# Patient Record
Sex: FEMALE | Race: AMERICAN INDIAN OR ALASKA NATIVE
[De-identification: names, ages, dates, MRNs, and addresses within clinical notes are randomized per-mention and may not be internally consistent; named-entity substitution may affect disease eponyms.]

---

## 2017-04-17 NOTE — HISTORY AND PHYSICAL REPORT
History of Present Illness


Chief complaint: 





chest palpitations


History of present illness: 





60 YO Female with HTN, CVA, MI, CHF, Atrial Fib, DM, CKD, COPD, HLD, Bipolar 

Disorder, CAD S/P Stent Placement presents to ED for evaluation.  Pt states 

that she has been experienced a "racing heart" today. Pt symptoms accompanied 

by chest pain and shortness of breath. Pain is 6/10, substernal, nonradiating, 

not worsened with exertion, or relieved with rest. Pt denies productive cough, 

leg swelling, calf pain, NVD, syncope, dizziness, or recent ill contacts. 








Past History


Past Medical History: acute MI, atrial fib, CAD, COPD, diabetes, heart failure, 

hypertension, hyperlipidemia


Past Surgical History: Other (stent placement)


Social history: , smoking.  denies: alcohol abuse, prescription drug 

abuse, IV drug use


Family history: diabetes, hypertension





Medications and Allergies


 Allergies











Allergy/AdvReac Type Severity Reaction Status Date / Time


 


apple AdvReac  Hives Verified 12/22/15 14:57


 


shell fish Allergy  Swelling Uncoded 12/22/15 14:57











 Home Medications











 Medication  Instructions  Recorded  Confirmed  Last Taken  Type


 


Carvedilol [Coreg] 12.5 mg PO BID 12/11/15 04/17/17 Unknown History


 


Clopidogrel [Plavix] 75 mg PO QDAY 12/11/15 04/17/17 Unknown History


 


Gabapentin [Neurontin] 300 mg PO BID 12/11/15 04/17/17 Unknown History


 


Amlodipine Besylate [Norvasc] 10 mg PO DAILY 04/17/17 04/17/17 Unknown History


 


Apixaban [Eliquis] 5 mg PO DAILY 04/17/17 04/17/17 Unknown History


 


ISOSORBIDE MONOnitrate [Imdur ER] 30 mg PO DAILY 04/17/17 04/17/17 Unknown 

History


 


Lisinopril [Zestril] 5 mg PO QDAY 04/17/17 04/17/17 Unknown History


 


Nitroglycerin [Nitrostat] 0.4 mg SL Q5M PRN 04/17/17 04/17/17 Unknown History


 


cloNIDine [Catapres] 0.2 mg PO BID 04/17/17 04/17/17 Unknown History














Review of Systems


All systems: negative


Cardiovascular: palpitations, shortness of breath





Exam





- Constitutional


Vitals: 


 











Temp Pulse Resp BP Pulse Ox


 


 98.3 F   89   22   105/69   97 


 


 04/17/17 11:27  04/17/17 13:30  04/17/17 13:30  04/17/17 13:30  04/17/17 13:30











General appearance: Present: mild distress, well-nourished





- EENT


Eyes: Present: PERRL


ENT: hearing intact, clear oral mucosa





- Neck


Neck: Present: supple, normal ROM





- Respiratory


Respiratory effort: normal


Respiratory: bilateral: diminished





- Cardiovascular


Rhythm: irregularly irregular


Heart Sounds: Absent: rub, click





- Extremities


Extremities: pulses symmetrical, No edema


Extremity abnormal: edema


Peripheral Pulses: within normal limits





- Abdominal


General gastrointestinal: Present: soft, non-tender, non-distended, normal 

bowel sounds


Female genitourinary: Present: normal





- Integumentary


Integumentary: Present: clear, warm, dry





- Musculoskeletal


Musculoskeletal: gait normal, strength equal bilaterally





- Psychiatric


Psychiatric: appropriate mood/affect, intact judgment & insight





- Neurologic


Neurologic: CNII-XII intact, moves all extremities





Results





- Labs


CBC & Chem 7: 


 04/17/17 12:03





 04/17/17 16:12


Labs: 


 Abnormal lab results











  04/17/17 04/17/17 Range/Units





  12:03 12:03 


 


WBC  12.3 H   (4.5-11.0)  K/mm3


 


RDW  17.4 H   (13.2-15.2)  %


 


PT   16.0 H  (12.2-14.9)  Sec.


 


INR   1.29 H  (0.87-1.13)  


 


APTT   48.6 H  (24.2-36.6)  Sec.


 


D-Dimer   260.88 H  (0-234)  ng/mlDDU














Assessment and Plan





- Patient Problems


(1) ACS (acute coronary syndrome)


Current Visit: Yes   Status: Acute   


Plan to address problem: 


Cardiology consulted: Chest pain protocol. serial cardiac enzymes, ekg, 

telemetry monitoring, stress test in AM as per cardiology








(2) Atrial fibrillation with rapid ventricular response


Current Visit: Yes   Status: Acute   


Plan to address problem: 


continue current therapy, rate controlled. condinue cardizem, cardiology team 

consulted. 








(3) Hypertension


Current Visit: Yes   Status: Chronic   


Qualifiers: 


   Hypertension type: H 


Plan to address problem: 


monitor bp q shift, continue current therapy








(4) DVT prophylaxis


Current Visit: Yes   Status: Acute

## 2017-04-17 NOTE — CONSULTATION
History of Present Illness


Consult date: 04/17/17


Consult reason: chest pain


History of present illness: 





This is a 61yr old woman visiting from [place] who presents to the ED with 

complaints of chest pain. Patient gives a history of coronary artery disease 

status post coronary intervention 3 months ago. She has a history of atrial 

fibrillation and is on eliquis for oral anticoagulation. Patient also continues 

to smoke. Her presenting ECG shows atrial fibrillation with rapid ventricular 

response. Initial set of cardiac enzymes are normal. Patient admitted to the 

hospital and a cardiac consultation requested for chest pain.





Past History


Past Medical History: atrial fib, CAD, hypertension





Medications and Allergies


 Allergies











Allergy/AdvReac Type Severity Reaction Status Date / Time


 


apple AdvReac  Hives Verified 12/22/15 14:57


 


shell fish Allergy  Swelling Uncoded 12/22/15 14:57











 Home Medications











 Medication  Instructions  Recorded  Confirmed  Last Taken  Type


 


Carvedilol [Coreg] 12.5 mg PO BID 12/11/15 04/17/17 Unknown History


 


Clopidogrel [Plavix] 75 mg PO QDAY 12/11/15 04/17/17 Unknown History


 


Gabapentin [Neurontin] 300 mg PO BID 12/11/15 04/17/17 Unknown History


 


Amlodipine Besylate [Norvasc] 10 mg PO DAILY 04/17/17 04/17/17 Unknown History


 


Apixaban [Eliquis] 5 mg PO DAILY 04/17/17 04/17/17 Unknown History


 


ISOSORBIDE MONOnitrate [Imdur ER] 30 mg PO DAILY 04/17/17 04/17/17 Unknown 

History


 


Lisinopril [Zestril] 5 mg PO QDAY 04/17/17 04/17/17 Unknown History


 


Nitroglycerin [Nitrostat] 0.4 mg SL Q5M PRN 04/17/17 04/17/17 Unknown History


 


cloNIDine [Catapres] 0.2 mg PO BID 04/17/17 04/17/17 Unknown History











Active Meds: 


Active Medications





Acetaminophen (Tylenol)  650 mg PO Q4H PRN


   PRN Reason: Pain MILD(1-3)/Fever >100.5/HA


Albuterol/Ipratropium (Duoneb 0.5 Mg-3 Mg/3 Ml Soln)  1 ampul IH Q6HRT PRN


   PRN Reason: Wheezing


Apixaban (Eliquis)  5 mg PO DAILY DERIK


   PRN Reason: Protocol


Bisacodyl (Dulcolax)  10 mg [place] QDAY PRN


   PRN Reason: Constipation unrelieved by MOM


Carvedilol (Coreg)  12.5 mg PO BID DERIK


Clonidine HCl (Catapres)  0.2 mg PO BID DERIK


Clopidogrel Bisulfate (Plavix)  75 mg PO QDAY DERIK


Gabapentin (Neurontin)  300 mg PO BID DERIK


Isosorbide Mononitrate (Imdur)  30 mg PO DAILY DERIK


Lisinopril (Zestril)  5 mg PO QDAY DERIK


Magnesium Hydroxide (Milk Of Magnesia)  30 ml PO Q4H PRN


   PRN Reason: Constipation


Miscellaneous Medication (Amlodipine Besylate [Norvasc])  10 mg PO DAILY DERIK


Nitroglycerin (Nitrostat)  0.4 mg SL Q5M PRN


   PRN Reason: Chest Pain


Ondansetron HCl (Zofran)  4 mg IV Q8H PRN


   PRN Reason: N/V unrelieved by Reglan











Physical Examination


 Vital Signs











Pulse BP


 


 140 H  110/78 


 


 04/17/17 11:20  04/17/17 11:20











General appearance: no acute distress


HEENT: Positive: PERRL


Neck: Positive: trachea midline


Cardiac: Positive: irregularly irregular





Results





 04/17/17 12:03





 04/17/17 12:03


 Coagulation











  04/17/17 Range/Units





  12:03 


 


PT  16.0 H  (12.2-14.9)  Sec.


 


INR  1.29 H  (0.87-1.13)  


 


APTT  48.6 H  (24.2-36.6)  Sec.








 CBC











  04/17/17 Range/Units





  12:03 


 


WBC  12.3 H  (4.5-11.0)  K/mm3


 


RBC  4.41  (3.65-5.03)  M/mm3


 


Hgb  12.5  (10.1-14.3)  gm/dl


 


Hct  41.5  (30.3-42.9)  %


 


Plt Count  239  (140-440)  K/mm3


 


Lymph #  Np  


 


Mono #  Np  


 


Eos #  Np  


 


Baso #  Np  














EKG interpretations





- EKG


Supraventricular dysrhythmia: atrial fibrillation





Assessment and Plan





Chest pain


Persistent Afib


   on eliquis for anticoagulation as an outpatient


Hx of CAD s/p recent PCI 


   on plavix therapy


Hypertension


Tobacco abuse

## 2017-04-17 NOTE — ADMIT CRITERIA FORM
Admission Criteria Documentation: 





                                           TELEMETRY CARE





Telemetry Admission Guidelines





                                                             (Place 'X' for any 

and all applicable criteria):





Admission to telemetry [A] may be indicated for ANY ONE of the following(1)(2)(3

)(4)(5):


[X ]I.    Cardiac disease, including ANY ONE  of the following (9)(10)(11)(12)(

13):   


         [ ]a)   Postacute MI


         [ ]b)   Low-risk patients with ST-segment elevation  MI who have 

undergone successful percutaneous coronary intervention


         [ ]c)   Unstable angina             


         [ ]d)   Suspected MI (until it is ruled out)


         [ ]e)   Post cardiac  surgery (first 48 to 72 hours unless 

complications occur)


         [X ]f)    Acute arrhythmias (including significant tachycardia or 

bradycardia) [B]


         [ ]g)   Firing of an implantable cardioverter defibrillator [C]


         [ ]h)   Suspected pacemaker or implantable cardioverter defibrillator 

malfunction (10)


         [ ]i)    New administration or adjustment of an antiarrhythmic drug [D

] 


         [ ]j)    Child admitted for acute congestive heart failure            

  


         [ ]j)    Long QT syndrome 


         [ ]k)   Advanced heart block (eg, second-degree Mobitz  type II, third-

degree heart block)


         [ ]l)    Acute myocarditis or pericarditis


         [ ]m)  Short-term (ambulatory or inpatient) monitoring after a cardiac

  procedure as indicated  by ANY ONE of  the following [E]:


                  [ ]i)     Electrophysiologic studies                          


                  [ ]ii)    Percutaneous coronary  intervention with stent 

placement


                  [ ]iii)   Pacemaker placement with cardiac  conduction defect 


                  [ ]iv)   Implantable cardiac  defibrillator placement


[ ]II.   Drug overdose  or poisoning with substance that causes arrhythmias or 

QT prolongation (eg, phenothiazines, sympathomimetic agents, 


         cyclic antidepressants, digitalis, antiarrhythmic drugs)(15)


[ ]III.  Short-term (ambulatory or inpatient) monitoring after therapeutic or 

diagnostic procedure requiring 


         conscious sedation or anesthesia (eg, endoscopy, elective   

cardioversion)


[ ]IV.  Acute cerebrovascular even[F](18)


[ ]V.   Massive blood transfusion (eg, at least 10 units of packed red blood 

cells in 24 hours)


[ ]VI.  Variceal bleeding after endoscopy, sclerotherapy, or IV vasopressin


[ ]VII. Uncorrected electrolyte  abnormalities associated with an increased  

risk of dangerous arrhythmia [G]; examples include


         [ ]a)   Hyperkalemia with attributable ECG changes


         [ ]b)   Potassium greater  than 6.5 mmol/L  (mEq/L) in a patient  

without  history of chronic  renal disease 


         [ ]c)   Prolonged QT attributed to hypokalemia,   hypomagnesemia, or 

hypocalcemia


[ ]VIII.Unexplained syncope  or other neurologic event suspected of being due 

to arrhythmia due to a finding that increases risk; 


        examples include(19)(20)(21):


        [ ]a)   High-risk ECG findings (eg, bifascicular block, bradycardia, 

abnormal QT interval,  ventricular pre- excitation)


        [ ]b)   History of previous  syncope  due to arrhythmia


        [ ]c)   Abnormal ventricular function  (eg, reduced  ejection  fraction

)      


        [ ]d)   Exertional or supine syncope


        [ ]e)   Concerning syncope  characteristics (eg, sudden loss of 

consciousness without  prodrome)


        [ ]f)    Family history of sudden  death


        [ ]g)   Use of arrhythmogenic medication          


        [ ]h)   Suspected cardiac  ischemia


        [ ]i)    Known channelopathy (eg, long QT syndrome, Brugada syndrome, 

or catecholaminergic paroxysmal ventricular tachycardia)


        [ ]j)    Known structural heart disease (eg, hypertrophic cardiomyopathy

, severe valvular disease)


        [ ]k)   Palpitations preceding syncope











The original [employer] content created by [employer] has been revised. 


The portions of  the content which have been revised are identified through the 

use of italic text or in bold, and [employer] [employer] 


has neither reviewed nor approved the modified material. All other unmodified 

content is copyright  [employer].





Please see references footnoted in the original [employer] edition 

2016





Admission Criteria Met: Yes

## 2017-04-17 NOTE — EMERGENCY DEPARTMENT REPORT
ED Palpitations HPI





- General


Chief Complaint: Chest Pain


Stated Complaint: CHEST PAIN


Time Seen by Provider: 04/17/17 11:26


Source: patient, EMS


Mode of arrival: Stretcher


Limitations: No Limitations





- History of Present Illness


MD Complaint: rapid heart beat, "heart racing", "skipped beats", palpitations, 

irregular heart beat, atrial fibrillation


-: Sudden


Context: occured during rest


Arrythmia History: atrial fibrillation


Associated Symptoms: chest pain, shortness of breath.  denies: syncope, near-

syncope, nausea/vomiting, anxiety, diaphoresis, cough, parasthesias





- Related Data


 Home Medications











 Medication  Instructions  Recorded  Confirmed  Last Taken


 


Carvedilol [Coreg] 12.5 mg PO BID 12/11/15 04/17/17 Unknown


 


Clopidogrel [Plavix] 75 mg PO QDAY 12/11/15 04/17/17 Unknown


 


Gabapentin [Neurontin] 300 mg PO BID 12/11/15 04/17/17 Unknown


 


Amlodipine Besylate [Norvasc] 10 mg PO DAILY 04/17/17 04/17/17 Unknown


 


Apixaban [Eliquis] 5 mg PO DAILY 04/17/17 04/17/17 Unknown


 


ISOSORBIDE MONOnitrate [Imdur ER] 30 mg PO DAILY 04/17/17 04/17/17 Unknown


 


Lisinopril [Zestril] 5 mg PO QDAY 04/17/17 04/17/17 Unknown


 


Nitroglycerin [Nitrostat] 0.4 mg SL Q5M PRN 04/17/17 04/17/17 Unknown


 


cloNIDine [Catapres] 0.2 mg PO BID 04/17/17 04/17/17 Unknown











 Allergies











Allergy/AdvReac Type Severity Reaction Status Date / Time


 


apple AdvReac  Hives Verified 12/22/15 14:57


 


shell fish Allergy  Swelling Uncoded 12/22/15 14:57














ED Review of Systems


ROS: 


Stated complaint: CHEST PAIN


Other details as noted in HPI





Constitutional: denies: chills, fever


Eyes: denies: eye pain, eye discharge, vision change


ENT: denies: ear pain, throat pain


Respiratory: denies: cough, shortness of breath, wheezing


Cardiovascular: denies: chest pain, palpitations


Endocrine: no symptoms reported


Gastrointestinal: denies: abdominal pain, nausea, diarrhea


Genitourinary: denies: urgency, dysuria, discharge


Musculoskeletal: denies: back pain, joint swelling, arthralgia


Skin: denies: rash, lesions


Neurological: denies: headache, weakness, paresthesias


Psychiatric: denies: anxiety, depression


Hematological/Lymphatic: denies: easy bleeding, easy bruising





ED Past Medical Hx





- Past Medical History


Hx Hypertension: Yes


Hx CVA: Yes


Hx Heart Attack/AMI: Yes


Hx Congestive Heart Failure: Yes


Hx Diabetes: Yes


Hx Renal Disease: Yes (RENAL INSUFFICIENCY)


Hx Psychiatric Treatment: Yes (BIPOLAR/SCHIZOPHRENIA)


Hx COPD: Yes


Additional medical history: HIGH CHOLESTEROL





- Surgical History


Hx Coronary Stent: Yes (x 2 in July 2015)


Additional Surgical History: stent placement x 2 in 2015





- Social History


Smoking Status: Light Tobacco Smoker





- Medications


Home Medications: 


 Home Medications











 Medication  Instructions  Recorded  Confirmed  Last Taken  Type


 


Carvedilol [Coreg] 12.5 mg PO BID 12/11/15 04/17/17 Unknown History


 


Clopidogrel [Plavix] 75 mg PO QDAY 12/11/15 04/17/17 Unknown History


 


Gabapentin [Neurontin] 300 mg PO BID 12/11/15 04/17/17 Unknown History


 


Amlodipine Besylate [Norvasc] 10 mg PO DAILY 04/17/17 04/17/17 Unknown History


 


Apixaban [Eliquis] 5 mg PO DAILY 04/17/17 04/17/17 Unknown History


 


ISOSORBIDE MONOnitrate [Imdur ER] 30 mg PO DAILY 04/17/17 04/17/17 Unknown 

History


 


Lisinopril [Zestril] 5 mg PO QDAY 04/17/17 04/17/17 Unknown History


 


Nitroglycerin [Nitrostat] 0.4 mg SL Q5M PRN 04/17/17 04/17/17 Unknown History


 


cloNIDine [Catapres] 0.2 mg PO BID 04/17/17 04/17/17 Unknown History














ED Physical Exam





- General


Limitations: No Limitations


General appearance: alert, in no apparent distress





- Head


Head exam: Present: atraumatic, normocephalic





- Eye


Eye exam: Present: normal appearance





- ENT


ENT exam: Present: mucous membranes moist





- Neck


Neck exam: Present: normal inspection





- Respiratory


Respiratory exam: Present: normal lung sounds bilaterally.  Absent: respiratory 

distress, wheezes, rales, rhonchi, stridor





- Cardiovascular


Cardiovascular Exam: Present: tachycardia, irregular rhythm.  Absent: systolic 

murmur, diastolic murmur, rubs, gallop





- GI/Abdominal


GI/Abdominal exam: Present: soft, normal bowel sounds





- Extremities Exam


Extremities exam: Present: normal inspection





- Back Exam


Back exam: Present: normal inspection





- Neurological Exam


Neurological exam: Present: alert, oriented X3





- Psychiatric


Psychiatric exam: Present: normal affect, normal mood





- Skin


Skin exam: Present: warm, dry, intact, normal color.  Absent: rash





ED Course


 Vital Signs











  04/17/17 04/17/17 04/17/17





  11:20 11:27 11:35


 


Temperature  98.3 F 


 


Pulse Rate 140 H 129 H 140 H


 


Respiratory  22 





Rate   


 


Blood Pressure 110/78 114/81 117/67


 


Blood Pressure   





[Left]   


 


O2 Sat by Pulse  100 





Oximetry   














  04/17/17 04/17/17 04/17/17





  12:00 13:13 13:15


 


Temperature   


 


Pulse Rate 104 H 136 H 124 H


 


Respiratory 18 17 16





Rate   


 


Blood Pressure   93/71


 


Blood Pressure 114/78  





[Left]   


 


O2 Sat by Pulse 99 99 96





Oximetry   














  04/17/17 04/17/17 04/17/17





  13:21 13:25 13:29


 


Temperature   


 


Pulse Rate 118 H 133 H 143 H


 


Respiratory 19 19 





Rate   


 


Blood Pressure 96/70 96/70 113/78


 


Blood Pressure   





[Left]   


 


O2 Sat by Pulse 99 98 





Oximetry   














  04/17/17





  13:30


 


Temperature 


 


Pulse Rate 89


 


Respiratory 22





Rate 


 


Blood Pressure 105/69


 


Blood Pressure 





[Left] 


 


O2 Sat by Pulse 97





Oximetry 














ED Medical Decision Making





- Lab Data


Result diagrams: 


 04/17/17 12:03








- EKG Data


Interpretation: other (a-fib with rvr)





- Radiology Data


Radiology results: report reviewed, image reviewed





- Medical Decision Making





patient stable after doses of metoprolol and dilt here in the ER, consulted 

cardiolgoy who are seeing the patient , cbc and coags within normal limits , 

waiting on chemistry and troponin,.





HR now at 90 to 100/min , bp well maintained , Spoke to hospitalist and agree 

with plan for admission.


Critical care time in (mins) excluding proc time.: 35


Critical care attestation.: 


If time is entered above; I have spent that time in minutes in the direct care 

of this critically ill patient, excluding procedure time.








ED Disposition


Clinical Impression: 


 Afib, Hypertension, Acute chest pain





Disposition: OP ADMITTED AS IP TO THIS HOSP


Is pt being admited?: Yes


Does the pt Need Aspirin: No


Condition: Fair


Instructions:  Hypertension (ED), Chest Pain (ED)


Time of Disposition: 14:19

## 2017-04-17 NOTE — XRAY REPORT
AP CHEST:



HISTORY: Dysrhythmia



AP view of the chest demonstrates a normal mediastinal and

cardiac contour with clear lungs and normal bony and soft tissue

structures.



IMPRESSION:

Unremarkable AP chest. No change since 12/10/15.

## 2017-04-18 NOTE — QUERY- CHEST PAIN
Deadakotah Russell____Jas_______________  Date:_____4/18/17____________   



/CDS:____Tj Schwartzabhenry____________ Phone#:____[phone]___________



Exercise your independent professional judgment when responding to query.  

Questions asked do not imply a particular answer is desired or expected. We 
greatly appreciate your clarification on this issue.           

Clinical Documentation States:



61 year old female was admitted on 4/17/17.



The cadiology consult note states" Consult reason: chest pain. Assessment and 
Plan Chest pain Persistent Afib on eliquis for anticoagulation as an outpatient 
Hx of CAD s/p recent PCI on plavix therapy Hypertension Tobacco abuse. Rule out 
MI protocol, followed by a Persantin thallium stress test for further 
assessment of coronary artery disease and chest pain. "



The H&P states " Assessment and Plan



- Patient Problems

(1) ACS (acute coronary syndrome)

Current Visit: Yes   Status: Acute   

Plan to address problem: 

Cardiology consulted: Chest pain protocol. serial cardiac enzymes, ekg, 
telemetry monitoring, stress test in AM as per cardiology



(2) Atrial fibrillation with rapid ventricular response

Current Visit: Yes   Status: Acute   

Plan to address problem: 

continue current therapy, rate controlled. condinue cardizem, cardiology team 
consulted.  "

     



Please document the etiology of Chest Pain:



[ ]  Myocardial Infarction

[ ]  Pneumonia

[ ]  Mediastinitis

[X ]  Costochondritis

[ ]  Pulmonary  Embolism

[ ]  Coronary  Artery  Disease

[ ]  GERD

[ ]  Other:__________    

[ ]  Comment/Explanation:____________







Present on Admission: [ Y] Yes (Y)      [ ] Clinically undeterminable (W)     [ 
] No(N)  



Please  document response in your Progress Notes and/or Discharge Summary and 
indicate 

if the condition was present on admission.
MTDD

## 2017-04-18 NOTE — DISCHARGE SUMMARY
Providers





- Providers


Date of Admission: 


04/17/17 14:27





Date of discharge: 04/18/17


Attending physician: 


MUMTAZ QUESADA MD





 





04/17/17


Consult to Cardiac Rehabilitation [CONS] Routine 


   Reason For Exam: Phase I











Primary care physician: 


PRIMARY CARE MD








Hospitalization


Reason for admission: chest pain


Condition: Stable


Hospital course: 


62 YO Female with HTN, CVA, MI, CHF, Atrial Fib, DM, CKD, COPD, HLD, Bipolar 

Disorder, CAD S/P Stent Placement presents to ED for evaluation.  Pt states 

that she has been experienced a "racing heart" today. Pt symptoms accompanied 

by chest pain and shortness of breath. Pain is 6/10, substernal, nonradiating, 

not worsened with exertion, or relieved with rest. Pt denies productive cough, 

leg swelling, calf pain, NVD, syncope, dizziness, or recent ill contacts.  For 

full evaluation of the patient the patient wants a stress test which was 

unremarkable.  Cardiology recommended medical therapy for CAD including dual 

oral antiplatelet platelet medication.  I did also discuss the patient she 

states she's been having a chronic pain in her chest wall area that has been 

ongoing for months and this reproducible.  She denies any association of 

shortness of breath at this time.  I did provide about 15 minutes of counseling 

to the patient the need to quit tobacco use she verbalizes that she has cut 

down from 4 packs a day to 3 cigarettes daily encouraged her to continue on 

that pathway totally quit considered history.





Discharge diagnosis


* Atypical chest pain likely secondary to costochondritis


* CAD


* Atrial fibrillation with rapid ventricular response


* Mild protein caloric malnutrition


* Hypertension


* Tobacco abuse


* Hypokalemia


* CKD


* COPD


* Bipolar disorder


Disposition: DISCHARGED TO HOME OR SELFCARE


Time spent for discharge: 35 mins





Core Measure Documentation





- Palliative Care


Palliative Care/ Comfort Measures: Not Applicable





- Core Measures


Any of the following diagnoses?: none





- VTE Discharge Requirements


Deep Vein Thrombosis/Pulmonary Embolism Present on Admission: No





Exam





- Physical Exam


Narrative exam: 


 VITAL SIGNS:  Reviewed.    


GENERAL:  The patient appeared well nourished and normally developed. Vital 

signs as documented.


HEAD:  No signs of head trauma.


EYES:  Pupils are equal.  Extraocular motions intact.  


EARS:  Hearing grossly intact.


MOUTH:  Oropharynx is normal. 


NECK:  No adenopathy, no JVD.   


CHEST:  Chest with clear breath sounds bilaterally.  No wheezes, rales, or 

rhonchi.  


CARDIAC:  Regular rate and rhythm.  S1 and S2, without murmurs, gallops, or 

rubs.


VASCULAR:  No Edema.  Peripheral pulses normal and equal in all extremities.


ABDOMEN:  Soft, without detectable tenderness.  No sign of distention.  No   

rebound or guarding, and no masses palpated.   Bowel Sounds normal.


MUSCULOSKELETAL:  Good range of motion of all major joints. Extremities without 

clubbing, cyanosis or edema.  


NEUROLOGIC EXAM:  Alert and oriented x 3.  No focal sensory or strength 

deficits.   Speech normal.  Follows commands.


PSYCHIATRIC:  Mood normal.


SKIN:  No rash or lesions.














- Constitutional


Vitals: 


 











Temp Pulse Resp BP Pulse Ox


 


 98.2 F   49 L  18   110/64   99 


 


 04/18/17 05:17  04/18/17 08:07  04/18/17 05:17  04/18/17 05:17  04/18/17 05:17














Plan


Activity: advance as tolerated, fall precautions


Diet: low salt


Special Instructions: record daily weights, record daily BP diary


Follow up with: 


[facility] [Provider Group] - 7 Days


PRIMARY CARE,MD [Primary Care Provider] - 3-5 Days


Prescriptions: 


amLODIPine [Norvasc] 10 mg PO DAILY #30 tablet


Diltiazem [Cardizem] 30 mg PO Q6HR #90 tablet

## 2017-04-18 NOTE — PROGRESS NOTE
Assessment and Plan





Medical therapy for coronary artery disease including dual oral antiplatelet 

therapy.  Medical therapy for her despite atrial fibrillation including oral 

anticoagulation therapy.





Subjective


Date of service: 04/18/17


Interval history: 





Patient underwent a Persantine thallium stress test today, normal myocardial 

perfusion study.


There is no further chest pain, no shortness of breath and no palpitations.





Objective


 Vital Signs











  Temp Pulse Pulse Resp BP BP Pulse Ox


 


 04/18/17 10:47   75    139/78  


 


 04/18/17 10:46   76    139/78  


 


 04/18/17 10:45   76    141/76  


 


 04/18/17 10:44   82    121/74  


 


 04/18/17 10:43   72    151/78  


 


 04/18/17 10:04   50 L    142/79  


 


 04/18/17 08:07   49 L     


 


 04/18/17 07:30  98.0 F   52 L  18   127/74  98


 


 04/18/17 05:17  98.2 F   56 L  18   110/64  99


 


 04/18/17 04:00   52 L     


 


 04/18/17 00:24  97.6 F   66  20   121/68  97


 


 04/17/17 21:21        98


 


 04/17/17 20:13  98.9 F   65  18   116/86  99


 


 04/17/17 17:05  99.2 F   72  72 H   109/75  97


 


 04/17/17 16:02   132 H     


 


 04/17/17 15:25   132 H   18  103/67   99


 


 04/17/17 15:21   103 H   18  103/67   99


 


 04/17/17 15:15   137 H   15  103/67   98


 


 04/17/17 15:11   112 H   17  103/67   98


 


 04/17/17 15:05   118 H   18  103/67   98


 


 04/17/17 15:00   128 H   33 H  103/67   97


 


 04/17/17 14:55   126 H   26 H  110/73   99


 


 04/17/17 14:50   116 H   20  110/73   99


 


 04/17/17 14:45   108 H   16  110/73   100


 


 04/17/17 14:41   113 H   21  110/73   99


 


 04/17/17 14:35   117 H   13  110/73   98


 


 04/17/17 14:30   106 H   16  110/73  














- Physical Examination


General: No Apparent Distress


HEENT: Positive: PERRL


Neck: Positive: trachea midline


Cardiac: Positive: irregularly irregular


Lungs: Positive: Decreased Breath Sounds


Neuro: Positive: Grossly Intact


Abdomen: Positive: Soft


Skin: Positive: Clear


Extremities: Absent: edema





- Labs and Meds


 Cardiac Enzymes











  04/17/17 04/18/17 Range/Units





  23:00 02:18 


 


CK-MB (CK-2)  3.4  3.0  (0.0-4.0)  ng/mL








 Comprehensive Metabolic Panel











  04/17/17 Range/Units





  16:12 


 


Sodium  143  (137-145)  mmol/L


 


Potassium  3.2 L  (3.6-5.0)  mmol/L


 


Chloride  109.4 H  ()  mmol/L


 


Carbon Dioxide  20 L D  (22-30)  mmol/L


 


BUN  20 H  (7-17)  mg/dL


 


Creatinine  1.3 H  (0.7-1.2)  mg/dL


 


Glucose  149 H  ()  mg/dL


 


Calcium  8.6  (8.4-10.2)  mg/dL

## 2017-04-19 NOTE — TREADMILL REPORT
THALLIUM STRESS TEST



LEFT VENTRICLE:  Left ventricular chamber size is within normal.  Perfusion

study demonstrates homogeneous uptake of the tracer in all segments.  No

significant defects identified.  Gated analysis was not available.



CONCLUSION:  Normal myocardial perfusion study.





DD: 04/18/2017 13:37

DT: 04/19/2017 01:50

[facility]# [id number]  [id number]

CA/NTS

## 2017-09-22 NOTE — EMERGENCY DEPARTMENT REPORT
ED Chest Pain HPI





- General


Chief Complaint: Chest Pain


Stated Complaint: CHEST PAIN


Time Seen by Provider: 09/22/17 13:33


Source: patient, EMS


Mode of arrival: Stretcher


Limitations: No Limitations





- History of Present Illness


Initial Comments: 


Patient is a very poor historian.  She states that she has no local physician 

and that she is not from this area.  Despite this apparently probably 

unreliable information, she states that she was just admitted to [facility] for chest pain.  She states that she "can't remember" if she had a 

cardiac catheterization or not.  She was actually admitted here in April 2017.  

At that time she had a nuclear perfusion scan that was read by Dr. Bacon as 

normal.  The patient states that she is from [place] and just visiting 

relatives here.  This seems quite dubious considering her multiple admissions 

at this facility and recent admission to [facility].  In any case she 

said that she had a stent placed [facility] approximately last January.





Patient states that she took 3 nitroglycerin for her chest pain prior to 

calling EMS.  She claims that she has been compliant with her antihypertensive 

regimen.  However she arrives with severely uncontrolled hypertension.  She 

complains of associated nausea and shortness of breath.


MD Complaint: chest pain


-: hour(s)


Onset: during rest


Pain Location: substernal


Severity scale (0 -10): 8


Quality: tightness


Consistency: constant


Improves With: nothing


Worsens With: nothing


Context: other (history of PCI)


re: nausea, dyspnea


Other Symptoms: denies: cough, fever, syncope


Treatments Prior to Arrival: none


Aspirin use within the Past 7 Days: (0) No (patient states that she is not 

taking Plavix although her reconciliation list this as above)





- Related Data


 Home Medications











 Medication  Instructions  Recorded  Confirmed  Last Taken


 


Carvedilol [Coreg] 12.5 mg PO BID 12/11/15 04/17/17 Unknown


 


Clopidogrel [Plavix] 75 mg PO QDAY 12/11/15 04/17/17 Unknown


 


Gabapentin [Neurontin] 300 mg PO BID 12/11/15 04/17/17 Unknown


 


Apixaban [Eliquis] 5 mg PO DAILY 04/17/17 04/17/17 Unknown


 


ISOSORBIDE MONOnitrate [Imdur ER] 30 mg PO DAILY 04/17/17 04/17/17 Unknown


 


Lisinopril [Zestril TAB] 5 mg PO QDAY 04/17/17 04/17/17 Unknown


 


Nitroglycerin [Nitrostat] 0.4 mg SL Q5M PRN 04/17/17 04/17/17 Unknown


 


cloNIDine [Catapres] 0.2 mg PO BID 04/17/17 04/17/17 Unknown








 Previous Rx's











 Medication  Instructions  Recorded  Last Taken  Type


 


Diltiazem [Cardizem] 30 mg PO Q6HR #90 tablet 04/18/17 Unknown Rx


 


amLODIPine [Norvasc] 10 mg PO DAILY #30 tablet 04/18/17 Unknown Rx











 Allergies











Allergy/AdvReac Type Severity Reaction Status Date / Time


 


apple AdvReac  Hives Verified 09/22/17 13:02


 


shell fish Allergy  Swelling Uncoded 09/22/17 13:02














Heart Score





- HEART Score


History: Moderately suspicious


EKG: Non-specific


Age: 45-65


Risk factors: > 3 risk factors or hx of atherosclerotic disease


Troponin: < normal limit


HEART Score: 5





- Critical Actions


Critical Actions: 4-6 pts:12-16.6% risk of adverse cardiac event. Should be 

admitted





ED Review of Systems


ROS: 


Stated complaint: CHEST PAIN


Other details as noted in HPI





Constitutional: denies: chills, fever


Eyes: denies: eye pain, eye discharge, vision change


ENT: denies: ear pain, throat pain


Respiratory: shortness of breath.  denies: cough, wheezing


Cardiovascular: chest pain.  denies: palpitations


Endocrine: no symptoms reported


Gastrointestinal: nausea.  denies: abdominal pain, diarrhea


Genitourinary: denies: urgency, dysuria, discharge


Musculoskeletal: denies: back pain, joint swelling, arthralgia


Skin: denies: rash, lesions


Neurological: denies: headache, weakness, paresthesias


Psychiatric: denies: anxiety, depression


Hematological/Lymphatic: denies: easy bleeding, easy bruising





ED Past Medical Hx





- Past Medical History


Previous Medical History?: Yes


Hx Hypertension: Yes


Hx CVA: Yes


Hx Heart Attack/AMI: Yes


Hx Congestive Heart Failure: Yes


Hx Diabetes: Yes


Hx Renal Disease: Yes (RENAL INSUFFICIENCY)


Hx Psychiatric Treatment: Yes (BIPOLAR/SCHIZOPHRENIA)


Hx COPD: Yes


Additional medical history: HIGH CHOLESTEROL





- Surgical History


Hx Coronary Stent: Yes (x 2 in July 2015)


Additional Surgical History: stent placement x 2 in 2015





- Social History


Smoking Status: Current Every Day Smoker


Substance Use Type: None





- Medications


Home Medications: 


 Home Medications











 Medication  Instructions  Recorded  Confirmed  Last Taken  Type


 


Carvedilol [Coreg] 12.5 mg PO BID 12/11/15 04/17/17 Unknown History


 


Clopidogrel [Plavix] 75 mg PO QDAY 12/11/15 04/17/17 Unknown History


 


Gabapentin [Neurontin] 300 mg PO BID 12/11/15 04/17/17 Unknown History


 


Apixaban [Eliquis] 5 mg PO DAILY 04/17/17 04/17/17 Unknown History


 


ISOSORBIDE MONOnitrate [Imdur ER] 30 mg PO DAILY 04/17/17 04/17/17 Unknown 

History


 


Lisinopril [Zestril TAB] 5 mg PO QDAY 04/17/17 04/17/17 Unknown History


 


Nitroglycerin [Nitrostat] 0.4 mg SL Q5M PRN 04/17/17 04/17/17 Unknown History


 


cloNIDine [Catapres] 0.2 mg PO BID 04/17/17 04/17/17 Unknown History


 


Diltiazem [Cardizem] 30 mg PO Q6HR #90 tablet 04/18/17  Unknown Rx


 


amLODIPine [Norvasc] 10 mg PO DAILY #30 tablet 04/18/17  Unknown Rx














ED Physical Exam





- General


Limitations: No Limitations


General appearance: alert, in no apparent distress





- Head


Head exam: Present: atraumatic, normocephalic





- Eye


Eye exam: Present: normal appearance.  Absent: scleral icterus





- ENT


ENT exam: Present: mucous membranes moist





- Neck


Neck exam: Present: normal inspection





- Respiratory


Respiratory exam: Present: normal lung sounds bilaterally.  Absent: respiratory 

distress





- Cardiovascular


Cardiovascular Exam: Present: regular rate, normal rhythm.  Absent: systolic 

murmur, diastolic murmur, rubs, gallop





- GI/Abdominal


GI/Abdominal exam: Present: soft, normal bowel sounds.  Absent: distended, 

tenderness, guarding, rebound, rigid





- Extremities Exam


Extremities exam: Present: normal inspection





- Back Exam


Back exam: Present: normal inspection





- Neurological Exam


Neurological exam: Present: alert, oriented X3, CN II-XII intact.  Absent: 

motor sensory deficit





- Psychiatric


Psychiatric exam: Present: normal affect, normal mood





- Skin


Skin exam: Present: warm, dry, intact, normal color.  Absent: rash





- Other


Other exam information: 


Family and posterior tibial pulses are 2+ and 2+ symmetrically








ED Course


 Vital Signs











  09/22/17 09/22/17 09/22/17





  13:20 14:01 14:02


 


Pulse Rate 88  


 


Respiratory   20





Rate   


 


Blood Pressure 189/119 189/110 














- Reevaluation(s)


Reevaluation #1: 


Patient was given Nitropaste, labetalol.  She will be admitted by Dr. Baeza to 

the hospitalist service.  I requested the discharge summary from [facility].  


09/22/17 14:46








PANCHO score





- Pancho Score


Age > 65: (0) No


Aspirin use within the Past 7 Days: (0) No


3 or more CAD Risk Factors: (1) Yes


2 or more Angina events in past 24 hrs: (0) No


Known CAD with more than 50% Stenosis: (1) Yes


Elevated Cardiac Markers: (0) No


ST Deviation Greater than 0.5mm: (0) No


PANCHO Score: 2





ED Medical Decision Making





- Lab Data


Result diagrams: 


 09/22/17 13:05





 09/22/17 13:05








 Laboratory Results - last 24 hr











  09/22/17 09/22/17





  13:05 13:05


 


WBC  15.5 H 


 


RBC  4.23 


 


Hgb  11.7 


 


Hct  36.8 


 


MCV  87 


 


MCH  28 


 


MCHC  32 


 


RDW  16.1 H 


 


Plt Count  296 


 


Lymph % (Auto)  17.0 


 


Mono % (Auto)  5.4 


 


Eos % (Auto)  2.5 


 


Baso % (Auto)  0.5 


 


Lymph #  2.6 


 


Mono #  0.8 


 


Eos #  0.4 


 


Baso #  0.1 


 


Seg Neutrophils %  74.6 H 


 


Seg Neutrophils #  11.6 H 


 


Sodium   140


 


Potassium   3.9


 


Chloride   104.2


 


Carbon Dioxide   22


 


Anion Gap   18


 


BUN   24 H


 


Creatinine   1.3 H


 


Estimated GFR   50


 


BUN/Creatinine Ratio   18.46


 


Glucose   81


 


Calcium   9.3


 


Troponin T   < 0.010














- EKG Data


-: EKG Interpreted by Me


EKG shows normal: sinus rhythm, axis, intervals, QRS complexes, ST-T waves


Rate: normal





- EKG Data


Interpretation: no acute changes, nonspecific ST-T wave mathieu (mild)





- Radiology Data


interpreted by me: 





Chest x-ray shows no acute process


Critical care attestation.: 


If time is entered above; I have spent that time in minutes in the direct care 

of this critically ill patient, excluding procedure time.








ED Disposition


Clinical Impression: 


 Uncontrolled hypertension





Chest pain


Qualifiers:


 Chest pain type: unspecified Qualified Code(s): R07.9 - Chest pain, unspecified





Disposition: DC-09 OP ADMIT IP TO THIS HOSP


Is pt being admited?: Yes


Does the pt Need Aspirin: Yes


Condition: Stable


Instructions:  Hypertension (ED), Chest Pain (ED)


Referrals: 


PRIMARY CARE,MD [Primary Care Provider] - 3-5 Days

## 2017-09-22 NOTE — XRAY REPORT
CHEST ONE VIEW



INDICATION: Hypertension.



COMPARISON: 4/17/2017.



FINDINGS: Portable, single, frontal chest radiograph demonstrates 

normal cardiomediastinal silhouette. Clear lungs. Unremarkable bones. 

Extrinsic EKG leads.



CONCLUSION: No acute disease in the chest.



Thank you for the opportunity to participate in this patient's care.

## 2017-09-22 NOTE — HISTORY AND PHYSICAL REPORT
History of Present Illness


Date of examination: 09/22/17


Date of admission: 


09/22/17 15:14





Chief complaint: 





CC Chest pain 1 day


History of present illness: 


61 y/o female a very poor historian comes in for RSCP.Non radiating.Had Stress 

test in April 2017 in [facility] and was negative.Apparently admitted to [facility] 1 week ago and had w/u for chest pain/.Not clear whrether she 

had cath.Stress was negative.No SOB.No exacerbating or relieving factors.No 

Diaphoresis.No palpitations.No recent travel.Pain is 5/10 dull in character.








Past History


Past Medical History: CAD, hypertension, hyperlipidemia


Past Surgical History: PTCA (Srtent x1 in Jan 2017)


Social history: no significant social history, full code


Family history: CAD, hypertension





Medications and Allergies


 Allergies











Allergy/AdvReac Type Severity Reaction Status Date / Time


 


apple AdvReac  Hives Verified 09/22/17 13:02


 


shell fish Allergy  Swelling Uncoded 09/22/17 13:02











 Home Medications











 Medication  Instructions  Recorded  Confirmed  Last Taken  Type


 


Clopidogrel [Plavix] 75 mg PO QDAY 12/11/15 09/22/17 09/22/17 History


 


Apixaban [Eliquis] 5 mg PO DAILY 04/17/17 09/22/17 09/22/17 History


 


Nitroglycerin [Nitrostat] 0.4 mg SL Q5M PRN 04/17/17 09/22/17 Unknown History


 


amLODIPine [Norvasc] 10 mg PO DAILY #30 tablet 04/18/17 09/22/17 09/22/17 Rx


 


ALBUTEROL Inhaler [Proair] 2 puff IH QID PRN 09/22/17 09/22/17 09/22/17 History


 


AtorvaSTATin [Lipitor] 80 mg PO QHS 09/22/17 09/22/17 09/21/17 History


 


Carvedilol [Coreg] 25 mg PO BID 09/22/17 09/22/17 09/22/17 History


 


Quetiapine Fumarate [Seroquel] 100 mg PO BID 09/22/17 09/22/17 09/22/17 History


 


hydrALAZINE [Apresoline] 25 mg PO Q8HR 09/22/17 09/22/17 09/22/17 History











Active Meds: 


Active Medications





Acetaminophen (Tylenol)  650 mg PO Q4H PRN


   PRN Reason: Pain MILD(1-3)/Fever >100.5/HA


Albuterol (Proair)  2 puff IH QID PRN


   PRN Reason: Shortness Of Breath


Amlodipine Besylate (Norvasc)  10 mg PO DAILY DERIK


Apixaban (Eliquis)  5 mg PO DAILY DERIK


   PRN Reason: Protocol


Atorvastatin Calcium (Lipitor)  80 mg PO QHS DERIK


Bisacodyl (Dulcolax)  10 mg NC QDAY PRN


   PRN Reason: Constipation unrelieved by MOM


Carvedilol (Coreg)  25 mg PO BID DERIK


Famotidine (Pepcid)  20 mg PO BID DERIK


Hydralazine HCl (Apresoline)  25 mg PO Q8HR DERIK


Hydromorphone HCl (Dilaudid)  1 mg IV Q3H PRN


   PRN Reason: Pain , Severe (7-10)


Magnesium Hydroxide (Milk Of Magnesia)  30 ml PO Q4H PRN


   PRN Reason: Constipation


Nitroglycerin (Nitrostat)  0.4 mg SL Q5M PRN


   PRN Reason: Chest Pain


Ondansetron HCl (Zofran)  4 mg IV Q8H PRN


   PRN Reason: N/V unrelieved by Reglan


Oxycodone/Acetaminophen (Percocet 5/325)  1 tab PO Q6H PRN


   PRN Reason: Pain, Moderate (4-6)


Quetiapine Fumarate (Seroquel)  100 mg PO BID DERIK











Review of Systems


All systems: negative





Exam





- Constitutional


Vitals: 


 











Temp Pulse Resp BP Pulse Ox


 


 98.1 F   88   18   182/111   97 


 


 09/22/17 19:52  09/22/17 19:52  09/22/17 19:52  09/22/17 19:52  09/22/17 19:52











General appearance: Present: no acute distress, well-nourished





- EENT


Eyes: Present: PERRL


ENT: hearing intact, clear oral mucosa





- Neck


Neck: Present: supple, normal ROM





- Respiratory


Respiratory effort: normal


Respiratory: bilateral: CTA





- Cardiovascular


Heart rate: 80


Rhythm: regular


Heart Sounds: Present: S1 & S2.  Absent: rub, click





- Extremities


Extremities: pulses symmetrical, No edema


Peripheral Pulses: within normal limits





- Abdominal


General gastrointestinal: Present: soft, non-tender, non-distended, normal 

bowel sounds


Female genitourinary: Present: normal





- Integumentary


Integumentary: Present: clear, warm, dry





- Musculoskeletal


Musculoskeletal: gait normal, strength equal bilaterally





- Psychiatric


Psychiatric: appropriate mood/affect, intact judgment & insight





- Neurologic


Neurologic: CNII-XII intact, moves all extremities





Results





- Labs


CBC & Chem 7: 


 09/23/17 03:06





 09/22/17 13:05


Labs: 


 Laboratory Last Values











WBC  15.5 K/mm3 (4.5-11.0)  H  09/22/17  13:05    


 


RBC  4.23 M/mm3 (3.65-5.03)   09/22/17  13:05    


 


Hgb  11.7 gm/dl (10.1-14.3)   09/22/17  13:05    


 


Hct  36.8 % (30.3-42.9)   09/22/17  13:05    


 


MCV  87 fl (79-97)   09/22/17  13:05    


 


MCH  28 pg (28-32)   09/22/17  13:05    


 


MCHC  32 % (30-34)   09/22/17  13:05    


 


RDW  16.1 % (13.2-15.2)  H  09/22/17  13:05    


 


Plt Count  296 K/mm3 (140-440)   09/22/17  13:05    


 


Lymph % (Auto)  17.0 % (13.4-35.0)   09/22/17  13:05    


 


Mono % (Auto)  5.4 % (0.0-7.3)   09/22/17  13:05    


 


Eos % (Auto)  2.5 % (0.0-4.3)   09/22/17  13:05    


 


Baso % (Auto)  0.5 % (0.0-1.8)   09/22/17  13:05    


 


Lymph #  2.6 K/mm3 (1.2-5.4)   09/22/17  13:05    


 


Mono #  0.8 K/mm3 (0.0-0.8)   09/22/17  13:05    


 


Eos #  0.4 K/mm3 (0.0-0.4)   09/22/17  13:05    


 


Baso #  0.1 K/mm3 (0.0-0.1)   09/22/17  13:05    


 


Seg Neutrophils %  74.6 % (40.0-70.0)  H  09/22/17  13:05    


 


Seg Neutrophils #  11.6 K/mm3 (1.8-7.7)  H  09/22/17  13:05    


 


PT  13.6 Sec. (12.2-14.9)   09/22/17  14:26    


 


INR  1.05  (0.87-1.13)   09/22/17  14:26    


 


APTT  40.3 Sec. (24.2-36.6)  H  09/22/17  14:26    


 


Sodium  140 mmol/L (137-145)   09/22/17  13:05    


 


Potassium  3.9 mmol/L (3.6-5.0)   09/22/17  13:05    


 


Chloride  104.2 mmol/L ()   09/22/17  13:05    


 


Carbon Dioxide  22 mmol/L (22-30)   09/22/17  13:05    


 


Anion Gap  18 mmol/L  09/22/17  13:05    


 


BUN  24 mg/dL (7-17)  H  09/22/17  13:05    


 


Creatinine  1.3 mg/dL (0.7-1.2)  H  09/22/17  13:05    


 


Estimated GFR  50 ml/min  09/22/17  13:05    


 


BUN/Creatinine Ratio  18.46 %  09/22/17  13:05    


 


Glucose  81 mg/dL ()   09/22/17  13:05    


 


Calcium  9.3 mg/dL (8.4-10.2)   09/22/17  13:05    


 


Total Bilirubin  0.40 mg/dL (0.1-1.2)   09/22/17  13:50    


 


Direct Bilirubin  < 0.2 mg/dL (0-0.2)   09/22/17  13:50    


 


Indirect Bilirubin  0.2 mg/dL  09/22/17  13:50    


 


AST  11 units/L (5-40)   09/22/17  13:50    


 


ALT  10 units/L (7-56)   09/22/17  13:50    


 


Alkaline Phosphatase  67 units/L ()   09/22/17  13:50    


 


Troponin T  < 0.010 ng/mL (0.00-0.029)   09/22/17  19:30    


 


NT-Pro-B Natriuret Pep  629.7 pg/mL (0-900)   09/22/17  13:50    


 


Total Protein  6.9 g/dL (6.3-8.2)   09/22/17  13:50    


 


Albumin  3.9 g/dL (3.9-5)   09/22/17  13:50    


 


Albumin/Globulin Ratio  1.3 %  09/22/17  13:50    


 


Urine Color  Yellow  (Yellow)   09/22/17  17:04    


 


Urine Turbidity  Clear  (Clear)   09/22/17  17:04    


 


Urine pH  5.0  (5.0-7.0)   09/22/17  17:04    


 


Ur Specific Gravity  1.012  (1.003-1.030)   09/22/17  17:04    


 


Urine Protein  <15 mg/dl mg/dL (Negative)   09/22/17  17:04    


 


Urine Glucose (UA)  Neg mg/dL (Negative)   09/22/17  17:04    


 


Urine Ketones  Neg mg/dL (Negative)   09/22/17  17:04    


 


Urine Blood  Neg  (Negative)   09/22/17  17:04    


 


Urine Nitrite  Neg  (Negative)   09/22/17  17:04    


 


Urine Bilirubin  Neg  (Negative)   09/22/17  17:04    


 


Urine Urobilinogen  < 2.0 mg/dL (<2.0)   09/22/17  17:04    


 


Ur Leukocyte Esterase  Neg  (Negative)   09/22/17  17:04    


 


Urine WBC (Auto)  1.0 /HPF (0.0-6.0)   09/22/17  17:04    


 


Urine RBC (Auto)  1.0 /HPF (0.0-6.0)   09/22/17  17:04    


 


U Epithel Cells (Auto)  4.0 /HPF (0-13.0)   09/22/17  17:04    


 


Urine Mucus  Few /HPF  09/22/17  17:04    


 


Urine Opiates Screen  Presumptive negative   09/22/17  17:04    


 


Urine Methadone Screen  Presumptive negative   09/22/17  17:04    


 


Ur Barbiturates Screen  Presumptive negative   09/22/17  17:04    


 


Ur Phencyclidine Scrn  Presumptive negative   09/22/17  17:04    


 


Ur Amphetamines Screen  Presumptive negative   09/22/17  17:04    


 


U Benzodiazepines Scrn  Presumptive negative   09/22/17  17:04    


 


Urine Cocaine Screen  Presumptive negative   09/22/17  17:04    


 


U Marijuana (THC) Screen  Presumptive negative   09/22/17  17:04    


 


Drugs of Abuse Note  Disclamer   09/22/17  17:04    








 Short CBC











  09/22/17 09/23/17 Range/Units





  13:05 03:06 


 


WBC  15.5 H  12.7 H  (4.5-11.0)  K/mm3


 


Hgb  11.7  11.3  (10.1-14.3)  gm/dl


 


Hct  36.8  35.3  (30.3-42.9)  %


 


Plt Count  296  275  (140-440)  K/mm3








 BMP











  09/22/17





  13:05


 


Sodium  140


 


Potassium  3.9


 


Chloride  104.2


 


Carbon Dioxide  22


 


BUN  24 H


 


Creatinine  1.3 H


 


Glucose  81


 


Calcium  9.3








 Cardiac Enzymes











  09/22/17 09/22/17 09/22/17 Range/Units





  13:05 15:58 19:30 


 


Total Creatine Kinase     ()  units/L


 


CK-MB (CK-2)     (0.0-4.0)  ng/mL


 


Troponin T  < 0.010  < 0.010  < 0.010  (0.00-0.029)  ng/mL














  09/22/17 09/23/17 Range/Units





  23:15 03:06 


 


Total Creatine Kinase  51  48  ()  units/L


 


CK-MB (CK-2)  1.3   (0.0-4.0)  ng/mL


 


Troponin T  < 0.010  < 0.010  (0.00-0.029)  ng/mL








 Liver Function











  09/22/17 Range/Units





  13:50 


 


Total Bilirubin  0.40  (0.1-1.2)  mg/dL


 


Direct Bilirubin  < 0.2  (0-0.2)  mg/dL


 


AST  11  (5-40)  units/L


 


ALT  10  (7-56)  units/L


 


Alkaline Phosphatase  67  ()  units/L


 


Albumin  3.9  (3.9-5)  g/dL








 Urine











  09/22/17 Range/Units





  17:04 


 


Urine Color  Yellow  (Yellow)  


 


Urine pH  5.0  (5.0-7.0)  


 


Ur Specific Gravity  1.012  (1.003-1.030)  


 


Urine Protein  <15 mg/dl  (Negative)  mg/dL


 


Urine Glucose (UA)  Neg  (Negative)  mg/dL














- Imaging and Cardiology


EKG: report reviewed


Chest x-ray: report reviewed (NAF)





Assessment and Plan


Advance Directives: Yes (Full code)


VTE prophylaxis?: Chemical


Plan of care discussed with patient/family: Yes





- Patient Problems


(1) Acute chest pain


Current Visit: No   Status: Acute   


Plan to address problem: 


Patient had multiple stress tests.Requested Hospital records from [facility].








(2) Afib


Current Visit: No   Status: Chronic   


Qualifiers: 


   Atrial fibrillation type: chronic   Qualified Code(s): I48.2 - Chronic 

atrial fibrillation   


Plan to address problem: 


Cont Eliquis








(3) HTN (hypertension)


Current Visit: Yes   Status: Chronic   


Qualifiers: 


   Hypertension type: essential hypertension   Qualified Code(s): I10 - 

Essential (primary) hypertension   


Plan to address problem: 


Cont Lisinopril Carvedilol and Clonidine








(4) CAD (coronary artery disease)


Current Visit: Yes   Status: Chronic   


Qualifiers: 


   Coronary Disease-Associated Artery/Lesion type: native artery   Native vs. 

transplanted heart: native heart   Associated angina: angina presence 

unspecified   Qualified Code(s): I25.10 - Atherosclerotic heart disease of 

native coronary artery without angina pectoris   


Plan to address problem: 


Cont Isosorbide








(5) DVT prophylaxis


Current Visit: Yes   Status: Acute   


Plan to address problem: 


On Eliquis

## 2017-09-23 NOTE — TREADMILL REPORT
MYOCARDIAL PERFUSION REPORT



PROCEDURE DETAIL:  Myocardial perfusion scan was done using the standard

Lexiscan protocol.  Scans were done pre and post Lexiscan stress test.  The rest

and stress images and gated analysis were reviewed.



FINDINGS:

1.  There is a small area of mild defect of moderate severity involving the

inferior apical wall, concerning ischemia.  No other areas of fixed or

reversible defects suggestive of infarct or ischemia noted.

3.  Gated analysis reveals normal wall motion.

4.  LVEF is normal.



IMPRESSION:

1.  Abnormal stress perfusion scan revealing a small area of moderate intensity

in the inferoapical wall, concerning for ischemia.

2.  Preserved LV systolic function.

3.  This is an intermediate-risk cardiac study.





DD: 09/23/2017 10:37

DT: 09/23/2017 21:55

JOB# [id number]  [id number]

CLARICE/KACEY

## 2017-09-23 NOTE — CONSULTATION
History of Present Illness


Consult date: 09/23/17


Requesting physician: GEOFF KAY


Consult reason: chest pain


History of present illness: 





Patient is a 62-year-old who unfortunately is a very poor historian.  Patient 

currently admitted to the hospital for chest pain.  Cardiology is being 

consulted to further assess her chest pain etiology.  By the time I saw the 

patient patient had already completed her breast perfusion scan.  Absolutely no 

history could be obtained from the patient except that she had this persisting 

5 out of 10 chest pain.  I have reviewed extensively her records from [facility].  Patient was admitted there with similar complaints of chest pain.  

However her creatinine at that time was elevated and a cardiac cath was 

deferred.  Patient is a history of paroxysmal atrial fibrillation for which she 

is on eliquis.  She also has history of coronary artery disease with 2 stents 

placed in 2015 and 2016 details of which are unavailable at present.  She did 

have a stress test today that was positive for inferoapical ischemia





Past History


Past Medical History: atrial fib, CAD, hyperlipidemia


Past Surgical History: PTCA (Srtent x1 in Jan 2017)


Social history: no significant social history, smoking, full code


Family history: CAD, hypertension





Medications and Allergies


 Allergies











Allergy/AdvReac Type Severity Reaction Status Date / Time


 


apple AdvReac  Hives Verified 09/22/17 13:02


 


shell fish Allergy  Swelling Uncoded 09/22/17 13:02











 Home Medications











 Medication  Instructions  Recorded  Confirmed  Last Taken  Type


 


Clopidogrel [Plavix] 75 mg PO QDAY 12/11/15 09/22/17 09/22/17 History


 


Apixaban [Eliquis] 5 mg PO DAILY 04/17/17 09/22/17 09/22/17 History


 


Nitroglycerin [Nitrostat] 0.4 mg SL Q5M PRN 04/17/17 09/22/17 Unknown History


 


amLODIPine [Norvasc] 10 mg PO DAILY #30 tablet 04/18/17 09/22/17 09/22/17 Rx


 


ALBUTEROL Inhaler [Proair] 2 puff IH QID PRN 09/22/17 09/22/17 09/22/17 History


 


AtorvaSTATin [Lipitor] 80 mg PO QHS 09/22/17 09/22/17 09/21/17 History


 


Carvedilol [Coreg] 25 mg PO BID 09/22/17 09/22/17 09/22/17 History


 


Quetiapine Fumarate [Seroquel] 100 mg PO BID 09/22/17 09/22/17 09/22/17 History


 


hydrALAZINE [Apresoline] 25 mg PO Q8HR 09/22/17 09/22/17 09/22/17 History











Active Meds: 


Active Medications





Acetaminophen (Tylenol)  650 mg PO Q4H PRN


   PRN Reason: Pain MILD(1-3)/Fever >100.5/HA


Albuterol (Proventil)  2.5 mg IH Q4HRT PRN


   PRN Reason: Shortness Of Breath


Amlodipine Besylate (Norvasc)  10 mg PO DAILY [facility]


   Last Admin: 09/22/17 22:33 Dose:  10 mg


Apixaban (Eliquis)  5 mg PO DAILY [facility]


   PRN Reason: Protocol


   Last Admin: 09/22/17 22:32 Dose:  5 mg


Atorvastatin Calcium (Lipitor)  80 mg PO QHS [facility]


   Last Admin: 09/22/17 22:31 Dose:  80 mg


Bisacodyl (Dulcolax)  10 mg NJ QDAY PRN


   PRN Reason: Constipation unrelieved by MOM


Carvedilol (Coreg)  25 mg PO BID [facility]


   Last Admin: 09/22/17 22:33 Dose:  25 mg


Famotidine (Pepcid)  20 mg PO BID [facility]


   Last Admin: 09/22/17 22:32 Dose:  20 mg


Hydralazine HCl (Apresoline)  25 mg PO Q8HR [facility]


   Last Admin: 09/23/17 06:11 Dose:  25 mg


Hydromorphone HCl (Dilaudid)  1 mg IV Q3H PRN


   PRN Reason: Pain , Severe (7-10)


Magnesium Hydroxide (Milk Of Magnesia)  30 ml PO Q4H PRN


   PRN Reason: Constipation


Nitroglycerin (Nitrostat)  0.4 mg SL Q5M PRN


   PRN Reason: Chest Pain


   Last Admin: 09/22/17 21:59 Dose:  0.4 mg


Ondansetron HCl (Zofran)  4 mg IV Q8H PRN


   PRN Reason: N/V unrelieved by Reglan


Oxycodone/Acetaminophen (Percocet 5/325)  1 tab PO Q6H PRN


   PRN Reason: Pain, Moderate (4-6)


   Last Admin: 09/23/17 09:30 Dose:  1 tab


Quetiapine Fumarate (Seroquel)  100 mg PO BID DERIK


   Last Admin: 09/22/17 22:32 Dose:  100 mg











Review of Systems


All systems: negative (with no additional symptoms except as mentioned in H&P)





Physical Examination


 Vital Signs











Pulse Ox


 


 100 


 


 09/22/17 13:10











Narrative exam: 





GEN:  NAD 


HEENT: Carotids 2+


NECK: SUPPLE, 


CVS: RRR, NORMAL S1S2


LUNGS/CHEST: CTA 


ABD: SOFT, 


MSK: FROM X 4 EXTREMITIES


NEURO: CN 2-12 GROSSLY INTACT, NO FOCAL DEFICITS


PSY: CALM





Results





 09/23/17 03:06





 09/22/17 13:05


 Cardiac Enzymes











  09/22/17 Range/Units





  23:15 


 


CK-MB (CK-2)  1.3  (0.0-4.0)  ng/mL








 CBC











  09/23/17 Range/Units





  03:06 


 


WBC  12.7 H  (4.5-11.0)  K/mm3


 


RBC  4.00  (3.65-5.03)  M/mm3


 


Hgb  11.3  (10.1-14.3)  gm/dl


 


Hct  35.3  (30.3-42.9)  %


 


Plt Count  275  (140-440)  K/mm3


 


Lymph #  2.3  (1.2-5.4)  K/mm3


 


Mono #  0.7  (0.0-0.8)  K/mm3


 


Eos #  0.4  (0.0-0.4)  K/mm3


 


Baso #  0.1  (0.0-0.1)  K/mm3














EKG interpretations





- Telemetry


EKG Rhythm: Sinus Rhythm





Assessment and Plan








Impression


1.  Recurrent chest pain with multiple recent hospital administration to nearby 

hospitals


2.  Stress test today was positive for inferoapical ischemia


3.  History of CAD status post PCI in 2015 and 16 at [place] details of which 

are not available


4.  History of paroxysmal A. fib on eliquis


5.  Poor historian with lack of insight into her medical history





Plan


Continue Plavix start aspirin


Hold eliquis


Aggressive IV hydration


In view of the recurrent admissions for chest pain prior history of CAD and 

chest pain reminiscent of her previous angina proceed with cardiac Monday

## 2017-09-23 NOTE — PROGRESS NOTE
Assessment and Plan


Assessment and plan: 


Chest pain. patient still c/o chest pain. To rule out acute coronary syndrome. 

She was at [facility] last week. I discussed with Dr. Shawn Gray, 

cardiologist. For cardiac cath on Monday





Coronary artery disease. To r/o acute coronary syndrome





Hypertension. On Norvasc and Coreg





Hyperlipidemia. She is on Lipitor





DVT prophylaxis. On Eliquis





atrial fibrillation. On Eliquis





Full code status














History


Interval history: 


Chest pain








Hospitalist Physical





- Physical exam


Narrative exam: 


Gen Appearance: Not in acute distress


HEENT: normocephalic, atraumatic


Neck: supple, no JVD


Lungs: Clear to auscultation, bilaterally, no rales, no wheeze


Heart: S1 and S2 regular, no murmurs, rubs or gallop


Abdomen: Soft , non tender, non distended, normal bowel sounds


Extremity: No edema, no clubbing or cyanosis, 


Neuro : Awake,alert, oriented x 3, moves all extremities











- Constitutional


Vitals: 


 











Temp Pulse Resp BP Pulse Ox


 


 98.0 F   73   16   165/86   99 


 


 09/23/17 12:01  09/23/17 12:01  09/23/17 12:01  09/23/17 12:01  09/23/17 12:01











General appearance: Present: no acute distress, well-nourished





Results





- Labs


CBC & Chem 7: 


 09/23/17 03:06





 09/23/17 03:06


Labs: 


 Laboratory Last Values











WBC  12.7 K/mm3 (4.5-11.0)  H  09/23/17  03:06    


 


RBC  4.00 M/mm3 (3.65-5.03)   09/23/17  03:06    


 


Hgb  11.3 gm/dl (10.1-14.3)   09/23/17  03:06    


 


Hct  35.3 % (30.3-42.9)   09/23/17  03:06    


 


MCV  88 fl (79-97)   09/23/17  03:06    


 


MCH  28 pg (28-32)   09/23/17  03:06    


 


MCHC  32 % (30-34)   09/23/17  03:06    


 


RDW  15.8 % (13.2-15.2)  H  09/23/17  03:06    


 


Plt Count  275 K/mm3 (140-440)   09/23/17  03:06    


 


Lymph % (Auto)  17.7 % (13.4-35.0)   09/23/17  03:06    


 


Mono % (Auto)  5.2 % (0.0-7.3)   09/23/17  03:06    


 


Eos % (Auto)  3.4 % (0.0-4.3)   09/23/17  03:06    


 


Baso % (Auto)  0.5 % (0.0-1.8)   09/23/17  03:06    


 


Lymph #  2.3 K/mm3 (1.2-5.4)   09/23/17  03:06    


 


Mono #  0.7 K/mm3 (0.0-0.8)   09/23/17  03:06    


 


Eos #  0.4 K/mm3 (0.0-0.4)   09/23/17  03:06    


 


Baso #  0.1 K/mm3 (0.0-0.1)   09/23/17  03:06    


 


Seg Neutrophils %  73.2 % (40.0-70.0)  H  09/23/17  03:06    


 


Seg Neutrophils #  9.3 K/mm3 (1.8-7.7)  H  09/23/17  03:06    


 


PT  13.6 Sec. (12.2-14.9)   09/22/17  14:26    


 


INR  1.05  (0.87-1.13)   09/22/17  14:26    


 


APTT  40.3 Sec. (24.2-36.6)  H  09/22/17  14:26    


 


Sodium  140 mmol/L (137-145)   09/22/17  13:05    


 


Potassium  3.9 mmol/L (3.6-5.0)   09/22/17  13:05    


 


Chloride  104.2 mmol/L ()   09/22/17  13:05    


 


Carbon Dioxide  22 mmol/L (22-30)   09/22/17  13:05    


 


Anion Gap  18 mmol/L  09/22/17  13:05    


 


BUN  24 mg/dL (7-17)  H  09/22/17  13:05    


 


Creatinine  1.3 mg/dL (0.7-1.2)  H  09/22/17  13:05    


 


Estimated GFR  50 ml/min  09/22/17  13:05    


 


BUN/Creatinine Ratio  18.46 %  09/22/17  13:05    


 


Glucose  81 mg/dL ()   09/22/17  13:05    


 


POC Glucose  169  ()  H  09/22/17  21:18    


 


Hemoglobin A1c  6.8 % (4-6)  H  09/22/17  21:05    


 


Calcium  9.3 mg/dL (8.4-10.2)   09/22/17  13:05    


 


Total Bilirubin  0.40 mg/dL (0.1-1.2)   09/22/17  13:50    


 


Direct Bilirubin  < 0.2 mg/dL (0-0.2)   09/22/17  13:50    


 


Indirect Bilirubin  0.2 mg/dL  09/22/17  13:50    


 


AST  11 units/L (5-40)   09/22/17  13:50    


 


ALT  10 units/L (7-56)   09/22/17  13:50    


 


Alkaline Phosphatase  67 units/L ()   09/22/17  13:50    


 


Total Creatine Kinase  48 units/L ()   09/23/17  03:06    


 


CK-MB (CK-2)  1.3 ng/mL (0.0-4.0)   09/22/17  23:15    


 


CK-MB (CK-2) Rel Index  2.7  (0-4)   09/23/17  03:06    


 


Troponin T  < 0.010 ng/mL (0.00-0.029)   09/23/17  03:06    


 


NT-Pro-B Natriuret Pep  629.7 pg/mL (0-900)   09/22/17  13:50    


 


Total Protein  6.9 g/dL (6.3-8.2)   09/22/17  13:50    


 


Albumin  3.9 g/dL (3.9-5)   09/22/17  13:50    


 


Albumin/Globulin Ratio  1.3 %  09/22/17  13:50    


 


Urine Color  Yellow  (Yellow)   09/22/17  17:04    


 


Urine Turbidity  Clear  (Clear)   09/22/17  17:04    


 


Urine pH  5.0  (5.0-7.0)   09/22/17  17:04    


 


Ur Specific Gravity  1.012  (1.003-1.030)   09/22/17  17:04    


 


Urine Protein  <15 mg/dl mg/dL (Negative)   09/22/17  17:04    


 


Urine Glucose (UA)  Neg mg/dL (Negative)   09/22/17  17:04    


 


Urine Ketones  Neg mg/dL (Negative)   09/22/17  17:04    


 


Urine Blood  Neg  (Negative)   09/22/17  17:04    


 


Urine Nitrite  Neg  (Negative)   09/22/17  17:04    


 


Urine Bilirubin  Neg  (Negative)   09/22/17  17:04    


 


Urine Urobilinogen  < 2.0 mg/dL (<2.0)   09/22/17  17:04    


 


Ur Leukocyte Esterase  Neg  (Negative)   09/22/17  17:04    


 


Urine WBC (Auto)  1.0 /HPF (0.0-6.0)   09/22/17  17:04    


 


Urine RBC (Auto)  1.0 /HPF (0.0-6.0)   09/22/17  17:04    


 


U Epithel Cells (Auto)  4.0 /HPF (0-13.0)   09/22/17  17:04    


 


Urine Mucus  Few /HPF  09/22/17  17:04    


 


Urine Opiates Screen  Presumptive negative   09/22/17  17:04    


 


Urine Methadone Screen  Presumptive negative   09/22/17  17:04    


 


Ur Barbiturates Screen  Presumptive negative   09/22/17  17:04    


 


Ur Phencyclidine Scrn  Presumptive negative   09/22/17  17:04    


 


Ur Amphetamines Screen  Presumptive negative   09/22/17  17:04    


 


U Benzodiazepines Scrn  Presumptive negative   09/22/17  17:04    


 


Urine Cocaine Screen  Presumptive negative   09/22/17  17:04    


 


U Marijuana (THC) Screen  Presumptive negative   09/22/17  17:04    


 


Drugs of Abuse Note  Disclamer   09/22/17  17:04

## 2017-09-24 NOTE — PROGRESS NOTE
Assessment and Plan


Assessment and plan: 


Chest pain. To rule out acute coronary syndrome. She was at [facility] 

last week. I discussed with Dr. Shawn Gray, cardiologist. For cardiac cath 

tomorrow. She feels better. No chest pain currently.





Coronary artery disease. To r/o acute coronary syndrome





Hypertension. BP uncontrolled. On Norvasc, Coreg. May increase dose of 

hydralazine to 50 mg po q 8h if blood pressure remains uncontrolled





Hyperlipidemia. She is on Lipitor





DVT prophylaxis. On Eliquis





Atrial fibrillation. On Eliquis





Full code status














History


Interval history: 


No more chest pain,


No shortness of breath








Hospitalist Physical





- Physical exam


Narrative exam: 


Gen Appearance: Not in acute distress


HEENT: normocephalic, atraumatic


Neck: supple, no JVD


Lungs: Clear to auscultation, bilaterally, no rales, no wheeze


Heart: S1 and S2 regular, no murmurs, rubs or gallop


Abdomen: Soft , non tender, non distended, normal bowel sounds


Extremity: No edema, no clubbing or cyanosis, 


Neuro : Awake,alert, oriented x 3, moves all extremities











- Constitutional


Vitals: 


 











Temp Pulse Resp BP Pulse Ox


 


 99.0 F   72   24   153/86   97 


 


 09/24/17 08:10  09/24/17 08:10  09/24/17 08:10  09/24/17 08:10  09/24/17 08:10











General appearance: Present: no acute distress, well-nourished





Results





- Labs


CBC & Chem 7: 


 09/23/17 03:06





 09/23/17 03:06


Labs: 


 Laboratory Last Values











WBC  12.7 K/mm3 (4.5-11.0)  H  09/23/17  03:06    


 


RBC  4.00 M/mm3 (3.65-5.03)   09/23/17  03:06    


 


Hgb  11.3 gm/dl (10.1-14.3)   09/23/17  03:06    


 


Hct  35.3 % (30.3-42.9)   09/23/17  03:06    


 


MCV  88 fl (79-97)   09/23/17  03:06    


 


MCH  28 pg (28-32)   09/23/17  03:06    


 


MCHC  32 % (30-34)   09/23/17  03:06    


 


RDW  15.8 % (13.2-15.2)  H  09/23/17  03:06    


 


Plt Count  275 K/mm3 (140-440)   09/23/17  03:06    


 


Lymph % (Auto)  17.7 % (13.4-35.0)   09/23/17  03:06    


 


Mono % (Auto)  5.2 % (0.0-7.3)   09/23/17  03:06    


 


Eos % (Auto)  3.4 % (0.0-4.3)   09/23/17  03:06    


 


Baso % (Auto)  0.5 % (0.0-1.8)   09/23/17  03:06    


 


Lymph #  2.3 K/mm3 (1.2-5.4)   09/23/17  03:06    


 


Mono #  0.7 K/mm3 (0.0-0.8)   09/23/17  03:06    


 


Eos #  0.4 K/mm3 (0.0-0.4)   09/23/17  03:06    


 


Baso #  0.1 K/mm3 (0.0-0.1)   09/23/17  03:06    


 


Seg Neutrophils %  73.2 % (40.0-70.0)  H  09/23/17  03:06    


 


Seg Neutrophils #  9.3 K/mm3 (1.8-7.7)  H  09/23/17  03:06    


 


PT  13.6 Sec. (12.2-14.9)   09/22/17  14:26    


 


INR  1.05  (0.87-1.13)   09/22/17  14:26    


 


APTT  40.3 Sec. (24.2-36.6)  H  09/22/17  14:26    


 


Sodium  140 mmol/L (137-145)   09/22/17  13:05    


 


Potassium  3.9 mmol/L (3.6-5.0)   09/22/17  13:05    


 


Chloride  104.2 mmol/L ()   09/22/17  13:05    


 


Carbon Dioxide  18 mmol/L (22-30)  L  09/23/17  03:06    


 


Anion Gap  20 mmol/L  09/23/17  03:06    


 


BUN  27 mg/dL (7-17)  H  09/23/17  03:06    


 


Creatinine  1.4 mg/dL (0.7-1.2)  H  09/23/17  03:06    


 


Estimated GFR  46 ml/min  09/23/17  03:06    


 


BUN/Creatinine Ratio  19.28 %  09/23/17  03:06    


 


Glucose  126 mg/dL ()  H  09/23/17  03:06    


 


POC Glucose  145  ()  H  09/23/17  21:15    


 


Hemoglobin A1c  6.8 % (4-6)  H  09/22/17  21:05    


 


Calcium  8.5 mg/dL (8.4-10.2)   09/23/17  03:06    


 


Total Bilirubin  0.40 mg/dL (0.1-1.2)   09/23/17  03:06    


 


Direct Bilirubin  < 0.2 mg/dL (0-0.2)   09/22/17  13:50    


 


Indirect Bilirubin  0.2 mg/dL  09/22/17  13:50    


 


AST  13 units/L (5-40)   09/23/17  03:06    


 


ALT  11 units/L (7-56)   09/23/17  03:06    


 


Alkaline Phosphatase  67 units/L ()   09/23/17  03:06    


 


Total Creatine Kinase  63 units/L ()   09/23/17  12:10    


 


CK-MB (CK-2)  1.7 ng/mL (0.0-4.0)   09/23/17  12:10    


 


CK-MB (CK-2) Rel Index  2.6  (0-4)   09/23/17  12:10    


 


Troponin T  < 0.010 ng/mL (0.00-0.029)   09/23/17  12:10    


 


NT-Pro-B Natriuret Pep  629.7 pg/mL (0-900)   09/22/17  13:50    


 


Total Protein  5.8 g/dL (6.3-8.2)  L  09/23/17  03:06    


 


Albumin  3.5 g/dL (3.9-5)  L  09/23/17  03:06    


 


Albumin/Globulin Ratio  1.5 %  09/23/17  03:06    


 


Urine Color  Yellow  (Yellow)   09/22/17  17:04    


 


Urine Turbidity  Clear  (Clear)   09/22/17  17:04    


 


Urine pH  5.0  (5.0-7.0)   09/22/17  17:04    


 


Ur Specific Gravity  1.012  (1.003-1.030)   09/22/17  17:04    


 


Urine Protein  <15 mg/dl mg/dL (Negative)   09/22/17  17:04    


 


Urine Glucose (UA)  Neg mg/dL (Negative)   09/22/17  17:04    


 


Urine Ketones  Neg mg/dL (Negative)   09/22/17  17:04    


 


Urine Blood  Neg  (Negative)   09/22/17  17:04    


 


Urine Nitrite  Neg  (Negative)   09/22/17  17:04    


 


Urine Bilirubin  Neg  (Negative)   09/22/17  17:04    


 


Urine Urobilinogen  < 2.0 mg/dL (<2.0)   09/22/17  17:04    


 


Ur Leukocyte Esterase  Neg  (Negative)   09/22/17  17:04    


 


Urine WBC (Auto)  1.0 /HPF (0.0-6.0)   09/22/17  17:04    


 


Urine RBC (Auto)  1.0 /HPF (0.0-6.0)   09/22/17  17:04    


 


U Epithel Cells (Auto)  4.0 /HPF (0-13.0)   09/22/17  17:04    


 


Urine Mucus  Few /HPF  09/22/17  17:04    


 


Urine Opiates Screen  Presumptive negative   09/22/17  17:04    


 


Urine Methadone Screen  Presumptive negative   09/22/17  17:04    


 


Ur Barbiturates Screen  Presumptive negative   09/22/17  17:04    


 


Ur Phencyclidine Scrn  Presumptive negative   09/22/17  17:04    


 


Ur Amphetamines Screen  Presumptive negative   09/22/17  17:04    


 


U Benzodiazepines Scrn  Presumptive negative   09/22/17  17:04    


 


Urine Cocaine Screen  Presumptive negative   09/22/17  17:04    


 


U Marijuana (THC) Screen  Presumptive negative   09/22/17  17:04    


 


Drugs of Abuse Note  Disclamer   09/22/17  17:04

## 2017-09-24 NOTE — PROGRESS NOTE
Assessment and Plan








Impression


1.  Recurrent chest pain with multiple recent hospital administration to nearby 

hospitals


2.  Stress test this admission was positive for inferoapical ischemia


3.  History of CAD status post PCI in 2015 and 16 at [place] details of which 

are not available


4.  History of paroxysmal A. fib on eliquis that is being held for anticipated 

cardiac cath tomorrow


5.  Poor historian with lack of insight into her medical history


6.  Mild renal insufficiency





Plan


Continue Plavix and aspirin


Continue holding eliquis


Aggressive IV hydration


Start  acetylcysteine


In view of the recurrent admissions for chest pain prior history of CAD and 

chest pain reminiscent of her previous angina proceed with cardiac tomorrow if 

renal function is stable





Subjective


Date of service: 09/24/17


Principal diagnosis: unstable angina


Interval history: 





Chest pain much better





Objective


 Vital Signs











  Temp Pulse Resp BP Pulse Ox


 


 09/24/17 08:10  99.0 F  72  24  153/86  97


 


 09/24/17 05:59     152/93 


 


 09/24/17 05:22  97.8 F  71  18  152/93  100


 


 09/24/17 05:00   70   


 


 09/24/17 00:13  98.1 F  72  18  137/74  93


 


 09/23/17 22:47      99


 


 09/23/17 21:30   72   145/86 


 


 09/23/17 21:00   72   


 


 09/23/17 19:26  98.2 F  72  18  145/86  99


 


 09/23/17 17:16  98.0 F  74  16  151/88  100


 


 09/23/17 13:42   82   160/78 


 


 09/23/17 12:01  98.0 F  73  16  165/86  99


 


 09/23/17 11:08   80   140/80 


 


 09/23/17 10:46  98.3 F  84  16  176/94  99














- Physical Examination


Narrative exam: 





GEN:  NAD 


HEENT: Carotids 2+


NECK: SUPPLE, 


CVS: RRR, NORMAL S1S2


LUNGS/CHEST: CTA 


ABD: SOFT, 


MSK: FROM X 4 EXTREMITIES


NEURO: CN 2-12 GROSSLY INTACT, NO FOCAL DEFICITS


PSY: CALM





- Labs and Meds


 Cardiac Enzymes











  09/23/17 09/23/17 Range/Units





  03:06 12:10 


 


AST  13   (5-40)  units/L


 


CK-MB (CK-2)   1.7  (0.0-4.0)  ng/mL








 Comprehensive Metabolic Panel











  09/23/17 Range/Units





  03:06 


 


Carbon Dioxide  18 L  (22-30)  mmol/L


 


BUN  27 H  (7-17)  mg/dL


 


Creatinine  1.4 H  (0.7-1.2)  mg/dL


 


Glucose  126 H  ()  mg/dL


 


Calcium  8.5  (8.4-10.2)  mg/dL


 


AST  13  (5-40)  units/L


 


ALT  11  (7-56)  units/L


 


Alkaline Phosphatase  67  ()  units/L


 


Total Protein  5.8 L  (6.3-8.2)  g/dL


 


Albumin  3.5 L  (3.9-5)  g/dL














- Imaging and Cardiology


EKG: report reviewed

## 2017-09-25 NOTE — PROGRESS NOTE
Assessment and Plan


Assessment and plan: 


Chest pain. To rule out acute coronary syndrome. She was at [facility] 

last week. I discussed with Dr. Shawn Gray, cardiologist. Cardiac cath done 

today showed 2 patent stents. cardiology recommended discharge home however was 

just called by Nurse tat she has blood oozing from site. Cancel intended 

discharge. Apply pressure dressing





Coronary artery disease. To r/o acute coronary syndrome





Hypertension. BP improved On Norvasc, Coreg, Hydralazine.





Hyperlipidemia. She is on Lipitor





DVT prophylaxis. On Eliquis





Atrial fibrillation. On Eliquis





Full code status





Discharge canceled because of blood oozing from cardiac cath site.














History


Interval history: 


No more chest pain,


No shortness of breath,


Cardiac cath done today,


Not discharged because blood oozing from site on groin








Hospitalist Physical





- Physical exam


Narrative exam: 


Gen Appearance: Not in acute distress


HEENT: normocephalic, atraumatic


Neck: supple, no JVD


Lungs: Clear to auscultation, bilaterally, no rales, no wheeze


Heart: S1 and S2 regular, no murmurs, rubs or gallop


Abdomen: Soft , non tender, non distended, normal bowel sounds


Extremity: No edema, no clubbing or cyanosis, 


Neuro : Awake,alert, oriented x 3, moves all extremities











- Constitutional


Vitals: 


 











Temp Pulse Resp BP Pulse Ox


 


 98.8 F   76   18   165/92   97 


 


 09/25/17 17:08  09/25/17 15:30  09/25/17 17:08  09/25/17 17:08  09/25/17 15:30











General appearance: Present: no acute distress, well-nourished





Results





- Labs


CBC & Chem 7: 


 09/27/17 10:20





 09/27/17 10:20


Labs: 


 Laboratory Last Values











WBC  11.6 K/mm3 (4.5-11.0)  H  09/25/17  05:10    


 


RBC  4.15 M/mm3 (3.65-5.03)   09/25/17  05:10    


 


Hgb  11.6 gm/dl (10.1-14.3)   09/25/17  05:10    


 


Hct  36.6 % (30.3-42.9)   09/25/17  05:10    


 


MCV  88 fl (79-97)   09/25/17  05:10    


 


MCH  28 pg (28-32)   09/25/17  05:10    


 


MCHC  32 % (30-34)   09/25/17  05:10    


 


RDW  16.0 % (13.2-15.2)  H  09/25/17  05:10    


 


Plt Count  270 K/mm3 (140-440)   09/25/17  05:10    


 


Lymph % (Auto)  17.7 % (13.4-35.0)   09/23/17  03:06    


 


Mono % (Auto)  5.2 % (0.0-7.3)   09/23/17  03:06    


 


Eos % (Auto)  3.4 % (0.0-4.3)   09/23/17  03:06    


 


Baso % (Auto)  0.5 % (0.0-1.8)   09/23/17  03:06    


 


Lymph #  2.3 K/mm3 (1.2-5.4)   09/23/17  03:06    


 


Mono #  0.7 K/mm3 (0.0-0.8)   09/23/17  03:06    


 


Eos #  0.4 K/mm3 (0.0-0.4)   09/23/17  03:06    


 


Baso #  0.1 K/mm3 (0.0-0.1)   09/23/17  03:06    


 


Seg Neutrophils %  73.2 % (40.0-70.0)  H  09/23/17  03:06    


 


Seg Neutrophils #  9.3 K/mm3 (1.8-7.7)  H  09/23/17  03:06    


 


PT  11.9 Sec. (12.2-14.9)  L  09/25/17  05:10    


 


INR  0.89  (0.87-1.13)   09/25/17  05:10    


 


APTT  40.3 Sec. (24.2-36.6)  H  09/22/17  14:26    


 


Sodium  140 mmol/L (137-145)   09/25/17  05:10    


 


Potassium  4.2 mmol/L (3.6-5.0)   09/25/17  05:10    


 


Chloride  106.0 mmol/L ()   09/25/17  05:10    


 


Carbon Dioxide  20 mmol/L (22-30)  L  09/25/17  05:10    


 


Anion Gap  18 mmol/L  09/25/17  05:10    


 


BUN  25 mg/dL (7-17)  H  09/25/17  05:10    


 


Creatinine  1.3 mg/dL (0.7-1.2)  H  09/25/17  05:10    


 


Estimated GFR  50 ml/min  09/25/17  05:10    


 


BUN/Creatinine Ratio  19.23 %  09/25/17  05:10    


 


Glucose  116 mg/dL ()  H  09/25/17  05:10    


 


POC Glucose  120  ()  H  09/25/17  17:14    


 


Hemoglobin A1c  6.8 % (4-6)  H  09/22/17  21:05    


 


Calcium  8.7 mg/dL (8.4-10.2)   09/25/17  05:10    


 


Total Bilirubin  0.40 mg/dL (0.1-1.2)   09/23/17  03:06    


 


Direct Bilirubin  < 0.2 mg/dL (0-0.2)   09/22/17  13:50    


 


Indirect Bilirubin  0.2 mg/dL  09/22/17  13:50    


 


AST  13 units/L (5-40)   09/23/17  03:06    


 


ALT  11 units/L (7-56)   09/23/17  03:06    


 


Alkaline Phosphatase  67 units/L ()   09/23/17  03:06    


 


Total Creatine Kinase  63 units/L ()   09/23/17  12:10    


 


CK-MB (CK-2)  1.7 ng/mL (0.0-4.0)   09/23/17  12:10    


 


CK-MB (CK-2) Rel Index  2.6  (0-4)   09/23/17  12:10    


 


Troponin T  < 0.010 ng/mL (0.00-0.029)   09/23/17  12:10    


 


NT-Pro-B Natriuret Pep  629.7 pg/mL (0-900)   09/22/17  13:50    


 


Total Protein  5.8 g/dL (6.3-8.2)  L  09/23/17  03:06    


 


Albumin  3.5 g/dL (3.9-5)  L  09/23/17  03:06    


 


Albumin/Globulin Ratio  1.5 %  09/23/17  03:06    


 


Urine Color  Yellow  (Yellow)   09/22/17  17:04    


 


Urine Turbidity  Clear  (Clear)   09/22/17  17:04    


 


Urine pH  5.0  (5.0-7.0)   09/22/17  17:04    


 


Ur Specific Gravity  1.012  (1.003-1.030)   09/22/17  17:04    


 


Urine Protein  <15 mg/dl mg/dL (Negative)   09/22/17  17:04    


 


Urine Glucose (UA)  Neg mg/dL (Negative)   09/22/17  17:04    


 


Urine Ketones  Neg mg/dL (Negative)   09/22/17  17:04    


 


Urine Blood  Neg  (Negative)   09/22/17  17:04    


 


Urine Nitrite  Neg  (Negative)   09/22/17  17:04    


 


Urine Bilirubin  Neg  (Negative)   09/22/17  17:04    


 


Urine Urobilinogen  < 2.0 mg/dL (<2.0)   09/22/17  17:04    


 


Ur Leukocyte Esterase  Neg  (Negative)   09/22/17  17:04    


 


Urine WBC (Auto)  1.0 /HPF (0.0-6.0)   09/22/17  17:04    


 


Urine RBC (Auto)  1.0 /HPF (0.0-6.0)   09/22/17  17:04    


 


U Epithel Cells (Auto)  4.0 /HPF (0-13.0)   09/22/17  17:04    


 


Urine Mucus  Few /HPF  09/22/17  17:04    


 


Urine Opiates Screen  Presumptive negative   09/22/17  17:04    


 


Urine Methadone Screen  Presumptive negative   09/22/17  17:04    


 


Ur Barbiturates Screen  Presumptive negative   09/22/17  17:04    


 


Ur Phencyclidine Scrn  Presumptive negative   09/22/17  17:04    


 


Ur Amphetamines Screen  Presumptive negative   09/22/17  17:04    


 


U Benzodiazepines Scrn  Presumptive negative   09/22/17  17:04    


 


Urine Cocaine Screen  Presumptive negative   09/22/17  17:04    


 


U Marijuana (THC) Screen  Presumptive negative   09/22/17  17:04    


 


Drugs of Abuse Note  Disclamer   09/22/17  17:04

## 2017-09-25 NOTE — CARDIAC CATHERIZATION REPORT
CARDIAC CATHETERIZATION REPORT



REASON FOR PROCEDURE:  Chest pain and abnormal thallium stress test.



PROCEDURE PERFORMED:

1.  Left heart catheterization.

2.  Left ventricular angiography.

3.  Selective left and right coronary angiography.



The patient was prepped and draped in a sterile fashion after informed consent. 

Right femoral artery was entered using the Seldinger technique followed by

placement of a 6-Greek sheath.  Selective left and right coronary angiography

was performed using #4 right and left Ila catheters.  A pigtail catheter was

used for left ventricle angiography.



The catheters were removed, sheath removed, and hemostasis achieved using an

Angio-Seal device.  The patient was returned to the postprocedure unit in stable

condition.  There were no complications.



FINDINGS:

HEMODYNAMICS:  Left ventricle end diastolic pressure was 24, following coronary

angiography.  Ascending aortic pressure was 176/90.  There was no significant

pressure gradient on pullback across the aortic valve.



CORONARY ANGIOGRAPHY:  The left main coronary artery was short and essentially

there was separate LAD and circumflex ostia.



The LAD was notable for two seperate, previously stented segments.  There was a 
long

stented segment of the proximal to mid LAD, extending from the origin of a large

first diagonal branch.  This proximal stent was widely patent, with no

significant instent restenosis.  This was followed by another, 

slightly shorter stent of the mid to distal LAD. This latter stent was 

also widely patent with no significant instent restenosis.  

The large proximal diagonal branch of the LAD contained mild luminal

irregularities.  Otherwise, no significant residual denovo lesions were noted 
in the

LAD or diagonal branches.



The circumflex was a small caliber system that consisted of a single medium

sized obtuse marginal.  This vessel was free of significant disease.



The right coronary artery was dominant.  This vessel contained mild narrowing of

its mid segment, otherwise no significant disease.



Left ventricular systolic function was at lower limits of normal, ejection

fraction 50-55%.



CONCLUSION:

1.  Widely patent proximal to mid and mid to distal LAD stents, no significant

instent restenosis despite two long stented segments.

2.  No significant residual denovo lesions noted.

3.  Well preserved left ventricular systolic function, ejection fraction 50-55%.



RECOMMENDATION:  Risk factor modification and medical therapy.





DD: 09/25/2017 13:47

DT: 09/25/2017 14:43

[facility]# [id number]  [id number]

CA/KACEY COLOND

## 2017-09-25 NOTE — EVENT NOTE
Date: 09/25/17





Cardiac cath, no complications.


Findings:


Widely patent [id number] LAD stents.


Otherwise no significant residual coronary lesions.


LVEF 50-55%.





Recommend:


Medical therapy and RF modification including smoking cessation.


OK for cardiac discharge.

## 2017-09-25 NOTE — DISCHARGE SUMMARY
Providers





- Providers


Date of Admission: 


09/22/17 15:14





Date of discharge: 09/25/17


Attending physician: 


SANDI JAMESON





 





09/23/17 07:22


Consult to Physician [CONS] Routine 


   Consulting Provider: AUSTIN CORONEL


   Reason For Exam: Chest pain


   Place consult to:: [facility]


   Notified:: yes


   If yes, spoke with:: Dr Gray


   Time called:: 09:03





09/25/17 13:53


Consult to Cardiac Rehabilitation [CONS] Routine 


   Reason For Exam: Cardiac Rehab Evaluation











Primary care physician: 


PRIMARY CARE MD








Hospitalization


Condition: Fair


Disposition: DC-01 TO HOME OR SELFCARE





- Discharge Diagnoses


(1) Stable angina


Status: Acute   





(2) Chest pain


Status: Acute   


Qualifiers: 


   Chest pain type: unspecified   Ischemic chest pain type: I   Qualified Code(s

): R07.9 - Chest pain, unspecified   





(3) CAD (coronary artery disease)


Status: Chronic   


Qualifiers: 


   Coronary Disease-Associated Artery/Lesion type: native artery   Native vs. 

transplanted heart: native heart   Associated angina: with stable angina   

Qualified Code(s): I25.118 - Atherosclerotic heart disease of native coronary 

artery with other forms of angina pectoris   





Core Measure Documentation





- Palliative Care


Palliative Care/ Comfort Measures: Not Applicable





Exam





- Constitutional


Vitals: 


 











Temp Pulse Resp BP Pulse Ox


 


 98.2 F   71   18   155/90   96 


 


 09/25/17 11:54  09/25/17 11:54  09/25/17 11:54  09/25/17 11:54  09/25/17 11:54














Plan


Activity: advance as tolerated


Diet: low fat, low cholesterol, low salt, diabetic


Additional Instructions: 1.Follow up with PCP in 1 week.  2.Follow up with 

Cardiology in 1 week


Follow up with: 


PRIMARY CARE,MD [Primary Care Provider] - 3-5 Days


Prescriptions: 


Famotidine [Pepcid] 20 mg PO BID #60 tablet


ISOSORBIDE MONOnitrate [Imdur ER] 30 mg PO DAILY #30 tab.er.24h

## 2017-09-26 NOTE — PROGRESS NOTE
Assessment and Plan





Chest pain


   [facility] Findings:


   Widely patent [id number] LAD stents.


   Otherwise no significant residual coronary lesions.


   LVEF 50-55%.


Hx of CAD


Paroxysmal Afib


   on eliquis as an outpatient


Tobacco abuse














Subjective


Date of service: 09/26/17


Principal diagnosis: unstable angina


Interval history: 





Discharge held due to bleeding of right groin cath site. BP stable. Patient has 

no complaints.


Pressure dressing and sandbag currently in place.





Objective


 Vital Signs











  Temp Pulse Resp BP Pulse Ox


 


 09/26/17 08:48    20  


 


 09/26/17 07:00   73   


 


 09/26/17 06:18     140/84 


 


 09/26/17 04:05  98.3 F   18  140/84 


 


 09/25/17 23:31  98.2 F   18  155/86 


 


 09/25/17 23:00   72   


 


 09/25/17 19:30    22  


 


 09/25/17 17:08  98.8 F   18  165/92 


 


 09/25/17 15:30   76    97


 


 09/25/17 15:15   72    98


 


 09/25/17 15:00   73    96


 


 09/25/17 14:45   73    95


 


 09/25/17 14:30   74   162/92  99


 


 09/25/17 14:15   72   152/85  96


 


 09/25/17 14:11   73   142/98  97


 


 09/25/17 14:10    20  


 


 09/25/17 11:55   71   155/90  99


 


 09/25/17 11:54  98.2 F  71  18  155/90  96














- Physical Examination


General: No Apparent Distress


Cardiac: Positive: Reg Rate and Rhythm


Incision: Cardiac Cath Site





- Labs and Meds


 CBC











  09/26/17 Range/Units





  06:40 


 


WBC  14.8 H  (4.5-11.0)  K/mm3


 


RBC  3.75  (3.65-5.03)  M/mm3


 


Hgb  10.4  (10.1-14.3)  gm/dl


 


Hct  32.8  (30.3-42.9)  %


 


Plt Count  257  (140-440)  K/mm3


 


Lymph #  2.7  (1.2-5.4)  K/mm3


 


Mono #  0.9 H  (0.0-0.8)  K/mm3


 


Eos #  0.2  (0.0-0.4)  K/mm3


 


Baso #  0.1  (0.0-0.1)  K/mm3














- Imaging and Cardiology


EKG: report reviewed

## 2017-09-26 NOTE — PROGRESS NOTE
Assessment and Plan


Assessment and plan: 





Chest pain. To rule out acute coronary syndrome. She was at [facility] 

last week. I discussed with Dr. Shawn Gray, cardiologist. For cardiac cath 

tomorrow. She feels better. No chest pain currently.





Coronary artery disease. To r/o acute coronary syndrome





Hypertension. BP uncontrolled. On Norvasc, Coreg. May increase dose of 

hydralazine to 50 mg po q 8h if blood pressure remains uncontrolled





Hyperlipidemia. She is on Lipitor





DVT prophylaxis. On Eliquis





Atrial fibrillation.  Eliquis on hold due to cath site bleeding





Acute blood loss; 


Hg stable, continue pressure dressing





Cardiac cath, bleeding from site, expected outcome, hg stable


Findings:


Widely patent [id number] LAD stents.


Otherwise no significant residual coronary lesions.


LVEF 50-55%.





Recommend:


Medical therapy and RF modification including smoking cessation.


OK for cardiac discharge.





Hospitalist Physical





- Constitutional


Vitals: 


 











Temp Pulse Resp BP Pulse Ox


 


 98.3 F   77   20   155/76   98 


 


 09/26/17 04:05  09/26/17 10:43  09/26/17 11:58  09/26/17 10:43  09/26/17 10:43











General appearance: Present: no acute distress, well-nourished





Results





- Labs


CBC & Chem 7: 


 09/26/17 06:40





 09/25/17 05:10


Labs: 


 Laboratory Last Values











WBC  14.8 K/mm3 (4.5-11.0)  H  09/26/17  06:40    


 


RBC  3.75 M/mm3 (3.65-5.03)   09/26/17  06:40    


 


Hgb  10.4 gm/dl (10.1-14.3)   09/26/17  06:40    


 


Hct  32.8 % (30.3-42.9)   09/26/17  06:40    


 


MCV  87 fl (79-97)   09/26/17  06:40    


 


MCH  28 pg (28-32)   09/26/17  06:40    


 


MCHC  32 % (30-34)   09/26/17  06:40    


 


RDW  16.0 % (13.2-15.2)  H  09/26/17  06:40    


 


Plt Count  257 K/mm3 (140-440)   09/26/17  06:40    


 


Lymph % (Auto)  18.0 % (13.4-35.0)   09/26/17  06:40    


 


Mono % (Auto)  6.3 % (0.0-7.3)   09/26/17  06:40    


 


Eos % (Auto)  1.3 % (0.0-4.3)   09/26/17  06:40    


 


Baso % (Auto)  0.6 % (0.0-1.8)   09/26/17  06:40    


 


Lymph #  2.7 K/mm3 (1.2-5.4)   09/26/17  06:40    


 


Mono #  0.9 K/mm3 (0.0-0.8)  H  09/26/17  06:40    


 


Eos #  0.2 K/mm3 (0.0-0.4)   09/26/17  06:40    


 


Baso #  0.1 K/mm3 (0.0-0.1)   09/26/17  06:40    


 


Seg Neutrophils %  73.8 % (40.0-70.0)  H  09/26/17  06:40    


 


Seg Neutrophils #  10.9 K/mm3 (1.8-7.7)  H  09/26/17  06:40    


 


PT  11.9 Sec. (12.2-14.9)  L  09/25/17  05:10    


 


INR  0.89  (0.87-1.13)   09/25/17  05:10    


 


APTT  40.3 Sec. (24.2-36.6)  H  09/22/17  14:26    


 


Sodium  140 mmol/L (137-145)   09/25/17  05:10    


 


Potassium  4.2 mmol/L (3.6-5.0)   09/25/17  05:10    


 


Chloride  106.0 mmol/L ()   09/25/17  05:10    


 


Carbon Dioxide  20 mmol/L (22-30)  L  09/25/17  05:10    


 


Anion Gap  18 mmol/L  09/25/17  05:10    


 


BUN  25 mg/dL (7-17)  H  09/25/17  05:10    


 


Creatinine  1.3 mg/dL (0.7-1.2)  H  09/25/17  05:10    


 


Estimated GFR  50 ml/min  09/25/17  05:10    


 


BUN/Creatinine Ratio  19.23 %  09/25/17  05:10    


 


Glucose  116 mg/dL ()  H  09/25/17  05:10    


 


POC Glucose  191  ()  H  09/26/17  16:55    


 


Hemoglobin A1c  6.8 % (4-6)  H  09/22/17  21:05    


 


Calcium  8.7 mg/dL (8.4-10.2)   09/25/17  05:10    


 


Total Bilirubin  0.40 mg/dL (0.1-1.2)   09/23/17  03:06    


 


Direct Bilirubin  < 0.2 mg/dL (0-0.2)   09/22/17  13:50    


 


Indirect Bilirubin  0.2 mg/dL  09/22/17  13:50    


 


AST  13 units/L (5-40)   09/23/17  03:06    


 


ALT  11 units/L (7-56)   09/23/17  03:06    


 


Alkaline Phosphatase  67 units/L ()   09/23/17  03:06    


 


Total Creatine Kinase  63 units/L ()   09/23/17  12:10    


 


CK-MB (CK-2)  1.7 ng/mL (0.0-4.0)   09/23/17  12:10    


 


CK-MB (CK-2) Rel Index  2.6  (0-4)   09/23/17  12:10    


 


Troponin T  < 0.010 ng/mL (0.00-0.029)   09/23/17  12:10    


 


NT-Pro-B Natriuret Pep  629.7 pg/mL (0-900)   09/22/17  13:50    


 


Total Protein  5.8 g/dL (6.3-8.2)  L  09/23/17  03:06    


 


Albumin  3.5 g/dL (3.9-5)  L  09/23/17  03:06    


 


Albumin/Globulin Ratio  1.5 %  09/23/17  03:06    


 


Urine Color  Yellow  (Yellow)   09/22/17  17:04    


 


Urine Turbidity  Clear  (Clear)   09/22/17  17:04    


 


Urine pH  5.0  (5.0-7.0)   09/22/17  17:04    


 


Ur Specific Gravity  1.012  (1.003-1.030)   09/22/17  17:04    


 


Urine Protein  <15 mg/dl mg/dL (Negative)   09/22/17  17:04    


 


Urine Glucose (UA)  Neg mg/dL (Negative)   09/22/17  17:04    


 


Urine Ketones  Neg mg/dL (Negative)   09/22/17  17:04    


 


Urine Blood  Neg  (Negative)   09/22/17  17:04    


 


Urine Nitrite  Neg  (Negative)   09/22/17  17:04    


 


Urine Bilirubin  Neg  (Negative)   09/22/17  17:04    


 


Urine Urobilinogen  < 2.0 mg/dL (<2.0)   09/22/17  17:04    


 


Ur Leukocyte Esterase  Neg  (Negative)   09/22/17  17:04    


 


Urine WBC (Auto)  1.0 /HPF (0.0-6.0)   09/22/17  17:04    


 


Urine RBC (Auto)  1.0 /HPF (0.0-6.0)   09/22/17  17:04    


 


U Epithel Cells (Auto)  4.0 /HPF (0-13.0)   09/22/17  17:04    


 


Urine Mucus  Few /HPF  09/22/17  17:04    


 


Urine Opiates Screen  Presumptive negative   09/22/17  17:04    


 


Urine Methadone Screen  Presumptive negative   09/22/17  17:04    


 


Ur Barbiturates Screen  Presumptive negative   09/22/17  17:04    


 


Ur Phencyclidine Scrn  Presumptive negative   09/22/17  17:04    


 


Ur Amphetamines Screen  Presumptive negative   09/22/17  17:04    


 


U Benzodiazepines Scrn  Presumptive negative   09/22/17  17:04    


 


Urine Cocaine Screen  Presumptive negative   09/22/17  17:04    


 


U Marijuana (THC) Screen  Presumptive negative   09/22/17  17:04    


 


Drugs of Abuse Note  Disclamer   09/22/17  17:04

## 2017-09-27 NOTE — DISCHARGE SUMMARY
Providers





- Providers


Date of Admission: 


09/22/17 15:14





Attending physician: 


FELIX MOORE MD





 





09/23/17 07:22


Consult to Physician [CONS] Routine 


   Consulting Provider: AUSTIN CORONEL


   Reason For Exam: Chest pain


   Place consult to:: [facility]


   Notified:: yes


   If yes, spoke with:: Dr Gray


   Time called:: 09:03





09/25/17 13:53


Consult to Cardiac Rehabilitation [CONS] Routine 


   Reason For Exam: Cardiac Rehab Evaluation











Primary care physician: 


PRIMARY CARE MD








Hospitalization


Condition: Fair


Disposition: DC-01 TO HOME OR SELFCARE





Core Measure Documentation





- Palliative Care


Palliative Care/ Comfort Measures: Not Applicable





Exam





- Constitutional


Vitals: 


 











Temp Pulse Resp BP Pulse Ox


 


 97.9 F   72   18   148/85   97 


 


 09/27/17 03:27  09/27/17 07:44  09/27/17 03:27  09/27/17 07:42  09/27/17 07:44














Plan


Follow up with: 


AUSTIN CORONEL MD [Staff Physician] - 7 Days


PRIMARY CARE,MD [Primary Care Provider] - 3-5 Days


Forms:  CardCath PCI D/C Instructions


Prescriptions: 


Famotidine [Pepcid] 20 mg PO BID #60 tablet


ISOSORBIDE MONOnitrate [Imdur ER] 30 mg PO DAILY #30 tab.er.24h

## 2017-09-27 NOTE — PROGRESS NOTE
Assessment and Plan





Chest pain


   [facility] Findings:


   Widely patent [id number]-[id number] LAD stents.


   Otherwise no significant residual coronary lesions.


   LVEF 50-55%.


Hx of CAD


Paroxysmal Afib


   on eliquis as an outpatient


Tobacco abuse





Recommendations:


No further bleeding of cardiac cath site. 


Stable, cardiac wise for discharge. Eliquis should be restarted at discharge.


F/U with [facility] as scheduled Oct. 5th.





Subjective


Date of service: 09/27/17


Principal diagnosis: unstable angina


Interval history: 





Patient has no complaints. Dressing to cath site removed. No further bleeding 

noted.





Objective


 Vital Signs











  Temp Pulse Resp BP Pulse Ox


 


 09/27/17 07:44   72    97


 


 09/27/17 07:42   75   148/85  96


 


 09/27/17 07:41   73    96


 


 09/27/17 06:57   77   155/90 


 


 09/27/17 03:27  97.9 F  76  18  130/78  92


 


 09/26/17 23:33  97.8 F  75  18  113/77  99


 


 09/26/17 22:00    20  


 


 09/26/17 19:41  98.1 F  75  18  157/92  98


 


 09/26/17 17:53    22  


 


 09/26/17 11:58    20  














- Physical Examination


General: No Apparent Distress


Cardiac: Positive: Reg Rate and Rhythm


Incision: Cardiac Cath Site





- Labs and Meds


 CBC











  09/27/17 Range/Units





  10:20 


 


WBC  14.2 H  (4.5-11.0)  K/mm3


 


RBC  3.74  (3.65-5.03)  M/mm3


 


Hgb  10.7  (10.1-14.3)  gm/dl


 


Hct  32.2  (30.3-42.9)  %


 


Plt Count  234  (140-440)  K/mm3


 


Lymph #  2.3  (1.2-5.4)  K/mm3


 


Mono #  0.8  (0.0-0.8)  K/mm3


 


Eos #  0.5 H  (0.0-0.4)  K/mm3


 


Baso #  0.1  (0.0-0.1)  K/mm3








 Comprehensive Metabolic Panel











  09/27/17 Range/Units





  10:20 


 


Sodium  140  (137-145)  mmol/L


 


Potassium  3.9  (3.6-5.0)  mmol/L


 


Chloride  103.4  ()  mmol/L


 


Carbon Dioxide  24  (22-30)  mmol/L


 


BUN  25 H  (7-17)  mg/dL


 


Creatinine  1.3 H  (0.7-1.2)  mg/dL


 


Glucose  155 H  ()  mg/dL


 


Calcium  8.9  (8.4-10.2)  mg/dL














- Imaging and Cardiology


EKG: report reviewed

## 2018-02-07 ENCOUNTER — HOSPITAL ENCOUNTER (INPATIENT)
Dept: HOSPITAL 5 - ED | Age: 63
LOS: 5 days | Discharge: HOME | DRG: 304 | End: 2018-02-12
Attending: INTERNAL MEDICINE | Admitting: INTERNAL MEDICINE
Payer: MEDICARE

## 2018-02-07 DIAGNOSIS — Z86.73: ICD-10-CM

## 2018-02-07 DIAGNOSIS — E78.00: ICD-10-CM

## 2018-02-07 DIAGNOSIS — E87.1: ICD-10-CM

## 2018-02-07 DIAGNOSIS — I25.2: ICD-10-CM

## 2018-02-07 DIAGNOSIS — E87.5: ICD-10-CM

## 2018-02-07 DIAGNOSIS — I13.0: ICD-10-CM

## 2018-02-07 DIAGNOSIS — K21.9: ICD-10-CM

## 2018-02-07 DIAGNOSIS — I50.9: ICD-10-CM

## 2018-02-07 DIAGNOSIS — F17.200: ICD-10-CM

## 2018-02-07 DIAGNOSIS — E11.22: ICD-10-CM

## 2018-02-07 DIAGNOSIS — F31.9: ICD-10-CM

## 2018-02-07 DIAGNOSIS — E78.5: ICD-10-CM

## 2018-02-07 DIAGNOSIS — Z91.018: ICD-10-CM

## 2018-02-07 DIAGNOSIS — F20.9: ICD-10-CM

## 2018-02-07 DIAGNOSIS — Z79.82: ICD-10-CM

## 2018-02-07 DIAGNOSIS — J44.1: ICD-10-CM

## 2018-02-07 DIAGNOSIS — N18.3: ICD-10-CM

## 2018-02-07 DIAGNOSIS — I16.0: Primary | ICD-10-CM

## 2018-02-07 DIAGNOSIS — I48.0: ICD-10-CM

## 2018-02-07 DIAGNOSIS — I25.10: ICD-10-CM

## 2018-02-07 DIAGNOSIS — Z91.013: ICD-10-CM

## 2018-02-07 DIAGNOSIS — N17.0: ICD-10-CM

## 2018-02-07 DIAGNOSIS — R10.9: ICD-10-CM

## 2018-02-07 LAB
BASOPHILS # (AUTO): 0.1 K/MM3 (ref 0–0.1)
BASOPHILS NFR BLD AUTO: 0.9 % (ref 0–1.8)
BUN SERPL-MCNC: 30 MG/DL (ref 7–17)
BUN/CREAT SERPL: 21 %
CALCIUM SERPL-MCNC: 9.1 MG/DL (ref 8.4–10.2)
EOSINOPHIL # BLD AUTO: 0.3 K/MM3 (ref 0–0.4)
EOSINOPHIL NFR BLD AUTO: 2 % (ref 0–4.3)
HCT VFR BLD CALC: 31.8 % (ref 30.3–42.9)
HEMOLYSIS INDEX: 10
HGB BLD-MCNC: 10.2 GM/DL (ref 10.1–14.3)
LYMPHOCYTES # BLD AUTO: 2.2 K/MM3 (ref 1.2–5.4)
LYMPHOCYTES NFR BLD AUTO: 17.6 % (ref 13.4–35)
MCH RBC QN AUTO: 27 PG (ref 28–32)
MCHC RBC AUTO-ENTMCNC: 32 % (ref 30–34)
MCV RBC AUTO: 85 FL (ref 79–97)
MONOCYTES # (AUTO): 0.6 K/MM3 (ref 0–0.8)
MONOCYTES % (AUTO): 4.7 % (ref 0–7.3)
PLATELET # BLD: 278 K/MM3 (ref 140–440)
RBC # BLD AUTO: 3.76 M/MM3 (ref 3.65–5.03)

## 2018-02-07 PROCEDURE — 94640 AIRWAY INHALATION TREATMENT: CPT

## 2018-02-07 PROCEDURE — 96375 TX/PRO/DX INJ NEW DRUG ADDON: CPT

## 2018-02-07 PROCEDURE — C9113 INJ PANTOPRAZOLE SODIUM, VIA: HCPCS

## 2018-02-07 PROCEDURE — 82570 ASSAY OF URINE CREATININE: CPT

## 2018-02-07 PROCEDURE — 84520 ASSAY OF UREA NITROGEN: CPT

## 2018-02-07 PROCEDURE — 83880 ASSAY OF NATRIURETIC PEPTIDE: CPT

## 2018-02-07 PROCEDURE — 36415 COLL VENOUS BLD VENIPUNCTURE: CPT

## 2018-02-07 PROCEDURE — 85025 COMPLETE CBC W/AUTO DIFF WBC: CPT

## 2018-02-07 PROCEDURE — 82962 GLUCOSE BLOOD TEST: CPT

## 2018-02-07 PROCEDURE — 83036 HEMOGLOBIN GLYCOSYLATED A1C: CPT

## 2018-02-07 PROCEDURE — 80048 BASIC METABOLIC PNL TOTAL CA: CPT

## 2018-02-07 PROCEDURE — 93010 ELECTROCARDIOGRAM REPORT: CPT

## 2018-02-07 PROCEDURE — 99406 BEHAV CHNG SMOKING 3-10 MIN: CPT

## 2018-02-07 PROCEDURE — 74150 CT ABDOMEN W/O CONTRAST: CPT

## 2018-02-07 PROCEDURE — 71045 X-RAY EXAM CHEST 1 VIEW: CPT

## 2018-02-07 PROCEDURE — 85379 FIBRIN DEGRADATION QUANT: CPT

## 2018-02-07 PROCEDURE — 84484 ASSAY OF TROPONIN QUANT: CPT

## 2018-02-07 PROCEDURE — 93005 ELECTROCARDIOGRAM TRACING: CPT

## 2018-02-07 PROCEDURE — 89050 BODY FLUID CELL COUNT: CPT

## 2018-02-07 PROCEDURE — 81001 URINALYSIS AUTO W/SCOPE: CPT

## 2018-02-07 PROCEDURE — 96376 TX/PRO/DX INJ SAME DRUG ADON: CPT

## 2018-02-07 PROCEDURE — 76770 US EXAM ABDO BACK WALL COMP: CPT

## 2018-02-07 PROCEDURE — 94644 CONT INHLJ TX 1ST HOUR: CPT

## 2018-02-07 PROCEDURE — A9558 XE133 XENON 10MCI: HCPCS

## 2018-02-07 PROCEDURE — 82550 ASSAY OF CK (CPK): CPT

## 2018-02-07 PROCEDURE — 84300 ASSAY OF URINE SODIUM: CPT

## 2018-02-07 PROCEDURE — 96374 THER/PROPH/DIAG INJ IV PUSH: CPT

## 2018-02-07 PROCEDURE — 84156 ASSAY OF PROTEIN URINE: CPT

## 2018-02-07 PROCEDURE — A9502 TC99M TETROFOSMIN: HCPCS

## 2018-02-07 PROCEDURE — 78452 HT MUSCLE IMAGE SPECT MULT: CPT

## 2018-02-07 PROCEDURE — A9540 TC99M MAA: HCPCS

## 2018-02-07 PROCEDURE — 78582 LUNG VENTILAT&PERFUS IMAGING: CPT

## 2018-02-07 RX ADMIN — HYDROMORPHONE HYDROCHLORIDE PRN MG: 1 INJECTION, SOLUTION INTRAMUSCULAR; INTRAVENOUS; SUBCUTANEOUS at 19:52

## 2018-02-07 RX ADMIN — HEPARIN SODIUM SCH UNIT: 5000 INJECTION, SOLUTION INTRAVENOUS; SUBCUTANEOUS at 21:18

## 2018-02-07 RX ADMIN — HEPARIN SODIUM SCH UNIT: 5000 INJECTION, SOLUTION INTRAVENOUS; SUBCUTANEOUS at 14:18

## 2018-02-07 NOTE — XRAY REPORT
AP CHEST:



HISTORY: chest pain



AP view of the chest demonstrates a normal mediastinal and

cardiac contour with clear lungs and normal bony and soft tissue

structures.



IMPRESSION:

Unremarkable AP chest. No change since 9/22/17.

## 2018-02-07 NOTE — EMERGENCY DEPARTMENT REPORT
ED Chest Pain HPI





- General


Chief Complaint: Chest Pain


Stated Complaint: CHEST PAIN


Time Seen by Provider: 02/07/18 10:57


Source: patient


Mode of arrival: Stretcher


Limitations: No Limitations





- History of Present Illness


Initial Comments: 


62-year-old  [place] female presents to the emergency department by EMS 

from home with the complaint of some midsternal chest pain that has been going 

on since about 3:57 AM this morning.  She took 2 sublingual nitroglycerin at 

home and received some aspirin and a sublingual nitroglycerin in route with EMS 

without much relief.  The chest pain is sharp in nature.  It is associated with 

some mild shortness of breath and some nausea without vomiting.  She has a past 

medical history of a previous MI and has a stent placed.  She has a history of 

hypertension, COPD without oxygen dependency, and insulin-dependent diabetes.  

She does not currently have a primary care physician or cardiologist in this 

area as she says she recently moved here from [facility].  She had 

a heart catheterization done in September 2017 that showed an ejection fraction 

of 50-55% and otherwise patent coronary arteries and/or stents.








- Related Data


 Home Medications











 Medication  Instructions  Recorded  Confirmed  Last Taken


 


Clopidogrel [Plavix] 75 mg PO QDAY 12/11/15 02/07/18 02/06/18


 


Apixaban [Eliquis] 5 mg PO DAILY 04/17/17 02/07/18 02/06/18


 


Nitroglycerin [Nitrostat] 0.4 mg SL Q5M PRN 04/17/17 02/07/18 02/06/18


 


ALBUTEROL Inhaler [ProAir HFA 2 puff IH QID PRN 09/22/17 02/07/18 02/06/18





Inhaler]    


 


Carvedilol [Coreg] 25 mg PO BID 09/22/17 02/07/18 02/06/18


 


Quetiapine Fumarate [Seroquel] 100 mg PO BID 09/22/17 02/07/18 02/06/18


 


hydrALAZINE [Apresoline TAB] 25 mg PO Q8HR 09/22/17 02/07/18 02/06/18


 


Aspirin [Aspirin TAB] 325 mg PO QDAY 02/07/18 02/07/18 02/06/18


 


cloNIDine [Catapres] 0.2 mg PO BID 02/07/18 02/07/18 02/06/18











 Allergies











Allergy/AdvReac Type Severity Reaction Status Date / Time


 


apple AdvReac  Hives Verified 09/22/17 13:02


 


shell fish Allergy  Swelling Uncoded 09/22/17 13:02














Heart Score





- HEART Score


History: Moderately suspicious


EKG: Normal


Age: 45-65


Risk factors: > 3 risk factors or hx of atherosclerotic disease


Troponin: < normal limit


HEART Score: 4





ED Review of Systems


ROS: 


Stated complaint: CHEST PAIN


Other details as noted in HPI





Comment: All other systems reviewed and negative


Constitutional: denies: chills, fever


Eyes: denies: eye pain, eye discharge, vision change


ENT: denies: ear pain, throat pain


Respiratory: shortness of breath.  denies: cough


Cardiovascular: chest pain.  denies: palpitations


Gastrointestinal: nausea.  denies: abdominal pain, vomiting


Genitourinary: denies: urgency, dysuria, discharge


Musculoskeletal: denies: back pain, joint swelling, arthralgia


Skin: denies: rash, lesions


Neurological: denies: headache, weakness, paresthesias





ED Past Medical Hx





- Past Medical History


Hx Hypertension: Yes


Hx CVA: Yes


Hx Heart Attack/AMI: Yes


Hx Congestive Heart Failure: Yes


Hx Diabetes: Yes


Hx Renal Disease: Yes (RENAL INSUFFICIENCY)


Hx Psychiatric Treatment: Yes (BIPOLAR/SCHIZOPHRENIA)


Hx COPD: Yes


Additional medical history: HIGH CHOLESTEROL





- Surgical History


Past Surgical History?: Yes


Hx Coronary Stent: Yes (x 2 in July 2015)


Additional Surgical History: stent placement x 2 in 2015, knee surgery 01/2018





- Social History


Smoking Status: Current Every Day Smoker


Substance Use Type: None





- Medications


Home Medications: 


 Home Medications











 Medication  Instructions  Recorded  Confirmed  Last Taken  Type


 


Clopidogrel [Plavix] 75 mg PO QDAY 12/11/15 02/07/18 02/06/18 History


 


Apixaban [Eliquis] 5 mg PO DAILY 04/17/17 02/07/18 02/06/18 History


 


Nitroglycerin [Nitrostat] 0.4 mg SL Q5M PRN 04/17/17 02/07/18 02/06/18 History


 


ALBUTEROL Inhaler [ProAir HFA 2 puff IH QID PRN 09/22/17 02/07/18 02/06/18 

History





Inhaler]     


 


Carvedilol [Coreg] 25 mg PO BID 09/22/17 02/07/18 02/06/18 History


 


Quetiapine Fumarate [Seroquel] 100 mg PO BID 09/22/17 02/07/18 02/06/18 History


 


hydrALAZINE [Apresoline TAB] 25 mg PO Q8HR 09/22/17 02/07/18 02/06/18 History


 


Aspirin [Aspirin TAB] 325 mg PO QDAY 02/07/18 02/07/18 02/06/18 History


 


cloNIDine [Catapres] 0.2 mg PO BID 02/07/18 02/07/18 02/06/18 History














ED Physical Exam





- General


Limitations: No Limitations





- Other


Other exam information: 





GENERAL: The patient is well-developed well-nourished.


HENT: Normocephalic.  Atraumatic.    Patient has moist mucous membranes.


EYES: Extraocular motions are intact.  Pupils equal reactive to light 

bilaterally.


NECK: Supple.  Trachea is midline.


CHEST/LUNGS: Clear to auscultation.  There is no respiratory distress noted.  

Chest pain is not reproducible to palpation chest wall.


HEART/CARDIOVASCULAR: Regular.  There is no tachycardia.  There is no murmur.


ABDOMEN: Abdomen is soft, nontender.  Patient has normal bowel sounds.  There 

is no abdominal distention.


SKIN: Skin is warm and dry.


NEURO: The patient is awake, alert, and oriented.  The patient is cooperative.  

The patient has no focal neurologic deficits.  The patient has normal speech.


MUSCULOSKELETAL: There is no tenderness or deformity.  There is no limitation 

range of motion.  There is no evidence of acute injury.





ED Course


 Vital Signs











  02/07/18 02/07/18 02/07/18





  10:34 10:35 10:45


 


Temperature 98.5 F  


 


Pulse Rate 72  70


 


Respiratory 20 11 L 16





Rate   


 


Blood Pressure 187/112  187/112


 


O2 Sat by Pulse 100 100 100





Oximetry   














  02/07/18 02/07/18 02/07/18





  11:00 11:15 11:31


 


Temperature   


 


Pulse Rate 68 69 67


 


Respiratory 12 14 20





Rate   


 


Blood Pressure 186/114 186/114 186/114


 


O2 Sat by Pulse 100 100 100





Oximetry   














  02/07/18 02/07/18 02/07/18





  11:45 12:00 13:30


 


Temperature   


 


Pulse Rate 70 68 


 


Respiratory 15 16 





Rate   


 


Blood Pressure 186/114 199/164 


 


O2 Sat by Pulse 99 100 100





Oximetry   














  02/07/18 02/07/18 02/07/18





  13:45 14:18 14:45


 


Temperature   


 


Pulse Rate  71 


 


Respiratory   15





Rate   


 


Blood Pressure 213/113 194/110 


 


O2 Sat by Pulse 100  98





Oximetry   














PANCHO score





- Pancho Score


Age > 65: (0) No


Aspirin use within the Past 7 Days: (0) No


3 or more CAD Risk Factors: (1) Yes


2 or more Angina events in past 24 hrs: (1) Yes


Known CAD with more than 50% Stenosis: (1) Yes


Elevated Cardiac Markers: (0) No


ST Deviation Greater than 0.5mm: (0) No


PANCHO Score: 3





ED Medical Decision Making





- Lab Data


Result diagrams: 


 02/07/18 10:59





 02/07/18 10:59





- EKG Data


-: EKG Interpreted by Me


EKG shows normal: sinus rhythm, axis, intervals, QRS complexes, ST-T waves


Rate: normal





- EKG Data


When compared to previous EKG there are: no significant change


Interpretation: unchanged when compared t (9/28/17)





- Radiology Data


Radiology results: report reviewed, image reviewed


interpreted by me: 





Chest x-ray does not show any acute process.  There are no pleural effusions, 

obvious pneumonia and there is no pneumothorax.





LUNG SCAN, VENTILATION AND PERFUSION: 





History: Short of breath. 





Technique: 5mci of Tc99m MAA was infused for the perfusion images. 


15mci  gas was inhaled for the ventilatory images. 





Correlation is made with a chest x-ray dated 2/7/18. 





Findings: 


Inhalation of Xenon gas demonstrates heterogeneous distribution of the 


radiotracer throughout both lungs. There appears to be mild retention 


of the radiotracer within the left lung on the equilibrium images. 





After injection of Technetium 99m macroaggregated albumin gamma 


camera imaging of the lungs in multiple projections demonstrates 


normal pulmonary contours with a homogeneous distribution of activity. 


No focal areas of perfusion deficiency are identified. 





IMPRESSION: 


Low probability for pulmonary embolus. 


Mild retention of the radiotracer particularly in the left lung 


suggesting mild obstructive pulmonary disease. 





Transcribed By: TTR 


Dictated By: DARRELL CARRERA JR, MD 


Electronically Authenticated By: DARRELL CARRERA JR, MD 


Signed Date/Time: 02/07/18 1440 








- Medical Decision Making


Patient with a history of coronary artery disease with previous MI and stents 

presents with some midsternal chest pain.  Elevated d-dimer so V/Q scan done 

that was low probability for PE.  Negative troponins 2 this far.  EKG does not 

show any signs of ST elevation MI.  Chest x-ray does not show any acute 

process.  Patient will be admitted for further evaluation and treatment is been 

accepted for admission by hospitalist, Dr. Baeza.








- Differential Diagnosis


MI, PE, CHF, Pneumonia, COPD


Critical Care Time: No


Critical care attestation.: 


If time is entered above; I have spent that time in minutes in the direct care 

of this critically ill patient, excluding procedure time.








ED Disposition


Clinical Impression: 


 Acute chest pain, Hypertensive urgency





Hypertension


Qualifiers:


 Hypertension type: essential hypertension Qualified Code(s): I10 - Essential (

primary) hypertension





Disposition: DC-09 OP ADMIT IP TO THIS HOSP


Is pt being admited?: Yes


Condition: Stable


Time of Disposition: 15:01

## 2018-02-07 NOTE — HISTORY AND PHYSICAL REPORT
History of Present Illness


Date of examination: 02/07/18


Date of admission: 


02/07/18 13:29





Chief complaint: 


Chest pain since AM





History of present illness: 


History of Present Illness


In


62-year-old  G[place] female with PMH significant for HTN Asthma and 

bipolar disorder  presents to the emergency department by EMS from home with 

the complaint of some midsternal chest pain that has been going on since about 3

:57 AM this morning.  She took 2 sublingual nitroglycerin at home and received 

some aspirin and a sublingual nitroglycerin in route with EMS without much 

relief.  The chest pain is sharp in nature.  It is associated with some mild 

shortness of breath and some nausea without vomiting.  She has a past medical 

history of a previous MI and has a stent placed.  She has a history of 

hypertension, COPD without oxygen dependency, and insulin-dependent diabetes.  

She does not currently have a primary care physician or cardiologist in this 

area as she says she recently moved here from [facility].  She had 

a heart catheterization done in September 2017 that showed an ejection fraction 

of 50-55% and otherwise patent coronary arteries and/or stents.





After eval patient developed severe abd pain when CT abdomen was ordered-NAF























 Past Medical History


Hx Hypertension: Yes


Hx CVA: Yes


Hx Heart Attack/AMI: Yes


Hx Congestive Heart Failure: Yes


Hx Diabetes: Yes


Hx Renal Disease: Yes (RENAL INSUFFICIENCY)


Hx Psychiatric Treatment: Yes (BIPOLAR/SCHIZOPHRENIA)


Hx COPD: Yes


Additional medical history: HIGH CHOLESTEROL





Surgical History


Past Surgical History?: Yes


Hx Coronary Stent: Yes (x 2 in July 2015)


Additional Surgical History: stent placement x 2 in 2015, knee surgery 01/2018





Social History  


Smoking Status: Current Every Day Smoker


Substance Use Type: None





Fam Hx


Htn





- Medications


Home Medications: 


 Home Medications











 Medication  Instructions  Recorded  Confirmed  Last Taken  Type


 


Clopidogrel [Plavix] 75 mg PO QDAY 12/11/15 02/07/18 02/06/18 History


 


Apixaban [Eliquis] 5 mg PO DAILY 04/17/17 02/07/18 02/06/18 History


 


Nitroglycerin [Nitrostat] 0.4 mg SL Q5M PRN 04/17/17 02/07/18 02/06/18 History


 


ALBUTEROL Inhaler [ProAir HFA 2 puff IH QID PRN 09/22/17 02/07/18 02/06/18 

History





Inhaler]     


 


Carvedilol [Coreg] 25 mg PO BID 09/22/17 02/07/18 02/06/18 History


 


Quetiapine Fumarate [Seroquel] 100 mg PO BID 09/22/17 02/07/18 02/06/18 History


 


hydrALAZINE [Apresoline TAB] 25 mg PO Q8HR 09/22/17 02/07/18 02/06/18 History


 


Aspirin [Aspirin TAB] 325 mg PO QDAY 02/07/18 02/07/18 02/06/18 History


 


cloNIDine [Catapres] 0.2 mg PO BID 02/07/18 02/07/18 02/06/18 History











Review of Systems


ROS: 


Stated complaint: CHEST PAIN


Other details as noted in HPI





Comment: All other systems reviewed and negative


Constitutional: denies: chills, fever


Eyes: denies: eye pain, eye discharge, vision change


ENT: denies: ear pain, throat pain


Respiratory: shortness of breath.  denies: cough


Cardiovascular: chest pain.  denies: palpitations


Gastrointestinal: nausea.  denies: abdominal pain, vomiting


Genitourinary: denies: urgency, dysuria, discharge


Musculoskeletal: denies: back pain, joint swelling, arthralgia


Skin: denies: rash, lesions


Neurological: denies: headache, weakness, paresthesias


                            





Medications and Allergies


 Allergies











Allergy/AdvReac Type Severity Reaction Status Date / Time


 


apple AdvReac  Hives Verified 09/22/17 13:02


 


shell fish Allergy  Swelling Uncoded 09/22/17 13:02











 Home Medications











 Medication  Instructions  Recorded  Confirmed  Last Taken  Type


 


Clopidogrel [Plavix] 75 mg PO QDAY 12/11/15 02/07/18 02/06/18 History


 


Apixaban [Eliquis] 5 mg PO DAILY 04/17/17 02/07/18 02/06/18 History


 


Nitroglycerin [Nitrostat] 0.4 mg SL Q5M PRN 04/17/17 02/07/18 02/06/18 History


 


ALBUTEROL Inhaler [ProAir HFA 2 puff IH QID PRN 09/22/17 02/07/18 02/06/18 

History





Inhaler]     


 


Carvedilol [Coreg] 25 mg PO BID 09/22/17 02/07/18 02/06/18 History


 


Quetiapine Fumarate [Seroquel] 100 mg PO BID 09/22/17 02/07/18 02/06/18 History


 


hydrALAZINE [Apresoline TAB] 25 mg PO Q8HR 09/22/17 02/07/18 02/06/18 History


 


Aspirin [Aspirin TAB] 325 mg PO QDAY 02/07/18 02/07/18 02/06/18 History


 


cloNIDine [Catapres] 0.2 mg PO BID 02/07/18 02/07/18 02/06/18 History











Active Meds: 


Active Medications





Heparin Sodium (Porcine) (Heparin)  5,000 unit SUB-Q Q8HR DERIK


   Last Admin: 02/07/18 21:18 Dose:  5,000 unit


Hydromorphone HCl (Dilaudid)  2 mg IV Q4H PRN


   PRN Reason: Pain , Severe (7-10)


   Last Admin: 02/07/18 19:52 Dose:  2 mg











Exam





- Constitutional


Vitals: 


 











Temp Pulse Resp BP Pulse Ox


 


 98 F   98 H  20   173/106   96 


 


 02/07/18 19:54  02/07/18 19:54  02/07/18 20:23  02/07/18 19:54  02/07/18 19:54











General appearance: Present: no acute distress, well-nourished





- EENT


Eyes: Present: PERRL


ENT: hearing intact, clear oral mucosa





- Neck


Neck: Present: supple, normal ROM





- Respiratory


Respiratory effort: normal


Respiratory: bilateral: CTA





- Cardiovascular


Heart rate: 80


Rhythm: regular


Heart Sounds: Present: S1 & S2.  Absent: rub, click





- Extremities


Extremities: no ischemia, pulses intact, pulses symmetrical, No edema


Peripheral Pulses: within normal limits





- Abdominal


General gastrointestinal: Present: soft, non-tender, non-distended, normal 

bowel sounds


Female genitourinary: Present: normal





- Integumentary


Integumentary: Present: clear, warm, dry





- Musculoskeletal


Musculoskeletal: gait normal, strength equal bilaterally





- Psychiatric


Psychiatric: appropriate mood/affect, intact judgment & insight





- Neurologic


Neurologic: CNII-XII intact, moves all extremities





- Allied Health


Allied health notes reviewed: nursing, case management





Results





- Labs


CBC & Chem 7: 


 02/07/18 10:59





 02/07/18 10:59


Labs: 


 Laboratory Last Values











WBC  12.6 K/mm3 (4.5-11.0)  H  02/07/18  10:59    


 


RBC  3.76 M/mm3 (3.65-5.03)   02/07/18  10:59    


 


Hgb  10.2 gm/dl (10.1-14.3)   02/07/18  10:59    


 


Hct  31.8 % (30.3-42.9)   02/07/18  10:59    


 


MCV  85 fl (79-97)   02/07/18  10:59    


 


MCH  27 pg (28-32)  L  02/07/18  10:59    


 


MCHC  32 % (30-34)   02/07/18  10:59    


 


RDW  18.8 % (13.2-15.2)  H  02/07/18  10:59    


 


Plt Count  278 K/mm3 (140-440)   02/07/18  10:59    


 


Lymph % (Auto)  17.6 % (13.4-35.0)   02/07/18  10:59    


 


Mono % (Auto)  4.7 % (0.0-7.3)   02/07/18  10:59    


 


Eos % (Auto)  2.0 % (0.0-4.3)   02/07/18  10:59    


 


Baso % (Auto)  0.9 % (0.0-1.8)   02/07/18  10:59    


 


Lymph #  2.2 K/mm3 (1.2-5.4)   02/07/18  10:59    


 


Mono #  0.6 K/mm3 (0.0-0.8)   02/07/18  10:59    


 


Eos #  0.3 K/mm3 (0.0-0.4)   02/07/18  10:59    


 


Baso #  0.1 K/mm3 (0.0-0.1)   02/07/18  10:59    


 


Seg Neutrophils %  74.8 % (40.0-70.0)  H  02/07/18  10:59    


 


Seg Neutrophils #  9.4 K/mm3 (1.8-7.7)  H  02/07/18  10:59    


 


D-Dimer  930.17 ng/mlDDU (0-234)  H  02/07/18  10:39    


 


Sodium  141 mmol/L (137-145)   02/07/18  10:59    


 


Potassium  3.9 mmol/L (3.6-5.0)   02/07/18  10:59    


 


Chloride  104.1 mmol/L ()   02/07/18  10:59    


 


Carbon Dioxide  22 mmol/L (22-30)   02/07/18  10:59    


 


Anion Gap  19 mmol/L  02/07/18  10:59    


 


BUN  30 mg/dL (7-17)  H  02/07/18  10:59    


 


Creatinine  1.4 mg/dL (0.7-1.2)  H  02/07/18  10:59    


 


Estimated GFR  46 ml/min  02/07/18  10:59    


 


BUN/Creatinine Ratio  21 %  02/07/18  10:59    


 


Glucose  109 mg/dL ()  H  02/07/18  10:59    


 


POC Glucose  106  ()  H  02/07/18  17:50    


 


Calcium  9.1 mg/dL (8.4-10.2)   02/07/18  10:59    


 


Troponin T  < 0.010 ng/mL (0.00-0.029)   02/07/18  17:07    


 


NT-Pro-B Natriuret Pep  2071 pg/mL (0-900)  H  02/07/18  10:39    








 Short CBC











  02/07/18 Range/Units





  10:59 


 


WBC  12.6 H  (4.5-11.0)  K/mm3


 


Hgb  10.2  (10.1-14.3)  gm/dl


 


Hct  31.8  (30.3-42.9)  %


 


Plt Count  278  (140-440)  K/mm3








 [facility]











  02/07/18





  10:59


 


Sodium  141


 


Potassium  3.9


 


Chloride  104.1


 


Carbon Dioxide  22


 


BUN  30 H


 


Creatinine  1.4 H


 


Glucose  109 H


 


Calcium  9.1








 Cardiac Enzymes











  02/07/18 02/07/18 Range/Units





  10:59 17:07 


 


Troponin T  < 0.010  < 0.010  (0.00-0.029)  ng/mL














- Imaging and Cardiology


EKG: report reviewed


Chest x-ray: report reviewed





Assessment and Plan


Advance Directives: Yes (Full code)


VTE prophylaxis?: Chemical


Plan of care discussed with patient/family: Yes





- Patient Problems


(1) Acute chest pain


Current Visit: Yes   Status: Acute   


Plan to address problem: 


Chest pain w/u


Serial CE's Lexiscan in AM


Costochondritis unlikely.No reproducible pain


Gerd in differential




















(2) T2DM (type 2 diabetes mellitus)


Current Visit: Yes   Status: Chronic   


Qualifiers: 


   Diabetes mellitus complication status: without complication   Diabetes 

mellitus long term insulin use: without long term use   Qualified Code(s): 

E11.9 - Type 2 diabetes mellitus without complications   


Plan to address problem: 


Check A1c.Coverage  and adust dosage if necessary











(3) HTN (hypertension)


Current Visit: Yes   Status: Chronic   


Qualifiers: 


   Hypertension type: essential hypertension   Qualified Code(s): I10 - 

Essential (primary) hypertension   


Plan to address problem: 


Cont antihypertensives








(4) CAD (coronary artery disease)


Current Visit: No   Status: Acute   


Qualifiers: 


   Coronary Disease-Associated Artery/Lesion type: native artery   Native vs. 

transplanted heart: native heart   Associated angina: angina presence 

unspecified   Qualified Code(s): I25.10 - Atherosclerotic heart disease of 

native coronary artery without angina pectoris   


Plan to address problem: 


Cont Plavix








(5) Bipolar 1 disorder


Current Visit: Yes   Status: Chronic   


Plan to address problem: 


Cont Seroquel








(6) CHARISSA (acute kidney injury)


Current Visit: Yes   Status: Acute   


Plan to address problem: 


Cr 1.4


Fluid challenge








(7) HLD (hyperlipidemia)


Current Visit: Yes   Status: Chronic   


Qualifiers: 


   Hyperlipidemia type: mixed hyperlipidemia   Qualified Code(s): E78.2 - Mixed 

hyperlipidemia   


Plan to address problem: 


Cont statins








(8) Asthma


Current Visit: Yes   Status: Inactive   


Plan to address problem: 


Cont Bronchodilators prn








(9) Abdominal pain


Current Visit: Yes   Status: Acute   


Qualifiers: 


   Abdominal location: epigastric   Qualified Code(s): R10.13 - Epigastric pain

   


Plan to address problem: 


Possible Gastritis


Initiated on Protonix








(10) DVT prophylaxis


Current Visit: Yes   Status: Acute   


Plan to address problem: 


on Lovenox

## 2018-02-07 NOTE — NUCLEAR MEDICINE REPORT
LUNG SCAN, VENTILATION AND PERFUSION:



History: Short of breath.



Technique: 5mci of Tc99m MAA was infused for the perfusion images.  

15mci  gas was inhaled for the ventilatory images.



Correlation is made with a chest x-ray dated 2/7/18.



Findings:

Inhalation of Xenon gas demonstrates heterogeneous distribution of the 

radiotracer throughout both lungs. There appears to be mild retention 

of the radiotracer within the left lung on the equilibrium images.



After injection of Technetium 99m macroaggregated albumin gamma

camera imaging of the lungs in multiple projections demonstrates

normal pulmonary contours with a homogeneous distribution of activity. 

No focal areas of perfusion deficiency are identified.



IMPRESSION:

Low probability for pulmonary embolus.

Mild retention of the radiotracer particularly in the left lung 

suggesting mild obstructive pulmonary disease.

## 2018-02-08 LAB
BUN SERPL-MCNC: 38 MG/DL (ref 7–17)
BUN/CREAT SERPL: 21 %
CALCIUM SERPL-MCNC: 9.3 MG/DL (ref 8.4–10.2)
HEMOLYSIS INDEX: 2

## 2018-02-08 RX ADMIN — CARVEDILOL SCH MG: 25 TABLET, FILM COATED ORAL at 02:06

## 2018-02-08 RX ADMIN — CLONIDINE HYDROCHLORIDE SCH MG: 0.2 TABLET ORAL at 02:01

## 2018-02-08 RX ADMIN — HYDROMORPHONE HYDROCHLORIDE PRN MG: 1 INJECTION, SOLUTION INTRAMUSCULAR; INTRAVENOUS; SUBCUTANEOUS at 12:03

## 2018-02-08 RX ADMIN — CLOPIDOGREL BISULFATE SCH MG: 75 TABLET ORAL at 10:47

## 2018-02-08 RX ADMIN — HYDROMORPHONE HYDROCHLORIDE PRN MG: 1 INJECTION, SOLUTION INTRAMUSCULAR; INTRAVENOUS; SUBCUTANEOUS at 08:08

## 2018-02-08 RX ADMIN — ASPIRIN SCH MG: 325 TABLET ORAL at 10:48

## 2018-02-08 RX ADMIN — HYDRALAZINE HYDROCHLORIDE SCH MG: 25 TABLET, FILM COATED ORAL at 10:47

## 2018-02-08 RX ADMIN — HYDRALAZINE HYDROCHLORIDE SCH MG: 25 TABLET, FILM COATED ORAL at 02:01

## 2018-02-08 RX ADMIN — PANTOPRAZOLE SODIUM SCH MG: 40 TABLET, DELAYED RELEASE ORAL at 10:47

## 2018-02-08 RX ADMIN — HYDROMORPHONE HYDROCHLORIDE PRN MG: 1 INJECTION, SOLUTION INTRAMUSCULAR; INTRAVENOUS; SUBCUTANEOUS at 04:03

## 2018-02-08 RX ADMIN — CLONIDINE HYDROCHLORIDE SCH MG: 0.2 TABLET ORAL at 21:55

## 2018-02-08 RX ADMIN — CLONIDINE HYDROCHLORIDE SCH MG: 0.2 TABLET ORAL at 10:48

## 2018-02-08 RX ADMIN — HYDROMORPHONE HYDROCHLORIDE PRN MG: 1 INJECTION, SOLUTION INTRAMUSCULAR; INTRAVENOUS; SUBCUTANEOUS at 21:56

## 2018-02-08 RX ADMIN — HYDRALAZINE HYDROCHLORIDE SCH MG: 25 TABLET, FILM COATED ORAL at 17:37

## 2018-02-08 RX ADMIN — HEPARIN SODIUM SCH: 5000 INJECTION, SOLUTION INTRAVENOUS; SUBCUTANEOUS at 05:17

## 2018-02-08 RX ADMIN — CARVEDILOL SCH MG: 25 TABLET, FILM COATED ORAL at 10:49

## 2018-02-08 RX ADMIN — APIXABAN SCH MG: 5 TABLET, FILM COATED ORAL at 13:10

## 2018-02-08 RX ADMIN — CARVEDILOL SCH MG: 25 TABLET, FILM COATED ORAL at 21:55

## 2018-02-08 RX ADMIN — APIXABAN SCH MG: 5 TABLET, FILM COATED ORAL at 21:54

## 2018-02-08 RX ADMIN — PANTOPRAZOLE SODIUM SCH MG: 40 TABLET, DELAYED RELEASE ORAL at 23:01

## 2018-02-08 NOTE — CONSULTATION
History of Present Illness


Consult date: 02/08/18


Consult reason: chest pain


History of present illness: 





This is a 62yr old woman with a history of coronary artery disease. A recent 

cardiac cath showed widely patent LAD stents. No significant residual coronary 

lesions. LVEF 50-55%. Patient is on eliquis for oral anticoagulation for 

history of paroxysmal atrial fibrillation. Patient also continues to smoke. 





Patient was brought to this hospital with complaints of chest pain and 

epigastric pain. A cardiac consultation was requested for evaluation of her 

chest pain. Patient denies shortness of breath. Since her admission, patient 

has been experiencing nausea with vomiting. Patient denies pre-syncope or 

syncope. Her ECG is a sinus rhythm, no acute ischemic changes. 





Medications and Allergies


 Allergies











Allergy/AdvReac Type Severity Reaction Status Date / Time


 


apple AdvReac  Hives Verified 09/22/17 13:02


 


shell fish Allergy  Swelling Uncoded 09/22/17 13:02











 Home Medications











 Medication  Instructions  Recorded  Confirmed  Last Taken  Type


 


Clopidogrel [Plavix] 75 mg PO QDAY 12/11/15 02/07/18 02/06/18 History


 


Apixaban [Eliquis] 5 mg PO DAILY 04/17/17 02/07/18 02/06/18 History


 


Nitroglycerin [Nitrostat] 0.4 mg SL Q5M PRN 04/17/17 02/07/18 02/06/18 History


 


ALBUTEROL Inhaler [ProAir HFA 2 puff IH QID PRN 09/22/17 02/07/18 02/06/18 

History





Inhaler]     


 


Carvedilol [Coreg] 25 mg PO BID 09/22/17 02/07/18 02/06/18 History


 


Quetiapine Fumarate [Seroquel] 100 mg PO BID 09/22/17 02/07/18 02/06/18 History


 


hydrALAZINE [Apresoline TAB] 25 mg PO Q8HR 09/22/17 02/07/18 02/06/18 History


 


Aspirin [Aspirin TAB] 325 mg PO QDAY 02/07/18 02/07/18 02/06/18 History


 


cloNIDine [Catapres] 0.2 mg PO BID 02/07/18 02/07/18 02/06/18 History











Active Meds: 


Active Medications





Albuterol (Proventil)  2.5 mg IH Q4HRT PRN


   PRN Reason: Shortness Of Breath


Apixaban (Eliquis)  5 mg PO BID [facility]


   PRN Reason: Protocol


   Last Admin: 02/08/18 13:10 Dose:  5 mg


Aspirin (Aspirin)  325 mg PO QDAY [facility]


   Last Admin: 02/08/18 10:48 Dose:  325 mg


Carvedilol (Coreg)  25 mg PO BID [facility]


   Last Admin: 02/08/18 10:49 Dose:  25 mg


Clonidine HCl (Catapres)  0.2 mg PO BID [facility]


   Last Admin: 02/08/18 10:48 Dose:  0.2 mg


Clopidogrel Bisulfate (Plavix)  75 mg PO QDAY [facility]


   Last Admin: 02/08/18 10:47 Dose:  75 mg


Hydralazine HCl (Apresoline)  25 mg PO Q8H [facility]


   Last Admin: 02/08/18 10:47 Dose:  25 mg


Hydromorphone HCl (Dilaudid)  2 mg IV Q4H PRN


   PRN Reason: Pain , Severe (7-10)


   Last Admin: 02/08/18 12:03 Dose:  2 mg


Nitroglycerin (Nitrostat)  0.4 mg SL Q5M PRN


   PRN Reason: Chest Pain


Ondansetron HCl (Zofran)  4 mg IV Q4H PRN


   PRN Reason: Nausea And Vomiting


   Last Admin: 02/08/18 13:03 Dose:  4 mg


Pantoprazole Sodium (Protonix)  40 mg PO BID [facility]


   Last Admin: 02/08/18 10:47 Dose:  40 mg


Quetiapine Fumarate (Seroquel)  100 mg PO BID [facility]


   Last Admin: 02/08/18 10:47 Dose:  100 mg


Valsartan (Diovan)  320 mg PO QDAY [facility]


   Last Admin: 02/08/18 10:48 Dose:  320 mg











Physical Examination


 Vital Signs











Temp Pulse Resp BP Pulse Ox


 


 98.5 F   72   20   187/112   100 


 


 02/07/18 10:34  02/07/18 10:34  02/07/18 10:34  02/07/18 10:34  02/07/18 10:34











General appearance: no acute distress


HEENT: Positive: PERRL


Neck: Positive: trachea midline


Cardiac: Positive: Reg Rate and Rhythm





Results





 02/07/18 10:59





 02/08/18 10:47


 Comprehensive Metabolic Panel











  02/08/18 Range/Units





  10:47 


 


Sodium  146 H  (137-145)  mmol/L


 


Potassium  4.0  (3.6-5.0)  mmol/L


 


Chloride  99.9  ()  mmol/L


 


Carbon Dioxide  29  D  (22-30)  mmol/L


 


BUN  38 H  (7-17)  mg/dL


 


Creatinine  1.8 H  (0.7-1.2)  mg/dL


 


Glucose  158 H  ()  mg/dL


 


Calcium  9.3  (8.4-10.2)  mg/dL














Assessment and Plan





Chest pain


Epigastic pain with n/v


Acute renal failure


Hx of CAD


   [facility] 09/2017:


   Widely patent [id number] LAD stents.


   Otherwise no significant residual coronary lesions.


   LVEF 50-55%.


Paroxysmal Afib


   on eliquis as an outpatient


Tobacco abuse





Recommendations:


Persantine thallium stress test for further assessment of chest pain.


Medical therapy for coronary artery disease and atrial fibrillation.

## 2018-02-08 NOTE — PROGRESS NOTE
Assessment and Plan


Assessment and plan: 





62-year-old  [place]abi female with PMH significant for HTN Asthma and 

bipolar disorder  presents to the emergency department by EMS from home with 

the complaint of some midsternal chest pain that has been going on since about 3

:57 AM this morning.  She took 2 sublingual nitroglycerin at home and received 

some aspirin and a sublingual nitroglycerin in route with EMS without much 

relief.





Acute chest pain


Serial CE's negative, Lexiscan scheduled for AM


Costochondritis unlikely. No reproducible pain


Gerd in differential








T2DM (type 2 diabetes mellitus)


ADA diet, Accu-Cheks before meals and at bedtime, SSI Check A1c





Hypertensive urgency


Diovan initiated along with current antihypertensives, currently controlled, 

hydralazine when necessary








CAD (coronary artery disease)


Cont Plavix








Bipolar 1 disorder


Cont Seroquel








CHARISSA (acute kidney injury)


IV fluids for now, renal ultrasound and nephrology consult if no improvement








HLD (hyperlipidemia)


Cont statins








Asthma


Cont Bronchodilators prn








Abdominal pain


Initiated on Protonix





Paroxysmal atrial fibrillation


Continue Eliquis





DVT prophylaxis


SCDs





History


Interval history: 





Patient seen and examined this morning.  She continues to complain of chest 

pain along with nausea and one time vomiting.  She denies shortness of breath.





Hospitalist Physical





- Constitutional


Vitals: 


 











Temp Pulse Resp BP Pulse Ox


 


 97.9 F   100 H  18   140/93   91 


 


 02/08/18 12:45  02/08/18 14:00  02/08/18 12:45  02/08/18 12:45  02/08/18 12:45











General appearance: Present: no acute distress, well-nourished





- EENT


Eyes: Present: PERRL, EOM intact


ENT: hearing intact, clear oral mucosa





- Neck


Neck: Present: supple, normal ROM





- Respiratory


Respiratory effort: normal


Respiratory: bilateral: CTA





- Cardiovascular


Rhythm: regular


Heart Sounds: Present: S1 & S2





- Extremities


Extremities: no ischemia, No edema





- Abdominal


General gastrointestinal: soft, non-tender, non-distended





- Integumentary


Integumentary: Present: clear, warm, dry





- Psychiatric


Psychiatric: appropriate mood/affect, cooperative





- Neurologic


Neurologic: CNII-XII intact, moves all extremities





- Allied Health


Allied health notes reviewed: nursing





Results





- Labs


CBC & Chem 7: 


 02/07/18 10:59





 02/08/18 10:47


Labs: 


 Laboratory Last Values











WBC  12.6 K/mm3 (4.5-11.0)  H  02/07/18  10:59    


 


RBC  3.76 M/mm3 (3.65-5.03)   02/07/18  10:59    


 


Hgb  10.2 gm/dl (10.1-14.3)   02/07/18  10:59    


 


Hct  31.8 % (30.3-42.9)   02/07/18  10:59    


 


MCV  85 fl (79-97)   02/07/18  10:59    


 


MCH  27 pg (28-32)  L  02/07/18  10:59    


 


MCHC  32 % (30-34)   02/07/18  10:59    


 


RDW  18.8 % (13.2-15.2)  H  02/07/18  10:59    


 


Plt Count  278 K/mm3 (140-440)   02/07/18  10:59    


 


Lymph % (Auto)  17.6 % (13.4-35.0)   02/07/18  10:59    


 


Mono % (Auto)  4.7 % (0.0-7.3)   02/07/18  10:59    


 


Eos % (Auto)  2.0 % (0.0-4.3)   02/07/18  10:59    


 


Baso % (Auto)  0.9 % (0.0-1.8)   02/07/18  10:59    


 


Lymph #  2.2 K/mm3 (1.2-5.4)   02/07/18  10:59    


 


Mono #  0.6 K/mm3 (0.0-0.8)   02/07/18  10:59    


 


Eos #  0.3 K/mm3 (0.0-0.4)   02/07/18  10:59    


 


Baso #  0.1 K/mm3 (0.0-0.1)   02/07/18  10:59    


 


Seg Neutrophils %  74.8 % (40.0-70.0)  H  02/07/18  10:59    


 


Seg Neutrophils #  9.4 K/mm3 (1.8-7.7)  H  02/07/18  10:59    


 


D-Dimer  930.17 ng/mlDDU (0-234)  H  02/07/18  10:39    


 


Sodium  146 mmol/L (137-145)  H  02/08/18  10:47    


 


Potassium  4.0 mmol/L (3.6-5.0)   02/08/18  10:47    


 


Chloride  99.9 mmol/L ()   02/08/18  10:47    


 


Carbon Dioxide  29 mmol/L (22-30)  D 02/08/18  10:47    


 


Anion Gap  21 mmol/L  02/08/18  10:47    


 


BUN  38 mg/dL (7-17)  H  02/08/18  10:47    


 


Creatinine  1.8 mg/dL (0.7-1.2)  H  02/08/18  10:47    


 


Estimated GFR  34 ml/min  02/08/18  10:47    


 


BUN/Creatinine Ratio  21 %  02/08/18  10:47    


 


Glucose  158 mg/dL ()  H  02/08/18  10:47    


 


POC Glucose  106  ()  H  02/07/18  17:50    


 


Calcium  9.3 mg/dL (8.4-10.2)   02/08/18  10:47    


 


Troponin T  < 0.010 ng/mL (0.00-0.029)   02/07/18  17:07    


 


NT-Pro-B Natriuret Pep  2071 pg/mL (0-900)  H  02/07/18  10:39

## 2018-02-08 NOTE — CAT SCAN REPORT
FINAL REPORT



PROCEDURE:  CT ABDOMEN WO CON



TECHNIQUE:  Computerized axial tomography of the abdomen was

performed without intravenous contrast. This study is performed

without intravascular contrast material and its sensitivity for

abdominal and pelvic pathology, including neoplasms,

inflammation, abscess, free fluid, thrombosis, arterial

dissection and infarction, is reduced compared with a contrast

enhanced study. 



HISTORY:  abdonimal pain   epigastric pain  



COMPARISON:  No prior studies are available for comparison.



FINDINGS:  

Visualized lower thorax: There is atelectasis at the lung bases.

Heart size is normal. There is a small pericardial effusion..



Liver: Normal size and attenuation.



Spleen: Normal size and attenuation.



Gallbladder and biliary system: Normal.



Pancreas: Normal.



Adrenals: There are bilateral adrenal adenomas measuring 3

centimeters on the right and 2 centimeters on the left..



Kidneys: There are bilateral kidney cysts. There are no kidney

stones. There is no hydronephrosis..



GI tract: Stomach is distended. There is no bowel obstruction,

colitis or enteritis. The appendix is normal..



Lymph nodes and mesentery: Normal.



Vasculature: Normal.



Peritoneum: There is no ascites or free air..



Musculoskeletal structures: No significant abnormality.



Other: None .







IMPRESSION:  

There is atelectasis at the lung bases. Heart size is normal.

There is a small pericardial effusion..



There are bilateral adrenal adenomas measuring 3 centimeters on

the right and 2 centimeters on the left..



There are bilateral kidney cysts. There are no kidney stones.

There is no hydronephrosis..



Stomach is distended. There is no bowel obstruction, colitis or

enteritis. The appendix is normal..



There is no ascites or free air..

## 2018-02-09 LAB
BASOPHILS # (AUTO): 0.1 K/MM3 (ref 0–0.1)
BASOPHILS NFR BLD AUTO: 0.5 % (ref 0–1.8)
BUN SERPL-MCNC: 52 MG/DL (ref 7–17)
BUN/CREAT SERPL: 20 %
CALCIUM SERPL-MCNC: 8.4 MG/DL (ref 8.4–10.2)
EOSINOPHIL # BLD AUTO: 0.2 K/MM3 (ref 0–0.4)
EOSINOPHIL NFR BLD AUTO: 1.6 % (ref 0–4.3)
HCT VFR BLD CALC: 30.3 % (ref 30.3–42.9)
HEMOLYSIS INDEX: 13
HGB BLD-MCNC: 9.6 GM/DL (ref 10.1–14.3)
LYMPHOCYTES # BLD AUTO: 2.7 K/MM3 (ref 1.2–5.4)
LYMPHOCYTES NFR BLD AUTO: 20.9 % (ref 13.4–35)
MCH RBC QN AUTO: 27 PG (ref 28–32)
MCHC RBC AUTO-ENTMCNC: 32 % (ref 30–34)
MCV RBC AUTO: 85 FL (ref 79–97)
MONOCYTES # (AUTO): 0.8 K/MM3 (ref 0–0.8)
MONOCYTES % (AUTO): 6.1 % (ref 0–7.3)
PLATELET # BLD: 303 K/MM3 (ref 140–440)
RBC # BLD AUTO: 3.58 M/MM3 (ref 3.65–5.03)

## 2018-02-09 RX ADMIN — HYDROMORPHONE HYDROCHLORIDE PRN MG: 1 INJECTION, SOLUTION INTRAMUSCULAR; INTRAVENOUS; SUBCUTANEOUS at 10:50

## 2018-02-09 RX ADMIN — PANTOPRAZOLE SODIUM SCH MG: 40 TABLET, DELAYED RELEASE ORAL at 21:49

## 2018-02-09 RX ADMIN — HYDRALAZINE HYDROCHLORIDE SCH MG: 25 TABLET, FILM COATED ORAL at 10:51

## 2018-02-09 RX ADMIN — APIXABAN SCH MG: 5 TABLET, FILM COATED ORAL at 21:49

## 2018-02-09 RX ADMIN — IPRATROPIUM BROMIDE AND ALBUTEROL SULFATE SCH AMPUL: .5; 3 SOLUTION RESPIRATORY (INHALATION) at 20:21

## 2018-02-09 RX ADMIN — ASPIRIN SCH MG: 325 TABLET ORAL at 10:51

## 2018-02-09 RX ADMIN — CLONIDINE HYDROCHLORIDE SCH MG: 0.2 TABLET ORAL at 10:51

## 2018-02-09 RX ADMIN — HYDROMORPHONE HYDROCHLORIDE PRN MG: 1 INJECTION, SOLUTION INTRAMUSCULAR; INTRAVENOUS; SUBCUTANEOUS at 21:48

## 2018-02-09 RX ADMIN — SODIUM CHLORIDE SCH MLS/HR: 0.45 INJECTION, SOLUTION INTRAVENOUS at 23:17

## 2018-02-09 RX ADMIN — APIXABAN SCH MG: 5 TABLET, FILM COATED ORAL at 10:51

## 2018-02-09 RX ADMIN — BUDESONIDE SCH MG: 0.5 INHALANT RESPIRATORY (INHALATION) at 20:28

## 2018-02-09 RX ADMIN — CLONIDINE HYDROCHLORIDE SCH MG: 0.2 TABLET ORAL at 21:49

## 2018-02-09 RX ADMIN — CARVEDILOL SCH MG: 25 TABLET, FILM COATED ORAL at 21:49

## 2018-02-09 RX ADMIN — PANTOPRAZOLE SODIUM SCH MG: 40 TABLET, DELAYED RELEASE ORAL at 10:51

## 2018-02-09 RX ADMIN — CARVEDILOL SCH MG: 25 TABLET, FILM COATED ORAL at 10:51

## 2018-02-09 RX ADMIN — HYDRALAZINE HYDROCHLORIDE SCH MG: 25 TABLET, FILM COATED ORAL at 00:19

## 2018-02-09 RX ADMIN — HYDRALAZINE HYDROCHLORIDE SCH MG: 25 TABLET, FILM COATED ORAL at 17:55

## 2018-02-09 RX ADMIN — CLOPIDOGREL BISULFATE SCH MG: 75 TABLET ORAL at 10:51

## 2018-02-09 NOTE — PROGRESS NOTE
Assessment and Plan


Assessment and plan: 





62-year-old  [place] female with PMH significant for HTN Asthma and 

bipolar disorder  presents to the emergency department by EMS from home with 

the complaint of some midsternal chest pain that has been going on since about 3

:57 AM this morning.  She took 2 sublingual nitroglycerin at home and received 

some aspirin and a sublingual nitroglycerin in route with EMS without much 

relief.





Acute chest pain


Serial CE's negative, Lexiscan scheduled for AM


Costochondritis unlikely. No reproducible pain


Gerd in differential








T2DM (type 2 diabetes mellitus)


ADA diet, Accu-Cheks before meals and at bedtime, SSI Check A1c





Hypertensive urgency


Urgency now resolved, continue current antihypertensives, hydralazine when 

necessary








CAD (coronary artery disease)


Cont Plavix








Bipolar 1 disorder


Cont Seroquel








CHARISSA (acute kidney injury)


IV fluids, renal ultrasound ordered








HLD (hyperlipidemia)


Cont statins








Asthma


Cont Bronchodilators prn








Abdominal pain


Initiated on Protonix





Paroxysmal atrial fibrillation


Continue Eliquis





DVT prophylaxis


SCDs





History


Interval history: 





Patient seen and examined this morning.  She continues to complain of nausea, 

chest pain along with generalized abdominal pain which has been ongoing for the 

past 2 months.  She denies shortness of breath and vomiting





Hospitalist Physical





- Constitutional


Vitals: 


 











Temp Pulse Resp BP Pulse Ox


 


 98.0 F   78   18   149/76   96 


 


 02/09/18 12:21  02/09/18 12:21  02/09/18 12:21  02/09/18 12:21  02/09/18 12:21











General appearance: Present: no acute distress, well-nourished





- EENT


Eyes: Present: PERRL, EOM intact


ENT: hearing intact, clear oral mucosa





- Neck


Neck: Present: supple, normal ROM





- Respiratory


Respiratory effort: normal


Respiratory: bilateral: wheezing





- Cardiovascular


Rhythm: regular


Heart Sounds: Present: S1 & S2





- Extremities


Extremities: no ischemia, No edema





- Abdominal


General gastrointestinal: soft, tender, non-distended





- Integumentary


Integumentary: Present: clear, warm, dry





- Psychiatric


Psychiatric: appropriate mood/affect, cooperative





- Neurologic


Neurologic: CNII-XII intact, moves all extremities





- Allied Health


Allied health notes reviewed: nursing





Results





- Labs


CBC & Chem 7: 


 02/09/18 05:36





 02/09/18 05:36


Labs: 


 Laboratory Last Values











WBC  13.0 K/mm3 (4.5-11.0)  H  02/09/18  05:36    


 


RBC  3.58 M/mm3 (3.65-5.03)  L  02/09/18  05:36    


 


Hgb  9.6 gm/dl (10.1-14.3)  L  02/09/18  05:36    


 


Hct  30.3 % (30.3-42.9)   02/09/18  05:36    


 


MCV  85 fl (79-97)   02/09/18  05:36    


 


MCH  27 pg (28-32)  L  02/09/18  05:36    


 


MCHC  32 % (30-34)   02/09/18  05:36    


 


RDW  18.7 % (13.2-15.2)  H  02/09/18  05:36    


 


Plt Count  303 K/mm3 (140-440)   02/09/18  05:36    


 


Lymph % (Auto)  20.9 % (13.4-35.0)   02/09/18  05:36    


 


Mono % (Auto)  6.1 % (0.0-7.3)   02/09/18  05:36    


 


Eos % (Auto)  1.6 % (0.0-4.3)   02/09/18  05:36    


 


Baso % (Auto)  0.5 % (0.0-1.8)   02/09/18  05:36    


 


Lymph #  2.7 K/mm3 (1.2-5.4)   02/09/18  05:36    


 


Mono #  0.8 K/mm3 (0.0-0.8)   02/09/18  05:36    


 


Eos #  0.2 K/mm3 (0.0-0.4)   02/09/18  05:36    


 


Baso #  0.1 K/mm3 (0.0-0.1)   02/09/18  05:36    


 


Seg Neutrophils %  70.9 % (40.0-70.0)  H  02/09/18  05:36    


 


Seg Neutrophils #  9.2 K/mm3 (1.8-7.7)  H  02/09/18  05:36    


 


D-Dimer  930.17 ng/mlDDU (0-234)  H  02/07/18  10:39    


 


Sodium  137 mmol/L (137-145)  D 02/09/18  05:36    


 


Potassium  4.0 mmol/L (3.6-5.0)   02/09/18  05:36    


 


Chloride  98.3 mmol/L ()   02/09/18  05:36    


 


Carbon Dioxide  23 mmol/L (22-30)   02/09/18  05:36    


 


Anion Gap  20 mmol/L  02/09/18  05:36    


 


BUN  52 mg/dL (7-17)  H  02/09/18  05:36    


 


Creatinine  2.6 mg/dL (0.7-1.2)  H  02/09/18  05:36    


 


Estimated GFR  23 ml/min  02/09/18  05:36    


 


BUN/Creatinine Ratio  20 %  02/09/18  05:36    


 


Glucose  119 mg/dL ()  H  02/09/18  05:36    


 


POC Glucose  106  ()  H  02/07/18  17:50    


 


Hemoglobin A1c  5.6 % (4-6)   02/09/18  05:36    


 


Calcium  8.4 mg/dL (8.4-10.2)   02/09/18  05:36    


 


Troponin T  < 0.010 ng/mL (0.00-0.029)   02/07/18  17:07    


 


NT-Pro-B Natriuret Pep  2071 pg/mL (0-900)  H  02/07/18  10:39

## 2018-02-09 NOTE — CONSULTATION
History of Present Illness


Consult date: 02/09/18


Reason for consult: chest pain, COPD


History of present illness: 





                                                       PULMONARY CONSULTATION





This is 62 year old  female  history of COPD,hypertension,CHF,

Hypercholesteremia, Insulin dependent DM and bipolar disorder  admitted with 

chest pain.Patient  received sulingual nitroglycerin and aspirin. associated 

with chest pain shortness breath and nausea.Patient has history of MI and stent 

placement in the past.Patient also has history of cva and renal 

insufficiency.Patient recently moved  from [place] to this area.Patient has 

long history of smoking. Still smoking . Counselled her to stop smoking.Denies 

alcohol or drug abuse.Allergic to Apple and shelfish. Patient also complained 

abdominal pain.CT of abdomen reported no active findings.


Patients D dimer elevated. Perfusion lung scan obtained reported low 

probability for pulmonary emboli.
































Past History


Past Medical History: acute MI, CAD, COPD, diabetes, hypertension, 

hyperlipidemia, renal failure, stroke


Past Surgical History: PTCA


Social history: smoking





Medications and Allergies


 Allergies











Allergy/AdvReac Type Severity Reaction Status Date / Time


 


apple AdvReac  Hives Verified 09/22/17 13:02


 


shell fish Allergy  Swelling Uncoded 09/22/17 13:02











 Home Medications











 Medication  Instructions  Recorded  Confirmed  Last Taken  Type


 


Clopidogrel [Plavix] 75 mg PO QDAY 12/11/15 02/07/18 02/06/18 History


 


Apixaban [Eliquis] 5 mg PO DAILY 04/17/17 02/07/18 02/06/18 History


 


Nitroglycerin [Nitrostat] 0.4 mg SL Q5M PRN 04/17/17 02/07/18 02/06/18 History


 


ALBUTEROL Inhaler [ProAir HFA 2 puff IH QID PRN 09/22/17 02/07/18 02/06/18 

History





Inhaler]     


 


Carvedilol [Coreg] 25 mg PO BID 09/22/17 02/07/18 02/06/18 History


 


Quetiapine Fumarate [Seroquel] 100 mg PO BID 09/22/17 02/07/18 02/06/18 History


 


hydrALAZINE [Apresoline TAB] 25 mg PO Q8HR 09/22/17 02/07/18 02/06/18 History


 


Aspirin [Aspirin TAB] 325 mg PO QDAY 02/07/18 02/07/18 02/06/18 History


 


cloNIDine [Catapres] 0.2 mg PO BID 02/07/18 02/07/18 02/06/18 History











Active Meds: 


Active Medications





Albuterol (Proventil)  2.5 mg IH Q4HRT PRN


   PRN Reason: Shortness Of Breath


Albuterol/Ipratropium (Duoneb *Not For Prn Use*)  1 ampul IH Q6HRT [facility]


Apixaban (Eliquis)  5 mg PO BID [facility]


   PRN Reason: Protocol


   Last Admin: 02/09/18 10:51 Dose:  5 mg


Aspirin (Aspirin)  325 mg PO QDAY [facility]


   Last Admin: 02/09/18 10:51 Dose:  325 mg


Budesonide (Pulmicort)  0.5 mg IH Q12HRT [facility]


Carvedilol (Coreg)  25 mg PO BID [facility]


   Last Admin: 02/09/18 10:51 Dose:  25 mg


Clonidine HCl (Catapres)  0.2 mg PO BID [facility]


   Last Admin: 02/09/18 10:51 Dose:  0.2 mg


Clopidogrel Bisulfate (Plavix)  75 mg PO QDAY [facility]


   Last Admin: 02/09/18 10:51 Dose:  75 mg


Hydralazine HCl (Apresoline)  25 mg PO Q8H [facility]


   Last Admin: 02/09/18 17:55 Dose:  25 mg


Hydromorphone HCl (Dilaudid)  2 mg IV Q4H PRN


   PRN Reason: Pain , Severe (7-10)


   Last Admin: 02/09/18 10:50 Dose:  2 mg


Sodium Chloride (Nacl 0.9% 500 Ml)  500 mls @ 50 mls/hr IV AS DIRECT [facility]


   Stop: 02/09/18 20:00


   Last Admin: 02/09/18 10:50 Dose:  50 mls/hr


Methylprednisolone Sodium Succinate (Solu-Medrol)  40 mg IV Q8HR [facility]


   Last Admin: 02/09/18 17:54 Dose:  40 mg


Nitroglycerin (Nitrostat)  0.4 mg SL Q5M PRN


   PRN Reason: Chest Pain


Ondansetron HCl (Zofran)  4 mg IV Q4H PRN


   PRN Reason: Nausea And Vomiting


   Last Admin: 02/08/18 13:03 Dose:  4 mg


Pantoprazole Sodium (Protonix)  40 mg PO BID [facility]


   Last Admin: 02/09/18 10:51 Dose:  40 mg


Quetiapine Fumarate (Seroquel)  100 mg PO BID [facility]


   Last Admin: 02/09/18 10:51 Dose:  100 mg











Review of Systems


All systems: negative





Physical Examination


Vital signs: 


 Vital Signs











Temp Pulse Resp BP Pulse Ox


 


 98.5 F   72   20   187/112   100 


 


 02/07/18 10:34  02/07/18 10:34  02/07/18 10:34  02/07/18 10:34  02/07/18 10:34











General appearance: no acute distress, alert


Eyes: non-icteric


ENT: oropharynx moist


Neck: supple, no JVD


Ascultation: Bilateral: diminished breath sounds (Prolonged expiratory phase.)


Cardiovascular: regular rate and rhythm


Gastrointestinal: normoactive bowel sounds, soft, non-tender


Integumentary: normal


Extremities: no cyanosis, no edema


Musculoskeletal: no deformities


Gait: other (resting in bed.)


normal mental status, non-focal exam, pupils equal and round, CN II-XII normal


anxious





Results





- Laboratory Findings


CBC and BMP: 


 02/09/18 05:36





 02/10/18 05:48


PT/INR, D-dimer











D-Dimer  930.17 ng/mlDDU (0-234)  H  02/07/18  10:39    








Abnormal lab findings: 


 Abnormal Labs











  02/07/18 02/07/18 02/07/18





  10:39 10:39 10:59


 


WBC    12.6 H


 


RBC   


 


Hgb   


 


MCH    27 L


 


RDW    18.8 H


 


Seg Neutrophils %    74.8 H


 


Seg Neutrophils #    9.4 H


 


D-Dimer  930.17 H  


 


Sodium   


 


BUN   


 


Creatinine   


 


Glucose   


 


POC Glucose   


 


NT-Pro-B Natriuret Pep   2071 H 














  02/07/18 02/07/18 02/08/18





  10:59 17:50 10:47


 


WBC   


 


RBC   


 


Hgb   


 


MCH   


 


RDW   


 


Seg Neutrophils %   


 


Seg Neutrophils #   


 


D-Dimer   


 


Sodium    146 H


 


BUN  30 H   38 H


 


Creatinine  1.4 H   1.8 H


 


Glucose  109 H   158 H


 


POC Glucose   106 H 


 


NT-Pro-B Natriuret Pep   














  02/09/18 02/09/18





  05:36 05:36


 


WBC  13.0 H 


 


RBC  3.58 L 


 


Hgb  9.6 L 


 


MCH  27 L 


 


RDW  18.7 H 


 


Seg Neutrophils %  70.9 H 


 


Seg Neutrophils #  9.2 H 


 


D-Dimer  


 


Sodium  


 


BUN   52 H


 


Creatinine   2.6 H


 


Glucose   119 H


 


POC Glucose  


 


NT-Pro-B Natriuret Pep  














- Diagnostic Findings


Chest x-ray: report reviewed (unremarkable AP chest has been reported.), image 

reviewed





Assessment and Plan





This is 62 year old  female  history of COPD,hypertension,CHF,

Hypercholesteremia, Insulin dependent DM and bipolar disorder  admitted with 

chest pain.Patient  received sulingual nitroglycerin and aspirin. associated 

with chest pain shortness breath and nausea.Patient has history of MI and stent 

placement in the past.Patient also has history of cva and renal 

insufficiency.Patient recently moved  from [place] to this area.Patient has 

long history of smoking. Still smoking . Counselled her to stop smoking.Denies 

alcohol or drug abuse.Allergic to Apple and shelfish. Patient also complained 

abdominal pain.CT of abdomen reported no active findings.





Patients D dimer elevated. Perfusion lung scan obtained reported low 

probability for pulmonary emboli.








- Patient Problems


(1) Acute chest pain


Current Visit: Yes   Status: Acute   


Plan to address problem: 


Management as per cardiology.








(2) Abdominal pain


Current Visit: Yes   Status: Acute   


Qualifiers: 


   Abdominal location: epigastric   Qualified Code(s): R10.13 - Epigastric pain

   


Plan to address problem: 


Management as per primary care and gastroenterology.








(3) HTN (hypertension)


Current Visit: Yes   Status: Chronic   


Qualifiers: 


   Hypertension type: essential hypertension   Qualified Code(s): I10 - 

Essential (primary) hypertension   


Plan to address problem: 


Management as per primary care,








(4) CHARISSA (acute kidney injury)


Current Visit: Yes   Status: Acute   


Plan to address problem: 


Management as per primary care and nephrology.








(5) COPD exacerbation


Current Visit: No   Status: Acute   


Plan to address problem: 


O2 2 litres via nasal canula.


 Albuterol/atrovent aerosol treatments q 6 hours.


 Continue I/V solumedral


 Patient is on Apixaban


 Continue protonix.


 Counselled to stop smoking.

## 2018-02-09 NOTE — PROGRESS NOTE
Assessment and Plan





Chest pain


Epigastic pain with n/v


Acute renal failure


Hx of CAD


   [facility] 09/2017:


   Widely patent [id number] LAD stents.


   Otherwise no significant residual coronary lesions.


   LVEF 50-55%.


Paroxysmal Afib


   on eliquis as an outpatient


Acute COPD exacerbation


   Active wheezing and bibasilar rales on exam





Recommendations:





Lexiscan MPI will be deferred today due to significant wheezing on exam


Pulmonary management of her acute COPD exacerbation is warranted


Discontinue valsartan due to acute rise in creatinine levels


Ischemic evaluation will be rescheduled once COPD exacerbation is resolved





Subjective


Date of service: 02/09/18


Principal diagnosis: Chest Pain


Interval history: 





Patient was scheduled for lexiscan MPI this morning but test had to be 

cancelled due to significant wheezing on exam





Objective


 Vital Signs











  Temp Pulse Pulse Resp Resp BP Pulse Ox


 


 02/09/18 05:22  97.6 F  71   18   121/60  94


 


 02/09/18 01:00  98.5 F  71   18   117/78  92


 


 02/08/18 22:00   75     


 


 02/08/18 20:55     18   


 


 02/08/18 20:34  99.5 F  70   18   132/83  95


 


 02/08/18 17:37   90     


 


 02/08/18 17:29  97.7 F  79   18   133/81  92


 


 02/08/18 14:00   100 H     


 


 02/08/18 12:45  97.9 F  85   18   140/93  91


 


 02/08/18 10:00    80  20  18   98














- Physical Examination


HEENT: Positive: PERRL


Neck: Positive: trachea midline


Cardiac: Positive: Reg Rate and Rhythm


Lungs: Positive: Decreased Breath Sounds, Rales, Wheezes


Abdomen: Positive: Soft


Extremities: Absent: edema





- Labs and Meds


 CBC











  02/09/18 Range/Units





  05:36 


 


WBC  13.0 H  (4.5-11.0)  K/mm3


 


RBC  3.58 L  (3.65-5.03)  M/mm3


 


Hgb  9.6 L  (10.1-14.3)  gm/dl


 


Hct  30.3  (30.3-42.9)  %


 


Plt Count  303  (140-440)  K/mm3


 


Lymph #  2.7  (1.2-5.4)  K/mm3


 


Mono #  0.8  (0.0-0.8)  K/mm3


 


Eos #  0.2  (0.0-0.4)  K/mm3


 


Baso #  0.1  (0.0-0.1)  K/mm3








 Comprehensive Metabolic Panel











  02/08/18 02/09/18 Range/Units





  10:47 05:36 


 


Sodium  146 H  137  D  (137-145)  mmol/L


 


Potassium  4.0  4.0  (3.6-5.0)  mmol/L


 


Chloride  99.9  98.3  ()  mmol/L


 


Carbon Dioxide  29  D  23  (22-30)  mmol/L


 


BUN  38 H  52 H  (7-17)  mg/dL


 


Creatinine  1.8 H  2.6 H  (0.7-1.2)  mg/dL


 


Glucose  158 H  119 H  ()  mg/dL


 


Calcium  9.3  8.4  (8.4-10.2)  mg/dL














- Imaging and Cardiology


EKG: report reviewed

## 2018-02-09 NOTE — ULTRASOUND REPORT
FINAL REPORT



EXAM:  US RENAL BILAT



HISTORY:  acute kidney injury 



TECHNIQUE:  Ultrasound kidneys 



PRIORS:  Correlated with prior CT of February 7, 2018  



FINDINGS:  

Right kidney is 11.7 x 4.6 x 4.5 centimeters cortical thickness

1.3 centimeters 



Left kidney is 13.4 x 5.5 x 5.3 centimeters. There are numerous

right renal cysts. Seven of these are noted. The largest seen is

1.8 x 1.4 x 1.6 centimeters. Cortical thickness 1.3 centimeters 



There are multiple left renal cysts 6 of these are identified.

Largest seen is 3.4 x 3.3 x 3.0 centimeters. 



Both kidneys demonstrate increased renal echogenicity bilaterally





No evidence for hydronephrosis. No shadowing echogenic foci

observed. 



IMPRESSION:  

Multiple bilateral renal cysts 



Increased renal echogenicity likely reflecting underlying medical

renal disease 



No evidence for obstructive uropathy

## 2018-02-10 LAB
BILIRUB UR QL STRIP: (no result)
BLOOD UR QL VISUAL: (no result)
BUN SERPL-MCNC: 54 MG/DL (ref 7–17)
BUN/CREAT SERPL: 23 %
CALCIUM SERPL-MCNC: 8.4 MG/DL (ref 8.4–10.2)
CREATININE,URINE: 76.3 MG/DL (ref 0.1–20)
HEMOLYSIS INDEX: 6
NITRITE UR QL STRIP: (no result)
PH UR STRIP: 5 [PH] (ref 5–7)
PROT UR STRIP-MCNC: (no result) MG/DL
PROTEIN/CREATININE RATIO,URINE: 0.1
RBC #/AREA URNS HPF: 6 /HPF (ref 0–6)
UROBILINOGEN UR-MCNC: < 2 MG/DL (ref ?–2)
WBC #/AREA URNS HPF: 1 /HPF (ref 0–6)

## 2018-02-10 RX ADMIN — BUDESONIDE SCH MG: 0.5 INHALANT RESPIRATORY (INHALATION) at 19:27

## 2018-02-10 RX ADMIN — HYDRALAZINE HYDROCHLORIDE SCH MG: 25 TABLET, FILM COATED ORAL at 01:36

## 2018-02-10 RX ADMIN — IPRATROPIUM BROMIDE AND ALBUTEROL SULFATE SCH: .5; 3 SOLUTION RESPIRATORY (INHALATION) at 12:07

## 2018-02-10 RX ADMIN — ASPIRIN SCH MG: 325 TABLET ORAL at 12:53

## 2018-02-10 RX ADMIN — PANTOPRAZOLE SODIUM SCH MG: 40 TABLET, DELAYED RELEASE ORAL at 12:54

## 2018-02-10 RX ADMIN — PANTOPRAZOLE SODIUM SCH MG: 40 TABLET, DELAYED RELEASE ORAL at 22:16

## 2018-02-10 RX ADMIN — BUDESONIDE SCH MG: 0.5 INHALANT RESPIRATORY (INHALATION) at 10:44

## 2018-02-10 RX ADMIN — IPRATROPIUM BROMIDE AND ALBUTEROL SULFATE SCH AMPUL: .5; 3 SOLUTION RESPIRATORY (INHALATION) at 19:27

## 2018-02-10 RX ADMIN — CARVEDILOL SCH MG: 25 TABLET, FILM COATED ORAL at 22:15

## 2018-02-10 RX ADMIN — CLONIDINE HYDROCHLORIDE SCH MG: 0.2 TABLET ORAL at 22:16

## 2018-02-10 RX ADMIN — HYDROMORPHONE HYDROCHLORIDE PRN MG: 1 INJECTION, SOLUTION INTRAMUSCULAR; INTRAVENOUS; SUBCUTANEOUS at 15:11

## 2018-02-10 RX ADMIN — IPRATROPIUM BROMIDE AND ALBUTEROL SULFATE SCH AMPUL: .5; 3 SOLUTION RESPIRATORY (INHALATION) at 14:27

## 2018-02-10 RX ADMIN — CARVEDILOL SCH MG: 25 TABLET, FILM COATED ORAL at 12:54

## 2018-02-10 RX ADMIN — SODIUM CHLORIDE SCH MLS/HR: 0.45 INJECTION, SOLUTION INTRAVENOUS at 14:56

## 2018-02-10 RX ADMIN — APIXABAN SCH MG: 5 TABLET, FILM COATED ORAL at 22:15

## 2018-02-10 RX ADMIN — CLOPIDOGREL BISULFATE SCH MG: 75 TABLET ORAL at 12:53

## 2018-02-10 RX ADMIN — HYDRALAZINE HYDROCHLORIDE SCH MG: 25 TABLET, FILM COATED ORAL at 12:55

## 2018-02-10 RX ADMIN — IPRATROPIUM BROMIDE AND ALBUTEROL SULFATE SCH AMPUL: .5; 3 SOLUTION RESPIRATORY (INHALATION) at 10:44

## 2018-02-10 NOTE — PROGRESS NOTE
Assessment and Plan


Assessment and plan: 





62-year-old  [place] female with PMH significant for HTN Asthma and 

bipolar disorder  who pw chest pain





Chest pain, MPI neg, likely due to htn urgency





HTN urgency; optimize meds, hold ACE/ARB in light of CHARISSA





CHARISSA/Vasomotor nephropathy; renal us neg, image reviewed, neg for obs, continue 

IVF, renal consult appreciated





T2DM: cont SSI





CAD (coronary artery disease): Cont Plavix





Bipolar 1 disorder; Cont Seroquel








HLD (hyperlipidemia); Cont statins








copd exacerbation;  Cont Bronchodilators and steroids








Paroxysmal atrial fibrillation with hypercoaguable state


Continue Eliquis, cont coreg





DVT prophylaxis


SCDs








History


Interval history: 





no cp,no sob, no abdominal pain, no fever





Hospitalist Physical





- Constitutional


Vitals: 


 











Temp Pulse Resp BP Pulse Ox


 


 98.6 F   72   20   145/85   94 


 


 02/10/18 05:43  02/10/18 05:43  02/10/18 05:43  02/10/18 05:43  02/10/18 05:43











General appearance: Present: no acute distress, well-nourished





- EENT


Eyes: Present: PERRL


ENT: hearing intact





- Neck


Neck: Present: supple





- Respiratory


Respiratory effort: normal


Respiratory: bilateral: CTA





- Cardiovascular


Rhythm: regular


Heart Sounds: Present: S1 & S2





- Extremities


Extremities: no ischemia


Peripheral Pulses: within normal limits





- Abdominal


General gastrointestinal: soft, non-tender





- Integumentary


Integumentary: Present: clear, warm, dry





- Psychiatric


Psychiatric: appropriate mood/affect, memory intact, cooperative





- Neurologic


Neurologic: CNII-XII intact, moves all extremities





Results





- Labs


CBC & Chem 7: 


 02/09/18 05:36





 02/11/18 05:22


Labs: 


 Laboratory Last Values











WBC  13.0 K/mm3 (4.5-11.0)  H  02/09/18  05:36    


 


RBC  3.58 M/mm3 (3.65-5.03)  L  02/09/18  05:36    


 


Hgb  9.6 gm/dl (10.1-14.3)  L  02/09/18  05:36    


 


Hct  30.3 % (30.3-42.9)   02/09/18  05:36    


 


MCV  85 fl (79-97)   02/09/18  05:36    


 


MCH  27 pg (28-32)  L  02/09/18  05:36    


 


MCHC  32 % (30-34)   02/09/18  05:36    


 


RDW  18.7 % (13.2-15.2)  H  02/09/18  05:36    


 


Plt Count  303 K/mm3 (140-440)   02/09/18  05:36    


 


Lymph % (Auto)  20.9 % (13.4-35.0)   02/09/18  05:36    


 


Mono % (Auto)  6.1 % (0.0-7.3)   02/09/18  05:36    


 


Eos % (Auto)  1.6 % (0.0-4.3)   02/09/18  05:36    


 


Baso % (Auto)  0.5 % (0.0-1.8)   02/09/18  05:36    


 


Lymph #  2.7 K/mm3 (1.2-5.4)   02/09/18  05:36    


 


Mono #  0.8 K/mm3 (0.0-0.8)   02/09/18  05:36    


 


Eos #  0.2 K/mm3 (0.0-0.4)   02/09/18  05:36    


 


Baso #  0.1 K/mm3 (0.0-0.1)   02/09/18  05:36    


 


Seg Neutrophils %  70.9 % (40.0-70.0)  H  02/09/18  05:36    


 


Seg Neutrophils #  9.2 K/mm3 (1.8-7.7)  H  02/09/18  05:36    


 


D-Dimer  930.17 ng/mlDDU (0-234)  H  02/07/18  10:39    


 


Sodium  135 mmol/L (137-145)  L  02/10/18  05:48    


 


Potassium  4.5 mmol/L (3.6-5.0)   02/10/18  05:48    


 


Chloride  99.5 mmol/L ()   02/10/18  05:48    


 


Carbon Dioxide  18 mmol/L (22-30)  L  02/10/18  05:48    


 


Anion Gap  22 mmol/L  02/10/18  05:48    


 


BUN  54 mg/dL (7-17)  H  02/10/18  05:48    


 


Creatinine  2.4 mg/dL (0.7-1.2)  H  02/10/18  05:48    


 


Estimated GFR  25 ml/min  02/10/18  05:48    


 


BUN/Creatinine Ratio  23 %  02/10/18  05:48    


 


Glucose  247 mg/dL ()  H  02/10/18  05:48    


 


POC Glucose  106  ()  H  02/07/18  17:50    


 


Hemoglobin A1c  5.6 % (4-6)   02/09/18  05:36    


 


Calcium  8.4 mg/dL (8.4-10.2)   02/10/18  05:48    


 


Total Creatine Kinase  37 units/L ()   02/09/18  21:46    


 


Troponin T  < 0.010 ng/mL (0.00-0.029)   02/07/18  17:07    


 


NT-Pro-B Natriuret Pep  1407 pg/mL (0-900)  H  02/09/18  21:46

## 2018-02-10 NOTE — PROGRESS NOTE
Assessment and Plan





This is 62 year old  female  history of COPD,hypertension,CHF,

Hypercholesteremia, Insulin dependent DM and bipolar disorder  admitted with 

chest pain.Patient  received sulingual nitroglycerin and aspirin. associated 

with chest pain shortness breath and nausea.Patient has history of MI and stent 

placement in the past.Patient also has history of cva and renal 

insufficiency.Patient recently moved  from [place] to this area.Patient has 

long history of smoking. Still smoking . Counselled her to stop smoking.Denies 

alcohol or drug abuse.Allergic to Apple and shelfish. Patient also complained 

abdominal pain.CT of abdomen reported no active findings.





Patients D dimer elevated. Perfusion lung scan obtained reported low 

probability for pulmonary emboli.





2/10/18





Patient sleeping at this time on room air.O2 saturation 100%.





- Patient Problems


(1) Acute chest pain


Current Visit: Yes   Status: Acute   


Plan to address problem: 


Management as per cardiology.








(2) Abdominal pain


Current Visit: Yes   Status: Acute   


Qualifiers: 


   Abdominal location: epigastric   Qualified Code(s): R10.13 - Epigastric pain

   


Plan to address problem: 


Management as per primary care and gastroenterology.








(3) HTN (hypertension)


Current Visit: Yes   Status: Chronic   


Qualifiers: 


   Hypertension type: essential hypertension   Qualified Code(s): I10 - 

Essential (primary) hypertension   


Plan to address problem: 


Management as per primary care,








(4) CHARISSA (acute kidney injury)


Current Visit: Yes   Status: Acute   


Plan to address problem: 


Management as per primary care and nephrology.








(5) COPD exacerbation


Current Visit: No   Status: Acute   


Plan to address problem: 


O2 2 litres via nasal canula.


 Albuterol/atrovent aerosol treatments q 6 hours.


 Continue I/V solumedral


 Patient is on Apixaban


 Continue protonix.


 Counselled to stop smoking.








Subjective


Date of service: 02/10/18


Principal diagnosis: Chest Pain


Interval history: 





Patient sleeping at this time on room air.O2 saturation 100%.





Objective


 Vital Signs - 12hr











  02/10/18 02/10/18 02/10/18





  00:25 05:43 08:31


 


Temperature 98.8 F 98.6 F 


 


Pulse Rate 74 72 69


 


Pulse Rate [   





Bilateral]   


 


Respiratory 20 20 





Rate   


 


Respiratory   





Rate [Bilateral   





]   


 


Blood Pressure 145/84 145/85 175/95


 


O2 Sat by Pulse 91 94 





Oximetry   














  02/10/18 02/10/18 02/10/18





  09:24 09:25 09:26


 


Temperature   


 


Pulse Rate 77 83 83


 


Pulse Rate [   





Bilateral]   


 


Respiratory   





Rate   


 


Respiratory   





Rate [Bilateral   





]   


 


Blood Pressure 142/73 144/77 148/77


 


O2 Sat by Pulse   





Oximetry   














  02/10/18 02/10/18 02/10/18





  09:27 09:28 10:40


 


Temperature   


 


Pulse Rate 85 82 


 


Pulse Rate [   85





Bilateral]   


 


Respiratory   





Rate   


 


Respiratory   18





Rate [Bilateral   





]   


 


Blood Pressure 151/79 153/79 


 


O2 Sat by Pulse   





Oximetry   














  02/10/18





  11:03


 


Temperature 


 


Pulse Rate 


 


Pulse Rate [ 83





Bilateral] 


 


Respiratory 





Rate 


 


Respiratory 18





Rate [Bilateral 





] 


 


Blood Pressure 


 


O2 Sat by Pulse 





Oximetry 











Constitutional: no acute distress, alert


Eyes: non-icteric


ENT: oropharynx moist


Neck: supple, no JVD


Ascultation: Bilateral: diminished breath sounds (Prolonged expiratory phase.)


Cardiovascular: regular rate and rhythm


Gastrointestinal: normoactive bowel sounds, soft, non-tender


Integumentary: normal


Extremities: no cyanosis, no edema


Neurologic: normal mental status, non-focal exam, pupils equal and round, CN II-

XII normal


Psychiatric: anxious


CBC and BMP: 


 02/09/18 05:36





 02/10/18 05:48


ABG, PT/INR, D-dimer: 


PT/INR, D-dimer











D-Dimer  930.17 ng/mlDDU (0-234)  H  02/07/18  10:39    








Abnormal lab findings: 


 Abnormal Labs











  02/07/18 02/07/18 02/07/18





  10:39 10:39 10:59


 


WBC    12.6 H


 


RBC   


 


Hgb   


 


MCH    27 L


 


RDW    18.8 H


 


Seg Neutrophils %    74.8 H


 


Seg Neutrophils #    9.4 H


 


D-Dimer  930.17 H  


 


Sodium   


 


Carbon Dioxide   


 


BUN   


 


Creatinine   


 


Glucose   


 


POC Glucose   


 


NT-Pro-B Natriuret Pep   2071 H 














  02/07/18 02/07/18 02/08/18





  10:59 17:50 10:47


 


WBC   


 


RBC   


 


Hgb   


 


MCH   


 


RDW   


 


Seg Neutrophils %   


 


Seg Neutrophils #   


 


D-Dimer   


 


Sodium    146 H


 


Carbon Dioxide   


 


BUN  30 H   38 H


 


Creatinine  1.4 H   1.8 H


 


Glucose  109 H   158 H


 


POC Glucose   106 H 


 


NT-Pro-B Natriuret Pep   














  02/09/18 02/09/18 02/09/18





  05:36 05:36 21:46


 


WBC  13.0 H  


 


RBC  3.58 L  


 


Hgb  9.6 L  


 


MCH  27 L  


 


RDW  18.7 H  


 


Seg Neutrophils %  70.9 H  


 


Seg Neutrophils #  9.2 H  


 


D-Dimer   


 


Sodium   


 


Carbon Dioxide   


 


BUN   52 H 


 


Creatinine   2.6 H 


 


Glucose   119 H 


 


POC Glucose   


 


NT-Pro-B Natriuret Pep    1407 H














  02/10/18





  05:48


 


WBC 


 


RBC 


 


Hgb 


 


MCH 


 


RDW 


 


Seg Neutrophils % 


 


Seg Neutrophils # 


 


D-Dimer 


 


Sodium  135 L


 


Carbon Dioxide  18 L


 


BUN  54 H


 


Creatinine  2.4 H


 


Glucose  247 H


 


POC Glucose 


 


NT-Pro-B Natriuret Pep 











Additional Studies: 





Perfusion lung scan reported low probability for pulmonary emboli.

## 2018-02-10 NOTE — CONSULTATION
History of Present Illness





- Reason for Consult


Consult date: 02/10/18


acute renal failure





- History of Present Illness


Ms Hudson is a 61 y/o F with a PMH of HTN, COPD, DM2, CKD, CAD s/p PCI who has 

been admitted to the [facility] with chest pain. Pt has also had some SHOB and 

nausea. Pt denies vomiting, diarrhea, dysuria. Pt says she has never seen a 

nephrologist before and is new to the area. She was found to have a Cr of 1.4 

on admission that has increased. Pt does endorse taking NSAIDs at home. She is 

making urine.





ROS:


As in HPI otherwise 12 point review of systems -ve





Past History


Past Medical History: acute MI, CAD, COPD, diabetes, hypertension, 

hyperlipidemia, renal failure, stroke


Past Surgical History: PTCA


Social history: smoking





Medications and Allergies


 Allergies











Allergy/AdvReac Type Severity Reaction Status Date / Time


 


apple AdvReac  Hives Verified 09/22/17 13:02


 


shell fish Allergy  Swelling Uncoded 09/22/17 13:02











 Home Medications











 Medication  Instructions  Recorded  Confirmed  Last Taken  Type


 


Clopidogrel [Plavix] 75 mg PO QDAY 12/11/15 02/07/18 02/06/18 History


 


Apixaban [Eliquis] 5 mg PO DAILY 04/17/17 02/07/18 02/06/18 History


 


Nitroglycerin [Nitrostat] 0.4 mg SL Q5M PRN 04/17/17 02/07/18 02/06/18 History


 


ALBUTEROL Inhaler [ProAir HFA 2 puff IH QID PRN 09/22/17 02/07/18 02/06/18 

History





Inhaler]     


 


Carvedilol [Coreg] 25 mg PO BID 09/22/17 02/07/18 02/06/18 History


 


Quetiapine Fumarate [Seroquel] 100 mg PO BID 09/22/17 02/07/18 02/06/18 History


 


hydrALAZINE [Apresoline TAB] 25 mg PO Q8HR 09/22/17 02/07/18 02/06/18 History


 


Aspirin [Aspirin TAB] 325 mg PO QDAY 02/07/18 02/07/18 02/06/18 History


 


cloNIDine [Catapres] 0.2 mg PO BID 02/07/18 02/07/18 02/06/18 History











Active Meds: 


Active Medications





Albuterol (Proventil)  2.5 mg IH Q4HRT PRN


   PRN Reason: Shortness Of Breath


Albuterol/Ipratropium (Duoneb *Not For Prn Use*)  1 ampul IH Q6HRT [facility]


   Last Admin: 02/10/18 12:07 Dose:  Not Given


Apixaban (Eliquis)  5 mg PO BID [facility]


   PRN Reason: Protocol


   Last Admin: 02/09/18 21:49 Dose:  5 mg


Aspirin (Aspirin)  325 mg PO QDAY [facility]


   Last Admin: 02/10/18 12:53 Dose:  325 mg


Budesonide (Pulmicort)  0.5 mg IH Q12HRT [facility]


   Last Admin: 02/10/18 10:44 Dose:  0.5 mg


Carvedilol (Coreg)  25 mg PO BID [facility]


   Last Admin: 02/10/18 12:54 Dose:  25 mg


Clonidine HCl (Catapres)  0.2 mg PO BID [facility]


   Last Admin: 02/09/18 21:49 Dose:  0.2 mg


Clopidogrel Bisulfate (Plavix)  75 mg PO QDAY [facility]


   Last Admin: 02/10/18 12:53 Dose:  75 mg


Hydralazine HCl (Apresoline)  25 mg PO Q8H [facility]


   Last Admin: 02/10/18 12:55 Dose:  25 mg


Hydromorphone HCl (Dilaudid)  2 mg IV Q4H PRN


   PRN Reason: Pain , Severe (7-10)


   Last Admin: 02/09/18 21:48 Dose:  2 mg


Sodium Chloride (Nacl 0.45% 1000 Ml)  1,000 mls @ 125 mls/hr IV AS DIRECT [facility]


   Last Admin: 02/09/18 23:17 Dose:  125 mls/hr


Methylprednisolone Sodium Succinate (Solu-Medrol)  40 mg IV Q8HR [facility]


   Last Admin: 02/10/18 05:46 Dose:  40 mg


Nitroglycerin (Nitrostat)  0.4 mg SL Q5M PRN


   PRN Reason: Chest Pain


Ondansetron HCl (Zofran)  4 mg IV Q4H PRN


   PRN Reason: Nausea And Vomiting


   Last Admin: 02/08/18 13:03 Dose:  4 mg


Pantoprazole Sodium (Protonix)  40 mg PO BID [facility]


   Last Admin: 02/10/18 12:54 Dose:  40 mg


Quetiapine Fumarate (Seroquel)  100 mg PO BID [facility]


   Last Admin: 02/10/18 12:53 Dose:  100 mg











Exam





- Vital Signs


Vital signs: 


 Vital Signs











Temp Pulse Resp BP Pulse Ox


 


 98.5 F   72   20   187/112   100 


 


 02/07/18 10:34  02/07/18 10:34  02/07/18 10:34  02/07/18 10:34  02/07/18 10:34














- Physical Exam


Narrative exam: 


GE: AAOX3


 HEENT: PERRLA


 Neck: No JVD


 Chest: CTAB


 CVS: RRR


 Abd: Soft/NT/ND/BS+


 Ext: No cce


 UG: No trinh


 Psyche: Appropriate mood





Results





- Lab Results





 02/09/18 05:36





 02/10/18 05:48


 Most recent lab results











Calcium  8.4 mg/dL (8.4-10.2)   02/10/18  05:48    














Assessment and Plan


Acute Kidney injury possibly prerenal/ATN from NSAIDs:


 Chronic Kidney disease stage 3 from DM/HTN:


 -BL Cr 1.3 to 1.4


 -Cr trending down with IVFs. Will continue


 -Check Urine studies


 -Will eventually need Ace inhibition or ARB for kidney protection from DM but 

this can be done outpatient once her Cr is back down to baseline


 -No NSAIDs


 -Renally dose all meds and avoid nephrotoxic meds





Chest Pain


Coronary artery disease s/p PCI:


-Cards on board. Per them. MPI Scan per Cards





Diabetes Mellitus type 2:


-Per primary


-No metformin in the setting of renal dysfunction





Essential Hypertension:


-Titrate BP meds to keep SBP <130


-No RAAS inhibition for now. Will eventually need Ace inhibition or ARB for 

kidney protection from DM but this can be done outpatient once her Cr is back 

down to baseline





Hyperlipidemia, chronic:


-Per primary





COPD:


-Per Pulm





Plan d/w Patient at bedside as well as bedside RN.





Thank you for the consult





Sammy Arriaza MD


[phone]

## 2018-02-10 NOTE — PROGRESS NOTE
Assessment and Plan





1.  Chest pain resolved


2.  History of coronary artery disease status post PCI to the LAD.  Normal LV 

ejection fraction 50-55%


3.  History of COPD and asthma


4.  Essential hypertension


5.  Hyperlipidemia


6.  Bipolar disorder





Plan.


Patient is currently stable she is to have Lexiscan MPI today if negative may 

be discharged home today





Subjective


Date of service: 02/10/18


Principal diagnosis: Chest Pain


Interval history: 





No cardiac symptoms.





Objective


 Vital Signs











  Temp Pulse Pulse Resp Resp BP Pulse Ox


 


 02/10/18 09:28   82     153/79 


 


 02/10/18 09:27   85     151/79 


 


 02/10/18 09:26   83     148/77 


 


 02/10/18 09:25   83     144/77 


 


 02/10/18 09:24   77     142/73 


 


 02/10/18 08:31   69     175/95 


 


 02/10/18 05:43  98.6 F  72   20   145/85  94


 


 02/10/18 00:25  98.8 F  74   20   145/84  91


 


 02/09/18 20:22    69   12  


 


 02/09/18 20:13   70     


 


 02/09/18 20:04  99.1 F  71   18   126/76  95


 


 02/09/18 19:49   70     


 


 02/09/18 16:19  98.1 F  70   19   134/78  96


 


 02/09/18 12:21  98.0 F  78   18   149/76  96














- Physical Examination


General: Appears Well, No Apparent Distress


HEENT: Positive: PERRL


Neck: Positive: trachea midline


Cardiac: 


Lungs: 


Neuro: 


Abdomen: Positive: Soft


/Rectal: Normal Prostate, No Masses


Skin: 


Musculoskeletal: No Fluid Collection, No Pain, Normal Range of Motion


Gait: Normal Gait


Extremities: Absent: edema





- Labs and Meds


 Comprehensive Metabolic Panel











  02/10/18 Range/Units





  05:48 


 


Sodium  135 L  (137-145)  mmol/L


 


Potassium  4.5  (3.6-5.0)  mmol/L


 


Chloride  99.5  ()  mmol/L


 


Carbon Dioxide  18 L  (22-30)  mmol/L


 


BUN  54 H  (7-17)  mg/dL


 


Creatinine  2.4 H  (0.7-1.2)  mg/dL


 


Glucose  247 H  ()  mg/dL


 


Calcium  8.4  (8.4-10.2)  mg/dL














- Imaging and Cardiology


EKG: report reviewed

## 2018-02-11 LAB
BUN SERPL-MCNC: 40 MG/DL (ref 7–17)
BUN/CREAT SERPL: 20 %
CALCIUM SERPL-MCNC: 7.6 MG/DL (ref 8.4–10.2)
HEMOLYSIS INDEX: 156

## 2018-02-11 RX ADMIN — SODIUM CHLORIDE SCH MLS/HR: 0.45 INJECTION, SOLUTION INTRAVENOUS at 00:23

## 2018-02-11 RX ADMIN — BUDESONIDE SCH MG: 0.5 INHALANT RESPIRATORY (INHALATION) at 08:08

## 2018-02-11 RX ADMIN — SODIUM BICARBONATE SCH MG: 650 TABLET ORAL at 21:38

## 2018-02-11 RX ADMIN — HYDRALAZINE HYDROCHLORIDE SCH MG: 25 TABLET, FILM COATED ORAL at 09:56

## 2018-02-11 RX ADMIN — APIXABAN SCH MG: 5 TABLET, FILM COATED ORAL at 21:39

## 2018-02-11 RX ADMIN — CLONIDINE HYDROCHLORIDE SCH MG: 0.2 TABLET ORAL at 21:39

## 2018-02-11 RX ADMIN — CLOPIDOGREL BISULFATE SCH MG: 75 TABLET ORAL at 09:56

## 2018-02-11 RX ADMIN — SODIUM CHLORIDE SCH MLS/HR: 0.45 INJECTION, SOLUTION INTRAVENOUS at 17:44

## 2018-02-11 RX ADMIN — IPRATROPIUM BROMIDE AND ALBUTEROL SULFATE SCH: .5; 3 SOLUTION RESPIRATORY (INHALATION) at 02:49

## 2018-02-11 RX ADMIN — BUDESONIDE SCH MG: 0.5 INHALANT RESPIRATORY (INHALATION) at 20:40

## 2018-02-11 RX ADMIN — HYDRALAZINE HYDROCHLORIDE SCH MG: 25 TABLET, FILM COATED ORAL at 16:13

## 2018-02-11 RX ADMIN — APIXABAN SCH MG: 5 TABLET, FILM COATED ORAL at 09:55

## 2018-02-11 RX ADMIN — CARVEDILOL SCH MG: 25 TABLET, FILM COATED ORAL at 21:38

## 2018-02-11 RX ADMIN — IPRATROPIUM BROMIDE AND ALBUTEROL SULFATE SCH AMPUL: .5; 3 SOLUTION RESPIRATORY (INHALATION) at 20:40

## 2018-02-11 RX ADMIN — PANTOPRAZOLE SODIUM SCH MG: 40 TABLET, DELAYED RELEASE ORAL at 09:55

## 2018-02-11 RX ADMIN — HYDROMORPHONE HYDROCHLORIDE PRN MG: 1 INJECTION, SOLUTION INTRAMUSCULAR; INTRAVENOUS; SUBCUTANEOUS at 15:15

## 2018-02-11 RX ADMIN — HYDROMORPHONE HYDROCHLORIDE PRN MG: 1 INJECTION, SOLUTION INTRAMUSCULAR; INTRAVENOUS; SUBCUTANEOUS at 21:40

## 2018-02-11 RX ADMIN — ASPIRIN SCH MG: 325 TABLET ORAL at 09:53

## 2018-02-11 RX ADMIN — CLONIDINE HYDROCHLORIDE SCH MG: 0.2 TABLET ORAL at 09:56

## 2018-02-11 RX ADMIN — CARVEDILOL SCH MG: 25 TABLET, FILM COATED ORAL at 09:53

## 2018-02-11 RX ADMIN — IPRATROPIUM BROMIDE AND ALBUTEROL SULFATE SCH AMPUL: .5; 3 SOLUTION RESPIRATORY (INHALATION) at 08:08

## 2018-02-11 RX ADMIN — PANTOPRAZOLE SODIUM SCH MG: 40 TABLET, DELAYED RELEASE ORAL at 21:38

## 2018-02-11 RX ADMIN — IPRATROPIUM BROMIDE AND ALBUTEROL SULFATE SCH: .5; 3 SOLUTION RESPIRATORY (INHALATION) at 14:00

## 2018-02-11 RX ADMIN — SODIUM CHLORIDE SCH MLS/HR: 0.45 INJECTION, SOLUTION INTRAVENOUS at 09:57

## 2018-02-11 RX ADMIN — HYDRALAZINE HYDROCHLORIDE SCH MG: 25 TABLET, FILM COATED ORAL at 02:00

## 2018-02-11 RX ADMIN — SODIUM BICARBONATE SCH MG: 650 TABLET ORAL at 13:44

## 2018-02-11 NOTE — PROGRESS NOTE
Assessment and Plan


Assessment and plan: 





62-year-old  [place] female with PMH significant for HTN Asthma and 

bipolar disorder  who pw chest pain





Chest pain, MPI neg, likely due to htn urgency





HTN urgency; optimize meds, hold ACE/ARB in light of CHARISSA





CHARISSA/Vasomotor nephropathy; renal us neg, image reviewed, neg for obs, continue 

IVF, renal consult appreciated





T2DM: cont SSI





CAD (coronary artery disease): Cont Plavix





Bipolar 1 disorder; Cont Seroquel








HLD (hyperlipidemia); Cont statins








copd exacerbation;  Cont Bronchodilators and steroids








Paroxysmal atrial fibrillation with hypercoaguable state


Continue Eliquis, cont coreg





DVT prophylaxis


SCDs








History


Interval history: 


wheezing and sob is now improved


no cp, no abdominal pain, no fever





Hospitalist Physical





- Physical exam


Narrative exam: 








General appearance: Present: no acute distress, well-nourished





- EENT


Eyes: Present: PERRL


ENT: hearing intact





- Neck


Neck: Present: supple





- Respiratory


Respiratory effort: normal


Respiratory: bilateral: CTA





- Cardiovascular


Rhythm: regular


Heart Sounds: Present: S1 & S2





- Extremities


Extremities: no ischemia


Peripheral Pulses: within normal limits





- Abdominal


General gastrointestinal: soft, non-tender





- Integumentary


Integumentary: Present: clear, warm, dry





- Psychiatric


Psychiatric: appropriate mood/affect, memory intact, cooperative





- Neurologic


Neurologic: CNII-XII intact, moves all extremities





- Constitutional


Vitals: 


 











Temp Pulse Resp BP Pulse Ox


 


 98 F   78   21   186/96   99 


 


 02/11/18 11:50  02/11/18 11:50  02/11/18 15:15  02/11/18 11:50  02/11/18 11:50











General appearance: Present: no acute distress, well-nourished





Results





- Labs


CBC & Chem 7: 


 02/09/18 05:36





 02/11/18 05:22


Labs: 


 Laboratory Last Values











WBC  13.0 K/mm3 (4.5-11.0)  H  02/09/18  05:36    


 


RBC  3.58 M/mm3 (3.65-5.03)  L  02/09/18  05:36    


 


Hgb  9.6 gm/dl (10.1-14.3)  L  02/09/18  05:36    


 


Hct  30.3 % (30.3-42.9)   02/09/18  05:36    


 


MCV  85 fl (79-97)   02/09/18  05:36    


 


MCH  27 pg (28-32)  L  02/09/18  05:36    


 


MCHC  32 % (30-34)   02/09/18  05:36    


 


RDW  18.7 % (13.2-15.2)  H  02/09/18  05:36    


 


Plt Count  303 K/mm3 (140-440)   02/09/18  05:36    


 


Lymph % (Auto)  20.9 % (13.4-35.0)   02/09/18  05:36    


 


Mono % (Auto)  6.1 % (0.0-7.3)   02/09/18  05:36    


 


Eos % (Auto)  1.6 % (0.0-4.3)   02/09/18  05:36    


 


Baso % (Auto)  0.5 % (0.0-1.8)   02/09/18  05:36    


 


Lymph #  2.7 K/mm3 (1.2-5.4)   02/09/18  05:36    


 


Mono #  0.8 K/mm3 (0.0-0.8)   02/09/18  05:36    


 


Eos #  0.2 K/mm3 (0.0-0.4)   02/09/18  05:36    


 


Baso #  0.1 K/mm3 (0.0-0.1)   02/09/18  05:36    


 


Seg Neutrophils %  70.9 % (40.0-70.0)  H  02/09/18  05:36    


 


Seg Neutrophils #  9.2 K/mm3 (1.8-7.7)  H  02/09/18  05:36    


 


D-Dimer  930.17 ng/mlDDU (0-234)  H  02/07/18  10:39    


 


Sodium  129 mmol/L (137-145)  L  02/11/18  05:22    


 


Potassium  5.1 mmol/L (3.6-5.0)  H  02/11/18  05:22    


 


Chloride  95.6 mmol/L ()  L  02/11/18  05:22    


 


Carbon Dioxide  14 mmol/L (22-30)  L  02/11/18  05:22    


 


Anion Gap  25 mmol/L  02/11/18  05:22    


 


BUN  40 mg/dL (7-17)  H  02/11/18  05:22    


 


Creatinine  2.0 mg/dL (0.7-1.2)  H  02/11/18  05:22    


 


Estimated GFR  31 ml/min  02/11/18  05:22    


 


BUN/Creatinine Ratio  20 %  02/11/18  05:22    


 


Glucose  213 mg/dL ()  H  02/11/18  05:22    


 


POC Glucose  251  ()  H  02/11/18  12:09    


 


Hemoglobin A1c  5.6 % (4-6)   02/09/18  05:36    


 


Calcium  7.6 mg/dL (8.4-10.2)  L  02/11/18  05:22    


 


Total Creatine Kinase  37 units/L ()   02/09/18  21:46    


 


Troponin T  < 0.010 ng/mL (0.00-0.029)   02/07/18  17:07    


 


NT-Pro-B Natriuret Pep  1407 pg/mL (0-900)  H  02/09/18  21:46    


 


Urine Color  Yellow  (Yellow)   02/10/18  17:50    


 


Urine Turbidity  Clear  (Clear)   02/10/18  17:50    


 


Urine pH  5.0  (5.0-7.0)   02/10/18  17:50    


 


Ur Specific Gravity  1.010  (1.003-1.030)   02/10/18  17:50    


 


Urine Protein  <15 mg/dl mg/dL (Negative)   02/10/18  17:50    


 


Urine Glucose (UA)  >=500 mg/dL (Negative)   02/10/18  17:50    


 


Urine Ketones  Neg mg/dL (Negative)   02/10/18  17:50    


 


Urine Blood  Neg  (Negative)   02/10/18  17:50    


 


Urine Nitrite  Neg  (Negative)   02/10/18  17:50    


 


Urine Bilirubin  Neg  (Negative)   02/10/18  17:50    


 


Urine Urobilinogen  < 2.0 mg/dL (<2.0)   02/10/18  17:50    


 


Ur Leukocyte Esterase  Neg  (Negative)   02/10/18  17:50    


 


Urine WBC (Auto)  1.0 /HPF (0.0-6.0)   02/10/18  17:50    


 


Urine RBC (Auto)  6.0 /HPF (0.0-6.0)   02/10/18  17:50    


 


U Epithel Cells (Auto)  1.0 /HPF (0-13.0)   02/10/18  17:50    


 


Urine Eosinophils  None seen  (None Seen)   02/10/18  17:50    


 


Urine Creatinine  76.3 mg/dL (0.1-20.0)  H  02/10/18  17:50    


 


Protein/Creatinin Ratio  0.10   02/10/18  17:50    


 


Urine Sodium  15 mmol/L  02/10/18  17:50    


 


Urine Urea Nitrogen  712   02/10/18  17:50    


 


Urine Total Protein  8 mg/dL (5-11.8)   02/10/18  17:50

## 2018-02-11 NOTE — PROGRESS NOTE
Assessment and Plan





1.  Chest pain resolved


2.  History of coronary artery disease status post PCI to the LAD.  Normal LV 

ejection fraction 50-55%


3.  History of COPD and asthma


4.  Essential hypertension


5.  Hyperlipidemia


6.  Bipolar disorder





Plan.


Patient is currently stable. Lexiscan MPI is negative for imyocardial ischemic. 

Discharged home as per PCP





Subjective


Date of service: 02/11/18


Principal diagnosis: Chest Pain


Interval history: 





No cardiac symptoms.





Objective


 Vital Signs











  Temp Pulse Pulse Pulse Resp Resp Resp


 


 02/11/18 09:56   76     


 


 02/11/18 09:53   76     


 


 02/11/18 09:45  98.3 F  76    18  


 


 02/11/18 08:10    80    18 


 


 02/11/18 08:00    77    18 


 


 02/11/18 06:17  98.4 F  75    20  


 


 02/11/18 01:08   74    20  


 


 02/10/18 20:48      18  


 


 02/10/18 20:31  98.7 F  77    20  


 


 02/10/18 19:43    83    18 


 


 02/10/18 19:28    81    18 


 


 02/10/18 17:24   80     


 


 02/10/18 17:23  97.0 F L  81    18  


 


 02/10/18 14:37    79    20 


 


 02/10/18 14:27    77    20 


 


 02/10/18 13:27  98.6 F  79    18  


 


 02/10/18 12:55   77     


 


 02/10/18 12:54   77     


 


 02/10/18 12:00   79     


 


 02/10/18 11:03     83    18


 


 02/10/18 10:40     85    18














  BP BP Pulse Ox


 


 02/11/18 09:56  199/108  


 


 02/11/18 09:53  199/108  


 


 02/11/18 09:45   199/108  96


 


 02/11/18 08:10   


 


 02/11/18 08:00   


 


 02/11/18 06:17  177/89   96


 


 02/11/18 01:08  164/85   96


 


 02/10/18 20:48   


 


 02/10/18 20:31  163/82   97


 


 02/10/18 19:43   


 


 02/10/18 19:28   


 


 02/10/18 17:24    100


 


 02/10/18 17:23  173/89   100


 


 02/10/18 14:37   


 


 02/10/18 14:27   


 


 02/10/18 13:27   153/75  98


 


 02/10/18 12:55  153/89  


 


 02/10/18 12:54  153/89  


 


 02/10/18 12:00    97


 


 02/10/18 11:03   


 


 02/10/18 10:40   














- Physical Examination


General: Appears Well, No Apparent Distress


HEENT: Positive: PERRL


Neck: Positive: trachea midline


Cardiac: Positive: Regular Rate, S1/S2, Laterally Displaced


Lungs: Positive: clear to auscultation, No Wheeze, Rales, Rhonchi


Neuro: Positive: Grossly Intact


Abdomen: Positive: Soft


/Rectal: Normal Prostate, No Masses


Skin: 


Musculoskeletal: No Fluid Collection, No Pain, Normal Range of Motion


Gait: Normal Gait


Extremities: Absent: edema





- Labs and Meds


 Comprehensive Metabolic Panel











  02/11/18 Range/Units





  05:22 


 


Sodium  129 L  (137-145)  mmol/L


 


Potassium  5.1 H  (3.6-5.0)  mmol/L


 


Chloride  95.6 L  ()  mmol/L


 


Carbon Dioxide  14 L  (22-30)  mmol/L


 


BUN  40 H  (7-17)  mg/dL


 


Creatinine  2.0 H  (0.7-1.2)  mg/dL


 


Glucose  213 H  ()  mg/dL


 


Calcium  7.6 L  (8.4-10.2)  mg/dL














- Imaging and Cardiology


EKG: report reviewed





- Telemetry


EKG Rhythm: Sinus Rhythm

## 2018-02-11 NOTE — PROGRESS NOTE
Assessment and Plan





i.





2/11/18





Patient sleeping at this time on room air.O2 saturation 97%.





- Patient Problems


(1) Acute chest pain


Current Visit: Yes   Status: Acute   


Plan to address problem: 


Management as per cardiology.








(2) Abdominal pain


Current Visit: Yes   Status: Acute   


Qualifiers: 


   Abdominal location: epigastric   Qualified Code(s): R10.13 - Epigastric pain

   


Plan to address problem: 


Management as per primary care and gastroenterology.








(3) HTN (hypertension)


Current Visit: Yes   Status: Chronic   


Qualifiers: 


   Hypertension type: essential hypertension   Qualified Code(s): I10 - 

Essential (primary) hypertension   


Plan to address problem: 


Management as per primary care,








(4) CHARISSA (acute kidney injury)


Current Visit: Yes   Status: Acute   


Plan to address problem: 


Management as per primary care and nephrology.








(5) COPD exacerbation


Current Visit: No   Status: Acute   


Plan to address problem: 


O2 2 litres via nasal canula.


 Albuterol/atrovent aerosol treatments q 6 hours.


 Continue I/V solumedral


 Patient is on Apixaban


 Continue protonix.


 Counselled to stop smoking.








Subjective


Date of service: 02/11/18


Principal diagnosis: Chest Pain


Interval history: 





Patient sleeping at this time on room air.O2 saturation 97%.





Objective


 Vital Signs - 12hr











  02/11/18 02/11/18 02/11/18





  11:50 15:15 16:03


 


Temperature 98 F  98.4 F


 


Pulse Rate 78  76


 


Pulse Rate [   





Anterior   





Bilateral   





Throughout]   


 


Respiratory 17 21 18





Rate   


 


Respiratory   





Rate [Anterior   





Bilateral   





Throughout]   


 


Blood Pressure   186/99


 


Blood Pressure 186/96  





[Left]   


 


O2 Sat by Pulse 99  98





Oximetry   














  02/11/18 02/11/18 02/11/18





  16:13 20:40 20:48


 


Temperature   97.4 F L


 


Pulse Rate 76  80


 


Pulse Rate [  88 





Anterior   





Bilateral   





Throughout]   


 


Respiratory   20





Rate   


 


Respiratory  18 





Rate [Anterior   





Bilateral   





Throughout]   


 


Blood Pressure 186/99  187/95


 


Blood Pressure   





[Left]   


 


O2 Sat by Pulse   96





Oximetry   














  02/11/18 02/11/18 02/11/18





  20:54 21:38 21:39


 


Temperature   


 


Pulse Rate  80 88


 


Pulse Rate [ 89  





Anterior   





Bilateral   





Throughout]   


 


Respiratory   





Rate   


 


Respiratory 18  





Rate [Anterior   





Bilateral   





Throughout]   


 


Blood Pressure  187/95 187/95


 


Blood Pressure   





[Left]   


 


O2 Sat by Pulse   





Oximetry   














  02/11/18





  22:57


 


Temperature 98.8 F


 


Pulse Rate 76


 


Pulse Rate [ 





Anterior 





Bilateral 





Throughout] 


 


Respiratory 22





Rate 


 


Respiratory 





Rate [Anterior 





Bilateral 





Throughout] 


 


Blood Pressure 189/104


 


Blood Pressure 





[Left] 


 


O2 Sat by Pulse 97





Oximetry 











Constitutional: no acute distress, asleep


Eyes: non-icteric


ENT: oropharynx moist


Neck: supple, no JVD


Ascultation: Bilateral: diminished breath sounds (Prolonged expiratory phase.)


Cardiovascular: regular rate and rhythm


Gastrointestinal: normoactive bowel sounds, soft, non-tender


Integumentary: normal


Extremities: no cyanosis, no edema


Neurologic: normal mental status, non-focal exam, pupils equal and round, CN II-

XII normal


Psychiatric: anxious


CBC and BMP: 


 02/09/18 05:36





 02/11/18 05:22


ABG, PT/INR, D-dimer: 


PT/INR, D-dimer











D-Dimer  930.17 ng/mlDDU (0-234)  H  02/07/18  10:39    








Abnormal lab findings: 


 Abnormal Labs











  02/07/18 02/07/18 02/07/18





  10:39 10:39 10:59


 


WBC    12.6 H


 


RBC   


 


Hgb   


 


MCH    27 L


 


RDW    18.8 H


 


Seg Neutrophils %    74.8 H


 


Seg Neutrophils #    9.4 H


 


D-Dimer  930.17 H  


 


Sodium   


 


Potassium   


 


Chloride   


 


Carbon Dioxide   


 


BUN   


 


Creatinine   


 


Glucose   


 


POC Glucose   


 


Calcium   


 


NT-Pro-B Natriuret Pep   2071 H 


 


Urine Creatinine   














  02/07/18 02/07/18 02/08/18





  10:59 17:50 10:47


 


WBC   


 


RBC   


 


Hgb   


 


MCH   


 


RDW   


 


Seg Neutrophils %   


 


Seg Neutrophils #   


 


D-Dimer   


 


Sodium    146 H


 


Potassium   


 


Chloride   


 


Carbon Dioxide   


 


BUN  30 H   38 H


 


Creatinine  1.4 H   1.8 H


 


Glucose  109 H   158 H


 


POC Glucose   106 H 


 


Calcium   


 


NT-Pro-B Natriuret Pep   


 


Urine Creatinine   














  02/09/18 02/09/18 02/09/18





  05:36 05:36 21:46


 


WBC  13.0 H  


 


RBC  3.58 L  


 


Hgb  9.6 L  


 


MCH  27 L  


 


RDW  18.7 H  


 


Seg Neutrophils %  70.9 H  


 


Seg Neutrophils #  9.2 H  


 


D-Dimer   


 


Sodium   


 


Potassium   


 


Chloride   


 


Carbon Dioxide   


 


BUN   52 H 


 


Creatinine   2.6 H 


 


Glucose   119 H 


 


POC Glucose   


 


Calcium   


 


NT-Pro-B Natriuret Pep    1407 H


 


Urine Creatinine   














  02/10/18 02/10/18 02/10/18





  05:48 12:08 17:32


 


WBC   


 


RBC   


 


Hgb   


 


MCH   


 


RDW   


 


Seg Neutrophils %   


 


Seg Neutrophils #   


 


D-Dimer   


 


Sodium  135 L  


 


Potassium   


 


Chloride   


 


Carbon Dioxide  18 L  


 


BUN  54 H  


 


Creatinine  2.4 H  


 


Glucose  247 H  


 


POC Glucose   379 H  269 H


 


Calcium   


 


NT-Pro-B Natriuret Pep   


 


Urine Creatinine   














  02/10/18 02/10/18 02/11/18





  17:50 22:18 05:22


 


WBC   


 


RBC   


 


Hgb   


 


MCH   


 


RDW   


 


Seg Neutrophils %   


 


Seg Neutrophils #   


 


D-Dimer   


 


Sodium    129 L


 


Potassium    5.1 H


 


Chloride    95.6 L


 


Carbon Dioxide    14 L


 


BUN    40 H


 


Creatinine    2.0 H


 


Glucose    213 H


 


POC Glucose   247 H 


 


Calcium    7.6 L


 


NT-Pro-B Natriuret Pep   


 


Urine Creatinine  76.3 H  














  02/11/18 02/11/18 02/11/18





  08:46 12:09 16:09


 


WBC   


 


RBC   


 


Hgb   


 


MCH   


 


RDW   


 


Seg Neutrophils %   


 


Seg Neutrophils #   


 


D-Dimer   


 


Sodium   


 


Potassium   


 


Chloride   


 


Carbon Dioxide   


 


BUN   


 


Creatinine   


 


Glucose   


 


POC Glucose  235 H  251 H  279 H


 


Calcium   


 


NT-Pro-B Natriuret Pep   


 


Urine Creatinine   














  02/11/18





  22:58


 


WBC 


 


RBC 


 


Hgb 


 


MCH 


 


RDW 


 


Seg Neutrophils % 


 


Seg Neutrophils # 


 


D-Dimer 


 


Sodium 


 


Potassium 


 


Chloride 


 


Carbon Dioxide 


 


BUN 


 


Creatinine 


 


Glucose 


 


POC Glucose  321 H


 


Calcium 


 


NT-Pro-B Natriuret Pep 


 


Urine Creatinine

## 2018-02-11 NOTE — PROGRESS NOTE
Assessment and Plan


Acute Kidney injury possibly prerenal/ATN from NSAIDs:


 Chronic Kidney disease stage 3 from DM/HTN:


 -BL Cr 1.3 to 1.4


 -Cr trending down. Continue IVFs.


 -Check Urine studies


 -Will eventually need Ace inhibition or ARB for kidney protection from DM but 

this can be done outpatient once her Cr is back down to baseline


 -No NSAIDs


 -Renally dose all meds and avoid nephrotoxic meds





Chest Pain


Coronary artery disease s/p PCI:


-Cards on board. Per them. MPI Scan per Cards





Hyperkalemia:


-Low K diet


-Kayxelate ordered once 





Diabetes Mellitus type 2:


-Per primary


-No metformin in the setting of renal dysfunction





Essential Hypertension:


-Titrate BP meds to keep SBP <130


-No RAAS inhibition for now. Will eventually need Ace inhibition or ARB for 

kidney protection from DM but this can be done outpatient once her Cr is back 

down to baseline





Hyperlipidemia, chronic:


-Per primary





COPD:


-Per Pulm





Plan d/w Patient at bedside.





Pt should follow up with [facility]  1 week after discharge.





Thank you for the consult





Sammy Arriaza MD


[phone]





Subjective


Date of service: 02/11/18


Principal diagnosis: Chest Pain


Interval history: 


Denies SHOB. 





Objective





- Exam


Narrative Exam: 


GE: AAOX3


 HEENT: PERRLA


 Neck: No JVD


 Chest: CTAB


 CVS: RRR


 Abd: Soft/NT/ND/BS+


 Ext: No cce


 UG: No trinh


 Psyche: Appropriate mood





- Vital Signs


Vital signs: 


 Vital Signs - 12hr











  02/11/18 02/11/18 02/11/18





  01:08 06:17 08:00


 


Temperature  98.4 F 


 


Pulse Rate 74 75 


 


Pulse Rate [   77





Anterior   





Bilateral   





Throughout]   


 


Respiratory 20 20 





Rate   


 


Respiratory   18





Rate [Anterior   





Bilateral   





Throughout]   


 


Blood Pressure 164/85 177/89 


 


Blood Pressure   





[Left]   


 


O2 Sat by Pulse 96 96 





Oximetry   














  02/11/18 02/11/18 02/11/18





  08:10 09:45 09:53


 


Temperature  98.3 F 


 


Pulse Rate  76 76


 


Pulse Rate [ 80  





Anterior   





Bilateral   





Throughout]   


 


Respiratory  18 





Rate   


 


Respiratory 18  





Rate [Anterior   





Bilateral   





Throughout]   


 


Blood Pressure   199/108


 


Blood Pressure  199/108 





[Left]   


 


O2 Sat by Pulse  96 





Oximetry   














  02/11/18





  09:56


 


Temperature 


 


Pulse Rate 76


 


Pulse Rate [ 





Anterior 





Bilateral 





Throughout] 


 


Respiratory 





Rate 


 


Respiratory 





Rate [Anterior 





Bilateral 





Throughout] 


 


Blood Pressure 199/108


 


Blood Pressure 





[Left] 


 


O2 Sat by Pulse 





Oximetry 














- Lab





 02/09/18 05:36





 02/11/18 05:22


 Most recent lab results











Calcium  7.6 mg/dL (8.4-10.2)  L  02/11/18  05:22    


 


Urine Creatinine  76.3 mg/dL (0.1-20.0)  H  02/10/18  17:50    


 


Urine Sodium  15 mmol/L  02/10/18  17:50    


 


Urine Total Protein  8 mg/dL (5-11.8)   02/10/18  17:50

## 2018-02-12 VITALS — SYSTOLIC BLOOD PRESSURE: 146 MMHG | DIASTOLIC BLOOD PRESSURE: 80 MMHG

## 2018-02-12 LAB
BUN SERPL-MCNC: 43 MG/DL (ref 7–17)
BUN/CREAT SERPL: 25 %
CALCIUM SERPL-MCNC: 8.1 MG/DL (ref 8.4–10.2)
HEMOLYSIS INDEX: 4

## 2018-02-12 RX ADMIN — SODIUM CHLORIDE SCH MLS/HR: 0.45 INJECTION, SOLUTION INTRAVENOUS at 01:42

## 2018-02-12 RX ADMIN — HYDROMORPHONE HYDROCHLORIDE PRN MG: 1 INJECTION, SOLUTION INTRAMUSCULAR; INTRAVENOUS; SUBCUTANEOUS at 11:43

## 2018-02-12 RX ADMIN — BUDESONIDE SCH MG: 0.5 INHALANT RESPIRATORY (INHALATION) at 08:49

## 2018-02-12 RX ADMIN — CARVEDILOL SCH MG: 25 TABLET, FILM COATED ORAL at 10:11

## 2018-02-12 RX ADMIN — IPRATROPIUM BROMIDE AND ALBUTEROL SULFATE SCH: .5; 3 SOLUTION RESPIRATORY (INHALATION) at 06:05

## 2018-02-12 RX ADMIN — APIXABAN SCH MG: 5 TABLET, FILM COATED ORAL at 10:11

## 2018-02-12 RX ADMIN — CLOPIDOGREL BISULFATE SCH MG: 75 TABLET ORAL at 10:10

## 2018-02-12 RX ADMIN — HYDRALAZINE HYDROCHLORIDE SCH MG: 25 TABLET, FILM COATED ORAL at 01:53

## 2018-02-12 RX ADMIN — HYDROMORPHONE HYDROCHLORIDE PRN MG: 1 INJECTION, SOLUTION INTRAMUSCULAR; INTRAVENOUS; SUBCUTANEOUS at 01:57

## 2018-02-12 RX ADMIN — SODIUM BICARBONATE SCH MG: 650 TABLET ORAL at 17:11

## 2018-02-12 RX ADMIN — APIXABAN SCH MG: 5 TABLET, FILM COATED ORAL at 10:14

## 2018-02-12 RX ADMIN — ASPIRIN SCH MG: 325 TABLET ORAL at 10:10

## 2018-02-12 RX ADMIN — HYDRALAZINE HYDROCHLORIDE SCH MG: 25 TABLET, FILM COATED ORAL at 10:18

## 2018-02-12 RX ADMIN — IPRATROPIUM BROMIDE AND ALBUTEROL SULFATE SCH AMPUL: .5; 3 SOLUTION RESPIRATORY (INHALATION) at 08:45

## 2018-02-12 RX ADMIN — SODIUM BICARBONATE SCH MG: 650 TABLET ORAL at 10:10

## 2018-02-12 RX ADMIN — PANTOPRAZOLE SODIUM SCH MG: 40 TABLET, DELAYED RELEASE ORAL at 10:11

## 2018-02-12 RX ADMIN — CLONIDINE HYDROCHLORIDE SCH MG: 0.2 TABLET ORAL at 10:11

## 2018-02-12 RX ADMIN — IPRATROPIUM BROMIDE AND ALBUTEROL SULFATE SCH: .5; 3 SOLUTION RESPIRATORY (INHALATION) at 14:53

## 2018-02-12 NOTE — PROGRESS NOTE
Assessment and Plan





Chest pain


   no ischemia on MPI this admission


COPD exacerbation


Epigastic pain with n/v


Acute renal failure


Hx of CAD


   [facility] 09/2017:


   Widely patent [id number] LAD stents.


   Otherwise no significant residual coronary lesions.


   LVEF 50-55%.


Paroxysmal Afib


   on eliquis as an outpatient


Tobacco abuse





Recommend:


Continue medical therapy for coronary artery disease and paroxysmal atrial 

fibrillation.


Otherwise, conservative medical therapy.





Subjective


Date of service: 02/12/18


Principal diagnosis: Chest Pain


Interval history: 





Patient denies chest pain.





Objective


 Vital Signs











  Temp Pulse Pulse Resp Resp BP BP


 


 02/12/18 10:18       190/107 


 


 02/12/18 10:11       190/107 


 


 02/12/18 09:01    84   18  


 


 02/12/18 09:00    84   18  


 


 02/12/18 08:02  98.7 F  78   24   190/107 


 


 02/12/18 04:24  98.3 F  87   24   144/91 


 


 02/12/18 01:53   86     163/90 


 


 02/12/18 00:10   76     189/104 


 


 02/11/18 22:57  98.8 F  76   22   189/104 


 


 02/11/18 21:39   88     187/95 


 


 02/11/18 21:38   80     187/95 


 


 02/11/18 20:54    89   18  


 


 02/11/18 20:48  97.4 F L  80   20   187/95 


 


 02/11/18 20:40    88   18  


 


 02/11/18 16:13   76     186/99 


 


 02/11/18 16:03  98.4 F  76   18   186/99 


 


 02/11/18 15:15     21   


 


 02/11/18 11:50  98 F  78   17    186/96














  Pulse Ox


 


 02/12/18 10:18 


 


 02/12/18 10:11 


 


 02/12/18 09:01 


 


 02/12/18 09:00 


 


 02/12/18 08:02  98


 


 02/12/18 04:24  93


 


 02/12/18 01:53 


 


 02/12/18 00:10 


 


 02/11/18 22:57  97


 


 02/11/18 21:39 


 


 02/11/18 21:38 


 


 02/11/18 20:54 


 


 02/11/18 20:48  96


 


 02/11/18 20:40 


 


 02/11/18 16:13 


 


 02/11/18 16:03  98


 


 02/11/18 15:15 


 


 02/11/18 11:50  99














- Physical Examination


General: Appears Well, No Apparent Distress


HEENT: Positive: PERRL


Cardiac: Positive: Reg Rate and Rhythm


Neuro: Positive: Grossly Intact


Skin: 


Extremities: Absent: edema





- Labs and Meds


 Comprehensive Metabolic Panel











  02/12/18 Range/Units





  05:24 


 


Sodium  136 L D  (137-145)  mmol/L


 


Potassium  4.5  (3.6-5.0)  mmol/L


 


Chloride  98.3  ()  mmol/L


 


Carbon Dioxide  21 L D  (22-30)  mmol/L


 


BUN  43 H  (7-17)  mg/dL


 


Creatinine  1.7 H  (0.7-1.2)  mg/dL


 


Glucose  237 H  ()  mg/dL


 


Calcium  8.1 L  (8.4-10.2)  mg/dL














- Imaging and Cardiology


EKG: report reviewed

## 2018-02-12 NOTE — PROGRESS NOTE
Assessment and Plan








Chest Pain:


CAD s/p PCI:


-Cardiology on board, on eliquis, coreg, s/p lexiscan MPI negative for 

myocardial ischemia, f/u recs








Acute Kidney injury possibly prerenal etiology/ ATN from NSAIDs use, underlying 

chronic Kidney disease stage 3 from diabetes mellitus and hypertension:


-Renal function reviewed, gradually improving, SCr level decreased to 1.7 today

, yesterday's SCr level was 2.0


-Renally dose meds


-Discontinue 0.45% NS infusion at 125 ml/hr


-Anticipate starting on either ACEI vs ARB for renal protection from diabetes 

mellitus once renal function is stable, but this can be done as an outpatient


-Avoid NSAIDs


-Strict intake and output


-Renal plan discussed with Dr Fletcher


-Continue supportive therapy








Hyperkalemia:


-Improved


-Low potassium diet








Hyponatremia:


-Improved








Diabetes Mellitus type 2 non-insulin dependent:


-As per primary team











Essential Hypertension:


-Discontinue IV fluids


-Continue on current regimen for now, adjust if needed











COPD:


-On nebs, steroids


-As per pulmonology











Hyperlipidemia:


-As per primary 














Subjective


Date of service: 02/12/18


Principal diagnosis: Chest Pain


Interval history: 








Patient reports less chest pain today, also states she does have shortness of 

breath is at baseline (underlying COPD), no acute distress. Patient states she 

is tolerating diet ok. No family at bedside. 





Objective





- Vital Signs


Vital signs: 


 Vital Signs - 12hr











  02/11/18 02/11/18 02/11/18





  20:54 21:38 21:39


 


Temperature   


 


Pulse Rate  80 88


 


Pulse Rate [ 89  





Anterior   





Bilateral   





Throughout]   


 


Respiratory   





Rate   


 


Respiratory 18  





Rate [Anterior   





Bilateral   





Throughout]   


 


Blood Pressure  187/95 187/95


 


O2 Sat by Pulse   





Oximetry   














  02/11/18 02/12/18 02/12/18





  22:57 00:10 01:53


 


Temperature 98.8 F  


 


Pulse Rate 76 76 86


 


Pulse Rate [   





Anterior   





Bilateral   





Throughout]   


 


Respiratory 22  





Rate   


 


Respiratory   





Rate [Anterior   





Bilateral   





Throughout]   


 


Blood Pressure 189/104 189/104 163/90


 


O2 Sat by Pulse 97  





Oximetry   














  02/12/18 02/12/18





  04:24 08:02


 


Temperature 98.3 F 98.7 F


 


Pulse Rate 87 78


 


Pulse Rate [  





Anterior  





Bilateral  





Throughout]  


 


Respiratory 24 24





Rate  


 


Respiratory  





Rate [Anterior  





Bilateral  





Throughout]  


 


Blood Pressure 144/91 190/107


 


O2 Sat by Pulse 93 98





Oximetry  














- General Appearance


General appearance: well-developed (no acute distress)


EENT: ATNC


Neck: no JVD


Respiratory: Present: Other (Lung sounds decreased bilaterally, unlabored)


Cardiology: S1S2


Gastrointestinal: normoactive bowel sounds, no tenderness


Integumentary: warm and dry


Neurologic: alert and oriented x3


Musculoskeletal: other (no edema to both lower extremities)


Psychiatric: mood/affect appropriate, cooperative





- Lab





 02/09/18 05:36





 02/12/18 05:24


 Most recent lab results











Calcium  8.1 mg/dL (8.4-10.2)  L  02/12/18  05:24    


 


Urine Creatinine  76.3 mg/dL (0.1-20.0)  H  02/10/18  17:50    


 


Urine Sodium  15 mmol/L  02/10/18  17:50    


 


Urine Total Protein  8 mg/dL (5-11.8)   02/10/18  17:50

## 2018-02-12 NOTE — DISCHARGE SUMMARY
Providers





- Providers


Date of Admission: 


02/07/18 13:29





Attending physician: 


EFLIX MOORE MD





 





02/08/18 10:04


Consult to Physician [CONS] Routine 


   Consulting Provider: AUSTIN CORONEL


   Reason For Exam: chest pain/cad


   Place consult to:: Dr. Coronel


   Notified:: Maria RN


   Was contact made?: Yes


   If yes, spoke with:: Maynorkeojuancarlos Hernandes


   Time called:: 12:18





02/09/18 15:13


Consult to Physician [CONS] Routine 


   Consulting Provider: LIVIA HAGER


   Reason For Exam: copd


   Place consult to:: DR HAGER


   Notified:: DR HAGER





02/09/18 17:32


Consult to Physician [CONS] Routine 


   Consulting Provider: JENNA ARRIAZA


   Reason For Exam: acute kidney injury


   Place consult to:: Dr. Arriaza


   Notified:: Dr. Arriaza


   Comment:: completed











Primary care physician: 


SANDI LAMA








Hospitalization


Condition: Stable


Hospital course: 





62-year-old  [place] female with PMH significant for HTN Asthma and 

bipolar disorder  who pw chest pain





Chest pain, MPI neg, likely due to htn urgency





HTN urgency; optimize meds, hold ACE/ARB in light of CHARISSA





CHARISSA/Vasomotor nephropathy; renal us neg, image reviewed, neg for obs, continue 

IVF, renal consult appreciated





T2DM: cont SSI





CAD (coronary artery disease): Cont Plavix





Bipolar 1 disorder; Cont Seroquel








HLD (hyperlipidemia); Cont statins








copd exacerbation;  Cont Bronchodilators and steroids








Paroxysmal atrial fibrillation with hypercoaguable state


Continue Eliquis, cont coreg





DVT prophylaxis


SCDs





Disposition: DC-01 TO HOME OR SELFCARE


Time spent for discharge: 35 minutes





Core Measure Documentation





- Palliative Care


Palliative Care/ Comfort Measures: Not Applicable





- Core Measures


Any of the following diagnoses?: none





Exam





- Constitutional


Vitals: 


 











Temp Pulse Resp BP Pulse Ox


 


 98.4 F   71   18   162/91   100 


 


 02/12/18 13:31  02/12/18 13:31  02/12/18 13:31  02/12/18 13:31  02/12/18 13:31











General appearance: Present: no acute distress, well-nourished





- EENT


Eyes: Present: PERRL


ENT: hearing intact, clear oral mucosa





- Neck


Neck: Present: supple, normal ROM





- Respiratory


Respiratory effort: normal


Respiratory: bilateral: CTA





- Cardiovascular


Heart Sounds: Present: S1 & S2.  Absent: rub, click





- Extremities


Extremities: pulses symmetrical, No edema


Peripheral Pulses: within normal limits





- Abdominal


General gastrointestinal: Present: soft, non-tender, non-distended, normal 

bowel sounds


Female genitourinary: Present: normal





- Integumentary


Integumentary: Present: clear, warm, dry





- Musculoskeletal


Musculoskeletal: gait normal, strength equal bilaterally





- Psychiatric


Psychiatric: appropriate mood/affect, intact judgment & insight





- Neurologic


Neurologic: CNII-XII intact, moves all extremities





Plan


Follow up with: 


JENNA ARRIAZA MD [Staff Physician] - 7 Days


PRIMARY CARE,MD [Referring] - 3-5 Days


Prescriptions: 


ALBUTEROL Inhaler [ProAir HFA Inhaler] 2 puff IH QID PRN #1 inha


 PRN Reason: Shortness Of Breath


Apixaban [Eliquis] 5 mg PO BID #60 tablet


Carvedilol [Coreg] 25 mg PO BID #60 tablet


Clonidine HCl [Catapres] 0.3 mg PO BID #60 tablet


NIFEdipine XL [Procardia Xl] 60 mg PO QDAY #30 tablet


Prednisone [predniSONE 5 mg (6-Day Pack, 21 Tabs)] 5 mg PO .TAPER #1 tab.ds.pk


Sodium Bicarbonate 650 mg PO TID #90 tablet

## 2018-02-12 NOTE — PROGRESS NOTE
Assessment and Plan








Acute COPD exacerbation


Chest Pain


Hypertensive Urgency


CHARISSA


Diabetes type II


PAF (Rate controlled)





- continue bronchodilators and ICS


- continue systemic steroids with taper


- prn oxygen therapy for sats < 89%


- empiric Ab's for acute COPD exacerbation


- continue glycemic control with SSI


- azotemia per nephrology


- complete ACS w/up per cardiology


- continue eliquis for A-fib


- continue GI prophylaxis


- flu & pneumovax per protocol





...d/c planning ok pulmonary-wise











Subjective


Date of service: 02/12/18


Principal diagnosis: Chest Pain


Interval history: 





Patient is seen today for: Chest Pain; AECOPD





Seen and examined at bedside; 24hour events reviewed; nursing and respiratory 

care staff consulted; no adverse overnight events reported to me; resting 

peacefully; denies acute chest pains or increased SOB 





Objective


 Vital Signs - 12hr











  02/12/18 02/12/18 02/12/18





  04:24 08:02 09:00


 


Temperature 98.3 F 98.7 F 


 


Pulse Rate 87 78 


 


Pulse Rate [   84





Anterior   





Bilateral   





Throughout]   


 


Respiratory 24 24 





Rate   


 


Respiratory   18





Rate [Anterior   





Bilateral   





Throughout]   


 


Blood Pressure 144/91 190/107 


 


O2 Sat by Pulse 93 98 





Oximetry   














  02/12/18 02/12/18 02/12/18





  09:01 10:11 10:18


 


Temperature   


 


Pulse Rate   


 


Pulse Rate [ 84  





Anterior   





Bilateral   





Throughout]   


 


Respiratory   





Rate   


 


Respiratory 18  





Rate [Anterior   





Bilateral   





Throughout]   


 


Blood Pressure  190/107 190/107


 


O2 Sat by Pulse   





Oximetry   














  02/12/18





  13:31


 


Temperature 98.4 F


 


Pulse Rate 71


 


Pulse Rate [ 





Anterior 





Bilateral 





Throughout] 


 


Respiratory 18





Rate 


 


Respiratory 





Rate [Anterior 





Bilateral 





Throughout] 


 


Blood Pressure 162/91


 


O2 Sat by Pulse 100





Oximetry 











Constitutional: no acute distress, other (resting peacefully)


Eyes: non-icteric


ENT: oropharynx moist


Neck: supple, no lymphadenopathy, no JVD, other (no thyromegaly)


Ascultation: Bilateral: clear, diminished breath sounds (Prolonged expiratory 

phase.)


Percussion: Bilateral: not dull


Cardiovascular: regular rate and rhythm, other (no rubs or murmurs)


Gastrointestinal: normoactive bowel sounds, soft, non-tender


Integumentary: normal


Extremities: no cyanosis, no edema


Neurologic: normal mental status, non-focal exam, pupils equal and round, CN II-

XII normal


Psychiatric: mood appropriate, affect normal


CBC and BMP: 


 02/09/18 05:36





 02/12/18 05:24


ABG, PT/INR, D-dimer: 


PT/INR, D-dimer











D-Dimer  930.17 ng/mlDDU (0-234)  H  02/07/18  10:39    








Abnormal lab findings: 


 Abnormal Labs











  02/07/18 02/07/18 02/07/18





  10:39 10:39 10:59


 


WBC    12.6 H


 


RBC   


 


Hgb   


 


MCH    27 L


 


RDW    18.8 H


 


Seg Neutrophils %    74.8 H


 


Seg Neutrophils #    9.4 H


 


D-Dimer  930.17 H  


 


Sodium   


 


Potassium   


 


Chloride   


 


Carbon Dioxide   


 


BUN   


 


Creatinine   


 


Glucose   


 


POC Glucose   


 


Calcium   


 


NT-Pro-B Natriuret Pep   2071 H 


 


Urine Creatinine   














  02/07/18 02/07/18 02/08/18





  10:59 17:50 10:47


 


WBC   


 


RBC   


 


Hgb   


 


MCH   


 


RDW   


 


Seg Neutrophils %   


 


Seg Neutrophils #   


 


D-Dimer   


 


Sodium    146 H


 


Potassium   


 


Chloride   


 


Carbon Dioxide   


 


BUN  30 H   38 H


 


Creatinine  1.4 H   1.8 H


 


Glucose  109 H   158 H


 


POC Glucose   106 H 


 


Calcium   


 


NT-Pro-B Natriuret Pep   


 


Urine Creatinine   














  02/09/18 02/09/18 02/09/18





  05:36 05:36 21:46


 


WBC  13.0 H  


 


RBC  3.58 L  


 


Hgb  9.6 L  


 


MCH  27 L  


 


RDW  18.7 H  


 


Seg Neutrophils %  70.9 H  


 


Seg Neutrophils #  9.2 H  


 


D-Dimer   


 


Sodium   


 


Potassium   


 


Chloride   


 


Carbon Dioxide   


 


BUN   52 H 


 


Creatinine   2.6 H 


 


Glucose   119 H 


 


POC Glucose   


 


Calcium   


 


NT-Pro-B Natriuret Pep    1407 H


 


Urine Creatinine   














  02/10/18 02/10/18 02/10/18





  05:48 12:08 17:32


 


WBC   


 


RBC   


 


Hgb   


 


MCH   


 


RDW   


 


Seg Neutrophils %   


 


Seg Neutrophils #   


 


D-Dimer   


 


Sodium  135 L  


 


Potassium   


 


Chloride   


 


Carbon Dioxide  18 L  


 


BUN  54 H  


 


Creatinine  2.4 H  


 


Glucose  247 H  


 


POC Glucose   379 H  269 H


 


Calcium   


 


NT-Pro-B Natriuret Pep   


 


Urine Creatinine   














  02/10/18 02/10/18 02/11/18





  17:50 22:18 05:22


 


WBC   


 


RBC   


 


Hgb   


 


MCH   


 


RDW   


 


Seg Neutrophils %   


 


Seg Neutrophils #   


 


D-Dimer   


 


Sodium    129 L


 


Potassium    5.1 H


 


Chloride    95.6 L


 


Carbon Dioxide    14 L


 


BUN    40 H


 


Creatinine    2.0 H


 


Glucose    213 H


 


POC Glucose   247 H 


 


Calcium    7.6 L


 


NT-Pro-B Natriuret Pep   


 


Urine Creatinine  76.3 H  














  02/11/18 02/11/18 02/11/18





  08:46 12:09 16:09


 


WBC   


 


RBC   


 


Hgb   


 


MCH   


 


RDW   


 


Seg Neutrophils %   


 


Seg Neutrophils #   


 


D-Dimer   


 


Sodium   


 


Potassium   


 


Chloride   


 


Carbon Dioxide   


 


BUN   


 


Creatinine   


 


Glucose   


 


POC Glucose  235 H  251 H  279 H


 


Calcium   


 


NT-Pro-B Natriuret Pep   


 


Urine Creatinine   














  02/11/18 02/12/18 02/12/18





  22:58 05:24 06:30


 


WBC   


 


RBC   


 


Hgb   


 


MCH   


 


RDW   


 


Seg Neutrophils %   


 


Seg Neutrophils #   


 


D-Dimer   


 


Sodium   136 L D 


 


Potassium   


 


Chloride   


 


Carbon Dioxide   21 L D 


 


BUN   43 H 


 


Creatinine   1.7 H 


 


Glucose   237 H 


 


POC Glucose  321 H   284 H


 


Calcium   8.1 L 


 


NT-Pro-B Natriuret Pep   


 


Urine Creatinine   














  02/12/18





  12:41


 


WBC 


 


RBC 


 


Hgb 


 


MCH 


 


RDW 


 


Seg Neutrophils % 


 


Seg Neutrophils # 


 


D-Dimer 


 


Sodium 


 


Potassium 


 


Chloride 


 


Carbon Dioxide 


 


BUN 


 


Creatinine 


 


Glucose 


 


POC Glucose  456 H


 


Calcium 


 


NT-Pro-B Natriuret Pep 


 


Urine Creatinine 











Chest x-ray: image reviewed

## 2018-02-13 NOTE — TREADMILL REPORT
THALLIUM STRESS TEST



LEFT VENTRICLE:  Left ventricular chamber size is within normal spread. 

Perfusion study demonstrates well preserved perfusion uptake of the tracer in

all segments, no significant defects identified.  Gated analysis demonstrates

normal left ventricular systolic function, ejection fraction 61%.



CONCLUSION:  Normal myocardial perfusion study.





DD: 02/13/2018 08:30

DT: 02/13/2018 09:07

JOB# [id number]  [id number]

CA/NTS

## 2018-02-27 ENCOUNTER — HOSPITAL ENCOUNTER (EMERGENCY)
Dept: HOSPITAL 5 - ED | Age: 63
LOS: 1 days | Discharge: LEFT BEFORE DISCHARGE | End: 2018-02-28
Payer: MEDICARE

## 2018-02-27 VITALS — DIASTOLIC BLOOD PRESSURE: 109 MMHG | SYSTOLIC BLOOD PRESSURE: 213 MMHG

## 2018-02-27 DIAGNOSIS — R07.9: Primary | ICD-10-CM

## 2018-02-27 DIAGNOSIS — Z53.21: ICD-10-CM

## 2018-02-27 LAB
BASOPHILS # (AUTO): 0 K/MM3 (ref 0–0.1)
BASOPHILS NFR BLD AUTO: 0.4 % (ref 0–1.8)
BUN SERPL-MCNC: 35 MG/DL (ref 7–17)
BUN/CREAT SERPL: 21 %
CALCIUM SERPL-MCNC: 9 MG/DL (ref 8.4–10.2)
EOSINOPHIL # BLD AUTO: 0.2 K/MM3 (ref 0–0.4)
EOSINOPHIL NFR BLD AUTO: 2.1 % (ref 0–4.3)
HCT VFR BLD CALC: 36.2 % (ref 30.3–42.9)
HEMOLYSIS INDEX: 24
HGB BLD-MCNC: 11.2 GM/DL (ref 10.1–14.3)
LYMPHOCYTES # BLD AUTO: 2.6 K/MM3 (ref 1.2–5.4)
LYMPHOCYTES NFR BLD AUTO: 21.5 % (ref 13.4–35)
MCH RBC QN AUTO: 27 PG (ref 28–32)
MCHC RBC AUTO-ENTMCNC: 31 % (ref 30–34)
MCV RBC AUTO: 86 FL (ref 79–97)
MONOCYTES # (AUTO): 0.7 K/MM3 (ref 0–0.8)
MONOCYTES % (AUTO): 6.1 % (ref 0–7.3)
PLATELET # BLD: 252 K/MM3 (ref 140–440)
RBC # BLD AUTO: 4.21 M/MM3 (ref 3.65–5.03)

## 2018-02-27 PROCEDURE — 83880 ASSAY OF NATRIURETIC PEPTIDE: CPT

## 2018-02-27 PROCEDURE — 36415 COLL VENOUS BLD VENIPUNCTURE: CPT

## 2018-02-27 PROCEDURE — 93005 ELECTROCARDIOGRAM TRACING: CPT

## 2018-02-27 PROCEDURE — 84484 ASSAY OF TROPONIN QUANT: CPT

## 2018-02-27 PROCEDURE — 93010 ELECTROCARDIOGRAM REPORT: CPT

## 2018-02-27 PROCEDURE — 80048 BASIC METABOLIC PNL TOTAL CA: CPT

## 2018-02-27 PROCEDURE — 71046 X-RAY EXAM CHEST 2 VIEWS: CPT

## 2018-02-27 PROCEDURE — 85025 COMPLETE CBC W/AUTO DIFF WBC: CPT

## 2018-02-27 NOTE — XRAY REPORT
FINAL REPORT



EXAM:  XR CHEST ROUTINE 2V



HISTORY:  SOB/chest pain 



TECHNIQUE:  PA and lateral views of the chest



PRIORS:  None.



FINDINGS:  

Lines, tubes, and devices: N/A



Lungs and pleura: Trachea is normal in position.  Lungs are clear

of infiltrate, pleural effusion, vascular congestion, or

pneumothorax. 



Cardiomediastinal silhouette: Cardiac and mediastinal silhouettes

are unremarkable.



Other: Bony structures are intact.



IMPRESSION:  

No acute cardiopulmonary process seen.

## 2020-08-18 ENCOUNTER — HOSPITAL ENCOUNTER (EMERGENCY)
Dept: HOSPITAL 5 - ED | Age: 65
LOS: 1 days | Discharge: HOME | End: 2020-08-19
Payer: MEDICARE

## 2020-08-18 DIAGNOSIS — Z79.899: ICD-10-CM

## 2020-08-18 DIAGNOSIS — R07.89: ICD-10-CM

## 2020-08-18 DIAGNOSIS — Z98.890: ICD-10-CM

## 2020-08-18 DIAGNOSIS — I25.2: ICD-10-CM

## 2020-08-18 DIAGNOSIS — I16.0: Primary | ICD-10-CM

## 2020-08-18 DIAGNOSIS — Z91.018: ICD-10-CM

## 2020-08-18 DIAGNOSIS — J44.9: ICD-10-CM

## 2020-08-18 DIAGNOSIS — I11.0: ICD-10-CM

## 2020-08-18 DIAGNOSIS — Z79.82: ICD-10-CM

## 2020-08-18 DIAGNOSIS — Z91.013: ICD-10-CM

## 2020-08-18 DIAGNOSIS — I50.9: ICD-10-CM

## 2020-08-18 DIAGNOSIS — E11.9: ICD-10-CM

## 2020-08-18 LAB
BASOPHILS # (AUTO): 0.1 K/MM3 (ref 0–0.1)
BASOPHILS NFR BLD AUTO: 0.5 % (ref 0–1.8)
BUN SERPL-MCNC: 33 MG/DL (ref 7–17)
BUN/CREAT SERPL: 15 %
CALCIUM SERPL-MCNC: 8.9 MG/DL (ref 8.4–10.2)
EOSINOPHIL # BLD AUTO: 0.2 K/MM3 (ref 0–0.4)
EOSINOPHIL NFR BLD AUTO: 1.2 % (ref 0–4.3)
HCT VFR BLD CALC: 36 % (ref 30.3–42.9)
HEMOLYSIS INDEX: 13
HGB BLD-MCNC: 12.1 GM/DL (ref 10.1–14.3)
LYMPHOCYTES # BLD AUTO: 2.1 K/MM3 (ref 1.2–5.4)
LYMPHOCYTES NFR BLD AUTO: 13.4 % (ref 13.4–35)
MCHC RBC AUTO-ENTMCNC: 34 % (ref 30–34)
MCV RBC AUTO: 92 FL (ref 79–97)
MONOCYTES # (AUTO): 0.9 K/MM3 (ref 0–0.8)
MONOCYTES % (AUTO): 5.9 % (ref 0–7.3)
PLATELET # BLD: 219 K/MM3 (ref 140–440)
RBC # BLD AUTO: 3.92 M/MM3 (ref 3.65–5.03)

## 2020-08-18 PROCEDURE — 71045 X-RAY EXAM CHEST 1 VIEW: CPT

## 2020-08-18 PROCEDURE — 96374 THER/PROPH/DIAG INJ IV PUSH: CPT

## 2020-08-18 PROCEDURE — 85025 COMPLETE CBC W/AUTO DIFF WBC: CPT

## 2020-08-18 PROCEDURE — 99285 EMERGENCY DEPT VISIT HI MDM: CPT

## 2020-08-18 PROCEDURE — 96375 TX/PRO/DX INJ NEW DRUG ADDON: CPT

## 2020-08-18 PROCEDURE — 84484 ASSAY OF TROPONIN QUANT: CPT

## 2020-08-18 PROCEDURE — 93005 ELECTROCARDIOGRAM TRACING: CPT

## 2020-08-18 PROCEDURE — 36415 COLL VENOUS BLD VENIPUNCTURE: CPT

## 2020-08-18 PROCEDURE — 80048 BASIC METABOLIC PNL TOTAL CA: CPT

## 2020-08-18 NOTE — EMERGENCY DEPARTMENT REPORT
ED Chest Pain HPI





- General


Chief Complaint: Chest Pain


Stated Complaint: CHEST PAIN


PUI?: No


Time Seen by Provider: 20 21:33


Source: patient, EMS


Mode of arrival: Stretcher


Limitations: No Limitations





- History of Present Illness


Initial Comments: 





This is a 65-year-old female with history of CAD status post cardiac stent, CVA,

atrial fibrillation, diabetes mellitus, COPD, bipolar disorder who presents with

chest pain squeezing sensation which begins in her chest radiates to her back.  

She states that she has this type of chest pain on a pretty consistent basis.  

She states that her chest pain normally arises when her blood pressure is 

elevated.  She has been compliant with daily aspirin.  Home medications include 

hydralazine, clonidine, diuretic.





Patient was evaluated  for chest pain.  She has had extensive cardiac 

evaluation for chest pain over several hospitalizations here.





According to cardiology consultation performed last month, patient had negative 

Lexiscan at outside hospital in May of this year.





MD Complaint: chest pain


-: Gradual, This morning


Onset: during rest


Pain Location: substernal


Pain Radiation: back


Severity: moderate


Severity scale (0 -10): 8


Quality: squeezing


Consistency: constant


Improves With: nothing


Worsens With: nothing


Treatments Prior to Arrival: none





- Related Data


                                Home Medications











 Medication  Instructions  Recorded  Confirmed  Last Taken


 


Quetiapine Fumarate [Seroquel] 100 mg PO BID 17








                                  Previous Rx's











 Medication  Instructions  Recorded  Last Taken  Type


 


Albuterol Mdi (or & Nicu Only) 2 puff IH QID PRN #1 inha 18 Rx





[ProAir HFA Inhaler]    


 


Acetaminophen [Acetaminophen TAB] 650 mg PO Q4H PRN  tablet 20 Unknown Rx


 


Amiodarone [Cordarone 200 MG TAB] 200 mg PO QDAY #30 tablet 20 Unknown Rx


 


Aspirin EC [Halfprin EC] 81 mg PO QDAY #30 tablet 20 Unknown Rx


 


Amiodarone [Cordarone 200 MG TAB] 200 mg PO BID #60 tablet 20 Unknown Rx


 


Apixaban [Eliquis] 5 mg PO BID 30 Days #60 tablet 20 Unknown Rx


 


Metoprolol [Lopressor TAB] 50 mg PO BID #60 tablet 20 Unknown Rx


 


amLODIPine 10 mg PO QDAY #30 tablet 20 Unknown Rx


 


oxyCODONE /ACETAMINOPHEN [Percocet 1 tab PO Q6H PRN #20 tablet 20 Unknown 

Rx





5/325 mg]    











                                    Allergies











Allergy/AdvReac Type Severity Reaction Status Date / Time


 


apple AdvReac  Hives Verified 17 13:02


 


shell fish Allergy  Swelling Uncoded 17 13:02














Heart Score





- HEART Score


History: Slightly suspicious


EKG: Non-specific


Age: > 65


Risk factors: > 3 risk factors or hx of atherosclerotic disease


Troponin: < normal limit


HEART Score: 5





ED Review of Systems


ROS: 


Stated complaint: CHEST PAIN


Other details as noted in HPI





Comment: All other systems reviewed and negative


Constitutional: denies: fever, malaise


Respiratory: denies: cough, shortness of breath


Cardiovascular: chest pain


Gastrointestinal: denies: abdominal pain, nausea, vomiting





ED Past Medical Hx





- Past Medical History


Previous Medical History?: Yes


Hx Hypertension: Yes


Hx CVA: Yes (right sided weakness)


Hx Heart Attack/AMI: Yes (1 stent)


Hx Congestive Heart Failure: Yes


Hx Diabetes: Yes


Hx Renal Disease: Yes (RENAL INSUFFICIENCY- stent left kidney)


Hx Psychiatric Treatment: Yes (BIPOLAR/SCHIZOPHRENIA)


Hx COPD: Yes


Additional medical history: HIGH CHOLESTEROL





- Surgical History


Past Surgical History?: Yes


Hx Coronary Stent: Yes (x 2 in 2015)


Additional Surgical History: stent placement x 2 in , knee surgery 2018





- Social History


Smoking Status: Never Smoker


Substance Use Type: None





- Medications


Home Medications: 


                                Home Medications











 Medication  Instructions  Recorded  Confirmed  Last Taken  Type


 


Quetiapine Fumarate [Seroquel] 100 mg PO BID 17 History


 


Albuterol Mdi (or & Nicu Only) 2 puff IH QID PRN #1 inha 18 Rx





[ProAir HFA Inhaler]     


 


Acetaminophen [Acetaminophen TAB] 650 mg PO Q4H PRN  tablet 20 

Unknown Rx


 


Amiodarone [Cordarone 200 MG TAB] 200 mg PO QDAY #30 tablet 20 

Unknown Rx


 


Aspirin EC [Halfprin EC] 81 mg PO QDAY #30 tablet 20 Unknown Rx


 


Amiodarone [Cordarone 200 MG TAB] 200 mg PO BID #60 tablet 20  Unknown Rx


 


Apixaban [Eliquis] 5 mg PO BID 30 Days #60 tablet 20  Unknown Rx


 


Metoprolol [Lopressor TAB] 50 mg PO BID #60 tablet 20  Unknown Rx


 


amLODIPine 10 mg PO QDAY #30 tablet 20  Unknown Rx


 


oxyCODONE /ACETAMINOPHEN [Percocet 1 tab PO Q6H PRN #20 tablet 20  Unknown

 Rx





5/325 mg]     














ED Physical Exam





- General


Limitations: No Limitations


General appearance: alert, in no apparent distress





- Head


Head exam: Present: atraumatic, normocephalic





- Eye


Eye exam: Present: normal appearance





- ENT


ENT exam: Present: mucous membranes moist





- Neck


Neck exam: Present: normal inspection, full ROM





- Respiratory


Respiratory exam: Present: normal lung sounds bilaterally.  Absent: respiratory 

distress, wheezes, rales, rhonchi, stridor





- Cardiovascular


Cardiovascular Exam: Present: regular rate, normal rhythm, normal heart sounds. 

Absent: systolic murmur, diastolic murmur, rubs, gallop





- GI/Abdominal


GI/Abdominal exam: Present: soft, normal bowel sounds.  Absent: distended, ten

derness, guarding, rebound





- Extremities Exam


Extremities exam: Present: normal inspection





- Neurological Exam


Neurological exam: Present: alert, oriented X3





- Psychiatric


Psychiatric exam: Present: normal affect, normal mood





- Skin


Skin exam: Present: warm, dry, intact, normal color.  Absent: rash





ED Course


                                   Vital Signs











  20





  21:32 21:34 21:36


 


Temperature   


 


Pulse Rate 61 59 L 59 L


 


Respiratory 24 28 H 19





Rate   


 


Blood Pressure   


 


Blood Pressure   





[Left]   


 


O2 Sat by Pulse 100 100 100





Oximetry   














  20





  21:38 21:40 21:42


 


Temperature  99.6 F 


 


Pulse Rate 61  59 L


 


Respiratory 18  16





Rate   


 


Blood Pressure 208/99  


 


Blood Pressure   208/99





[Left]   


 


O2 Sat by Pulse 100  99





Oximetry   














  20





  21:57 21:59 22:47


 


Temperature   


 


Pulse Rate 61  65


 


Respiratory  19 25 H





Rate   


 


Blood Pressure 208/99  173/94


 


Blood Pressure   





[Left]   


 


O2 Sat by Pulse   100





Oximetry   














  20





  23:00 23:05 00:00


 


Temperature   


 


Pulse Rate 62 62 64


 


Respiratory 14 20 22





Rate   


 


Blood Pressure 175/91 175/91 187/92


 


Blood Pressure   





[Left]   


 


O2 Sat by Pulse 100 100 100





Oximetry   














PANCHO score





- Pancho Score


Age > 65: (0) No


Aspirin use within the Past 7 Days: (0) No


3 or more CAD Risk Factors: (1) Yes


2 or more Angina events in past 24 hrs: (1) Yes


Known CAD with more than 50% Stenosis: (1) Yes


Elevated Cardiac Markers: (0) No


ST Deviation Greater than 0.5mm: (0) No


PANCHO Score: 3





ED Medical Decision Making





- Lab Data


Result diagrams: 


                                 20 21:37





                                 20 21:37








                         Laboratory Results - last 24 hr











  20





  21:37 21:37


 


WBC  15.5 H 


 


RBC  3.92 


 


Hgb  12.1 


 


Hct  36.0 


 


MCV  92 


 


MCH  31 


 


MCHC  34 


 


RDW  15.9 H 


 


Plt Count  219 


 


Lymph % (Auto)  13.4 


 


Mono % (Auto)  5.9 


 


Eos % (Auto)  1.2 


 


Baso % (Auto)  0.5 


 


Lymph #  2.1 


 


Mono #  0.9 H 


 


Eos #  0.2 


 


Baso #  0.1 


 


Seg Neutrophils %  79.0 H 


 


Seg Neutrophils #  12.2 H 


 


Sodium   139


 


Potassium   3.7


 


Chloride   102.7


 


Carbon Dioxide   22


 


Anion Gap   18


 


BUN   33 H


 


Creatinine   2.2 H


 


Estimated GFR   27


 


BUN/Creatinine Ratio   15


 


Glucose   133 H


 


Calcium   8.9


 


Troponin T   < 0.010














- EKG Data


-: EKG Interpreted by Me


EKG shows normal: sinus rhythm, axis, intervals, ST-T waves


Rate: normal, bradycardia





- EKG Data


Interpretation: other (QS complexes in anterior lead)





- Radiology Data


Radiology results: report reviewed





Chest radiograph: No acute findings





- Medical Decision Making





1.  Chest pain: Persistent chest pain at rest.  Atypical for ACS.  Patient has 

had several hospitalizations this year for chest pain.  Last 3 previous 

hospitalizations, she had cardiac work-ups which were negative for acute 

ischemia.  Patient is grieving.  A close family friend  on yesterday.  She 

denies suicidal homicidal ideation.





2.  Hypertensive urgency: Repeat blood pressure 168/84 after receiving IV 

hydralazine.





I do not suspect pulmonary embolism, aortic dissection or any other emergent co

ndition associated with chest pain.  





She is currently chest pain-free.  Discharged home.


Critical care attestation.: 


If time is entered above; I have spent that time in minutes in the direct care 

of this critically ill patient, excluding procedure time.








ED Disposition


Clinical Impression: 


 Hypertensive urgency, Chest pain





Disposition: DC-01 TO HOME OR SELFCARE


Is pt being admited?: No


Does the pt Need Aspirin: No


Condition: Stable


Instructions:  Chest Pain (ED)


Referrals: 


PRIMARY CARE,MD [Primary Care Provider] - 3-5 Days

## 2020-08-18 NOTE — XRAY REPORT
CHEST 1 VIEW



INDICATION / CLINICAL INFORMATION:

Chest Pain.



COMPARISON: 

Radiograph dated 7/18/2020.



FINDINGS:



SUPPORT DEVICES: None.



HEART / MEDIASTINUM: No significant abnormality. 



LUNGS / PLEURA: No significant pulmonary or pleural abnormality. No pneumothorax. 



ADDITIONAL FINDINGS: No significant additional findings.



IMPRESSION:

No acute cardiopulmonary abnormality.



Signer Name: Jack Belle MD 

Signed: 8/18/2020 10:24 PM

Workstation Name: [id number]

## 2020-08-19 VITALS — DIASTOLIC BLOOD PRESSURE: 88 MMHG | SYSTOLIC BLOOD PRESSURE: 182 MMHG

## 2022-03-11 ENCOUNTER — HOSPITAL ENCOUNTER (EMERGENCY)
Dept: HOSPITAL 5 - ED | Age: 67
Discharge: HOME | End: 2022-03-11
Payer: MEDICARE

## 2022-03-11 VITALS — SYSTOLIC BLOOD PRESSURE: 172 MMHG | DIASTOLIC BLOOD PRESSURE: 73 MMHG

## 2022-03-11 DIAGNOSIS — E11.22: ICD-10-CM

## 2022-03-11 DIAGNOSIS — J44.9: ICD-10-CM

## 2022-03-11 DIAGNOSIS — Z91.018: ICD-10-CM

## 2022-03-11 DIAGNOSIS — I48.91: ICD-10-CM

## 2022-03-11 DIAGNOSIS — Z79.899: ICD-10-CM

## 2022-03-11 DIAGNOSIS — I13.0: Primary | ICD-10-CM

## 2022-03-11 DIAGNOSIS — N18.9: ICD-10-CM

## 2022-03-11 DIAGNOSIS — Z91.013: ICD-10-CM

## 2022-03-11 DIAGNOSIS — E78.00: ICD-10-CM

## 2022-03-11 DIAGNOSIS — I50.9: ICD-10-CM

## 2022-03-11 LAB
ALBUMIN SERPL-MCNC: 3.9 G/DL (ref 3.9–5)
ALT SERPL-CCNC: 14 UNITS/L (ref 7–56)
BASOPHILS # (AUTO): 0.1 K/MM3 (ref 0–0.1)
BASOPHILS NFR BLD AUTO: 1.4 % (ref 0–1.8)
BUN SERPL-MCNC: 51 MG/DL (ref 7–17)
BUN/CREAT SERPL: 11 %
CALCIUM SERPL-MCNC: 9 MG/DL (ref 8.4–10.2)
EOSINOPHIL # BLD AUTO: 0.5 K/MM3 (ref 0–0.4)
EOSINOPHIL NFR BLD AUTO: 6.7 % (ref 0–4.3)
HCT VFR BLD CALC: 32.1 % (ref 30.3–42.9)
HEMOLYSIS INDEX: 8
HGB BLD-MCNC: 10.1 GM/DL (ref 10.1–14.3)
INR PPP: 1.01 (ref 0.87–1.13)
LYMPHOCYTES # BLD AUTO: 1.2 K/MM3 (ref 1.2–5.4)
LYMPHOCYTES NFR BLD AUTO: 17.4 % (ref 13.4–35)
MCHC RBC AUTO-ENTMCNC: 32 % (ref 30–34)
MCV RBC AUTO: 96 FL (ref 79–97)
MONOCYTES # (AUTO): 0.5 K/MM3 (ref 0–0.8)
MONOCYTES % (AUTO): 7.6 % (ref 0–7.3)
PLATELET # BLD: 174 K/MM3 (ref 140–440)
RBC # BLD AUTO: 3.34 M/MM3 (ref 3.65–5.03)

## 2022-03-11 PROCEDURE — 85025 COMPLETE CBC W/AUTO DIFF WBC: CPT

## 2022-03-11 PROCEDURE — 80053 COMPREHEN METABOLIC PANEL: CPT

## 2022-03-11 PROCEDURE — 96374 THER/PROPH/DIAG INJ IV PUSH: CPT

## 2022-03-11 PROCEDURE — 85610 PROTHROMBIN TIME: CPT

## 2022-03-11 PROCEDURE — 84484 ASSAY OF TROPONIN QUANT: CPT

## 2022-03-11 PROCEDURE — 36415 COLL VENOUS BLD VENIPUNCTURE: CPT

## 2022-03-11 PROCEDURE — 71046 X-RAY EXAM CHEST 2 VIEWS: CPT

## 2022-03-11 PROCEDURE — 83690 ASSAY OF LIPASE: CPT

## 2022-03-11 PROCEDURE — 93005 ELECTROCARDIOGRAM TRACING: CPT

## 2022-03-11 PROCEDURE — 99284 EMERGENCY DEPT VISIT MOD MDM: CPT

## 2022-03-11 NOTE — EMERGENCY DEPARTMENT REPORT
ED Chest Pain HPI





- General


Chief Complaint: Chest Pain


Stated Complaint: CHEST PAIN


Time Seen by Provider: 03/11/22 14:17


Source: patient, family


Mode of arrival: Ambulatory


Limitations: Physical Limitation





- History of Present Illness


Initial Comments: 





pt reports misternal chest pain, reports squeezing , history of afib on eliquies

, central noradiation


-: Gradual, hour(s)


Pain Radiation: none


Severity: mild


Severity scale (0 -10): 4


Quality: heaviness


re: dyspnea





- Related Data


                                Home Medications











 Medication  Instructions  Recorded  Confirmed  Last Taken


 


Quetiapine Fumarate [Seroquel] 100 mg PO BID 09/22/17 07/18/20 06/01/20








                                  Previous Rx's











 Medication  Instructions  Recorded  Last Taken  Type


 


Albuterol Mdi (or & Nicu Only) 2 puff IH QID PRN #1 inha 02/12/18 06/01/20 Rx





[ProAir HFA Inhaler]    


 


Acetaminophen [Acetaminophen TAB] 650 mg PO Q4H PRN  tablet 06/05/20 Unknown Rx


 


Amiodarone [Cordarone 200 MG TAB] 200 mg PO QDAY #30 tablet 06/05/20 Unknown Rx


 


Aspirin EC [Halfprin EC] 81 mg PO QDAY #30 tablet 06/05/20 Unknown Rx


 


Amiodarone [Cordarone 200 MG TAB] 200 mg PO BID #60 tablet 07/20/20 Unknown Rx


 


Apixaban [Eliquis] 5 mg PO BID 30 Days #60 tablet 07/20/20 Unknown Rx


 


Metoprolol [Lopressor TAB] 50 mg PO BID #60 tablet 07/20/20 Unknown Rx


 


amLODIPine 10 mg PO QDAY #30 tablet 07/20/20 Unknown Rx


 


oxyCODONE /ACETAMINOPHEN [Percocet 1 tab PO Q6H PRN #20 tablet 07/20/20 Unknown 

Rx





5/325 mg]    











                                    Allergies











Allergy/AdvReac Type Severity Reaction Status Date / Time


 


apple AdvReac  Hives Verified 03/11/22 13:56


 


shell fish Allergy  Swelling Uncoded 03/11/22 13:56














Heart Score





- HEART Score


History: Slightly suspicious


EKG: Non-specific


Age: > 65


Risk factors: 1-2 risk factors


Troponin: < normal limit


HEART Score: 4





- EKG Read Time


Time EKG Completed: 12:11


EKG Read Time: 12:11





- Critical Actions


Critical Actions: 4-6 pts:12-16.6% risk of adverse cardiac event. Should be admi

tted





ED Review of Systems


ROS: 


Stated complaint: CHEST PAIN


Other details as noted in HPI





Constitutional: denies: chills, fever


Eyes: denies: eye pain, eye discharge, vision change


ENT: denies: ear pain, throat pain


Respiratory: denies: cough, shortness of breath, wheezing


Cardiovascular: denies: chest pain, palpitations


Endocrine: no symptoms reported


Gastrointestinal: denies: abdominal pain, nausea, diarrhea


Genitourinary: denies: urgency, dysuria, discharge


Musculoskeletal: denies: back pain, joint swelling, arthralgia


Skin: denies: rash, lesions


Neurological: denies: headache, weakness, paresthesias


Psychiatric: denies: anxiety, depression


Hematological/Lymphatic: denies: easy bleeding, easy bruising





ED Past Medical Hx





- Past Medical History


Previous Medical History?: Yes


Hx Hypertension: Yes


Hx CVA: Yes (right sided weakness)


Hx Heart Attack/AMI: Yes (1 stent)


Hx Congestive Heart Failure: Yes


Hx Diabetes: Yes


Hx Renal Disease: Yes (RENAL INSUFFICIENCY- stent left kidney)


Hx Psychiatric Treatment: Yes (BIPOLAR/SCHIZOPHRENIA)


Hx COPD: Yes


Additional medical history: HIGH CHOLESTEROL





- Surgical History


Hx Coronary Stent: Yes (x 2 in July 2015)


Additional Surgical History: stent placement x 2 in 2015, knee surgery 01/2018





- Social History


Smoking Status: Never Smoker





- Medications


Home Medications: 


                                Home Medications











 Medication  Instructions  Recorded  Confirmed  Last Taken  Type


 


Quetiapine Fumarate [Seroquel] 100 mg PO BID 09/22/17 07/18/20 06/01/20 History


 


Albuterol Mdi (or & Nicu Only) 2 puff IH QID PRN #1 inha 02/12/18 07/18/20 06/01/20 Rx





[ProAir HFA Inhaler]     


 


Acetaminophen [Acetaminophen TAB] 650 mg PO Q4H PRN  tablet 06/05/20 07/18/20 

Unknown Rx


 


Amiodarone [Cordarone 200 MG TAB] 200 mg PO QDAY #30 tablet 06/05/20 07/18/20 

Unknown Rx


 


Aspirin EC [Halfprin EC] 81 mg PO QDAY #30 tablet 06/05/20 07/18/20 Unknown Rx


 


Amiodarone [Cordarone 200 MG TAB] 200 mg PO BID #60 tablet 07/20/20  Unknown Rx


 


Apixaban [Eliquis] 5 mg PO BID 30 Days #60 tablet 07/20/20  Unknown Rx


 


Metoprolol [Lopressor TAB] 50 mg PO BID #60 tablet 07/20/20  Unknown Rx


 


amLODIPine 10 mg PO QDAY #30 tablet 07/20/20  Unknown Rx


 


oxyCODONE /ACETAMINOPHEN [Percocet 1 tab PO Q6H PRN #20 tablet 07/20/20  Unknown

 Rx





5/325 mg]     














ED Physical Exam





- General


Limitations: Physical Limitation


General appearance: alert, in no apparent distress





- Head


Head exam: Present: atraumatic, normocephalic





- Eye


Eye exam: Present: normal appearance





- ENT


ENT exam: Present: mucous membranes moist





- Neck


Neck exam: Present: normal inspection





- Respiratory


Respiratory exam: Present: normal lung sounds bilaterally.  Absent: respiratory 

distress





- Cardiovascular


Cardiovascular Exam: Present: regular rate, tachycardia.  Absent: systolic 

murmur, diastolic murmur, rubs, gallop





- GI/Abdominal


GI/Abdominal exam: Present: soft, normal bowel sounds





- Extremities Exam


Extremities exam: Present: normal inspection





- Back Exam


Back exam: Present: normal inspection





- Neurological Exam


Neurological exam: Present: alert, oriented X3





- Psychiatric


Psychiatric exam: Present: normal affect, normal mood





- Skin


Skin exam: Present: warm, dry, intact, normal color.  Absent: rash





ED Course





                                   Vital Signs











  03/11/22 03/11/22 03/11/22





  13:49 15:45 16:16


 


Temperature 99.0 F  


 


Pulse Rate 117 H  73


 


Respiratory 16  15





Rate   


 


Blood Pressure 167/97  


 


Blood Pressure   174/87





[Left]   


 


O2 Sat by Pulse 100 100 97





Oximetry   














- Reevaluation(s)


Reevaluation #1: 





03/11/22 16:29


work up showed :


afib rvr : controlled with lopressor 


chets pain negative enzymes, rpeated visits for chest pain, work up neg , vss, 

no distress pain meds given 


CKD, dialysis tuesday thursday saturday 





PANCHO score





- Pancho Score


Age > 65: (0) No


Aspirin use within the Past 7 Days: (0) No


3 or more CAD Risk Factors: (1) Yes


2 or more Angina events in past 24 hrs: (1) Yes


Known CAD with more than 50% Stenosis: (1) Yes


Elevated Cardiac Markers: (0) No


ST Deviation Greater than 0.5mm: (0) No


PANCHO Score: 3





ED Medical Decision Making





- Lab Data


Result diagrams: 


                                 03/11/22 14:25





                                 03/11/22 14:25


Critical care attestation.: 


If time is entered above; I have spent that time in minutes in the direct care 

of this critically ill patient, excluding procedure time.








ED Disposition


Clinical Impression: 


 Chest pain, Afib, CKD (chronic kidney disease) requiring chronic dialysis





Disposition: 01 HOME / SELF CARE / HOMELESS


Is pt being admited?: No


Does the pt Need Aspirin: No


Condition: Stable


Instructions:  Nonspecific Chest Pain, Adult

## 2022-03-11 NOTE — XRAY REPORT
CHEST 2 VIEWS 



INDICATION / CLINICAL INFORMATION: Chest Pain.



COMPARISON: August 18, 2020



FINDINGS:



SUPPORT DEVICES: Right Vas-Cath is satisfactory in position with tip overlying the SVC. Pacer leads a
re satisfactory in position

HEART / MEDIASTINUM: No significant abnormality. 

LUNGS / PLEURA: Mild increased pulmonary vascularity with mild interstitial prominence in bilateral l
ungs. Right lower lung atelectasis No pneumothorax. 



Signer Name: Marcos Ferrari MD 

Signed: 3/11/2022 2:56 PM

Workstation Name: [id number]

## 2022-03-12 ENCOUNTER — HOSPITAL ENCOUNTER (INPATIENT)
Dept: HOSPITAL 5 - ED | Age: 67
LOS: 3 days | Discharge: HOME HEALTH | DRG: 291 | End: 2022-03-15
Attending: INTERNAL MEDICINE | Admitting: INTERNAL MEDICINE
Payer: MEDICARE

## 2022-03-12 DIAGNOSIS — Z20.822: ICD-10-CM

## 2022-03-12 DIAGNOSIS — I16.0: ICD-10-CM

## 2022-03-12 DIAGNOSIS — D63.1: ICD-10-CM

## 2022-03-12 DIAGNOSIS — R07.89: ICD-10-CM

## 2022-03-12 DIAGNOSIS — Z91.14: ICD-10-CM

## 2022-03-12 DIAGNOSIS — E87.4: ICD-10-CM

## 2022-03-12 DIAGNOSIS — I48.0: ICD-10-CM

## 2022-03-12 DIAGNOSIS — Z91.15: ICD-10-CM

## 2022-03-12 DIAGNOSIS — Z91.013: ICD-10-CM

## 2022-03-12 DIAGNOSIS — E78.00: ICD-10-CM

## 2022-03-12 DIAGNOSIS — I25.2: ICD-10-CM

## 2022-03-12 DIAGNOSIS — N18.6: ICD-10-CM

## 2022-03-12 DIAGNOSIS — E87.2: ICD-10-CM

## 2022-03-12 DIAGNOSIS — J96.01: ICD-10-CM

## 2022-03-12 DIAGNOSIS — Z99.2: ICD-10-CM

## 2022-03-12 DIAGNOSIS — F31.9: ICD-10-CM

## 2022-03-12 DIAGNOSIS — E78.5: ICD-10-CM

## 2022-03-12 DIAGNOSIS — I25.10: ICD-10-CM

## 2022-03-12 DIAGNOSIS — F20.9: ICD-10-CM

## 2022-03-12 DIAGNOSIS — I13.2: Primary | ICD-10-CM

## 2022-03-12 DIAGNOSIS — Z91.018: ICD-10-CM

## 2022-03-12 DIAGNOSIS — Z82.49: ICD-10-CM

## 2022-03-12 DIAGNOSIS — J44.1: ICD-10-CM

## 2022-03-12 DIAGNOSIS — E11.22: ICD-10-CM

## 2022-03-12 DIAGNOSIS — G93.41: ICD-10-CM

## 2022-03-12 DIAGNOSIS — I48.91: ICD-10-CM

## 2022-03-12 DIAGNOSIS — I50.33: ICD-10-CM

## 2022-03-12 DIAGNOSIS — I69.351: ICD-10-CM

## 2022-03-12 LAB
BASOPHILS # (AUTO): 0.1 K/MM3 (ref 0–0.1)
BASOPHILS NFR BLD AUTO: 0.8 % (ref 0–1.8)
BUN SERPL-MCNC: 53 MG/DL (ref 7–17)
BUN/CREAT SERPL: 13 %
CALCIUM SERPL-MCNC: 9.3 MG/DL (ref 8.4–10.2)
EOSINOPHIL # BLD AUTO: 0.2 K/MM3 (ref 0–0.4)
EOSINOPHIL NFR BLD AUTO: 1.7 % (ref 0–4.3)
HCT VFR BLD CALC: 32.8 % (ref 30.3–42.9)
HEMOLYSIS INDEX: 13
HGB BLD-MCNC: 10.5 GM/DL (ref 10.1–14.3)
LYMPHOCYTES # BLD AUTO: 0.9 K/MM3 (ref 1.2–5.4)
LYMPHOCYTES NFR BLD AUTO: 9.2 % (ref 13.4–35)
MCHC RBC AUTO-ENTMCNC: 32 % (ref 30–34)
MCV RBC AUTO: 97 FL (ref 79–97)
MONOCYTES # (AUTO): 0.4 K/MM3 (ref 0–0.8)
MONOCYTES % (AUTO): 4.1 % (ref 0–7.3)
PLATELET # BLD: 188 K/MM3 (ref 140–440)
RBC # BLD AUTO: 3.39 M/MM3 (ref 3.65–5.03)

## 2022-03-12 PROCEDURE — 80074 ACUTE HEPATITIS PANEL: CPT

## 2022-03-12 PROCEDURE — 83690 ASSAY OF LIPASE: CPT

## 2022-03-12 PROCEDURE — 84484 ASSAY OF TROPONIN QUANT: CPT

## 2022-03-12 PROCEDURE — 80053 COMPREHEN METABOLIC PANEL: CPT

## 2022-03-12 PROCEDURE — 83735 ASSAY OF MAGNESIUM: CPT

## 2022-03-12 PROCEDURE — U0003 INFECTIOUS AGENT DETECTION BY NUCLEIC ACID (DNA OR RNA); SEVERE ACUTE RESPIRATORY SYNDROME CORONAVIRUS 2 (SARS-COV-2) (CORONAVIRUS DISEASE [COVID-19]), AMPLIFIED PROBE TECHNIQUE, MAKING USE OF HIGH THROUGHPUT TECHNOLOGIES AS DESCRIBED BY CMS-2020-01-R: HCPCS

## 2022-03-12 PROCEDURE — 5A1D70Z PERFORMANCE OF URINARY FILTRATION, INTERMITTENT, LESS THAN 6 HOURS PER DAY: ICD-10-PCS | Performed by: INTERNAL MEDICINE

## 2022-03-12 PROCEDURE — 85025 COMPLETE CBC W/AUTO DIFF WBC: CPT

## 2022-03-12 PROCEDURE — 83880 ASSAY OF NATRIURETIC PEPTIDE: CPT

## 2022-03-12 PROCEDURE — 82805 BLOOD GASES W/O2 SATURATION: CPT

## 2022-03-12 PROCEDURE — 78452 HT MUSCLE IMAGE SPECT MULT: CPT

## 2022-03-12 PROCEDURE — 80048 BASIC METABOLIC PNL TOTAL CA: CPT

## 2022-03-12 PROCEDURE — 5A09357 ASSISTANCE WITH RESPIRATORY VENTILATION, LESS THAN 24 CONSECUTIVE HOURS, CONTINUOUS POSITIVE AIRWAY PRESSURE: ICD-10-PCS | Performed by: INTERNAL MEDICINE

## 2022-03-12 PROCEDURE — 93005 ELECTROCARDIOGRAM TRACING: CPT

## 2022-03-12 PROCEDURE — 93017 CV STRESS TEST TRACING ONLY: CPT

## 2022-03-12 PROCEDURE — 71045 X-RAY EXAM CHEST 1 VIEW: CPT

## 2022-03-12 PROCEDURE — 85610 PROTHROMBIN TIME: CPT

## 2022-03-12 PROCEDURE — 4A033R1 MEASUREMENT OF ARTERIAL SATURATION, PERIPHERAL, PERCUTANEOUS APPROACH: ICD-10-PCS | Performed by: INTERNAL MEDICINE

## 2022-03-12 PROCEDURE — 99284 EMERGENCY DEPT VISIT MOD MDM: CPT

## 2022-03-12 PROCEDURE — 36415 COLL VENOUS BLD VENIPUNCTURE: CPT

## 2022-03-12 PROCEDURE — 71046 X-RAY EXAM CHEST 2 VIEWS: CPT

## 2022-03-12 PROCEDURE — 96374 THER/PROPH/DIAG INJ IV PUSH: CPT

## 2022-03-12 PROCEDURE — A9502 TC99M TETROFOSMIN: HCPCS

## 2022-03-12 RX ADMIN — METOPROLOL TARTRATE SCH MG: 50 TABLET, FILM COATED ORAL at 10:37

## 2022-03-12 RX ADMIN — APIXABAN SCH MG: 5 TABLET, FILM COATED ORAL at 22:09

## 2022-03-12 RX ADMIN — METHYLPREDNISOLONE SODIUM SUCCINATE SCH: 40 INJECTION, POWDER, FOR SOLUTION INTRAMUSCULAR; INTRAVENOUS at 17:52

## 2022-03-12 RX ADMIN — DOCUSATE SODIUM SCH MG: 100 CAPSULE, LIQUID FILLED ORAL at 22:09

## 2022-03-12 RX ADMIN — FAMOTIDINE SCH: 10 INJECTION, SOLUTION INTRAVENOUS at 17:50

## 2022-03-12 RX ADMIN — METHYLPREDNISOLONE SODIUM SUCCINATE SCH MG: 40 INJECTION, POWDER, FOR SOLUTION INTRAMUSCULAR; INTRAVENOUS at 22:09

## 2022-03-12 RX ADMIN — APIXABAN SCH MG: 5 TABLET, FILM COATED ORAL at 10:37

## 2022-03-12 RX ADMIN — Medication SCH ML: at 22:10

## 2022-03-12 RX ADMIN — ASPIRIN SCH MG: 81 TABLET, COATED ORAL at 10:37

## 2022-03-12 RX ADMIN — DOCUSATE SODIUM SCH MG: 100 CAPSULE, LIQUID FILLED ORAL at 10:36

## 2022-03-12 RX ADMIN — AMIODARONE HYDROCHLORIDE SCH MG: 200 TABLET ORAL at 22:09

## 2022-03-12 RX ADMIN — Medication SCH ML: at 11:56

## 2022-03-12 RX ADMIN — FUROSEMIDE SCH MG: 10 INJECTION, SOLUTION INTRAVENOUS at 19:18

## 2022-03-12 RX ADMIN — METOPROLOL TARTRATE SCH MG: 50 TABLET, FILM COATED ORAL at 22:09

## 2022-03-12 RX ADMIN — Medication SCH ML: at 22:20

## 2022-03-12 RX ADMIN — AMIODARONE HYDROCHLORIDE SCH: 200 TABLET ORAL at 17:50

## 2022-03-12 RX ADMIN — FAMOTIDINE SCH MG: 10 INJECTION, SOLUTION INTRAVENOUS at 22:09

## 2022-03-12 NOTE — CONSULTATION
History of Present Illness





- Reason for Consult


Consult date: 03/12/22


end stage renal disease





- History of Present Illness


The patient is a 67 YO female with history of Paroxysmal atrial fibrillation, 

HFpEF, COPD and ESRD on hemodialysis who presented to [facility] ED with worsening 

shortness of breath and intermittent chest pain.  Patient was not able to 

provide any history and there was no family member at the bedside.  She 

presented to ED 3/11 and was discharged home.  Patient received dialysis TTS, 

missed her last 2 dialysis treatments.  Upon arrival patient was severely 

hypoxemic requiring BiPAP.  X-ray showed fluid overload.  Initial BP was 

229/148.  Patient was admitted for further evaluation.  Nephrology was consulted

for ESRD management.





Past History


Past Medical History: atrial fib (Paroxysmal), CAD, COPD, diabetes, dialysis, 

ESRD, heart failure (Diastolic), hypertension, hyperlipidemia, renal failure, 

stroke, other


Past Surgical History: PTCA, Other (Knee surgery, AV graft)


Social history: denies: smoking, alcohol abuse, prescription drug abuse


Family history: no significant family history





Medications and Allergies


                                    Allergies











Allergy/AdvReac Type Severity Reaction Status Date / Time


 


apple AdvReac  Hives Verified 03/12/22 06:53


 


shell fish Allergy  Swelling Uncoded 03/12/22 06:53











                                Home Medications











 Medication  Instructions  Recorded  Confirmed  Last Taken  Type


 


Quetiapine Fumarate [Seroquel] 100 mg PO BID 09/22/17 07/18/20 06/01/20 History


 


Albuterol Mdi (or & Nicu Only) 2 puff IH QID PRN #1 inha 02/12/18 07/18/20 06/01/20 Rx





[ProAir HFA Inhaler]     


 


Acetaminophen [Acetaminophen TAB] 650 mg PO Q4H PRN  tablet 06/05/20 07/18/20 

Unknown Rx


 


Amiodarone [Cordarone 200 MG TAB] 200 mg PO QDAY #30 tablet 06/05/20 07/18/20 

Unknown Rx


 


Aspirin EC [Halfprin EC] 81 mg PO QDAY #30 tablet 06/05/20 07/18/20 Unknown Rx


 


Amiodarone [Cordarone 200 MG TAB] 200 mg PO BID #60 tablet 07/20/20  Unknown Rx


 


Apixaban [Eliquis] 5 mg PO BID 30 Days #60 tablet 07/20/20  Unknown Rx


 


Metoprolol [Lopressor TAB] 50 mg PO BID #60 tablet 07/20/20  Unknown Rx


 


amLODIPine 10 mg PO QDAY #30 tablet 07/20/20  Unknown Rx


 


oxyCODONE /ACETAMINOPHEN [Percocet 1 tab PO Q6H PRN #20 tablet 07/20/20  Unknown

 Rx





5/325 mg]     











Active Meds: 


Active Medications





Acetaminophen (Acetaminophen 325 Mg Tab)  650 mg PO Q4H PRN


   PRN Reason: Pain MILD(1-3)/Fever >100.5/HA


Albuterol (Albuterol 2.5 Mg/3 Ml Nebu)  2.5 mg IH QID PRN


   PRN Reason: Shortness Of Breath


Amiodarone HCl (Amiodarone 200 Mg Tab)  200 mg PO BID [facility]


Apixaban (Apixaban 5 Mg Tab)  5 mg PO BID [facility]


   Last Admin: 03/12/22 10:37 Dose:  5 mg


   


Aspirin (Aspirin Ec 81 Mg Tab)  81 mg PO QDAY [facility]


   Last Admin: 03/12/22 10:37 Dose:  81 mg


   


Docusate Sodium (Docusate Sodium 100 Mg Cap)  100 mg PO BID [facility]


   Last Admin: 03/12/22 10:36 Dose:  100 mg


   


Famotidine (Famotidine 20 Mg/2 Ml Inj)  20 mg IV BID [facility]


Furosemide (Furosemide 40 Mg/4 Ml Inj)  40 mg IV 0600,1800 [facility]


Heparin Sodium (Porcine) (Heparin 10,000 Units/10 Ml Vial)  3,000 unit IV VANCE 

PRN


   PRN Reason: hemodialysis


Hydralazine HCl (Hydralazine 25 Mg Tab)  25 mg PO Q8HR [facility]


Sodium Chloride (Nacl 0.9%)  100 mls @ 999 mls/hr IV VANCE PRN


   PRN Reason: Hypotension


Labetalol HCl (Labetalol 20 Mg/4 Ml Inj)  10 mg IV Q4H PRN


   PRN Reason: Hypertension


Methylprednisolone Sodium Succinate (Methylprednisolone Sod Succinate 40 Mg/1 Ml

Inj)  40 mg IV Q8HR [facility]


Metoprolol Tartrate (Metoprolol Tartrate 50 Mg Tab)  50 mg PO BID [facility]


   Last Admin: 03/12/22 10:37 Dose:  50 mg


   


Morphine Sulfate (Morphine 4 Mg/1 Ml Inj)  2 mg IV Q4H PRN


   PRN Reason: Pain , Severe (7-10)


Ondansetron HCl (Ondansetron 4 Mg/2 Ml Inj)  4 mg IV Q8H PRN


   PRN Reason: Nausea And Vomiting


Oxycodone/Acetaminophen (Oxycodone /Acetaminophen 5-325mg Tab)  1 tab PO Q6H PRN


   PRN Reason: Pain, Moderate (4-6)


Quetiapine Fumarate (Quetiapine 100 Mg Tab)  100 mg PO BID [facility]


   Last Admin: 03/12/22 10:37 Dose:  100 mg


   


Sodium Chloride (Sodium Chloride 0.9% 10 Ml Flush Syringe)  10 ml IV BID [facility]


   Last Admin: 03/12/22 11:56 Dose:  10 ml


   


Sodium Chloride (Sodium Chloride 0.9% 10 Ml Flush Syringe)  10 ml IV BID [facility]


   Last Admin: 03/12/22 11:56 Dose:  10 ml


   


Sodium Chloride (Sodium Chloride 0.9% 10 Ml Flush Syringe)  10 ml IV PRN PRN


   PRN Reason: LINE FLUSH











Review of Systems


ROS unobtainable: due to mental status





Exam





- Vital Signs


Vital signs: 


                                   Vital Signs











Temp Pulse Resp BP Pulse Ox


 


 98.4 F   110 H  26 H  207/150   94 


 


 03/12/22 06:50  03/12/22 06:50  03/12/22 06:50  03/12/22 06:50  03/12/22 06:50














Results





- Lab Results





                                 03/13/22 05:09





                                 03/13/22 05:09


                             Most recent lab results











ABG pH  7.176  (7.320-7.450)  L  03/12/22  09:13    


 


ABG O2 Saturation  99.1  (0-100)   03/12/22  09:13    


 


Calcium  9.3 mg/dL (8.4-10.2)   03/12/22  07:26    


 


Magnesium  2.00 mg/dL (1.7-2.3)   03/12/22  07:26    














Assessment and Plan


1. ESRD:


Unable to get any information at this time 2/2 AMS.


Admitted with volume overload and metabolic acidosis.


Hemodialysis: 3/12.





2. FEN:


Metabolic acidosis, s/p HD, monitor.


Volume control, UF with HD as tolerated.


Monitor lytes and volume status.





3. Acute hypoxic resp failure, POA:


2/2 COPD exacerbation +/- volume overload.


Supplemental O2 as needed.


Nebs and steroids.


Volume control with HD.


Monitor.





4. Chest pain:


Serial cardiac enzymes.


Cardiology consult.





5. Acute on chronic diastolic CHF:


Volume control thur HD.


Fluid restriction, monitor I/O.


Cardiology evaluation, echocardiogram for LV function ejection fraction.





6. Acute exacerbation of COPD:


Titrate O2 sats to more than 90%.


Nebs and steroids.


BiPAP as needed.





7. Paroxysmal A.fib:


Eliquis and Amio.


Monitor.





8. Hypertensive urgency, POA:


Volume control with HD.


Continue home BP meds.


Monitor BP, controlled at the time of eval.





9. Normocytic Anemia, POA:


Chronic.


2/2 ESRD.


Epogen with HD as needed.


Monitor.





10. Acute metabolic Encephalopathy, POA:


Monitor.











Subjective:


Patient was seen and examined at the bedside.











Examination:


General appearance: well-developed, appears stated age, in resp distress, on O2


HEENT: atraumatic, IVETTE


Neck: trachea midline


Respiratory: bilaterally rhonchi and rales heard


Heart: S1S2, no murmur


Abdomen: soft, bowel sounds heard, NT


Integumentary: no obvious rash


Neurologic: lethargic, not following any command


Ext: no edema


Hemodialysis access: R IJ tunnel catheter, R arm AVF/AVG

## 2022-03-12 NOTE — EMERGENCY DEPARTMENT REPORT
ED General Adult HPI





- General


Chief complaint: Dyspnea/Respdistress


Stated complaint: SOB


Time Seen by Provider: 03/12/22 06:59


Source: EMS


Mode of arrival: Stretcher


Limitations: No Limitations





- History of Present Illness


Initial comments: 





Patient presents by EMS secondary shortness of breath.  She had been seen here 

yesterday for chest pain.  She was not having significant dyspnea at that time. 

She was discharged.  This morning, she states that she is short of breath.  It 

started last night and into today.  EMS was called because of this.  She is 

having a hard time breathing.  She states she just cannot catch her breath.  She

is a renal failure patient on dialysis Tuesday, Thursday, and Saturday.  She was

not dialyzed Thursday.  She was supposed to be dialyzed today.  Regardless, she 

started having trouble breathing and EMS was called.  Patient states that she is

here from [place].  She tells me that she did arrange dialysis for her while she

was here in [place].  Based on review of records yesterday, this is 

questionable.  She had told the nursing staff yesterday that she had not arrange

dialysis for the [place] area.  Regardless she is here today with this.  She 

denies chest pain.  There has been no cough.  She has had no vomiting.  EMS 

stated that she did not tolerate BiPAP.  On a nonrebreather here, she is sitting

upright still stating that she cannot breathe.


Severity scale (0 -10): 0





- Related Data


                                Home Medications











 Medication  Instructions  Recorded  Confirmed  Last Taken


 


Quetiapine Fumarate [Seroquel] 100 mg PO BID 09/22/17 07/18/20 06/01/20








                                  Previous Rx's











 Medication  Instructions  Recorded  Last Taken  Type


 


Albuterol Mdi (or & Nicu Only) 2 puff IH QID PRN #1 inha 02/12/18 06/01/20 Rx





[ProAir HFA Inhaler]    


 


Acetaminophen [Acetaminophen TAB] 650 mg PO Q4H PRN  tablet 06/05/20 Unknown Rx


 


Amiodarone [Cordarone 200 MG TAB] 200 mg PO QDAY #30 tablet 06/05/20 Unknown Rx


 


Aspirin EC [Halfprin EC] 81 mg PO QDAY #30 tablet 06/05/20 Unknown Rx


 


Amiodarone [Cordarone 200 MG TAB] 200 mg PO BID #60 tablet 07/20/20 Unknown Rx


 


Apixaban [Eliquis] 5 mg PO BID 30 Days #60 tablet 07/20/20 Unknown Rx


 


Metoprolol [Lopressor TAB] 50 mg PO BID #60 tablet 07/20/20 Unknown Rx


 


amLODIPine 10 mg PO QDAY #30 tablet 07/20/20 Unknown Rx


 


oxyCODONE /ACETAMINOPHEN [Percocet 1 tab PO Q6H PRN #20 tablet 07/20/20 Unknown 

Rx





5/325 mg]    











                                    Allergies











Allergy/AdvReac Type Severity Reaction Status Date / Time


 


apple AdvReac  Hives Verified 03/12/22 06:53


 


shell fish Allergy  Swelling Uncoded 03/12/22 06:53














ED Review of Systems


ROS: 


Stated complaint: SOB


Other details as noted in HPI





Comment: All other systems reviewed and negative


Constitutional: denies: fever


Eyes: denies: vision change


ENT: denies: throat pain


Respiratory: denies: cough


Cardiovascular: as per HPI


Endocrine: denies: unexplained weight loss


Gastrointestinal: denies: abdominal pain


Genitourinary: as per HPI


Musculoskeletal: denies: joint swelling


Skin: denies: rash


Neurological: denies: headache


Hematological/Lymphatic: denies: easy bruising





ED Past Medical Hx





- Past Medical History


Hx Hypertension: Yes


Hx CVA: Yes (right sided weakness)


Hx Heart Attack/AMI: Yes (1 stent)


Hx Congestive Heart Failure: Yes


Hx Diabetes: Yes


Hx Renal Disease: Yes (RENAL INSUFFICIENCY- stent left kidney)


Hx Psychiatric Treatment: Yes (BIPOLAR/SCHIZOPHRENIA)


Hx COPD: Yes


Additional medical history: HIGH CHOLESTEROL





- Surgical History


Hx Coronary Stent: Yes (x 2 in July 2015)


Additional Surgical History: stent placement x 2 in 2015, knee surgery 01/2018





- Family History


Family history: hypertension





- Social History


Smoking Status: Never Smoker





- Medications


Home Medications: 


                                Home Medications











 Medication  Instructions  Recorded  Confirmed  Last Taken  Type


 


Quetiapine Fumarate [Seroquel] 100 mg PO BID 09/22/17 07/18/20 06/01/20 History


 


Albuterol Mdi (or & Nicu Only) 2 puff IH QID PRN #1 inha 02/12/18 07/18/20 06/01/20 Rx





[ProAir HFA Inhaler]     


 


Acetaminophen [Acetaminophen TAB] 650 mg PO Q4H PRN  tablet 06/05/20 07/18/20 

Unknown Rx


 


Amiodarone [Cordarone 200 MG TAB] 200 mg PO QDAY #30 tablet 06/05/20 07/18/20 

Unknown Rx


 


Aspirin EC [Halfprin EC] 81 mg PO QDAY #30 tablet 06/05/20 07/18/20 Unknown Rx


 


Amiodarone [Cordarone 200 MG TAB] 200 mg PO BID #60 tablet 07/20/20  Unknown Rx


 


Apixaban [Eliquis] 5 mg PO BID 30 Days #60 tablet 07/20/20  Unknown Rx


 


Metoprolol [Lopressor TAB] 50 mg PO BID #60 tablet 07/20/20  Unknown Rx


 


amLODIPine 10 mg PO QDAY #30 tablet 07/20/20  Unknown Rx


 


oxyCODONE /ACETAMINOPHEN [Percocet 1 tab PO Q6H PRN #20 tablet 07/20/20  Unknown

 Rx





5/325 mg]     














ED Physical Exam





- General


Limitations: Physical Limitation (Dyspnea), Other (Pulse ox noted on 

nonrebreather and hypoxic.)


General appearance: alert, in distress (Severe), other (Leaning forward.  She is

speaking in 3 word sentences.)





- Head


Head exam: Present: atraumatic, normocephalic, normal inspection





- Eye


Eye exam: Present: normal appearance, PERRL, EOMI.  Absent: scleral icterus





- ENT


ENT exam: Present: mucous membranes dry, normal external ear exam





- Neck


Neck exam: Present: normal inspection.  Absent: meningismus





- Respiratory


Respiratory exam: Present: respiratory distress (Severe), wheezes, rales, 

prolonged expiratory





- Cardiovascular


Cardiovascular Exam: Present: tachycardia, irregular rhythm, JVD





- GI/Abdominal


GI/Abdominal exam: Present: soft.  Absent: tenderness





- Extremities Exam


Extremities exam: Present: normal capillary refill, pedal edema (Bilateral)





- Back Exam


Back exam: Absent: CVA tenderness (R), CVA tenderness (L)





- Neurological Exam


Neurological exam: Present: alert, oriented X3, CN II-XII intact.  Absent: motor

sensory deficit





- Psychiatric


Psychiatric exam: Present: anxious





- Skin


Skin exam: Present: warm, dry





ED Course


                                   Vital Signs











  03/12/22 03/12/22 03/12/22





  06:50 07:28 07:30


 


Temperature 98.4 F  


 


Pulse Rate 110 H  96 H


 


Respiratory 26 H  15





Rate   


 


Blood Pressure   


 


Blood Pressure 207/150  





[Left]   


 


O2 Sat by Pulse 94 100 100





Oximetry   














  03/12/22 03/12/22 03/12/22





  07:45 08:00 08:15


 


Temperature   


 


Pulse Rate 100 H 105 H 100 H


 


Respiratory 20 16 21





Rate   


 


Blood Pressure 229/148 229/148 212/120


 


Blood Pressure   





[Left]   


 


O2 Sat by Pulse 100 100 100





Oximetry   














  03/12/22





  08:31


 


Temperature 


 


Pulse Rate 98 H


 


Respiratory 16





Rate 


 


Blood Pressure 


 


Blood Pressure 





[Left] 


 


O2 Sat by Pulse 100





Oximetry 














- Reevaluation(s)


Reevaluation #1: 





03/12/22 07:02


EMS was met.  Labs were ordered.  Old records noted.


Reevaluation #2: 





03/12/22 07:31


Patient looks more comfortable on BiPAP.  Blood pressure is 220s/110s.  Labs are

 pending.  X-ray was noted.


03/12/22 07:51


EKG was noted.  Patient is still complaining that she cannot breathe and pulled 

her BiPAP mask off.  Due to her QT, haloperidol was discontinued and Ativan was 

ordered.


Reevaluation #3: 





03/12/22 08:54


Labs have been reviewed.  Patient does have evidence of acute respiratory 

failure with hypoxia requiring BiPAP.  She does have end-stage renal disease and

 will require dialysis as she has not been dialyzed in several days.  She does 

not have EKG changes suggestive of STEMI.  I believe that this is all volume 

overload related to renal failure.





ED Medical Decision Making





- Lab Data


Result diagrams: 


                                 03/12/22 07:26





                                 03/12/22 07:26





Rhythm strip: Atrial fibrillation with rapid ventricular response.  Monitor 

observe 10 seconds.





- EKG Data


-: EKG Interpreted by Me





- EKG Data





03/12/22 07:52


0743-EKG shows normal sinus rhythm at 98.  There is LVH.  Patient has a QT that 

is prolonged at 513.  QRS is 115.  Patient has no ST elevation suggestive of 

STEMI.  There is T wave flattening in 1 and aVL.  There is T wave flattening in 

V5 and V6 with artifact.  Sinus rhythm has replaced A. fib from yesterday.





- Radiology Data


Radiology results: report reviewed





- Medical Decision Making





Patient presented by ambulance secondary to difficulty breathing.  She has 

evidence of pulmonary edema which did not seem to be present when she was here 

yesterday with chest pain.  She does not radiographically have pneumonia.  I do 

not have a suspicion for pneumonia.  Patient does have renal failure.  She has 

missed her treatment on Thursday and today.  She will be admitted for ongoing 

management.  She will likely need dialysis.  She is not hyperkalemic.  She does 

not have any obvious infectious pathology.


Critical Care Time: Yes (45 minutes exclusive of all procedures based on hypoxic

 respiratory failure)


Critical care attestation.: 


If time is entered above; I have spent that time in minutes in the direct care 

of this critically ill patient, excluding procedure time.








ED Disposition


Clinical Impression: 


 Shortness of breath, ESRD on dialysis





Respiratory failure


Qualifiers:


 Chronicity: acute Respiratory failure complication: hypoxia Qualified Code(s): 

J96.01 - Acute respiratory failure with hypoxia





Disposition: 09 ADMITTED AS INPATIENT


Is pt being admited?: Yes


Condition: Stable


Referrals: 


[facility], [place] [Primary Care Provider] - 3-5 Days

## 2022-03-12 NOTE — HISTORY AND PHYSICAL REPORT
History of Present Illness


Date of examination: 03/05/22


Date of admission: 


3/5/2022


Chief complaint: 


Worsening shortness of breath/acute hypoxic respiratory failure/missed 

hemodialysis





History of present illness: 


66-year-old female patient with significant past medical history of paroxysmal 

atrial fibrillation, diastolic congestive heart failure, end-stage renal disease

on hemodialysis, COPD presented to the emergency room with worsening shortness 

of breath and intermittent chest pain.  Patient presented yesterday and was 

discharged home, today complaints of worsening shortness of breath.  Patient 

received dialysis TTS, missed her last 2 dialysis treatments


Upon arrival patient was severely hypoxemic requiring BiPAP, patient has severe 

shortness of breath


X-ray findings consistent with some fluid overload.  Also has uncontrolled 

hypertension.


Patient complains of intermittent chest discomfort and chest pain, first set of 

cardiac enzymes negative














Past History


Past Medical History: atrial fib (Paroxysmal), CAD, COPD, diabetes, dialysis, 

ESRD, heart failure (Diastolic), hypertension, hyperlipidemia, renal failure, 

stroke, other


Past Surgical History: PTCA, Other (Knee surgery, AV graft)


Social history: denies: smoking, alcohol abuse, prescription drug abuse


Family history: no significant family history





Medications and Allergies


                                    Allergies











Allergy/AdvReac Type Severity Reaction Status Date / Time


 


apple AdvReac  Hives Verified 03/12/22 06:53


 


shell fish Allergy  Swelling Uncoded 03/12/22 06:53











                                Home Medications











 Medication  Instructions  Recorded  Confirmed  Last Taken  Type


 


Quetiapine Fumarate [Seroquel] 100 mg PO BID 09/22/17 07/18/20 06/01/20 History


 


Albuterol Mdi (or & Nicu Only) 2 puff IH QID PRN #1 inha 02/12/18 07/18/20 06/01/20 Rx





[ProAir HFA Inhaler]     


 


Acetaminophen [Acetaminophen TAB] 650 mg PO Q4H PRN  tablet 06/05/20 07/18/20 

Unknown Rx


 


Amiodarone [Cordarone 200 MG TAB] 200 mg PO QDAY #30 tablet 06/05/20 07/18/20 

Unknown Rx


 


Aspirin EC [Halfprin EC] 81 mg PO QDAY #30 tablet 06/05/20 07/18/20 Unknown Rx


 


Amiodarone [Cordarone 200 MG TAB] 200 mg PO BID #60 tablet 07/20/20  Unknown Rx


 


Apixaban [Eliquis] 5 mg PO BID 30 Days #60 tablet 07/20/20  Unknown Rx


 


Metoprolol [Lopressor TAB] 50 mg PO BID #60 tablet 07/20/20  Unknown Rx


 


amLODIPine 10 mg PO QDAY #30 tablet 07/20/20  Unknown Rx


 


oxyCODONE /ACETAMINOPHEN [Percocet 1 tab PO Q6H PRN #20 tablet 07/20/20  Unknown

 Rx





5/325 mg]     














Review of Systems


Constitutional: fatigue, weakness, no weight loss, no weight gain


Ears, nose, mouth and throat: no nasal congestion, no nasal discharge


Cardiovascular: chest pain, orthopnea, shortness of breath


Respiratory: shortness of breath, dyspnea on exertion


Gastrointestinal: no abdominal pain, no nausea, no vomiting


Genitourinary Female: no flank pain, no dysuria


Musculoskeletal: no myalgias, no arthritis


Integumentary: no rash, no lesions


Neurological: weakness, no numbness, no tingling


Psychiatric: no anxiety, no depression


Endocrine: no cold intolerance, no heat intolerance


Hematologic/Lymphatic: no easy bruising, no easy bleeding


Allergic/Immunologic: no urticaria, no allergic rhinitis





Exam





- Constitutional


Vitals: 


                                        











Temp Pulse Resp BP Pulse Ox


 


 98.4 F   79   38 H  212/120   100 


 


 03/12/22 06:50  03/12/22 08:55  03/12/22 08:55  03/12/22 08:15  03/12/22 08:55











General appearance: Present: mild distress, cachectic, disheveled, other (On 

BiPAP)





- EENT


Eyes: Present: PERRL, EOM intact





- Neck


Neck: Present: supple, normal ROM





- Respiratory


Respiratory effort: normal


Respiratory: bilateral: diminished, rales, negative: rhonchi, wheezing





- Cardiovascular


Rhythm: regular


Heart Sounds: Present: S1 & S2





- Extremities


Extremities: no ischemia


Extremity abnormal: edema





- Abdominal


General gastrointestinal: Present: soft, non-tender, non-distended, normal bowel

sounds





- Integumentary


Integumentary: Present: clear, warm





- Musculoskeletal


Musculoskeletal: generalized weakness





- Psychiatric


Psychiatric: appropriate mood/affect, other (Anxious)





- Neurologic


Neurologic: moves all extremities





HEART Score





- HEART Score


Troponin: 


                                        











Troponin T  < 0.010 ng/mL (0.00-0.029)   03/12/22  07:26    














Results





- Labs


CBC & Chem 7: 


                                 03/12/22 07:26





                                 03/12/22 07:26


Labs: 


                              Abnormal lab results











  03/12/22 03/12/22 Range/Units





  07:26 07:26 


 


RBC  3.39 L   (3.65-5.03)  M/mm3


 


RDW  17.4 H   (13.2-15.2)  %


 


Lymph % (Auto)  9.2 L   (13.4-35.0)  %


 


Lymph # (Auto)  0.9 L   (1.2-5.4)  K/mm3


 


Seg Neutrophils %  84.2 H   (40.0-70.0)  %


 


Seg Neutrophils #  8.5 H   (1.8-7.7)  K/mm3


 


Chloride   108.0 H  ()  mmol/L


 


Carbon Dioxide   16 L  (22-30)  mmol/L


 


BUN   53 H  (7-17)  mg/dL


 


Creatinine   4.1 H  (0.6-1.2)  mg/dL


 


Glucose   172 H  ()  mg/dL


 


NT-Pro-B Natriuret Pep   47224 H  (0-900)  pg/mL














Assessment and Plan


--Acute hypoxic respiratory failure; requiring BiPAP


Status: Acute   


Multifactorial , missed hemodialysis and fluid overload 


secondary to acute exacerbation of COPD


Nebulizers, oxygen titrate O2 sats to more than 90%, BiPAP as needed


Pulmonary evaluation if needed, IV steroids, supportive care





--Noncompliant with hemodialysis/fluid overload


Nephrology consulted, stat hemodialysis


HD per schedule, avoid nephrotoxins, renal dosing medications





--ESRD; on hemodialysis TTS


HD per schedule, avoid nephrotoxin, renal dosing of medications





--Hypertensive urgency;


Resume home antihypertensives


As needed labetalol


Closely monitor blood pressures and adjust as needed





--Paroxysmal atrial fibrillation


Status: Acute   


Beta-blockers, amiodarone and Eliquis





--Chest pain/acute coronary syndrome


Status: Acute


Serial cardiac enzymes


Cardiac medications


Echocardiogram for LV function ejection fraction


Cardiology consult


Heart cath 9/2017; widely patent proximal to mid and mid to distal LAD stents no

significant in-stent restenosis EF 50 to 55%


Thallium stress test/2/2018; normal study EF 61% 


echo 6/2020; EF 50 to 55%





--Acute on chronic diastolic CHF (congestive heart failure)


Status: Chronic   


IV diuretics, input output monitoring, fluid restriction


Cardiology evaluation, echocardiogram for LV function ejection fraction





--Acute exacerbation of COPD (chronic obstructive pulmonary disease)


Status: Chronic   


Oxygen titrate O2 sats to more than 90%


BiPAP as needed, nebulizers, IV steroids


Pulmonary consult if needed





--Noncompliance;


Counseling done patient strongly advised to comply with medications, diet, 

follow-up visits


Mainly hemodialysis


Patient verbalized understanding





- DVT prophylaxis


Status: Acute   


Patient is on Eliquis





We will closely monitor the patient and adjust management as needed


Plan of care reviewed with the patient and her nurse


Also discussed with nephrologist Dr. Dejesus

## 2022-03-12 NOTE — XRAY REPORT
CHEST 1 VIEW 3/12/2022 7:02 AM



INDICATION / CLINICAL INFORMATION: soa.



COMPARISON: Previous day.



FINDINGS:



SUPPORT DEVICES: Unchanged.

HEART / MEDIASTINUM: Stable. 

LUNGS / PLEURA: Mild increasing interstitial markings greater at the bases. No pneumothorax. 



ADDITIONAL FINDINGS: No significant additional findings.



IMPRESSION: Development of mild edema.



Signer Name: Duong Dominguez MD 

Signed: 3/12/2022 7:19 AM

Workstation Name: [id number]

## 2022-03-13 LAB
BASOPHILS # (AUTO): 0 K/MM3 (ref 0–0.1)
BASOPHILS NFR BLD AUTO: 0.6 % (ref 0–1.8)
BUN SERPL-MCNC: 27 MG/DL (ref 7–17)
BUN/CREAT SERPL: 10 %
CALCIUM SERPL-MCNC: 9.4 MG/DL (ref 8.4–10.2)
EOSINOPHIL # BLD AUTO: 0 K/MM3 (ref 0–0.4)
EOSINOPHIL NFR BLD AUTO: 0.2 % (ref 0–4.3)
HCT VFR BLD CALC: 34.3 % (ref 30.3–42.9)
HEMOLYSIS INDEX: 17
HGB BLD-MCNC: 10.8 GM/DL (ref 10.1–14.3)
LYMPHOCYTES # BLD AUTO: 0.5 K/MM3 (ref 1.2–5.4)
LYMPHOCYTES NFR BLD AUTO: 8.3 % (ref 13.4–35)
MCHC RBC AUTO-ENTMCNC: 31 % (ref 30–34)
MCV RBC AUTO: 96 FL (ref 79–97)
MONOCYTES # (AUTO): 0.1 K/MM3 (ref 0–0.8)
MONOCYTES % (AUTO): 1.4 % (ref 0–7.3)
PLATELET # BLD: 175 K/MM3 (ref 140–440)
RBC # BLD AUTO: 3.57 M/MM3 (ref 3.65–5.03)

## 2022-03-13 RX ADMIN — Medication SCH: at 09:07

## 2022-03-13 RX ADMIN — METOPROLOL TARTRATE SCH MG: 50 TABLET, FILM COATED ORAL at 09:07

## 2022-03-13 RX ADMIN — APIXABAN SCH MG: 5 TABLET, FILM COATED ORAL at 09:07

## 2022-03-13 RX ADMIN — AMIODARONE HYDROCHLORIDE SCH MG: 200 TABLET ORAL at 09:07

## 2022-03-13 RX ADMIN — AMIODARONE HYDROCHLORIDE SCH MG: 200 TABLET ORAL at 22:03

## 2022-03-13 RX ADMIN — DOCUSATE SODIUM SCH MG: 100 CAPSULE, LIQUID FILLED ORAL at 22:03

## 2022-03-13 RX ADMIN — NICOTINE SCH MG: 14 PATCH TRANSDERMAL at 11:54

## 2022-03-13 RX ADMIN — Medication SCH ML: at 22:00

## 2022-03-13 RX ADMIN — APIXABAN SCH MG: 5 TABLET, FILM COATED ORAL at 22:03

## 2022-03-13 RX ADMIN — METHYLPREDNISOLONE SODIUM SUCCINATE SCH MG: 40 INJECTION, POWDER, FOR SOLUTION INTRAMUSCULAR; INTRAVENOUS at 22:03

## 2022-03-13 RX ADMIN — ASPIRIN SCH MG: 81 TABLET, COATED ORAL at 09:07

## 2022-03-13 RX ADMIN — Medication SCH ML: at 09:07

## 2022-03-13 RX ADMIN — FUROSEMIDE SCH MG: 10 INJECTION, SOLUTION INTRAVENOUS at 17:24

## 2022-03-13 RX ADMIN — METHYLPREDNISOLONE SODIUM SUCCINATE SCH MG: 40 INJECTION, POWDER, FOR SOLUTION INTRAMUSCULAR; INTRAVENOUS at 13:15

## 2022-03-13 RX ADMIN — METHYLPREDNISOLONE SODIUM SUCCINATE SCH MG: 40 INJECTION, POWDER, FOR SOLUTION INTRAMUSCULAR; INTRAVENOUS at 05:53

## 2022-03-13 RX ADMIN — METOPROLOL TARTRATE SCH MG: 50 TABLET, FILM COATED ORAL at 22:04

## 2022-03-13 RX ADMIN — OXYCODONE AND ACETAMINOPHEN PRN TAB: 5; 325 TABLET ORAL at 17:28

## 2022-03-13 RX ADMIN — DOCUSATE SODIUM SCH MG: 100 CAPSULE, LIQUID FILLED ORAL at 09:07

## 2022-03-13 RX ADMIN — FUROSEMIDE SCH MG: 10 INJECTION, SOLUTION INTRAVENOUS at 05:53

## 2022-03-13 RX ADMIN — FAMOTIDINE SCH MG: 10 INJECTION, SOLUTION INTRAVENOUS at 09:07

## 2022-03-13 RX ADMIN — FAMOTIDINE SCH MG: 10 INJECTION, SOLUTION INTRAVENOUS at 22:03

## 2022-03-13 NOTE — ELECTROCARDIOGRAPH REPORT
[facility]

                                       

Test Date:    2022               Test Time:    07:43:24

Pat Name:     LINDA GARCIA              Department:   

Patient ID:   [id number]          Room:         A465 1

Gender:       F                        Technician:   FAWN

:          [id number]               Requested By: KAMALA KASPER

Order Number: [id number]              Reading MD:   Tone Campos

                                 Measurements

Intervals                              Axis          

Rate:         98                       P:            103

TX:           134                      QRS:          45

QRSD:         115                      T:            134

QT:           401                                    

QTc:          513                                    

                           Interpretive Statements

Sinus rhythm

LVH with secondary repolarization abnormality

Prolonged QT interval

Compared to ECG 2022 13:59:04

Left ventricular hypertrophy now present

Prolonged QT interval now present

T-wave abnormality no longer present

Electronically Signed On 3- 10:46:11 EDT by Tone Campos

## 2022-03-13 NOTE — PROGRESS NOTE
Assessment and Plan


1. ESRD:


Per patient she moved from [place] 3 days ago.


Haven't started on any local outpatient dialysis clinic yet.


Admitted with volume overload and metabolic acidosis.


Hemodialysis: 3/12.





2. FEN:


Metabolic acidosis, s/p HD, monitor.


Volume control, UF with HD as tolerated.


Monitor lytes and volume status.





3. Acute hypoxic resp failure, POA:


2/2 COPD exacerbation +/- volume overload.


Supplemental O2 as needed.


Nebs and steroids.


Volume control with HD.


Monitor.





4. Chest pain:


Seen by Cardiology.





5. Acute on chronic diastolic CHF:


Volume control thru HD.


Fluid restriction, monitor I/O.


Seen by Cardiology.





6. Acute exacerbation of COPD:


Nebs and steroids.


On RA at the time of eval.





7. Paroxysmal A.fib:


Eliquis and Amio.


Monitor.





8. Hypertensive urgency, POA:


Volume control with HD.


Continue home BP meds.


Monitor BP.





9. Normocytic Anemia, POA:


Chronic.


2/2 ESRD.


Epogen with HD as needed.


Monitor.





10. Acute metabolic Encephalopathy, POA:


Improved.











Subjective:


Patient was seen and examined at the bedside.











Examination:


General appearance: well-developed, appears stated age, no distress, on RA


HEENT: atraumatic, IVETTE


Neck: trachea midline


Respiratory: ctab


Heart: S1S2, no murmur


Abdomen: soft, bowel sounds heard, NT


Integumentary: no obvious rash


Neurologic: AO, able to move extremities


Ext: no edema


Hemodialysis access: R IJ tunnel catheter, R arm AVF/AVG








Subjective


Date of service: 03/13/22





Objective





- Vital Signs


Vital signs: 


                               Vital Signs - 12hr











  03/13/22 03/13/22 03/13/22





  04:27 07:10 08:08


 


Temperature 98.0 F  99.0 F


 


Pulse Rate 79 74 76


 


Pulse Rate [   





From Monitor]   


 


Respiratory 18  20





Rate   


 


Blood Pressure   173/86


 


Blood Pressure 143/70  





[Left]   


 


O2 Sat by Pulse 98  99





Oximetry   














  03/13/22 03/13/22





  11:15 12:10


 


Temperature  98.0 F


 


Pulse Rate  70


 


Pulse Rate [ 74 





From Monitor]  


 


Respiratory 18 18





Rate  


 


Blood Pressure  154/77


 


Blood Pressure  





[Left]  


 


O2 Sat by Pulse 99 100





Oximetry  














- Lab





                                 03/13/22 05:09





                                 03/13/22 05:09


                             Most recent lab results











ABG pH  7.176  (7.320-7.450)  L  03/12/22  09:13    


 


ABG O2 Saturation  99.1  (0-100)   03/12/22  09:13    


 


Calcium  9.4 mg/dL (8.4-10.2)   03/13/22  05:09    


 


Magnesium  2.00 mg/dL (1.7-2.3)   03/12/22  07:26    














Medications & Allergies





- Medications


Allergies/Adverse Reactions: 


                                    Allergies





apple Adverse Reaction (Verified 03/12/22 06:53)


   Hives


shell fish Allergy (Uncoded 03/12/22 06:53)


   Swelling








Home Medications: 


                                Home Medications











 Medication  Instructions  Recorded  Confirmed  Last Taken  Type


 


Quetiapine Fumarate [Seroquel] 100 mg PO BID 09/22/17 07/18/20 06/01/20 History


 


Albuterol Mdi (or & Nicu Only) 2 puff IH QID PRN #1 inha 02/12/18 07/18/20 06/01/20 Rx





[ProAir HFA Inhaler]     


 


Acetaminophen [Acetaminophen TAB] 650 mg PO Q4H PRN  tablet 06/05/20 07/18/20 

Unknown Rx


 


Amiodarone [Cordarone 200 MG TAB] 200 mg PO QDAY #30 tablet 06/05/20 07/18/20 

Unknown Rx


 


Aspirin EC [Halfprin EC] 81 mg PO QDAY #30 tablet 06/05/20 07/18/20 Unknown Rx


 


Amiodarone [Cordarone 200 MG TAB] 200 mg PO BID #60 tablet 07/20/20  Unknown Rx


 


Apixaban [Eliquis] 5 mg PO BID 30 Days #60 tablet 07/20/20  Unknown Rx


 


Metoprolol [Lopressor TAB] 50 mg PO BID #60 tablet 07/20/20  Unknown Rx


 


amLODIPine 10 mg PO QDAY #30 tablet 07/20/20  Unknown Rx


 


oxyCODONE /ACETAMINOPHEN [Percocet 1 tab PO Q6H PRN #20 tablet 07/20/20  Unknown

 Rx





5/325 mg]     











Active Medications: 














Generic Name Dose Route Start Last Admin





  Trade Name Freq  PRN Reason Stop Dose Admin


 


Acetaminophen  650 mg  03/12/22 09:10 





  Acetaminophen 325 Mg Tab  PO  





  Q4H PRN  





  Pain MILD(1-3)/Fever >100.5/HA  


 


Albuterol  2.5 mg  03/12/22 09:10 





  Albuterol 2.5 Mg/3 Ml Nebu  IH  





  QID PRN  





  Shortness Of Breath  


 


Amiodarone HCl  200 mg  03/12/22 10:00  03/13/22 09:07





  Amiodarone 200 Mg Tab  PO   200 mg





  BID DERIK   Administration


 


Apixaban  5 mg  03/12/22 10:00  03/13/22 09:07





  Apixaban 5 Mg Tab  PO   5 mg





  BID DERIK   Administration


 


Aspirin  81 mg  03/12/22 10:00  03/13/22 09:07





  Aspirin Ec 81 Mg Tab  PO   81 mg





  QDAY DERIK   Administration


 


Docusate Sodium  100 mg  03/12/22 10:00  03/13/22 09:07





  Docusate Sodium 100 Mg Cap  PO   100 mg





  BID DERIK   Administration


 


Famotidine  20 mg  03/12/22 10:00  03/13/22 09:07





  Famotidine 20 Mg/2 Ml Inj  IV   20 mg





  BID DERIK   Administration


 


Furosemide  40 mg  03/12/22 18:00  03/13/22 05:53





  Furosemide 40 Mg/4 Ml Inj  IV   40 mg





  0600,1800 DERIK   Administration


 


Heparin Sodium (Porcine)  3,000 unit  03/12/22 10:51 





  Heparin 10,000 Units/10 Ml Vial  IV  





  VANCE PRN  





  hemodialysis  


 


Hydralazine HCl  25 mg  03/12/22 14:00  03/13/22 13:15





  Hydralazine 25 Mg Tab  PO   25 mg





  Q8HR DERIK   Administration


 


Sodium Chloride  100 mls @ 999 mls/hr  03/12/22 10:51 





  Nacl 0.9%  IV  





  VANCE PRN  





  Hypotension  


 


Labetalol HCl  10 mg  03/12/22 10:11 





  Labetalol 20 Mg/4 Ml Inj  IV  





  Q4H PRN  





  Hypertension  


 


Methylprednisolone Sodium Succinate  40 mg  03/12/22 14:00  03/13/22 13:15





  Methylprednisolone Sod Succinate 40 Mg/1 Ml Inj  IV   40 mg





  Q8HR DERIK   Administration


 


Metoprolol Tartrate  50 mg  03/12/22 10:00  03/13/22 09:07





  Metoprolol Tartrate 50 Mg Tab  PO   50 mg





  BID DERIK   Administration


 


Morphine Sulfate  2 mg  03/12/22 09:55 





  Morphine 4 Mg/1 Ml Inj  IV  





  Q4H PRN  





  Pain , Severe (7-10)  


 


Nicotine  14 mg  03/13/22 12:00  03/13/22 11:54





  Nicotine 14 Mg/24 Hr Patch  TD   14 mg





  QDAY DERIK   Administration


 


Ondansetron HCl  4 mg  03/12/22 09:55 





  Ondansetron 4 Mg/2 Ml Inj  IV  





  Q8H PRN  





  Nausea And Vomiting  


 


Oxycodone/Acetaminophen  1 tab  03/12/22 09:10 





  Oxycodone /Acetaminophen 5-325mg Tab  PO  





  Q6H PRN  





  Pain, Moderate (4-6)  


 


Quetiapine Fumarate  100 mg  03/12/22 10:00  03/13/22 09:07





  Quetiapine 100 Mg Tab  PO   100 mg





  BID DERIK   Administration


 


Sodium Chloride  10 ml  03/12/22 10:00  03/13/22 09:07





  Sodium Chloride 0.9% 10 Ml Flush Syringe  IV   10 ml





  BID DERIK   Administration


 


Sodium Chloride  10 ml  03/12/22 10:00  03/13/22 09:07





  Sodium Chloride 0.9% 10 Ml Flush Syringe  IV   Not Given





  BID DERIK  


 


Sodium Chloride  10 ml  03/12/22 09:55 





  Sodium Chloride 0.9% 10 Ml Flush Syringe  IV  





  PRN PRN  





  LINE FLUSH

## 2022-03-13 NOTE — CONSULTATION
DATE OF CONSULTATION: 03/12/2022



HISTORY OF PRESENT ILLNESS:  The patient is a 66-year-old female with a history 

of multiple medical problems including end-stage renal disease, hypertension, 

paroxysmal atrial fibrillation, congestive heart failure and COPD.  She is here 

from [place].  She has missed two dialysis sessions.  She was seen in the 

Emergency Room yesterday and came back today with shortness of breath.  She did 

not have chest discomfort in the past.  There also is a history of a previous 

stroke and previous coronary stent.  Findings were consistent with volume 

overload.  Cardiology was requested to evaluate the patient.  The patient is 

currently sleeping.  The history was obtained from the medical record.



SOCIAL HISTORY:  Smoking, none.  Alcohol, no heavy use.



FAMILY HISTORY:  No significant history described in the medical record.



PAST SURGICAL HISTORY:  Knee surgery, AV graft surgery.



ALLERGIES:  APPLES, SHELLFISH.



MEDICATIONS:  See the nurse's list.



REVIEW OF SYSTEMS:  Other than what is described above, there is a description 

of fatigue, weakness, history of schizophrenia and bipolar disorders.



PHYSICAL EXAMINATION:

GENERAL:  Well-developed, well-nourished, no acute distress.

HEENT:  Eyes, nose, and throat unremarkable.

NECK:  Reveals no clearcut JVD or bruits.  Neck is supple, no masses.

LUNGS:  Diminished breath sounds.

HEART:  Regular rhythm.  No rubs, murmurs or gallops appreciable.  Heart sounds 

somewhat distant.

ABDOMEN:  Soft, nontender, no masses.

EXTREMITIES:  No cyanosis, clubbing.  There is trace pedal edema.  Peripheral 

pulses are intact.

NEUROLOGIC:  Deferred.



IMPRESSION:

1.  History of diastolic heart failure with evidence of volume overload 

secondary to missing 2 dialysis sessions.  BNP is markedly elevated as well.  

The patient is on dialysis at this time for volume management and hypertension 

therapy, oral heart failure therapy will be adjusted.  The previous workup will 

be reviewed.

2.  Status post chest pain yesterday:  There is a history of coronary artery 

disease, but the troponins are normal so far.

3.  Hypertension:  There was hypertensive urgency on admission.

4.  Paroxysmal atrial fibrillation.

5.  History of cerebrovascular accident with right hemiparesis.

6.  Hyperlipidemia.

7.  History of psychiatric disorder.

8.  History of chronic obstructive pulmonary disease.



PLAN:  Congestive heart failure and hypertension therapy, urgent dialysis, 

stress test may need to be considered.  Consider echocardiography if there has 

been no workup in the past 12 months.



Thank you for this consultation.







DD: 03/12/2022 11:49 PM

DT: 03/13/2022 12:23 AM

TID: [id number] RECEIPT: [id number]

SUSANNE/ALBA

## 2022-03-13 NOTE — PROGRESS NOTE
Assessment and Plan


Assessment and plan: 


--Acute hypoxic respiratory failure; requiring BiPAP


Status: Acute   


Multifactorial , missed hemodialysis and fluid overload 


secondary to acute exacerbation of COPD


Nebulizers, oxygen titrate O2 sats to more than 90%, BiPAP as needed


Pulmonary evaluation if needed, IV steroids, supportive care





--Noncompliant with hemodialysis/fluid overload


Nephrology consulted, stat hemodialysis


HD per schedule, avoid nephrotoxins, renal dosing medications





--ESRD; on hemodialysis TTS


HD per schedule, avoid nephrotoxin, renal dosing of medications





--Hypertensive urgency;


Resume home antihypertensives


As needed labetalol


Closely monitor blood pressures and adjust as needed





--Paroxysmal atrial fibrillation


Status: Acute   


Beta-blockers, amiodarone and Eliquis





--Chest pain/acute coronary syndrome


Status: Acute


Serial cardiac enzymes


Cardiac medications


Echocardiogram for LV function ejection fraction


Cardiology consult


Heart cath 9/2017; widely patent proximal to mid and mid to distal LAD stents 


no significant in-stent restenosis EF 50 to 55%


Thallium stress test/2/2018; normal study EF 61% 


echo 6/2020; EF 50 to 55%





--Acute on chronic diastolic CHF (congestive heart failure)


Status: Chronic   


IV diuretics, input output monitoring, fluid restriction


Cardiology evaluation, echocardiogram for LV function ejection fraction





--Acute exacerbation of COPD (chronic obstructive pulmonary disease)


Status: Chronic   


Oxygen titrate O2 sats to more than 90%


BiPAP as needed, nebulizers, IV steroids


Pulmonary consult if needed





--Noncompliance;


Counseling done patient strongly advised to comply with medications, diet, fol

low-up visits


Mainly hemodialysis


Patient verbalized understanding





- DVT prophylaxis


Status: Acute   


Patient is on Eliquis





We will closely monitor the patient and adjust management as needed


Plan of care reviewed with the patient and her nurse


nephrologist Dr. Dejesus following





3/13/2022; patient feels slightly better continue current management


Consultant recommendations noted and appreciated











History


Interval history: 


Seen and examined the patient at the bedside


Patient's chart and medications reviewed


Patient feels slightly better


Denies chest pain or shortness of breath


Patient is searching her room saying she lost her $10


Slightly confused


Vital signs noted








Hospitalist Physical





- Constitutional


Vitals: 


                                        











Temp Pulse Resp BP Pulse Ox


 


 99.0 F   76   20   173/86   99 


 


 03/13/22 08:08  03/13/22 08:08  03/13/22 08:08  03/13/22 08:08  03/13/22 08:08











General appearance: Present: no acute distress, cachectic, disheveled, other (On

BiPAP)





- EENT


Eyes: Present: PERRL, EOM intact





- Neck


Neck: Present: supple, normal ROM





- Respiratory


Respiratory effort: normal


Respiratory: bilateral: diminished, rales, negative: rhonchi, wheezing





- Cardiovascular


Rhythm: regular


Heart Sounds: Present: S1 & S2





- Extremities


Extremities: no ischemia, pulses intact





- Abdominal


General gastrointestinal: soft, non-tender, non-distended, normal bowel sounds





- Integumentary


Integumentary: Present: clear, warm





- Psychiatric


Psychiatric: appropriate mood/affect, cooperative





- Neurologic


Neurologic: moves all extremities





HEART Score





- HEART Score


Troponin: 


                                        











Troponin T  < 0.010 ng/mL (0.00-0.029)   03/12/22  07:26    














Results





- Labs


CBC & Chem 7: 


                                 03/13/22 05:09





                                 03/13/22 05:09


Labs: 


                             Laboratory Last Values











WBC  5.5 K/mm3 (4.5-11.0)   03/13/22  05:09    


 


RBC  3.57 M/mm3 (3.65-5.03)  L  03/13/22  05:09    


 


Hgb  10.8 gm/dl (10.1-14.3)   03/13/22  05:09    


 


Hct  34.3 % (30.3-42.9)   03/13/22  05:09    


 


MCV  96 fl (79-97)   03/13/22  05:09    


 


MCH  30 pg (28-32)   03/13/22  05:09    


 


MCHC  31 % (30-34)   03/13/22  05:09    


 


RDW  17.3 % (13.2-15.2)  H  03/13/22  05:09    


 


Plt Count  175 K/mm3 (140-440)   03/13/22  05:09    


 


Lymph % (Auto)  8.3 % (13.4-35.0)  L  03/13/22  05:09    


 


Mono % (Auto)  1.4 % (0.0-7.3)   03/13/22  05:09    


 


Eos % (Auto)  0.2 % (0.0-4.3)   03/13/22  05:09    


 


Baso % (Auto)  0.6 % (0.0-1.8)   03/13/22  05:09    


 


Lymph # (Auto)  0.5 K/mm3 (1.2-5.4)  L  03/13/22  05:09    


 


Mono # (Auto)  0.1 K/mm3 (0.0-0.8)   03/13/22  05:09    


 


Eos # (Auto)  0.0 K/mm3 (0.0-0.4)   03/13/22  05:09    


 


Baso # (Auto)  0.0 K/mm3 (0.0-0.1)   03/13/22  05:09    


 


Seg Neutrophils %  89.5 % (40.0-70.0)  H  03/13/22  05:09    


 


Seg Neutrophils #  4.9 K/mm3 (1.8-7.7)   03/13/22  05:09    


 


ABG pH  7.176  (7.320-7.450)  L  03/12/22  09:13    


 


POC ABG pCO2  46.0 mmHg (32.0-48.0)   03/12/22  09:13    


 


POC ABG pO2  168.7 mmHg ()  H  03/12/22  09:13    


 


POC ABG HCO3  16.6   03/12/22  09:13    


 


ABG O2 Saturation  99.1  (0-100)   03/12/22  09:13    


 


POC ABG Base Excess  -11.4   03/12/22  09:13    


 


ABG Hemoglobin  11.0  (12.0-17.5)  L  03/12/22  09:13    


 


ABG Oxyhemoglobin  98.1  (94-98)  H  03/12/22  09:13    


 


ABG Methemoglobin  0.3  (0.0-1.5)   03/12/22  09:13    


 


ABG Sodium  Not Reportable   03/12/22  09:13    


 


ABG Potassium  Not Reportable   03/12/22  09:13    


 


ABG Chloride  Not Reportable   03/12/22  09:13    


 


ABG Glucose  Not Reportable   03/12/22  09:13    


 


Carboxyhemoglobin  0.7  (0.5-1.5)   03/12/22  09:13    


 


FiO2 %  45.0   03/12/22  09:13    


 


Sodium  142 mmol/L (137-145)   03/13/22  05:09    


 


Potassium  4.7 mmol/L (3.6-5.0)   03/13/22  05:09    


 


Chloride  102.7 mmol/L ()   03/13/22  05:09    


 


Carbon Dioxide  22 mmol/L (22-30)   03/13/22  05:09    


 


Anion Gap  22 mmol/L  03/13/22  05:09    


 


BUN  27 mg/dL (7-17)  H  03/13/22  05:09    


 


Creatinine  2.7 mg/dL (0.6-1.2)  H  03/13/22  05:09    


 


Estimated GFR  21 ml/min  03/13/22  05:09    


 


BUN/Creatinine Ratio  10 %  03/13/22  05:09    


 


Glucose  139 mg/dL ()  H  03/13/22  05:09    


 


Calcium  9.4 mg/dL (8.4-10.2)   03/13/22  05:09    


 


Magnesium  2.00 mg/dL (1.7-2.3)   03/12/22  07:26    


 


Troponin T  < 0.010 ng/mL (0.00-0.029)   03/12/22  07:26    


 


NT-Pro-B Natriuret Pep  94827 pg/mL (0-900)  H  03/12/22  07:26    


 


Arterial Blood Glucose  Not Reportable   03/12/22  09:13    


 


Hepatitis A IgM Ab  Non-reactive  (NonReactive)   03/12/22  11:38    


 


Hep Bs Antigen  Non-reactive  (Negative)   03/12/22  11:38    


 


Hep B Core IgM Ab  Non-reactive  (NonReactive)   03/12/22  11:38    


 


Hepatitis C Antibody  Non-reactive  (NonReactive)   03/12/22  11:38    











[place]/IV: 


                                        





Voiding Method                   Toilet











Active Medications





- Current Medications


Current Medications: 














Generic Name Dose Route Start Last Admin





  Trade Name Freq  PRN Reason Stop Dose Admin


 


Acetaminophen  650 mg  03/12/22 09:10 





  Acetaminophen 325 Mg Tab  PO  





  Q4H PRN  





  Pain MILD(1-3)/Fever >100.5/HA  


 


Albuterol  2.5 mg  03/12/22 09:10 





  Albuterol 2.5 Mg/3 Ml Nebu  IH  





  QID PRN  





  Shortness Of Breath  


 


Amiodarone HCl  200 mg  03/12/22 10:00  03/13/22 09:07





  Amiodarone 200 Mg Tab  PO   200 mg





  BID DERIK   Administration


 


Apixaban  5 mg  03/12/22 10:00  03/13/22 09:07





  Apixaban 5 Mg Tab  PO   5 mg





  BID DERIK   Administration


 


Aspirin  81 mg  03/12/22 10:00  03/13/22 09:07





  Aspirin Ec 81 Mg Tab  PO   81 mg





  QDAY DERIK   Administration


 


Docusate Sodium  100 mg  03/12/22 10:00  03/13/22 09:07





  Docusate Sodium 100 Mg Cap  PO   100 mg





  BID DERIK   Administration


 


Famotidine  20 mg  03/12/22 10:00  03/13/22 09:07





  Famotidine 20 Mg/2 Ml Inj  IV   20 mg





  BID DERIK   Administration


 


Furosemide  40 mg  03/12/22 18:00  03/13/22 05:53





  Furosemide 40 Mg/4 Ml Inj  IV   40 mg





  0600,1800 DERIK   Administration


 


Heparin Sodium (Porcine)  3,000 unit  03/12/22 10:51 





  Heparin 10,000 Units/10 Ml Vial  IV  





  VANCE PRN  





  hemodialysis  


 


Hydralazine HCl  25 mg  03/12/22 14:00  03/13/22 05:53





  Hydralazine 25 Mg Tab  PO   25 mg





  Q8HR DERIK   Administration


 


Sodium Chloride  100 mls @ 999 mls/hr  03/12/22 10:51 





  Nacl 0.9%  IV  





  VANCE PRN  





  Hypotension  


 


Labetalol HCl  10 mg  03/12/22 10:11 





  Labetalol 20 Mg/4 Ml Inj  IV  





  Q4H PRN  





  Hypertension  


 


Methylprednisolone Sodium Succinate  40 mg  03/12/22 14:00  03/13/22 05:53





  Methylprednisolone Sod Succinate 40 Mg/1 Ml Inj  IV   40 mg





  Q8HR DERIK   Administration


 


Metoprolol Tartrate  50 mg  03/12/22 10:00  03/13/22 09:07





  Metoprolol Tartrate 50 Mg Tab  PO   50 mg





  BID DERIK   Administration


 


Morphine Sulfate  2 mg  03/12/22 09:55 





  Morphine 4 Mg/1 Ml Inj  IV  





  Q4H PRN  





  Pain , Severe (7-10)  


 


Ondansetron HCl  4 mg  03/12/22 09:55 





  Ondansetron 4 Mg/2 Ml Inj  IV  





  Q8H PRN  





  Nausea And Vomiting  


 


Oxycodone/Acetaminophen  1 tab  03/12/22 09:10 





  Oxycodone /Acetaminophen 5-325mg Tab  PO  





  Q6H PRN  





  Pain, Moderate (4-6)  


 


Quetiapine Fumarate  100 mg  03/12/22 10:00  03/13/22 09:07





  Quetiapine 100 Mg Tab  PO   100 mg





  BID DERIK   Administration


 


Sodium Chloride  10 ml  03/12/22 10:00  03/13/22 09:07





  Sodium Chloride 0.9% 10 Ml Flush Syringe  IV   10 ml





  BID DERIK   Administration


 


Sodium Chloride  10 ml  03/12/22 10:00  03/13/22 09:07





  Sodium Chloride 0.9% 10 Ml Flush Syringe  IV   Not Given





  BID DERIK  


 


Sodium Chloride  10 ml  03/12/22 09:55 





  Sodium Chloride 0.9% 10 Ml Flush Syringe  IV  





  PRN PRN  





  LINE FLUSH

## 2022-03-13 NOTE — ELECTROCARDIOGRAPH REPORT
[facility]

                                       

Test Date:    2022               Test Time:    16:39:39

Pat Name:     LINDA GARCIA              Department:   

Patient ID:   [id number]          Room:          

Gender:       F                        Technician:   [id number]

:          1955               Requested By: UCHE TILLMAN

Order Number: [id number]              Reading MD:   Tone Campos

                                 Measurements

Intervals                              Axis          

Rate:         75                       P:            55

MA:           131                      QRS:          6

QRSD:         90                       T:            75

QT:           448                                    

QTc:          502                                    

                           Interpretive Statements

Sinus rhythm

Borderline ST elevation, anterior leads

Prolonged QT interval

Compared to ECG 2022 13:59:04

ST (T wave) deviation now present

Prolonged QT interval now present

Atrial fibrillation no longer present

T-wave abnormality no longer present

Electronically Signed On 3- 10:41:51 EDT by Tone Campos

## 2022-03-13 NOTE — ELECTROCARDIOGRAPH REPORT
[facility]

                                       

Test Date:    2022               Test Time:    13:59:04

Pat Name:     LINDA GARCIA              Department:   

Patient ID:   [id number]          Room:          

Gender:       F                        Technician:   [id number]

:          1955               Requested By: UCHE TILLMAN

Order Number: [id number]              Reading MD:   Tone Campos

                                 Measurements

Intervals                              Axis          

Rate:         110                      P:            

AL:                                    QRS:          73

QRSD:         88                       T:            -88

QT:           308                                    

QTc:          418                                    

                           Interpretive Statements

Atrial fibrillation

Nonspecific T abnormalities, lateral leads

No previous ECG available for comparison

Electronically Signed On 3- 10:41:28 EDT by Tone Campos

## 2022-03-13 NOTE — PROGRESS NOTE
Assessment and Plan





- Patient Problems


(1) CKD (chronic kidney disease) requiring chronic dialysis


Current Visit: Yes   Status: Acute   





(2) Acute chest pain


Current Visit: No   Status: Acute   





Subjective


Date of service: 03/13/22


Interval history: 





CP -RESOLVED,,,BREATHING BETTER





Objective


                                   Vital Signs











  Temp Pulse Pulse Resp BP BP Pulse Ox


 


 03/13/22 11:15    74  18    99


 


 03/13/22 08:08  99.0 F  76   20  173/86   99


 


 03/13/22 07:10   74     


 


 03/13/22 04:27  98.0 F  79   18   143/70  98


 


 03/12/22 23:23        95


 


 03/12/22 23:09   85   20    98


 


 03/12/22 23:05  98.6 F  74   24  147/73   100


 


 03/12/22 19:55  97.6 F  72   16  154/82   100


 


 03/12/22 17:27        95


 


 03/12/22 16:45  98.4 F  91 H   18  149/98  


 


 03/12/22 16:30   88    124/70  


 


 03/12/22 16:15   102 H    123/71  


 


 03/12/22 16:00   77    132/70  


 


 03/12/22 15:45   96 H    130/80  


 


 03/12/22 15:30   86    125/79  


 


 03/12/22 15:15   86    101/69  


 


 03/12/22 15:00   85    146/68  


 


 03/12/22 14:45   74    122/87  


 


 03/12/22 14:30   79    98/80  


 


 03/12/22 14:15   77    116/74  


 


 03/12/22 14:00   73    99/87  


 


 03/12/22 13:45   101 H    124/86  


 


 03/12/22 13:30  98.4 F  125 H   18  152/107  


 


 03/12/22 12:45   130 H   20  132/70   100


 


 03/12/22 12:31   121 H   20  146/80   100


 


 03/12/22 12:15   128 H   16  151/90   100


 


 03/12/22 12:01   134 H   14  154/92   100


 


 03/12/22 11:45   145 H   20  186/92   98


 


 03/12/22 11:31   156 H   18  181/107   100


 


 03/12/22 11:15   133 H   19  171/109   100














  Pulse Ox


 


 03/13/22 11:15 


 


 03/13/22 08:08 


 


 03/13/22 07:10 


 


 03/13/22 04:27 


 


 03/12/22 23:23 


 


 03/12/22 23:09 


 


 03/12/22 23:05 


 


 03/12/22 19:55 


 


 03/12/22 17:27 


 


 03/12/22 16:45  98


 


 03/12/22 16:30 


 


 03/12/22 16:15 


 


 03/12/22 16:00 


 


 03/12/22 15:45 


 


 03/12/22 15:30 


 


 03/12/22 15:15 


 


 03/12/22 15:00 


 


 03/12/22 14:45 


 


 03/12/22 14:30 


 


 03/12/22 14:15 


 


 03/12/22 14:00 


 


 03/12/22 13:45 


 


 03/12/22 13:30  98


 


 03/12/22 12:45 


 


 03/12/22 12:31 


 


 03/12/22 12:15 


 


 03/12/22 12:01 


 


 03/12/22 11:45 


 


 03/12/22 11:31 


 


 03/12/22 11:15 














- Physical Examination


General: No Apparent Distress


HEENT: Positive: PERRL


Neck: Positive: neck supple


Cardiac: Positive: Regular Rhythm


Lungs: Positive: clear to auscultation


Abdomen: Positive: Unremarkable


Extremities: Present: normal





- Labs and Meds


                                       CBC











  03/13/22 Range/Units





  05:09 


 


WBC  5.5  (4.5-11.0)  K/mm3


 


RBC  3.57 L  (3.65-5.03)  M/mm3


 


Hgb  10.8  (10.1-14.3)  gm/dl


 


Hct  34.3  (30.3-42.9)  %


 


Plt Count  175  (140-440)  K/mm3


 


Lymph # (Auto)  0.5 L  (1.2-5.4)  K/mm3


 


Mono # (Auto)  0.1  (0.0-0.8)  K/mm3


 


Eos # (Auto)  0.0  (0.0-0.4)  K/mm3


 


Baso # (Auto)  0.0  (0.0-0.1)  K/mm3








                          Comprehensive Metabolic Panel











  03/13/22 Range/Units





  05:09 


 


Sodium  142  (137-145)  mmol/L


 


Potassium  4.7  (3.6-5.0)  mmol/L


 


Chloride  102.7  ()  mmol/L


 


Carbon Dioxide  22  (22-30)  mmol/L


 


BUN  27 H  (7-17)  mg/dL


 


Creatinine  2.7 H  (0.6-1.2)  mg/dL


 


Glucose  139 H  ()  mg/dL


 


Calcium  9.4  (8.4-10.2)  mg/dL

## 2022-03-14 PROCEDURE — 5A09457 ASSISTANCE WITH RESPIRATORY VENTILATION, 24-96 CONSECUTIVE HOURS, CONTINUOUS POSITIVE AIRWAY PRESSURE: ICD-10-PCS | Performed by: INTERNAL MEDICINE

## 2022-03-14 PROCEDURE — 5A1D70Z PERFORMANCE OF URINARY FILTRATION, INTERMITTENT, LESS THAN 6 HOURS PER DAY: ICD-10-PCS | Performed by: INTERNAL MEDICINE

## 2022-03-14 RX ADMIN — METHYLPREDNISOLONE SODIUM SUCCINATE SCH MG: 40 INJECTION, POWDER, FOR SOLUTION INTRAMUSCULAR; INTRAVENOUS at 05:40

## 2022-03-14 RX ADMIN — FUROSEMIDE SCH MG: 10 INJECTION, SOLUTION INTRAVENOUS at 19:10

## 2022-03-14 RX ADMIN — DOCUSATE SODIUM SCH MG: 100 CAPSULE, LIQUID FILLED ORAL at 21:58

## 2022-03-14 RX ADMIN — AMIODARONE HYDROCHLORIDE SCH MG: 200 TABLET ORAL at 21:59

## 2022-03-14 RX ADMIN — Medication SCH ML: at 10:15

## 2022-03-14 RX ADMIN — Medication SCH ML: at 22:00

## 2022-03-14 RX ADMIN — FUROSEMIDE SCH MG: 10 INJECTION, SOLUTION INTRAVENOUS at 05:40

## 2022-03-14 RX ADMIN — APIXABAN SCH MG: 2.5 TABLET, FILM COATED ORAL at 22:00

## 2022-03-14 RX ADMIN — NICOTINE SCH MG: 14 PATCH TRANSDERMAL at 19:14

## 2022-03-14 RX ADMIN — Medication SCH ML: at 05:01

## 2022-03-14 RX ADMIN — METHYLPREDNISOLONE SODIUM SUCCINATE SCH MG: 40 INJECTION, POWDER, FOR SOLUTION INTRAMUSCULAR; INTRAVENOUS at 22:00

## 2022-03-14 RX ADMIN — FAMOTIDINE SCH MG: 10 INJECTION, SOLUTION INTRAVENOUS at 22:00

## 2022-03-14 RX ADMIN — METOPROLOL TARTRATE SCH MG: 50 TABLET, FILM COATED ORAL at 21:59

## 2022-03-14 RX ADMIN — OXYCODONE AND ACETAMINOPHEN PRN TAB: 5; 325 TABLET ORAL at 05:47

## 2022-03-14 NOTE — PROGRESS NOTE
Assessment and Plan


Assessment and plan: 


Stone PCR test is negative





--Acute hypoxic respiratory failure; requiring BiPAP


Symptoms significantly improved, on room air today


Multifactorial , missed hemodialysis and fluid overload 


secondary to acute exacerbation of COPD


Nebulizers, oxygen titrate O2 sats to more than 90%, BiPAP as needed





Home O2 evaluation prior to discharge





--Noncompliant with hemodialysis/fluid overload


Nephrology consulted, stat hemodialysis


HD per schedule, avoid nephrotoxins, renal dosing medications





--ESRD; on hemodialysis TTS


HD per schedule, avoid nephrotoxin, renal dosing of medications





--Hypertensive urgency;


Resume home antihypertensives


As needed labetalol


Closely monitor blood pressures and adjust as needed





--Paroxysmal atrial fibrillation  


Beta-blockers, amiodarone and Eliquis





--Chest pain/acute coronary syndrome


Serial cardiac enzymes, Cardiac medications


Echocardiogram for LV function ejection fraction


Heart cath 9/2017; widely patent proximal to mid and mid to distal LAD stents 


no significant in-stent restenosis EF 50 to 55%


Thallium stress test/2/2018; normal study EF 61% 


echo 6/2020; EF 50 to 55%


Cardiology following





--Acute on chronic diastolic CHF (congestive heart failure)


IV diuretics, input output monitoring, fluid restriction


Cardiology evaluation, echocardiogram for LV function ejection fraction





--Acute exacerbation of COPD (chronic obstructive pulmonary disease)


Oxygen titrate O2 sats to more than 90%


BiPAP as needed, nebulizers, IV steroids


Pulmonary consult if needed





--Noncompliance;


Counseling done patient strongly advised to comply with medications, diet, 

follow-up visits


Mainly hemodialysis


Patient verbalized understanding





- DVT prophylaxis


Patient is on Eliquis





We will closely monitor the patient and adjust management as needed


Plan of care reviewed with the patient and her nurse





3/13/2022; patient feels slightly better continue current management


Consultant recommendations noted and appreciated





3/14; patient received hemodialysis today, home O2 evaluation prior to discharge


DC planning per case management, discharge when stable














History


Interval history: 


I have seen and examined the patient at the bedside


Patient's chart and medications reviewed


Patient is scheduled for stress test today


Vital signs noted


Patient complains of generalized weakness





Hospitalist Physical





- Constitutional


Vitals: 


                                        











Temp Pulse Resp BP Pulse Ox


 


 99 F   113 H  18   160/96   97 


 


 03/14/22 16:00  03/14/22 16:00  03/14/22 16:00  03/14/22 16:00  03/14/22 14:15











General appearance: Present: no acute distress, cachectic, disheveled, other (On

BiPAP)





- EENT


Eyes: Present: PERRL, EOM intact





- Neck


Neck: Present: supple, normal ROM





- Respiratory


Respiratory effort: normal


Respiratory: bilateral: diminished, negative: rales, rhonchi, wheezing





- Cardiovascular


Rhythm: regular


Heart Sounds: Present: S1 & S2





- Extremities


Extremities: no ischemia, No edema





- Abdominal


General gastrointestinal: soft, non-tender, non-distended, normal bowel sounds





- Integumentary


Integumentary: Present: clear, warm





- Psychiatric


Psychiatric: appropriate mood/affect, cooperative





- Neurologic


Neurologic: CNII-XII intact, moves all extremities





HEART Score





- HEART Score


Troponin: 


                                        











Troponin T  < 0.010 ng/mL (0.00-0.029)   03/12/22  07:26    














Results





- Labs


CBC & Chem 7: 


                                 03/13/22 05:09





                                 03/13/22 05:09


Labs: 


                             Laboratory Last Values











WBC  5.5 K/mm3 (4.5-11.0)   03/13/22  05:09    


 


RBC  3.57 M/mm3 (3.65-5.03)  L  03/13/22  05:09    


 


Hgb  10.8 gm/dl (10.1-14.3)   03/13/22  05:09    


 


Hct  34.3 % (30.3-42.9)   03/13/22  05:09    


 


MCV  96 fl (79-97)   03/13/22  05:09    


 


MCH  30 pg (28-32)   03/13/22  05:09    


 


MCHC  31 % (30-34)   03/13/22  05:09    


 


RDW  17.3 % (13.2-15.2)  H  03/13/22  05:09    


 


Plt Count  175 K/mm3 (140-440)   03/13/22  05:09    


 


Lymph % (Auto)  8.3 % (13.4-35.0)  L  03/13/22  05:09    


 


Mono % (Auto)  1.4 % (0.0-7.3)   03/13/22  05:09    


 


Eos % (Auto)  0.2 % (0.0-4.3)   03/13/22  05:09    


 


Baso % (Auto)  0.6 % (0.0-1.8)   03/13/22  05:09    


 


Lymph # (Auto)  0.5 K/mm3 (1.2-5.4)  L  03/13/22  05:09    


 


Mono # (Auto)  0.1 K/mm3 (0.0-0.8)   03/13/22  05:09    


 


Eos # (Auto)  0.0 K/mm3 (0.0-0.4)   03/13/22  05:09    


 


Baso # (Auto)  0.0 K/mm3 (0.0-0.1)   03/13/22  05:09    


 


Seg Neutrophils %  89.5 % (40.0-70.0)  H  03/13/22  05:09    


 


Seg Neutrophils #  4.9 K/mm3 (1.8-7.7)   03/13/22  05:09    


 


ABG pH  7.176  (7.320-7.450)  L  03/12/22  09:13    


 


POC ABG pCO2  46.0 mmHg (32.0-48.0)   03/12/22  09:13    


 


POC ABG pO2  168.7 mmHg ()  H  03/12/22  09:13    


 


POC ABG HCO3  16.6   03/12/22  09:13    


 


ABG O2 Saturation  99.1  (0-100)   03/12/22  09:13    


 


POC ABG Base Excess  -11.4   03/12/22  09:13    


 


ABG Hemoglobin  11.0  (12.0-17.5)  L  03/12/22  09:13    


 


ABG Oxyhemoglobin  98.1  (94-98)  H  03/12/22  09:13    


 


ABG Methemoglobin  0.3  (0.0-1.5)   03/12/22  09:13    


 


ABG Sodium  Not Reportable   03/12/22  09:13    


 


ABG Potassium  Not Reportable   03/12/22  09:13    


 


ABG Chloride  Not Reportable   03/12/22  09:13    


 


ABG Glucose  Not Reportable   03/12/22  09:13    


 


Carboxyhemoglobin  0.7  (0.5-1.5)   03/12/22  09:13    


 


FiO2 %  45.0   03/12/22  09:13    


 


Sodium  142 mmol/L (137-145)   03/13/22  05:09    


 


Potassium  4.7 mmol/L (3.6-5.0)   03/13/22  05:09    


 


Chloride  102.7 mmol/L ()   03/13/22  05:09    


 


Carbon Dioxide  22 mmol/L (22-30)   03/13/22  05:09    


 


Anion Gap  22 mmol/L  03/13/22  05:09    


 


BUN  27 mg/dL (7-17)  H  03/13/22  05:09    


 


Creatinine  2.7 mg/dL (0.6-1.2)  H  03/13/22  05:09    


 


Estimated GFR  21 ml/min  03/13/22  05:09    


 


BUN/Creatinine Ratio  10 %  03/13/22  05:09    


 


Glucose  139 mg/dL ()  H  03/13/22  05:09    


 


Calcium  9.4 mg/dL (8.4-10.2)   03/13/22  05:09    


 


Magnesium  2.00 mg/dL (1.7-2.3)   03/12/22  07:26    


 


Troponin T  < 0.010 ng/mL (0.00-0.029)   03/12/22  07:26    


 


NT-Pro-B Natriuret Pep  65399 pg/mL (0-900)  H  03/12/22  07:26    


 


Arterial Blood Glucose  Not Reportable   03/12/22  09:13    


 


Coronavirus (PCR)  Negative  (Negative)   03/13/22  09:45    


 


Hepatitis A IgM Ab  Non-reactive  (NonReactive)   03/12/22  11:38    


 


Hep Bs Antigen  Non-reactive  (Negative)   03/12/22  11:38    


 


Hep B Core IgM Ab  Non-reactive  (NonReactive)   03/12/22  11:38    


 


Hepatitis C Antibody  Non-reactive  (NonReactive)   03/12/22  11:38    











[place]/IV: 


                                        





Voiding Method                   Toilet











Active Medications





- Current Medications


Current Medications: 














Generic Name Dose Route Start Last Admin





  Trade Name Freq  PRN Reason Stop Dose Admin


 


Acetaminophen  650 mg  03/12/22 09:10 





  Acetaminophen 325 Mg Tab  PO  





  Q4H PRN  





  Pain MILD(1-3)/Fever >100.5/HA  


 


Albuterol  2.5 mg  03/12/22 09:10 





  Albuterol 2.5 Mg/3 Ml Nebu  IH  





  QID PRN  





  Shortness Of Breath  


 


Amiodarone HCl  200 mg  03/12/22 10:00  03/13/22 22:03





  Amiodarone 200 Mg Tab  PO   200 mg





  BID DERIK   Administration


 


Apixaban  2.5 mg  03/14/22 10:00 





  Apixaban 2.5 Mg Tab  PO  





  BID DERIK  


 


Aspirin  81 mg  03/12/22 10:00  03/13/22 09:07





  Aspirin Ec 81 Mg Tab  PO   81 mg





  QDAY DERIK   Administration


 


Docusate Sodium  100 mg  03/12/22 10:00  03/13/22 22:03





  Docusate Sodium 100 Mg Cap  PO   100 mg





  BID DERIK   Administration


 


Famotidine  20 mg  03/12/22 10:00  03/13/22 22:03





  Famotidine 20 Mg/2 Ml Inj  IV   20 mg





  BID DERIK   Administration


 


Furosemide  40 mg  03/12/22 18:00  03/14/22 05:40





  Furosemide 40 Mg/4 Ml Inj  IV   40 mg





  0600,1800 DERIK   Administration


 


Heparin Sodium (Porcine)  3,000 unit  03/12/22 10:51  03/14/22 12:36





  Heparin 10,000 Units/10 Ml Vial  IV   3,000 unit





  VANCE PRN   Administration





  hemodialysis  


 


Hydralazine HCl  25 mg  03/12/22 14:00  03/14/22 05:40





  Hydralazine 25 Mg Tab  PO   25 mg





  Q8HR DERIK   Administration


 


Sodium Chloride  100 mls @ 999 mls/hr  03/14/22 09:00 





  Nacl 0.9%  IV  





  VANCE PRN  





  Hypotension  


 


Labetalol HCl  10 mg  03/12/22 10:11 





  Labetalol 20 Mg/4 Ml Inj  IV  





  Q4H PRN  





  Hypertension  


 


Methylprednisolone Sodium Succinate  40 mg  03/12/22 14:00  03/14/22 05:40





  Methylprednisolone Sod Succinate 40 Mg/1 Ml Inj  IV   40 mg





  Q8HR DERIK   Administration


 


Metoprolol Tartrate  50 mg  03/12/22 10:00  03/13/22 22:04





  Metoprolol Tartrate 50 Mg Tab  PO   50 mg





  BID DERIK   Administration


 


Morphine Sulfate  2 mg  03/12/22 09:55  03/14/22 15:45





  Morphine 4 Mg/1 Ml Inj  IV   2 mg





  Q4H PRN   Administration





  Pain , Severe (7-10)  


 


Nicotine  14 mg  03/13/22 12:00  03/13/22 11:54





  Nicotine 14 Mg/24 Hr Patch  TD   14 mg





  QDAY DERIK   Administration


 


Ondansetron HCl  4 mg  03/12/22 09:55 





  Ondansetron 4 Mg/2 Ml Inj  IV  





  Q8H PRN  





  Nausea And Vomiting  


 


Oxycodone/Acetaminophen  1 tab  03/12/22 09:10  03/14/22 05:47





  Oxycodone /Acetaminophen 5-325mg Tab  PO   1 tab





  Q6H PRN   Administration





  Pain, Moderate (4-6)  


 


Quetiapine Fumarate  100 mg  03/12/22 10:00  03/13/22 22:03





  Quetiapine 100 Mg Tab  PO   100 mg





  BID DERIK   Administration


 


Sodium Chloride  10 ml  03/12/22 10:00  03/13/22 22:00





  Sodium Chloride 0.9% 10 Ml Flush Syringe  IV   10 ml





  BID DERIK   Administration


 


Sodium Chloride  10 ml  03/12/22 10:00  03/14/22 05:01





  Sodium Chloride 0.9% 10 Ml Flush Syringe  IV   10 ml





  BID DERIK   Administration


 


Sodium Chloride  10 ml  03/12/22 09:55 





  Sodium Chloride 0.9% 10 Ml Flush Syringe  IV  





  PRN PRN  





  LINE FLUSH

## 2022-03-14 NOTE — PROGRESS NOTE
Assessment and Plan





- Patient Problems


(1) CKD (chronic kidney disease) requiring chronic dialysis


Current Visit: Yes   Status: Acute   





(2) Acute chest pain


Current Visit: No   Status: Acute   





Subjective


Date of service: 03/14/22


Interval history: 





CP THIS AM - NO ACUTE EKG CHANGE DURING CP





Objective


                                   Vital Signs











  Temp Pulse Pulse Resp Resp BP BP


 


 03/14/22 10:26       148/83 


 


 03/14/22 09:57       153/76 


 


 03/14/22 09:56       162/78 


 


 03/14/22 09:55       157/77 


 


 03/14/22 09:54       152/71 


 


 03/14/22 09:00       157/89 


 


 03/14/22 06:47     17   


 


 03/14/22 05:47     17   


 


 03/14/22 05:40   69     149/77 


 


 03/14/22 05:38  98.4 F  69   17    149/77


 


 03/14/22 04:00   71     


 


 03/14/22 03:40       


 


 03/14/22 03:30       


 


 03/14/22 03:20       


 


 03/14/22 03:10       


 


 03/14/22 03:00       


 


 03/14/22 02:50       


 


 03/14/22 02:43       


 


 03/14/22 02:25      17  


 


 03/14/22 02:20       


 


 03/14/22 02:10       163/103 


 


 03/14/22 02:01       164/100 


 


 03/14/22 01:51       158/101 


 


 03/14/22 01:41       158/101 


 


 03/14/22 01:31       162/96 


 


 03/14/22 01:21       162/96 


 


 03/14/22 01:11       162/96 


 


 03/14/22 01:01       162/96 


 


 03/14/22 00:51       162/96 


 


 03/14/22 00:41       162/96 


 


 03/14/22 00:31       162/96 


 


 03/14/22 00:21       150/96 


 


 03/14/22 00:15       150/96 


 


 03/14/22 00:00   71     


 


 03/13/22 23:19  98.9 F  70   18   160/81 


 


 03/13/22 22:34    73  17   


 


 03/13/22 22:10       133/91 


 


 03/13/22 22:04   73     174/89 


 


 03/13/22 22:03   73     174/89 


 


 03/13/22 22:01   73   17    179/89


 


 03/13/22 22:00       


 


 03/13/22 21:50       


 


 03/13/22 21:40       


 


 03/13/22 21:30       


 


 03/13/22 21:20       


 


 03/13/22 21:10       139/95 


 


 03/13/22 21:00       


 


 03/13/22 20:50       


 


 03/13/22 20:45       143/94 


 


 03/13/22 20:41       143/94 


 


 03/13/22 20:38       


 


 03/13/22 20:00   74     


 


 03/13/22 16:00  98.0 F  71   20   163/85 














  Pulse Ox


 


 03/14/22 10:26 


 


 03/14/22 09:57 


 


 03/14/22 09:56 


 


 03/14/22 09:55 


 


 03/14/22 09:54 


 


 03/14/22 09:00  93


 


 03/14/22 06:47 


 


 03/14/22 05:47 


 


 03/14/22 05:40 


 


 03/14/22 05:38  98


 


 03/14/22 04:00 


 


 03/14/22 03:40  94


 


 03/14/22 03:30  95


 


 03/14/22 03:20  94


 


 03/14/22 03:10  95


 


 03/14/22 03:00  94


 


 03/14/22 02:50  96


 


 03/14/22 02:43  95


 


 03/14/22 02:25 


 


 03/14/22 02:20  93


 


 03/14/22 02:10  95


 


 03/14/22 02:01  94


 


 03/14/22 01:51  94


 


 03/14/22 01:41  97


 


 03/14/22 01:31  98


 


 03/14/22 01:21  98


 


 03/14/22 01:11  98


 


 03/14/22 01:01  97


 


 03/14/22 00:51  98


 


 03/14/22 00:41  97


 


 03/14/22 00:31  96


 


 03/14/22 00:21  96


 


 03/14/22 00:15  98


 


 03/14/22 00:00 


 


 03/13/22 23:19  100


 


 03/13/22 22:34  97


 


 03/13/22 22:10  100


 


 03/13/22 22:04 


 


 03/13/22 22:03 


 


 03/13/22 22:01  98


 


 03/13/22 22:00  100


 


 03/13/22 21:50  100


 


 03/13/22 21:40  100


 


 03/13/22 21:30  100


 


 03/13/22 21:20  100


 


 03/13/22 21:10  80 L


 


 03/13/22 21:00  100


 


 03/13/22 20:50  100


 


 03/13/22 20:45  100


 


 03/13/22 20:41  100


 


 03/13/22 20:38  100


 


 03/13/22 20:00 


 


 03/13/22 16:00  99














- Physical Examination


General: No Apparent Distress


HEENT: Positive: PERRL


Neck: Positive: neck supple, JVD/HJR


Cardiac: Positive: Regular Rate


Lungs: Positive: clear to auscultation


Abdomen: Positive: Unremarkable


Extremities: Present: normal

## 2022-03-14 NOTE — PROGRESS NOTE
Assessment and Plan


1. ESRD:


Per patient she moved from [place] 3 days ago.


Haven't started on any local outpatient dialysis clinic yet.


Admitted with volume overload and metabolic acidosis.


Hemodialysis: 3/12.


HD today.





2. FEN:


Metabolic acidosis, s/p HD, monitor.


Volume control, UF with HD as tolerated.


Monitor lytes and volume status.





3. Acute hypoxic resp failure, POA:


2/2 COPD exacerbation +/- volume overload.


Supplemental O2 as needed.


Nebs and steroids.


Volume control with HD.


Monitor.





4. Chest pain:


Stress test today.





5. Acute on chronic diastolic CHF:


Volume control thru HD.


Fluid restriction, monitor I/O.


Seen by Cardiology.





6. Acute exacerbation of COPD:


Nebs and steroids.


On RA at the time of eval.





7. Paroxysmal A.fib:


Eliquis and Amio.


Monitor.





8. Hypertensive urgency, POA:


Volume control with HD.


Continue home BP meds.


Monitor BP.





9. Normocytic Anemia, POA:


Chronic.


2/2 ESRD.


Epogen with HD as needed.


Monitor.





10. Acute metabolic Encephalopathy, POA:


Improved.











Subjective:


Patient was seen and examined at the bedside.











Examination:


General appearance: well-developed, appears stated age, no distress, on RA


HEENT: atraumatic, IVETTE


Neck: trachea midline


Respiratory: ctab


Heart: S1S2, no murmur


Abdomen: soft, bowel sounds heard, NT


Integumentary: no obvious rash


Neurologic: AO, able to move extremities


Ext: no edema


Hemodialysis access: R IJ tunnel catheter, R arm AVF/AVG








Subjective


Date of service: 03/14/22





Objective





- Vital Signs


Vital signs: 


                               Vital Signs - 12hr











  03/13/22 03/13/22 03/13/22





  22:10 22:34 23:19


 


Temperature   98.9 F


 


Pulse Rate   70


 


Pulse Rate [  73 





From Monitor]   


 


Respiratory  17 18





Rate   


 


Respiratory   





Rate [Chest]   


 


Blood Pressure 133/91  160/81


 


Blood Pressure   





[Left]   


 


O2 Sat by Pulse 100 97 100





Oximetry   














  03/14/22 03/14/22 03/14/22





  00:00 00:15 00:21


 


Temperature   


 


Pulse Rate 71  


 


Pulse Rate [   





From Monitor]   


 


Respiratory   





Rate   


 


Respiratory   





Rate [Chest]   


 


Blood Pressure  150/96 150/96


 


Blood Pressure   





[Left]   


 


O2 Sat by Pulse  98 96





Oximetry   














  03/14/22 03/14/22 03/14/22





  00:31 00:41 00:51


 


Temperature   


 


Pulse Rate   


 


Pulse Rate [   





From Monitor]   


 


Respiratory   





Rate   


 


Respiratory   





Rate [Chest]   


 


Blood Pressure 162/96 162/96 162/96


 


Blood Pressure   





[Left]   


 


O2 Sat by Pulse 96 97 98





Oximetry   














  03/14/22 03/14/22 03/14/22





  01:01 01:11 01:21


 


Temperature   


 


Pulse Rate   


 


Pulse Rate [   





From Monitor]   


 


Respiratory   





Rate   


 


Respiratory   





Rate [Chest]   


 


Blood Pressure 162/96 162/96 162/96


 


Blood Pressure   





[Left]   


 


O2 Sat by Pulse 97 98 98





Oximetry   














  03/14/22 03/14/22 03/14/22





  01:31 01:41 01:51


 


Temperature   


 


Pulse Rate   


 


Pulse Rate [   





From Monitor]   


 


Respiratory   





Rate   


 


Respiratory   





Rate [Chest]   


 


Blood Pressure 162/96 158/101 158/101


 


Blood Pressure   





[Left]   


 


O2 Sat by Pulse 98 97 94





Oximetry   














  03/14/22 03/14/22 03/14/22





  02:01 02:10 02:20


 


Temperature   


 


Pulse Rate   


 


Pulse Rate [   





From Monitor]   


 


Respiratory   





Rate   


 


Respiratory   





Rate [Chest]   


 


Blood Pressure 164/100 163/103 


 


Blood Pressure   





[Left]   


 


O2 Sat by Pulse 94 95 93





Oximetry   














  03/14/22 03/14/22 03/14/22





  02:25 02:43 02:50


 


Temperature   


 


Pulse Rate   


 


Pulse Rate [   





From Monitor]   


 


Respiratory   





Rate   


 


Respiratory 17  





Rate [Chest]   


 


Blood Pressure   


 


Blood Pressure   





[Left]   


 


O2 Sat by Pulse  95 96





Oximetry   














  03/14/22 03/14/22 03/14/22





  03:00 03:10 03:20


 


Temperature   


 


Pulse Rate   


 


Pulse Rate [   





From Monitor]   


 


Respiratory   





Rate   


 


Respiratory   





Rate [Chest]   


 


Blood Pressure   


 


Blood Pressure   





[Left]   


 


O2 Sat by Pulse 94 95 94





Oximetry   














  03/14/22 03/14/22 03/14/22





  03:30 03:40 04:00


 


Temperature   


 


Pulse Rate   71


 


Pulse Rate [   





From Monitor]   


 


Respiratory   





Rate   


 


Respiratory   





Rate [Chest]   


 


Blood Pressure   


 


Blood Pressure   





[Left]   


 


O2 Sat by Pulse 95 94 





Oximetry   














  03/14/22 03/14/22 03/14/22





  05:38 05:40 05:47


 


Temperature 98.4 F  


 


Pulse Rate 69 69 


 


Pulse Rate [   





From Monitor]   


 


Respiratory 17  17





Rate   


 


Respiratory   





Rate [Chest]   


 


Blood Pressure  149/77 


 


Blood Pressure 149/77  





[Left]   


 


O2 Sat by Pulse 98  





Oximetry   














  03/14/22





  06:47


 


Temperature 


 


Pulse Rate 


 


Pulse Rate [ 





From Monitor] 


 


Respiratory 17





Rate 


 


Respiratory 





Rate [Chest] 


 


Blood Pressure 


 


Blood Pressure 





[Left] 


 


O2 Sat by Pulse 





Oximetry 














- Lab





                                 03/13/22 05:09





                                 03/13/22 05:09


                             Most recent lab results











ABG pH  7.176  (7.320-7.450)  L  03/12/22  09:13    


 


ABG O2 Saturation  99.1  (0-100)   03/12/22  09:13    


 


Calcium  9.4 mg/dL (8.4-10.2)   03/13/22  05:09    


 


Magnesium  2.00 mg/dL (1.7-2.3)   03/12/22  07:26    














Medications & Allergies





- Medications


Allergies/Adverse Reactions: 


                                    Allergies





apple Adverse Reaction (Verified 03/12/22 06:53)


   Hives


shell fish Allergy (Uncoded 03/12/22 06:53)


   Swelling








Home Medications: 


                                Home Medications











 Medication  Instructions  Recorded  Confirmed  Last Taken  Type


 


Quetiapine Fumarate [Seroquel] 100 mg PO BID 09/22/17 07/18/20 06/01/20 History


 


Albuterol Mdi (or & Nicu Only) 2 puff IH QID PRN #1 inha 02/12/18 07/18/20 06/01/20 Rx





[ProAir HFA Inhaler]     


 


Acetaminophen [Acetaminophen TAB] 650 mg PO Q4H PRN  tablet 06/05/20 07/18/20 

Unknown Rx


 


Amiodarone [Cordarone 200 MG TAB] 200 mg PO QDAY #30 tablet 06/05/20 07/18/20 

Unknown Rx


 


Aspirin EC [Halfprin EC] 81 mg PO QDAY #30 tablet 06/05/20 07/18/20 Unknown Rx


 


Amiodarone [Cordarone 200 MG TAB] 200 mg PO BID #60 tablet 07/20/20  Unknown Rx


 


Apixaban [Eliquis] 5 mg PO BID 30 Days #60 tablet 07/20/20  Unknown Rx


 


Metoprolol [Lopressor TAB] 50 mg PO BID #60 tablet 07/20/20  Unknown Rx


 


amLODIPine 10 mg PO QDAY #30 tablet 07/20/20  Unknown Rx


 


oxyCODONE /ACETAMINOPHEN [Percocet 1 tab PO Q6H PRN #20 tablet 07/20/20  Unknown

 Rx





5/325 mg]     











Active Medications: 














Generic Name Dose Route Start Last Admin





  Trade Name Freq  PRN Reason Stop Dose Admin


 


Acetaminophen  650 mg  03/12/22 09:10 





  Acetaminophen 325 Mg Tab  PO  





  Q4H PRN  





  Pain MILD(1-3)/Fever >100.5/HA  


 


Albuterol  2.5 mg  03/12/22 09:10 





  Albuterol 2.5 Mg/3 Ml Nebu  IH  





  QID PRN  





  Shortness Of Breath  


 


Amiodarone HCl  200 mg  03/12/22 10:00  03/13/22 22:03





  Amiodarone 200 Mg Tab  PO   200 mg





  BID DERIK   Administration


 


Apixaban  2.5 mg  03/14/22 10:00 





  Apixaban 2.5 Mg Tab  PO  





  BID DERIK  


 


Aspirin  81 mg  03/12/22 10:00  03/13/22 09:07





  Aspirin Ec 81 Mg Tab  PO   81 mg





  QDAY DERIK   Administration


 


Docusate Sodium  100 mg  03/12/22 10:00  03/13/22 22:03





  Docusate Sodium 100 Mg Cap  PO   100 mg





  BID DERIK   Administration


 


Famotidine  20 mg  03/12/22 10:00  03/13/22 22:03





  Famotidine 20 Mg/2 Ml Inj  IV   20 mg





  BID DERIK   Administration


 


Furosemide  40 mg  03/12/22 18:00  03/14/22 05:40





  Furosemide 40 Mg/4 Ml Inj  IV   40 mg





  0600,1800 DEIRK   Administration


 


Heparin Sodium (Porcine)  3,000 unit  03/12/22 10:51 





  Heparin 10,000 Units/10 Ml Vial  IV  





  VANCE PRN  





  hemodialysis  


 


Hydralazine HCl  25 mg  03/12/22 14:00  03/14/22 05:40





  Hydralazine 25 Mg Tab  PO   25 mg





  Q8HR DERIK   Administration


 


Sodium Chloride  100 mls @ 999 mls/hr  03/14/22 09:00 





  Nacl 0.9%  IV  





  VANCE PRN  





  Hypotension  


 


Labetalol HCl  10 mg  03/12/22 10:11 





  Labetalol 20 Mg/4 Ml Inj  IV  





  Q4H PRN  





  Hypertension  


 


Methylprednisolone Sodium Succinate  40 mg  03/12/22 14:00  03/14/22 05:40





  Methylprednisolone Sod Succinate 40 Mg/1 Ml Inj  IV   40 mg





  Q8HR DERIK   Administration


 


Metoprolol Tartrate  50 mg  03/12/22 10:00  03/13/22 22:04





  Metoprolol Tartrate 50 Mg Tab  PO   50 mg





  BID DERIK   Administration


 


Morphine Sulfate  2 mg  03/12/22 09:55 





  Morphine 4 Mg/1 Ml Inj  IV  





  Q4H PRN  





  Pain , Severe (7-10)  


 


Nicotine  14 mg  03/13/22 12:00  03/13/22 11:54





  Nicotine 14 Mg/24 Hr Patch  TD   14 mg





  QDAY DERIK   Administration


 


Ondansetron HCl  4 mg  03/12/22 09:55 





  Ondansetron 4 Mg/2 Ml Inj  IV  





  Q8H PRN  





  Nausea And Vomiting  


 


Oxycodone/Acetaminophen  1 tab  03/12/22 09:10  03/14/22 05:47





  Oxycodone /Acetaminophen 5-325mg Tab  PO   1 tab





  Q6H PRN   Administration





  Pain, Moderate (4-6)  


 


Quetiapine Fumarate  100 mg  03/12/22 10:00  03/13/22 22:03





  Quetiapine 100 Mg Tab  PO   100 mg





  BID DERIK   Administration


 


Sodium Chloride  10 ml  03/12/22 10:00  03/13/22 22:00





  Sodium Chloride 0.9% 10 Ml Flush Syringe  IV   10 ml





  BID DERIK   Administration


 


Sodium Chloride  10 ml  03/12/22 10:00  03/14/22 05:01





  Sodium Chloride 0.9% 10 Ml Flush Syringe  IV   10 ml





  BID DERIK   Administration


 


Sodium Chloride  10 ml  03/12/22 09:55 





  Sodium Chloride 0.9% 10 Ml Flush Syringe  IV  





  PRN PRN  





  LINE FLUSH

## 2022-03-15 VITALS — SYSTOLIC BLOOD PRESSURE: 161 MMHG | DIASTOLIC BLOOD PRESSURE: 79 MMHG

## 2022-03-15 RX ADMIN — METOPROLOL TARTRATE SCH MG: 50 TABLET, FILM COATED ORAL at 09:49

## 2022-03-15 RX ADMIN — METHYLPREDNISOLONE SODIUM SUCCINATE SCH MG: 40 INJECTION, POWDER, FOR SOLUTION INTRAMUSCULAR; INTRAVENOUS at 06:20

## 2022-03-15 RX ADMIN — AMIODARONE HYDROCHLORIDE SCH MG: 200 TABLET ORAL at 12:23

## 2022-03-15 RX ADMIN — FUROSEMIDE SCH MG: 10 INJECTION, SOLUTION INTRAVENOUS at 06:20

## 2022-03-15 RX ADMIN — APIXABAN SCH MG: 2.5 TABLET, FILM COATED ORAL at 12:48

## 2022-03-15 RX ADMIN — ASPIRIN SCH MG: 81 TABLET, COATED ORAL at 12:24

## 2022-03-15 RX ADMIN — DOCUSATE SODIUM SCH MG: 100 CAPSULE, LIQUID FILLED ORAL at 12:24

## 2022-03-15 RX ADMIN — OXYCODONE AND ACETAMINOPHEN PRN TAB: 5; 325 TABLET ORAL at 12:24

## 2022-03-15 RX ADMIN — Medication SCH ML: at 12:23

## 2022-03-15 RX ADMIN — NICOTINE SCH MG: 14 PATCH TRANSDERMAL at 12:24

## 2022-03-15 NOTE — PROGRESS NOTE
Assessment and Plan





- Patient Problems


(1) CKD (chronic kidney disease) requiring chronic dialysis


Current Visit: Yes   Status: Acute   





(2) Acute chest pain


Current Visit: No   Status: Acute   





Subjective


Date of service: 03/15/22


Interval history: 





NO C/O





Objective


                                   Vital Signs











  Temp Pulse Pulse Resp BP BP Pulse Ox


 


 03/15/22 12:08  99.3 F    18  161/79  


 


 03/15/22 09:36        99


 


 03/15/22 08:32  99.1 F  70   16  164/83   99


 


 03/15/22 06:19   114 H    158/77  


 


 03/15/22 04:00   114 H     


 


 03/15/22 03:30  97.8 F  70   16  158/77   99


 


 03/15/22 00:00    114 H  18    97


 


 03/14/22 23:02  98.4 F  114 H   16  147/83   98


 


 03/14/22 21:59   71    159/73  


 


 03/14/22 19:34        99


 


 03/14/22 19:18  98.9 F  71   16  159/73   100


 


 03/14/22 16:00  99 F  113 H   18   160/96 


 


 03/14/22 14:15  98.2 F  107 H   18  160/90  


 


 03/14/22 14:00   109 H    160/96  


 


 03/14/22 13:45   122 H    179/96  


 


 03/14/22 13:30   116 H    160/79  


 


 03/14/22 13:15   116 H    165/95  


 


 03/14/22 13:00   112 H    163/96  














  Pulse Ox


 


 03/15/22 12:08 


 


 03/15/22 09:36 


 


 03/15/22 08:32 


 


 03/15/22 06:19 


 


 03/15/22 04:00 


 


 03/15/22 03:30 


 


 03/15/22 00:00 


 


 03/14/22 23:02 


 


 03/14/22 21:59 


 


 03/14/22 19:34 


 


 03/14/22 19:18 


 


 03/14/22 16:00 


 


 03/14/22 14:15  97


 


 03/14/22 14:00 


 


 03/14/22 13:45 


 


 03/14/22 13:30 


 


 03/14/22 13:15 


 


 03/14/22 13:00 














- Physical Examination


General: No Apparent Distress


HEENT: Positive: PERRL


Neck: Positive: neck supple, JVD/HJR


Cardiac: Positive: Reg Rate and Rhythm


Lungs: Positive: clear to auscultation, Decreased Breath Sounds


Abdomen: Positive: Unremarkable


Extremities: Present: normal

## 2022-03-15 NOTE — PROGRESS NOTE
Assessment and Plan


1. ESRD:


Per patient she moved from [place] 3 days ago.


Haven't started on any local outpatient dialysis clinic yet.


Admitted with volume overload and metabolic acidosis.


Hemodialysis: 3/12, 3/14.





2. FEN:


Metabolic acidosis, s/p HD, monitor.


Volume control, UF with HD as tolerated.


Monitor lytes and volume status.





3. Acute hypoxic resp failure, POA:


2/2 COPD exacerbation +/- volume overload.


Supplemental O2 as needed.


Nebs and steroids.


Volume control with HD.


Monitor.





4. Chest pain:


S/p stress test 3/14.





5. Acute on chronic diastolic CHF:


Volume control thru HD.


Fluid restriction, monitor I/O.


Seen by Cardiology.





6. Acute exacerbation of COPD:


Nebs and steroids.


On RA at the time of eval.





7. Paroxysmal A.fib:


Eliquis and Amio.


Monitor.





8. Hypertensive urgency, POA:


Volume control with HD.


Continue home BP meds.


Monitor BP.





9. Normocytic Anemia, POA:


Chronic.


2/2 ESRD.


Epogen with HD as needed.


Monitor.





10. Acute metabolic Encephalopathy, POA:


Improved.











Subjective:


Patient was seen and examined at the bedside.











Examination:


General appearance: well-developed, appears stated age, no distress, on RA


HEENT: atraumatic, IVETTE


Neck: trachea midline


Respiratory: ctab


Heart: S1S2, no murmur


Abdomen: soft, bowel sounds heard, NT


Integumentary: no obvious rash


Neurologic: AO, able to move extremities


Ext: no edema


Hemodialysis access: R IJ tunnel catheter, R arm AVF/AVG








Subjective


Date of service: 03/15/22





Objective





- Vital Signs


Vital signs: 


                               Vital Signs - 12hr











  03/14/22 03/14/22 03/15/22





  21:59 23:02 00:00


 


Temperature  98.4 F 


 


Pulse Rate 71 114 H 


 


Pulse Rate [   114 H





From Monitor]   


 


Respiratory  16 18





Rate   


 


Blood Pressure 159/73 147/83 


 


O2 Sat by Pulse  98 97





Oximetry   














  03/15/22 03/15/22 03/15/22





  03:30 04:00 06:19


 


Temperature 97.8 F  


 


Pulse Rate 70 114 H 114 H


 


Pulse Rate [   





From Monitor]   


 


Respiratory 16  





Rate   


 


Blood Pressure 158/77  158/77


 


O2 Sat by Pulse 99  





Oximetry   














- Lab





                                 03/13/22 05:09





                                 03/13/22 05:09


                             Most recent lab results











ABG pH  7.176  (7.320-7.450)  L  03/12/22  09:13    


 


ABG O2 Saturation  99.1  (0-100)   03/12/22  09:13    


 


Calcium  9.4 mg/dL (8.4-10.2)   03/13/22  05:09    


 


Magnesium  2.00 mg/dL (1.7-2.3)   03/12/22  07:26    














Medications & Allergies





- Medications


Allergies/Adverse Reactions: 


                                    Allergies





apple Adverse Reaction (Verified 03/15/22 10:35)


   Hives


   yellow and green apples. can eat red. 


shell fish Allergy (Uncoded 03/15/22 10:35)


   Swelling


   allergic to seafood except blue crab 








Home Medications: 


                                Home Medications











 Medication  Instructions  Recorded  Confirmed  Last Taken  Type


 


Quetiapine Fumarate [Seroquel] 100 mg PO BID 09/22/17 07/18/20 06/01/20 History


 


Albuterol Mdi (or & Nicu Only) 2 puff IH QID PRN #1 inha 02/12/18 07/18/20 06/01/20 Rx





[ProAir HFA Inhaler]     


 


Metoprolol [Lopressor TAB] 50 mg PO BID #60 tablet 07/20/20  Unknown Rx


 


amLODIPine 10 mg PO QDAY #30 tablet 07/20/20  Unknown Rx


 


oxyCODONE /ACETAMINOPHEN [Percocet 1 tab PO Q6H PRN #20 tablet 07/20/20  Unknown

 Rx





5/325 mg]     


 


Amiodarone [Cordarone 200 MG TAB] 200 mg PO BID #60 tablet 03/15/22  Unknown Rx


 


Apixaban [Eliquis] 5 mg PO BID 30 Days #60 tablet 03/15/22  Unknown Rx


 


Aspirin EC [Halfprin EC] 81 mg PO QDAY #30 tablet 03/15/22  Unknown Rx


 


hydrALAZINE [Apresoline TAB] 25 mg PO Q8HR #90 tablet 03/15/22  Unknown Rx


 


predniSONE 10 mg PO QDAY #20 tab 03/15/22  Unknown Rx











Active Medications: 














Generic Name Dose Route Start Last Admin





  Trade Name Freq  PRN Reason Stop Dose Admin


 


Acetaminophen  650 mg  03/12/22 09:10 





  Acetaminophen 325 Mg Tab  PO  





  Q4H PRN  





  Pain MILD(1-3)/Fever >100.5/HA  


 


Albuterol  2.5 mg  03/12/22 09:10 





  Albuterol 2.5 Mg/3 Ml Nebu  IH  





  QID PRN  





  Shortness Of Breath  


 


Amiodarone HCl  200 mg  03/12/22 10:00  03/14/22 21:59





  Amiodarone 200 Mg Tab  PO   200 mg





  BID DERIK   Administration


 


Apixaban  2.5 mg  03/14/22 10:00  03/14/22 22:00





  Apixaban 2.5 Mg Tab  PO   2.5 mg





  BID DERIK   Administration


 


Aspirin  81 mg  03/12/22 10:00  03/13/22 09:07





  Aspirin Ec 81 Mg Tab  PO   81 mg





  QDAY DERIK   Administration


 


Docusate Sodium  100 mg  03/12/22 10:00  03/14/22 21:58





  Docusate Sodium 100 Mg Cap  PO   100 mg





  BID DERIK   Administration


 


Famotidine  10 mg  03/15/22 10:00 





  Famotidine 10 Mg Tab  PO  





  BID DERIK  


 


Furosemide  40 mg  03/12/22 18:00  03/15/22 06:20





  Furosemide 40 Mg/4 Ml Inj  IV   40 mg





  0600,1800 DERIK   Administration


 


Heparin Sodium (Porcine)  3,000 unit  03/12/22 10:51  03/14/22 12:36





  Heparin 10,000 Units/10 Ml Vial  IV   3,000 unit





  VANCE PRN   Administration





  hemodialysis  


 


Hydralazine HCl  50 mg  03/14/22 18:47  03/15/22 06:19





  Hydralazine 25 Mg Tab  PO   50 mg





  Q8HR DERIK   Administration


 


Sodium Chloride  100 mls @ 999 mls/hr  03/14/22 09:00 





  Nacl 0.9%  IV  





  VANCE PRN  





  Hypotension  


 


Labetalol HCl  10 mg  03/12/22 10:11 





  Labetalol 20 Mg/4 Ml Inj  IV  





  Q4H PRN  





  Hypertension  


 


Methylprednisolone Sodium Succinate  40 mg  03/12/22 14:00  03/15/22 06:20





  Methylprednisolone Sod Succinate 40 Mg/1 Ml Inj  IV   40 mg





  Q8HR DERIK   Administration


 


Metoprolol Tartrate  50 mg  03/14/22 20:00  03/14/22 21:59





  Metoprolol Tartrate 50 Mg Tab  PO   50 mg





  TID DERIK   Administration


 


Morphine Sulfate  2 mg  03/12/22 09:55  03/14/22 15:45





  Morphine 4 Mg/1 Ml Inj  IV   2 mg





  Q4H PRN   Administration





  Pain , Severe (7-10)  


 


Nicotine  14 mg  03/13/22 12:00  03/14/22 19:14





  Nicotine 14 Mg/24 Hr Patch  TD   14 mg





  QDAY DERIK   Administration


 


Ondansetron HCl  4 mg  03/12/22 09:55 





  Ondansetron 4 Mg/2 Ml Inj  IV  





  Q8H PRN  





  Nausea And Vomiting  


 


Oxycodone/Acetaminophen  1 tab  03/12/22 09:10  03/14/22 05:47





  Oxycodone /Acetaminophen 5-325mg Tab  PO   1 tab





  Q6H PRN   Administration





  Pain, Moderate (4-6)  


 


Quetiapine Fumarate  100 mg  03/12/22 10:00  03/14/22 22:00





  Quetiapine 100 Mg Tab  PO   100 mg





  BID DERIK   Administration


 


Sodium Chloride  10 ml  03/12/22 10:00  03/14/22 05:01





  Sodium Chloride 0.9% 10 Ml Flush Syringe  IV   10 ml





  BID DERIK   Administration


 


Sodium Chloride  10 ml  03/12/22 09:55 





  Sodium Chloride 0.9% 10 Ml Flush Syringe  IV  





  PRN PRN  





  LINE FLUSH

## 2022-03-15 NOTE — DISCHARGE SUMMARY
Providers





- Providers


Date of Admission: 


03/12/22 09:55





Date of discharge: 03/15/22


Attending physician: 


AMY J KOCHERLA





                                        





03/12/22 10:37


Consult to Physician [CONS] Urgent 


   Comment: 


   Consulting Provider: EMELINA CH


   Physician Instructions: 


   Reason For Exam: Respiratory failure/fluid overload/missed dialysis





03/12/22 10:39


Consult to Physician [CONS] Routine 


   Comment: 


   Consulting Provider: MERARY NGUYỄN


   Physician Instructions: 


   Reason For Exam: Paroxysmal A. fib/acute on chronic diastolic CHF





03/13/22 16:06


Physical Therapy Evaluation and Treat [CONS] Routine 


   Comment: unstable gait, fall risk


   Reason For Exam: Eval & Treat











Primary care physician: 


[facility], [place]








Hospitalization


Condition: Stable


Pertinent studies: 





Chest x-ray; mild edema





Procedures: 


Negative stress test





Hospital course: 


--Acute hypoxic respiratory failure; requiring BiPAP


Symptoms significantly improved, on room air today


Multifactorial , missed hemodialysis and fluid overload 


secondary to acute exacerbation of COPD


Nebulizers, oxygen titrate O2 sats to more than 90%, BiPAP as needed





Home O2 evaluation prior to discharge





--Noncompliant with hemodialysis/fluid overload


Nephrology consulted, stat hemodialysis


HD per schedule, avoid nephrotoxins, renal dosing medications





--ESRD; on hemodialysis TTS


HD per schedule, avoid nephrotoxin, renal dosing of medications





--Hypertensive urgency;


Resume home antihypertensives


As needed labetalol


Closely monitor blood pressures and adjust as needed





--Paroxysmal atrial fibrillation  


Beta-blockers, amiodarone and Eliquis





--Chest pain/acute coronary syndrome


Serial cardiac enzymes, Cardiac medications


Echocardiogram for LV function ejection fraction


Heart cath 9/2017; widely patent proximal to mid and mid to distal LAD stents 


no significant in-stent restenosis EF 50 to 55%


Thallium stress test/2/2018; normal study EF 61% 


echo 6/2020; EF 50 to 55%


Cardiology following





--Acute on chronic diastolic CHF (congestive heart failure)


IV diuretics, input output monitoring, fluid restriction


Cardiology evaluation, echocardiogram for LV function ejection fraction





--Acute exacerbation of COPD (chronic obstructive pulmonary disease)


Oxygen titrate O2 sats to more than 90%


BiPAP as needed, nebulizers, IV steroids


Pulmonary consult if needed





--Noncompliance;


Counseling done patient strongly advised to comply with medications, diet, 

follow-up visits


Mainly hemodialysis


Patient verbalized understanding





- DVT prophylaxis


Patient is on Eliquis





We will closely monitor the patient and adjust management as needed


Plan of care reviewed with the patient and her nurse





3/13/2022; patient feels slightly better continue current management


Consultant recommendations noted and appreciated





3/14; patient received hemodialysis today, home O2 evaluation prior to discharge


DC planning per case management, discharge when stable


Dictation box





Disposition: 01 HOME / SELF CARE / HOMELESS


Final Discharge Diagnosis (Prints w/discharge instructions): Acute hypoxic 

respiratory failure requiring BiPAP.  Currently on room air.  Noncompliant with 

hemodialysis.  ESRD on HD TTS.  Hypertensive urgency/blood pressures well 

controlled now.  Paroxysmal atrial fibrillation.  Atypical chest pain.  Acute on

 chronic diastolic CHF.  Acute exacerbation of COPD.  Medical noncompliance


Time spent for discharge: 40 min





Core Measure Documentation





- Palliative Care


Palliative Care/ Comfort Measures: Not Applicable





- Core Measures


Any of the following diagnoses?: none





Exam





- Constitutional


Vitals: 


                                        











Temp Pulse Resp BP Pulse Ox


 


 99.3 F   70   18   161/79   99 


 


 03/15/22 12:08  03/15/22 08:32  03/15/22 12:08  03/15/22 12:08  03/15/22 09:36











General appearance: Present: no acute distress, well-nourished





- EENT


Eyes: Present: PERRL, EOM intact





- Neck


Neck: Present: supple, normal ROM





- Respiratory


Respiratory effort: normal


Respiratory: bilateral: diminished, negative: rales, rhonchi, wheezing





- Cardiovascular


Rhythm: regular


Heart Sounds: Present: S1 & S2





- Extremities


Extremities: no ischemia, No edema





- Abdominal


General gastrointestinal: Present: soft, non-tender, non-distended, normal bowel

 sounds





- Integumentary


Integumentary: Present: clear, warm





- Musculoskeletal


Musculoskeletal: strength equal bilaterally





- Psychiatric


Psychiatric: appropriate mood/affect, cooperative





- Neurologic


Neurologic: CNII-XII intact, moves all extremities





Plan


Activity: advance as tolerated, fall precautions


Diet: renal (Diet), other (Cardiac diet)


Additional Instructions: Follow renal, hemodialysis per schedule.  If you have 

worsening symptoms contact MD or go to the nearest emergency room as needed.  

Advised to comply with medications, diet, follow-up visits and hemodialysis per 

schedule


Follow up with: 


[facility], MD [Primary Care Provider] - 3-5 Days


EMELINA CH MD [Staff Physician] - 7 Days


LENORE PYLE MD [Staff Physician] - 7 Days


Forms:  AMA Form


Prescriptions: 


hydrALAZINE [Apresoline TAB] 25 mg PO Q8HR #90 tablet


Amiodarone [Cordarone 200 MG TAB] 200 mg PO BID #60 tablet


Apixaban [Eliquis] 5 mg PO BID 30 Days #60 tablet


Aspirin EC [Halfprin EC] 81 mg PO QDAY #30 tablet


predniSONE 10 mg PO QDAY #20 tab

## 2022-03-19 ENCOUNTER — HOSPITAL ENCOUNTER (INPATIENT)
Dept: HOSPITAL 5 - ED | Age: 67
LOS: 1 days | Discharge: HOME | DRG: 391 | End: 2022-03-20
Attending: INTERNAL MEDICINE | Admitting: INTERNAL MEDICINE
Payer: MEDICARE

## 2022-03-19 DIAGNOSIS — I25.10: ICD-10-CM

## 2022-03-19 DIAGNOSIS — I25.2: ICD-10-CM

## 2022-03-19 DIAGNOSIS — I48.11: ICD-10-CM

## 2022-03-19 DIAGNOSIS — D63.1: ICD-10-CM

## 2022-03-19 DIAGNOSIS — F17.200: ICD-10-CM

## 2022-03-19 DIAGNOSIS — Z82.49: ICD-10-CM

## 2022-03-19 DIAGNOSIS — E78.2: ICD-10-CM

## 2022-03-19 DIAGNOSIS — Z86.73: ICD-10-CM

## 2022-03-19 DIAGNOSIS — Z83.3: ICD-10-CM

## 2022-03-19 DIAGNOSIS — E78.00: ICD-10-CM

## 2022-03-19 DIAGNOSIS — I13.2: ICD-10-CM

## 2022-03-19 DIAGNOSIS — Z95.0: ICD-10-CM

## 2022-03-19 DIAGNOSIS — Z91.013: ICD-10-CM

## 2022-03-19 DIAGNOSIS — J44.9: ICD-10-CM

## 2022-03-19 DIAGNOSIS — I48.0: ICD-10-CM

## 2022-03-19 DIAGNOSIS — I16.0: ICD-10-CM

## 2022-03-19 DIAGNOSIS — K21.9: Primary | ICD-10-CM

## 2022-03-19 DIAGNOSIS — F31.9: ICD-10-CM

## 2022-03-19 DIAGNOSIS — N18.6: ICD-10-CM

## 2022-03-19 DIAGNOSIS — F20.9: ICD-10-CM

## 2022-03-19 DIAGNOSIS — I50.33: ICD-10-CM

## 2022-03-19 DIAGNOSIS — Z91.018: ICD-10-CM

## 2022-03-19 DIAGNOSIS — E87.70: ICD-10-CM

## 2022-03-19 LAB
BASOPHILS # (AUTO): 0.2 K/MM3 (ref 0–0.1)
BASOPHILS NFR BLD AUTO: 1.9 % (ref 0–1.8)
BUN SERPL-MCNC: 43 MG/DL (ref 7–17)
BUN/CREAT SERPL: 11 %
CALCIUM SERPL-MCNC: 8.5 MG/DL (ref 8.4–10.2)
EOSINOPHIL # BLD AUTO: 0.5 K/MM3 (ref 0–0.4)
EOSINOPHIL NFR BLD AUTO: 6.3 % (ref 0–4.3)
HCT VFR BLD CALC: 29 % (ref 30.3–42.9)
HEMOLYSIS INDEX: 11
HGB BLD-MCNC: 9.6 GM/DL (ref 10.1–14.3)
INR PPP: 1.08 (ref 0.87–1.13)
LYMPHOCYTES # BLD AUTO: 1.2 K/MM3 (ref 1.2–5.4)
LYMPHOCYTES NFR BLD AUTO: 15 % (ref 13.4–35)
MCHC RBC AUTO-ENTMCNC: 33 % (ref 30–34)
MCV RBC AUTO: 95 FL (ref 79–97)
MONOCYTES # (AUTO): 0.4 K/MM3 (ref 0–0.8)
MONOCYTES % (AUTO): 4.6 % (ref 0–7.3)
PLATELET # BLD: 147 K/MM3 (ref 140–440)
RBC # BLD AUTO: 3.05 M/MM3 (ref 3.65–5.03)

## 2022-03-19 PROCEDURE — 94760 N-INVAS EAR/PLS OXIMETRY 1: CPT

## 2022-03-19 PROCEDURE — 5A1D70Z PERFORMANCE OF URINARY FILTRATION, INTERMITTENT, LESS THAN 6 HOURS PER DAY: ICD-10-PCS | Performed by: INTERNAL MEDICINE

## 2022-03-19 PROCEDURE — 71045 X-RAY EXAM CHEST 1 VIEW: CPT

## 2022-03-19 PROCEDURE — 83880 ASSAY OF NATRIURETIC PEPTIDE: CPT

## 2022-03-19 PROCEDURE — 93005 ELECTROCARDIOGRAM TRACING: CPT

## 2022-03-19 PROCEDURE — 94644 CONT INHLJ TX 1ST HOUR: CPT

## 2022-03-19 PROCEDURE — 82550 ASSAY OF CK (CPK): CPT

## 2022-03-19 PROCEDURE — 82553 CREATINE MB FRACTION: CPT

## 2022-03-19 PROCEDURE — 84484 ASSAY OF TROPONIN QUANT: CPT

## 2022-03-19 PROCEDURE — 36415 COLL VENOUS BLD VENIPUNCTURE: CPT

## 2022-03-19 PROCEDURE — 80048 BASIC METABOLIC PNL TOTAL CA: CPT

## 2022-03-19 PROCEDURE — 85610 PROTHROMBIN TIME: CPT

## 2022-03-19 PROCEDURE — 82962 GLUCOSE BLOOD TEST: CPT

## 2022-03-19 PROCEDURE — 85025 COMPLETE CBC W/AUTO DIFF WBC: CPT

## 2022-03-19 RX ADMIN — FAMOTIDINE SCH MG: 20 TABLET ORAL at 16:09

## 2022-03-19 RX ADMIN — OXYCODONE AND ACETAMINOPHEN PRN TAB: 5; 325 TABLET ORAL at 16:11

## 2022-03-19 RX ADMIN — Medication SCH ML: at 21:33

## 2022-03-19 NOTE — XRAY REPORT
CHEST 1 VIEW 3/19/2022 12:12 PM



INDICATION / CLINICAL INFORMATION: chest pain.



COMPARISON: 3/12/2022



FINDINGS:



SUPPORT DEVICES: Unchanged.

HEART / MEDIASTINUM: Stable. 

LUNGS / PLEURA: Improved aeration of the lungs with persistence of slight increased interstitial dalia
ings. No pneumothorax. 



ADDITIONAL FINDINGS: No significant additional findings.



IMPRESSION:

1. Interval improvement.



Signer Name: Ced Bruce DO 

Signed: 3/19/2022 12:27 PM

Workstation Name: [id number]

## 2022-03-19 NOTE — EMERGENCY DEPARTMENT REPORT
HPI





- General


Chief Complaint: Chest Pain


Time Seen by Provider: 03/19/22 11:37





- HPI


HPI: 





Room 24








The patient is a 66-year-old female present with a chief complaint of chest 

pain.  The patient states she had just arrived at dialysis and was sitting in 

her chair prior to initiating hemodialysis when she developed substernal chest 

pressure.  Patient states the pressure has been constant and associated with 

shortness of breath, some diaphoresis and nausea without vomiting.  Patient 

currently gives her chest pressure score 7/10.  The patient did not receive any 

hemodialysis today.  Patient states her last stress test she believes occurred 

last year and was normal but her last cardiac catheterization occurred over 5 

years ago.





ED Past Medical Hx





- Past Medical History


Previous Medical History?: Yes


Hx Hypertension: Yes


Hx CVA: Yes (right sided weakness)


Hx Heart Attack/AMI: Yes (1 stent)


Hx Congestive Heart Failure: Yes


Hx Diabetes: No (Resolved with diet and weight loss)


Hx Renal Disease: Yes (RENAL INSUFFICIENCY- stent left kidney)


Hx Psychiatric Treatment: Yes (BIPOLAR/SCHIZOPHRENIA)


Hx COPD: Yes (No home O2)


Additional medical history: HIGH CHOLESTEROL





- Surgical History


Past Surgical History?: Yes


Hx Coronary Stent: Yes (x 2 in July 2015)


Additional Surgical History: stent placement x 2 in 2015, knee surgery 01/2018, 

right upper extremity fistula





- Family History


Family history: no significant





- Social History


Smoking Status: Current Every Day Smoker (1/21 pack/day)


Substance Use Type: None (Denies illicit drug use)





- Medications


Home Medications: 


                                Home Medications











 Medication  Instructions  Recorded  Confirmed  Last Taken  Type


 


Quetiapine Fumarate [Seroquel] 100 mg PO BID 09/22/17 07/18/20 06/01/20 History


 


Albuterol Mdi (or & Nicu Only) 2 puff IH QID PRN #1 inha 02/12/18 07/18/20 06/01/20 Rx





[ProAir HFA Inhaler]     


 


Metoprolol [Lopressor TAB] 50 mg PO BID #60 tablet 07/20/20  Unknown Rx


 


amLODIPine 10 mg PO QDAY #30 tablet 07/20/20  Unknown Rx


 


oxyCODONE /ACETAMINOPHEN [Percocet 1 tab PO Q6H PRN #20 tablet 07/20/20  Unknown

 Rx





5/325 mg]     


 


Amiodarone [Cordarone 200 MG TAB] 200 mg PO BID #60 tablet 03/15/22  Unknown Rx


 


Apixaban [Eliquis] 5 mg PO BID 30 Days #60 tablet 03/15/22  Unknown Rx


 


Aspirin EC [Halfprin EC] 81 mg PO QDAY #30 tablet 03/15/22  Unknown Rx


 


hydrALAZINE [Apresoline TAB] 25 mg PO Q8HR #90 tablet 03/15/22  Unknown Rx


 


predniSONE 10 mg PO QDAY #20 tab 03/15/22  Unknown Rx














ED Review of Systems


ROS: 


Stated complaint: CHEST PAIN


Other details as noted in HPI





Constitutional: diaphoresis


Eyes: denies: eye pain


ENT: denies: throat pain


Respiratory: shortness of breath


Cardiovascular: chest pain


Endocrine: no symptoms reported


Gastrointestinal: nausea.  denies: vomiting


Genitourinary: denies: discharge, abnormal menses


Musculoskeletal: back pain


Neurological: denies: headache





Physical Exam





- Physical Exam


Vital Signs: 


                                   Vital Signs











  03/19/22





  11:37


 


Temperature 98 F


 


Pulse Rate 70


 


Respiratory 16





Rate 


 


Blood Pressure 137/75


 


O2 Sat by Pulse 100





Oximetry 











Physical Exam: 





GENERAL: The patient is well-developed well-nourished female lying on stretcher 

not appearing to be in acute distress. []


HEENT: Normocephalic.  Atraumatic.  Extraocular motions are intact.  Patient has

 moist mucous membranes.


NECK: Supple.  Trachea midline


CHEST/LUNGS: Faint crackles with occasional expiratory wheeze at bases 

bilaterally.  There is no respiratory distress noted.


HEART/CARDIOVASCULAR: Regular.  There is no tachycardia.  There is no gallop rub

 or murmur.


ABDOMEN: Abdomen is soft, nontender.  Patient has normal bowel sounds.  There is

 no abdominal distention.


SKIN: There is no rash.  There is no edema.  There is no diaphoresis.


NEURO: The patient is awake, alert, and oriented.  The patient is cooperative.  

The patient has no focal neurologic deficits.  The patient has normal speech.  

GCS 15


MUSCULOSKELETAL:  There is no evidence of acute injury.





ED Course


                                   Vital Signs











  03/19/22





  11:37


 


Temperature 98 F


 


Pulse Rate 70


 


Respiratory 16





Rate 


 


Blood Pressure 137/75


 


O2 Sat by Pulse 100





Oximetry 














ED Medical Decision Making





- Lab Data


Result diagrams: 


                                 03/19/22 11:49





                                 03/19/22 11:49





- EKG Data


-: EKG Interpreted by Me


EKG shows normal: sinus rhythm


Rate: normal





- EKG Data


When compared to previous EKG there are: previous EKG unavailable


Interpretation: nonspecific ST-T wave mathieu (T wave inversion in lead V2)





- Differential Diagnosis


ACS, pericarditis, GERD


Critical care attestation.: 


If time is entered above; I have spent that time in minutes in the direct care 

of this critically ill patient, excluding procedure time.








ED Disposition


Clinical Impression: 


 Chest pain





Disposition: 09 ADMITTED AS INPATIENT


Is pt being admited?: Yes


Does the pt Need Aspirin: Yes


Condition: Fair


Instructions:  Nonspecific Chest Pain, Adult


Time of Disposition: 12:40 (Hospitalist called (Dr. Vuong))





Heart Score





- HEART Score


History: Moderately suspicious


EKG: Non-specific


Age: > 65


Risk factors: > 3 risk factors or hx of atherosclerotic disease


Troponin: < normal limit


HEART Score: 6





- EKG Read Time


Time EKG Completed: 11:36


EKG Read Time: 11:40

## 2022-03-19 NOTE — CONSULTATION
History of Present Illness





- Reason for Consult


Consult date: 03/19/22


end stage renal disease





- History of Present Illness


The patient is a 65 YO female known to our service with history of DM-2, HTN, 

HLD, Bipolar Disorder, CAD S/P Stent Placement, Paroxysmal atrial fibrillation, 

HFpEF, COPD, Anemia and ESRD on hemodialysis who presented to [facility] ED 3/19 with 

c/o chest pain.  Patient states that she experienced a sudden onset of L sided 

chest tightness while sitting in her dialysis chair awaiting dialysis.  The pain

was 7/10, constant, not radiating and associate with shortness of breath.  

Patient has some baseline dyspnea on exertion.  Patient denies any N, V, D, abd 

pain, dizziness, cough, leg swelling, weakness, fever, chills, rash, blurry 

vision, hematuria or hemoptysis.  CXR showed increased interstitial markings.  

Patient was admitted to telemetry for evaluation.  Labs and imaging noted.  

Nephrology was consulted for ESRD management.








Past History


Past Medical History: other (See HPI.)





Medications and Allergies


                                    Allergies











Allergy/AdvReac Type Severity Reaction Status Date / Time


 


apple AdvReac  Hives Verified 03/15/22 10:35


 


shell fish Allergy  Swelling Uncoded 03/15/22 10:35











                                Home Medications











 Medication  Instructions  Recorded  Confirmed  Last Taken  Type


 


Quetiapine Fumarate [Seroquel] 100 mg PO BID 09/22/17 03/19/22 06/01/20 History


 


Albuterol Mdi (or & Nicu Only) 2 puff IH QID PRN #1 inha 02/12/18 03/19/22 06/01/20 Rx





[ProAir HFA Inhaler]     


 


Metoprolol [Lopressor TAB] 50 mg PO BID #60 tablet 07/20/20 03/19/22 Unknown Rx


 


amLODIPine 10 mg PO QDAY #30 tablet 07/20/20 03/19/22 Unknown Rx


 


oxyCODONE /ACETAMINOPHEN [Percocet 1 tab PO Q6H PRN #20 tablet 07/20/20 03/19/22

 Unknown Rx





5/325 mg]     


 


Amiodarone [Cordarone 200 MG TAB] 200 mg PO BID #60 tablet 03/15/22 03/19/22 

Unknown Rx


 


Apixaban [Eliquis] 5 mg PO BID 30 Days #60 tablet 03/15/22 03/19/22 Unknown Rx


 


Aspirin EC [Halfprin EC] 81 mg PO QDAY #30 tablet 03/15/22 03/19/22 Unknown Rx











Active Meds: 


Active Medications





Acetaminophen (Acetaminophen 325 Mg Tab)  650 mg PO Q4H PRN


   PRN Reason: Pain MILD(1-3)/Fever >100.5/HA


Albuterol (Albuterol 2.5 Mg/3 Ml Nebu)  2.5 mg IH Q4HRT PRN


   PRN Reason: Shortness Of Breath


Famotidine (Famotidine 20 Mg Tab)  20 mg PO QDAY DERIK


Hydromorphone HCl (Hydromorphone 1 Mg/1 Ml Inj)  0.5 mg IV Q23H PRN


   PRN Reason: Pain , Severe (7-10)


Ondansetron HCl (Ondansetron 4 Mg/2 Ml Inj)  4 mg IV Q8H PRN


   PRN Reason: Nausea And Vomiting


Oxycodone/Acetaminophen (Oxycodone /Acetaminophen 5-325mg Tab)  1 tab PO Q16H 

PRN


   PRN Reason: Pain, Moderate (4-6)


Sodium Chloride (Sodium Chloride 0.9% 10 Ml Flush Syringe)  10 ml IV BID DERIK


Sodium Chloride (Sodium Chloride 0.9% 10 Ml Flush Syringe)  10 ml IV PRN PRN


   PRN Reason: LINE FLUSH











Review of Systems


All systems: negative





Exam





- Vital Signs


Vital signs: 


                                   Vital Signs











Temp Pulse Resp BP Pulse Ox


 


 98 F   70   16   137/75   100 


 


 03/19/22 11:37  03/19/22 11:37  03/19/22 11:37  03/19/22 11:37  03/19/22 11:37














Results





- Lab Results





                                 03/19/22 11:49





                                 03/20/22 04:54


                             Most recent lab results











Calcium  8.5 mg/dL (8.4-10.2)   03/19/22  11:49    














Assessment and Plan





1. ESRD:


Patient is on maintenance hemodialysis, TTS schedule.


Last outpatient HD 3/17.


Hemodialysis: 3/19.





2. FEN:


Volume control, UF with HD as tolerated.


Monitor lytes and volume status.





3. Chest pain at rest:


H/o CAD s/p PCI and stenting in [place].


Recent thallium scan done on last visit showed normal myocardial perfusion scan 

images.


Trend Troponin.


Cardiology consult.


Monitor.





4. Chronic HFpEF:


Volume control thru HD.


Fluid restriction, monitor I/O.





5. COPD:


Not in exacerbation.


Nebs and O2 as needed.





6. Paroxysmal A.fib:


SR.


Monitor.





7. Hypertension:


Volume control with HD.


Continue home BP meds.


Monitor BP.





8. Normocytic Anemia, POA:


2/2 ESRD.


Epogen with HD as needed.


Monitor.











Subjective:


Patient was seen and examined at the bedside.











Examination:


General appearance: well-developed, appears stated age, no distress


HEENT: atraumatic, IVETTE


Neck: trachea midline


Respiratory: faint rales heard


Heart: S1S2, no murmur


Abdomen: soft, bowel sounds heard, NT


Integumentary: no obvious rash


Neurologic: AO, able to move extremities


Ext: no edema


Hemodialysis access: R IJ tunnel catheter, R arm AVG

## 2022-03-19 NOTE — HISTORY AND PHYSICAL REPORT
History of Present Illness


Chief complaint: 





My chest feels tight


History of present illness: 


65 YO Female with ESRD on HD(T,R,Sa), HTN, CVA, MI, CHF, Nicotine Dependence,  

Atrial Fib on Therapeutic Anticoagulation, DM, CKD, COPD, HLD, Bipolar Disorder,

CAD S/P Stent Placement presents to ED for evaluation.  Patient reports "my 

chest feels tight".  Patient states that she experienced a sudden onset of chest

tightness while sitting in her dialysis chair awaiting dialysis.  Patient states

that the level of her discomfort was 7/10, constant, associate with shortness of

breath, not worsened with exertion, not relieved with rest.  Patient 

acknowledges decreased exercise tolerance, dyspnea on exertion, as well as dys

pnea at rest.  EMS notified and upon arrival the patient was found to be in 

distress and subsequent transported laceration further care and evaluation of 

the aforementioned symptoms.  Patient was seen and evaluated in the emergency 

department.  All lab and imaging studies reviewed.  Patient found to have 

clinical symptoms consistent with CHF decompensation, as well as end-stage renal

disease, fluid overload.  Patient admitted to telemetry due to increased risk of

cardiac decompensation and for medical stabilization.  Nephrology team consulted

in ED for urgent dialysis.  Cardiology team consulted in ED.  Pt denies fever, 

chills, productive cough, unilateral leg swelling, calf pain, NVD, syncope, 

dizziness, or recent ill contacts, syncope, known exposure to COVID-19.  Prior 

admission on 3/12/2022 reviewed.  All medication listed at time of admission has

been reconciled.  Advanced care planning conducted in ED.








Past History


Past Medical History: acute MI, atrial fib, COPD, diabetes, ESRD, heart failure,

hypertension, hyperlipidemia, stroke, other (See HPI)


Past Surgical History: Other (stent placement x 2 in 2015, knee surgery 01/2018,

right upper extremity fistula)


Social history: , smoking.  denies: alcohol abuse, prescription drug 

abuse


Family history: CAD, diabetes, hypertension





Medications and Allergies


                                    Allergies











Allergy/AdvReac Type Severity Reaction Status Date / Time


 


apple AdvReac  Hives Verified 03/15/22 10:35


 


shell fish Allergy  Swelling Uncoded 03/15/22 10:35











                                Home Medications











 Medication  Instructions  Recorded  Confirmed  Last Taken  Type


 


Quetiapine Fumarate [Seroquel] 100 mg PO BID 09/22/17 07/18/20 06/01/20 History


 


Albuterol Mdi (or & Nicu Only) 2 puff IH QID PRN #1 inha 02/12/18 07/18/20 06/01/20 Rx





[ProAir HFA Inhaler]     


 


Metoprolol [Lopressor TAB] 50 mg PO BID #60 tablet 07/20/20  Unknown Rx


 


amLODIPine 10 mg PO QDAY #30 tablet 07/20/20  Unknown Rx


 


oxyCODONE /ACETAMINOPHEN [Percocet 1 tab PO Q6H PRN #20 tablet 07/20/20  Unknown

 Rx





5/325 mg]     


 


Amiodarone [Cordarone 200 MG TAB] 200 mg PO BID #60 tablet 03/15/22  Unknown Rx


 


Apixaban [Eliquis] 5 mg PO BID 30 Days #60 tablet 03/15/22  Unknown Rx


 


Aspirin EC [Halfprin EC] 81 mg PO QDAY #30 tablet 03/15/22  Unknown Rx


 


hydrALAZINE [Apresoline TAB] 25 mg PO Q8HR #90 tablet 03/15/22  Unknown Rx


 


predniSONE 10 mg PO QDAY #20 tab 03/15/22  Unknown Rx














Review of Systems


Constitutional: no weight loss, no weight gain, no fever, no chills


Ears, nose, mouth and throat: no ear pain, no ear discharge, no tinnitis, no 

nose pain, no nasal congestion


Breasts: no change in shape, no swelling, no mass


Cardiovascular: shortness of breath, dyspnea on exertion, decreased exercise 

tolerance


Respiratory: no cough, no cough with sputum, no hemoptysis, no shortness of 

breath


Gastrointestinal: no abdominal pain, no nausea, no change in bowel habits, no 

hematemesis


Genitourinary Female: no pelvic pain, no flank pain, no urinary frequency, no 

urgency


Rectal: no pain, no incontinence, no bleeding


Musculoskeletal: no neck stiffness, no neck pain, no shooting leg pain


Integumentary: no rash, no pruritis, no redness, no wounds, no jaundice


Psychiatric: no anxiety, no memory loss, no insomnia, no hypersomnia, no 

suicidal ideation


Endocrine: no cold intolerance, no polyphagia, no polyuria, no excessive 

sweating


Hematologic/Lymphatic: no easy bruising, no easy bleeding, no lymphedema


Allergic/Immunologic: no urticaria, no anaphylaxis





Exam





- Constitutional


Vitals: 


                                        











Temp Pulse Resp BP Pulse Ox


 


 98 F   72   16   137/75   100 


 


 03/19/22 11:37  03/19/22 12:30  03/19/22 12:30  03/19/22 11:37  03/19/22 11:37











General appearance: Present: mild distress





- EENT


Eyes: Present: PERRL


ENT: hearing intact, clear oral mucosa





- Neck


Neck: Present: supple, normal ROM





- Respiratory


Respiratory effort: normal


Respiratory: bilateral: CTA





- Cardiovascular


Rhythm: irregularly irregular


Heart Sounds: Present: S1 & S2.  Absent: rub, click





- Extremities


Extremities: pulses symmetrical, No edema


Peripheral Pulses: within normal limits





- Abdominal


General gastrointestinal: Present: soft, non-tender, non-distended, normal bowel

sounds


Female genitourinary: Present: normal





- Integumentary


Integumentary: Present: clear, warm, dry





- Musculoskeletal


Musculoskeletal: gait normal, strength equal bilaterally





- Psychiatric


Psychiatric: appropriate mood/affect, intact judgment & insight





- Neurologic


Neurologic: CNII-XII intact, moves all extremities





HEART Score





- HEART Score


EKG: Non-specific


Age: > 65


Risk factors: > 3 risk factors or hx of atherosclerotic disease


Troponin: 


                                        











Troponin T  0.015 ng/mL (0.00-0.029)   03/19/22  11:49    











Troponin: < normal limit





Results





- Labs


CBC & Chem 7: 


                                 03/19/22 11:49





                                 03/19/22 11:49


Labs: 


                              Abnormal lab results











  03/19/22 03/19/22 03/19/22 Range/Units





  11:49 11:49 11:49 


 


RBC  3.05 L    (3.65-5.03)  M/mm3


 


Hgb  9.6 L    (10.1-14.3)  gm/dl


 


Hct  29.0 L    (30.3-42.9)  %


 


RDW  17.7 H    (13.2-15.2)  %


 


Eos % (Auto)  6.3 H    (0.0-4.3)  %


 


Baso % (Auto)  1.9 H    (0.0-1.8)  %


 


Eos # (Auto)  0.5 H    (0.0-0.4)  K/mm3


 


Baso # (Auto)  0.2 H    (0.0-0.1)  K/mm3


 


Seg Neutrophils %  72.2 H    (40.0-70.0)  %


 


PT   15.2 H   (12.2-14.9)  Sec.


 


BUN    43 H  (7-17)  mg/dL


 


Creatinine    3.8 H  (0.6-1.2)  mg/dL


 


Glucose    122 H  ()  mg/dL


 


Total Creatine Kinase    28 L  ()  units/L


 


CK-MB (CK-2) Rel Index    4.6 H  (0-4)  


 


NT-Pro-B Natriuret Pep    32166 H  (0-900)  pg/mL














Assessment and Plan





- Patient Problems


(1) CHF (congestive heart failure)


Current Visit: No   Status: Chronic   


Qualifiers: 


   Heart failure type: combined systolic and diastolic   Heart failure 

chronicity: chronic   Qualified Code(s): I50.42 - Chronic combined systolic 

(congestive) and diastolic (congestive) heart failure   


Plan to address problem: 


Strict I's/O, monitor urine output every shift, daily weight, afterload 

reduction, blood pressure control, urgent dialysis, monitor fluid balance.








(2) End stage renal disease


Current Visit: Yes   Status: Acute   


Plan to address problem: 


Nephrology team consulted in ED, dialysis as per renal team, strict I's/O, 

monitor urine output every shift, avoid nephrotoxic agents.








(3) Nicotine dependence


Current Visit: No   Status: Acute   


Qualifiers: 


   Nicotine product type: cigarettes   Substance use status: in withdrawal   

Qualified Code(s): F17.213 - Nicotine dependence, cigarettes, with withdrawal   


Plan to address problem: 


Supportive care, behavior change counseling, +15 minutes.








(4) Afib


Current Visit: No   Status: Chronic   


Qualifiers: 


   Atrial fibrillation type: longstanding persistent   Qualified Code(s): I48.11

- Longstanding persistent atrial fibrillation   


Plan to address problem: 


Rate currently controlled, continue therapeutic anticoagulation, supportive 

care.  Telemetry monitoring.








(5) Bipolar 1 disorder


Current Visit: No   Status: Chronic   


Plan to address problem: 


Continue medical management, supportive care, no acute exacerbation at this 

time.  Continue to monitor.








(6) COPD (chronic obstructive pulmonary disease)


Current Visit: No   Status: Chronic   


Qualifiers: 


   Chronic bronchitis type: mixed simple and mucopurulent 


Plan to address problem: 


Supplemental oxygen as clinically indicated, no acute exacerbation at this time,

supportive care, continue medical management.








(7) Diabetes mellitus


Current Visit: No   Status: Chronic   


Plan to address problem: 


Consistent carbohydrate diet, Accu-Chek, hypoglycemia protocol, insulin 

protocol, Accu-Chek.








(8) HLD (hyperlipidemia)


Current Visit: No   Status: Chronic   


Qualifiers: 


   Hyperlipidemia type: mixed hyperlipidemia   Qualified Code(s): E78.2 - Mixed 

hyperlipidemia   


Plan to address problem: 


Statin therapy, supportive care, low-cholesterol diet








(9) HTN (hypertension)


Current Visit: No   Status: Chronic   


Qualifiers: 


   Hypertension type: primary hypertension   Qualified Code(s): I10 - Essential 

(primary) hypertension   


Plan to address problem: 


Monitor blood pressure every shift, continue medical management








(10) DVT prophylaxis


Current Visit: Yes   Status: Acute   


Plan to address problem: 


SCDs bilateral lower extremities while in bed, continue therapeutic antico

agulation








(11) Advance care planning


Current Visit: Yes   Status: Acute   


Plan to address problem: 


Disease education conducted, care plan discussed, diagnoses discussed, prognosis

discussed, patient is full code.  Patient knowledges understanding and agreement

with care plan, +30 minutes

## 2022-03-20 VITALS — DIASTOLIC BLOOD PRESSURE: 73 MMHG | SYSTOLIC BLOOD PRESSURE: 130 MMHG

## 2022-03-20 LAB
BUN SERPL-MCNC: 27 MG/DL (ref 7–17)
BUN/CREAT SERPL: 10 %
CALCIUM SERPL-MCNC: 8.2 MG/DL (ref 8.4–10.2)
HEMOLYSIS INDEX: 33

## 2022-03-20 RX ADMIN — Medication SCH ML: at 10:10

## 2022-03-20 RX ADMIN — NITROGLYCERIN PRN MG: 0.4 TABLET, ORALLY DISINTEGRATING SUBLINGUAL at 10:02

## 2022-03-20 RX ADMIN — FAMOTIDINE SCH MG: 20 TABLET ORAL at 10:04

## 2022-03-20 RX ADMIN — NITROGLYCERIN PRN MG: 0.4 TABLET, ORALLY DISINTEGRATING SUBLINGUAL at 04:15

## 2022-03-20 RX ADMIN — OXYCODONE AND ACETAMINOPHEN PRN TAB: 5; 325 TABLET ORAL at 13:13

## 2022-03-20 RX ADMIN — NITROGLYCERIN PRN MG: 0.4 TABLET, ORALLY DISINTEGRATING SUBLINGUAL at 04:25

## 2022-03-20 RX ADMIN — NITROGLYCERIN PRN MG: 0.4 TABLET, ORALLY DISINTEGRATING SUBLINGUAL at 04:20

## 2022-03-20 NOTE — DISCHARGE SUMMARY
Providers





- Providers


Date of Admission: 


03/19/22 13:50





Date of discharge: 03/20/22


Attending physician: 


KIRIT RM





                                        





03/19/22 12:48


Consult to Physician [CONS] Urgent 


   Comment: 


   Consulting Provider: EMELINA CH


   Physician Instructions: 


   Reason For Exam: ESRD





03/19/22 16:31


Consult to Physician [CONS] Routine 


   Comment: 


   Consulting Provider: LENORE PYLE


   Physician Instructions: 


   Reason For Exam: chf











Primary care physician: 


PRIMARY CARE MD








Hospitalization


Condition: Fair


Hospital course: 





The patient is a 65 YO female known to our service with history of DM-2, HTN, 

HLD, Bipolar Disorder, CAD S/P Stent Placement, Paroxysmal atrial fibrillation, 

HFpEF, COPD, Anemia and ESRD on hemodialysis who presented to [facility] ED 3/19 with 

c/o chest pain.  Patient states that she experienced a sudden onset of L sided 

chest tightness while sitting in her dialysis chair awaiting dialysis.  The pain

 was 7/10, constant, not radiating and associate with shortness of breath.  

Patient has some baseline dyspnea on exertion.  Patient denies any N, V, D, abd 

pain, dizziness, cough, leg swelling, weakness, fever, chills, rash, blurry vis

ion, hematuria or hemoptysis.  CXR showed increased interstitial markings.  

Patient was admitted to telemetry for evaluation.  Labs and imaging noted.  

Nephrology was consulted for ESRD management.  Patient was dialyzed following 

admission, cardiac enzymes and EKG were followed.  Cardiology evaluated the 

patient and recommended no further intervention, chest pain most likely due to 

volume overload.  Of note patient was recently discharged from the hospital on 

3/15/2022.  Patient was encouraged to be compliant with diet and fluid 

restriction and outpatient medications.  Patient was discharged home in stable 

condition after being cleared by nephrology and cardiology.  Patient was 

recommended to follow-up with her PCP in 1 week.








Disposition: 01 HOME / SELF CARE / HOMELESS


Final Discharge Diagnosis (Prints w/discharge instructions): --Noncompliant with

 hemodialysis/fluid overload.  --ESRD; on hemodialysis TTS.  --Hypertensive 

urgency;.  --Paroxysmal atrial fibrillation.  --Chest pain, atypical due to 

GERD.  --Acute on chronic diastolic CHF (congestive heart failure) due to volume

 overload


Time spent for discharge: 34 minutes





Core Measure Documentation





- Palliative Care


Palliative Care/ Comfort Measures: Not Applicable





- Core Measures


Any of the following diagnoses?: none





Exam





- Physical Exam


Narrative exam: 





GENERAL:  well-developed and well-nourished elderly -American female 

lying on bed appeared to be in no discomfort. 


HEENT: Normocephalic.  Atraumatic.  No conjunctival congestion or icterus. 

Patient has moist mucous membranes.


NECK: Supple.  Trachea midline.


CHEST/LUNGS: Clear to auscultated bilaterally, breathing nonlabored. No wheezes 

crackles or rhonchi.


HEART/CARDIOVASCULAR: Regular in rate and rhythm.  S1 and S2 positive.


ABDOMEN: Abdomen is soft, nontender.  Patient has normal bowel sounds.  


SKIN: There is no rash.  Warm and dry.


NEURO:  No focal motor deficit.  Follows command.


MUSCULOSKELETAL: No joint effusion or tenderness.


EXTRIMITY: No edema, no cyanosis or clubbing.


PSYCH:  Cooperative.





- Constitutional


Vitals: 


                                        











Temp Pulse Resp BP Pulse Ox


 


 98.6 F   120 H  16   165/96   100 


 


 03/20/22 07:44  03/20/22 07:44  03/20/22 07:44  03/20/22 07:44  03/20/22 07:44














Plan


Activity: advance as tolerated


Weight Bearing Status: Weight Bear as Tolerated


Diet: renal


Follow up with: 


PRIMARY CARE,MD [Primary Care Provider] - 7 Days


GEOFF KAY MD [Staff Physician] - 7 Days

## 2022-03-20 NOTE — PROGRESS NOTE
Assessment and Plan





1. ESRD:


Patient is on maintenance hemodialysis, TTS schedule.


Last outpatient HD 3/17.


Hemodialysis: 3/19.





2. FEN:


Volume control, UF with HD as tolerated.


Monitor lytes and volume status.





3. Chest pain at rest:


H/o CAD s/p PCI and stenting in [place].


Recent thallium scan done on last visit showed normal myocardial perfusion scan 

images.


Trend Troponin.


Seen by Cards.


Monitor.





4. Chronic HFpEF:


Volume control thru HD.


Fluid restriction, monitor I/O.





5. COPD:


Not in exacerbation.


Nebs and O2 as needed.





6. Paroxysmal A.fib:


SR.


Monitor.





7. Hypertension:


Volume control with HD.


Continue home BP meds.


Monitor BP.





8. Normocytic Anemia, POA:


2/2 ESRD.


Epogen with HD as needed.


Monitor.











Subjective:


Patient was seen and examined at the bedside.


Doing much better.











Examination:


General appearance: well-developed, appears stated age, no distress


HEENT: atraumatic, IVETTE


Neck: trachea midline


Respiratory: faint rales heard


Heart: S1S2, no murmur


Abdomen: soft, bowel sounds heard, NT


Integumentary: no obvious rash


Neurologic: AO, able to move extremities


Ext: no edema


Hemodialysis access: R IJ tunnel catheter, R arm AVG








Subjective


Date of service: 03/20/22





Objective





- Vital Signs


Vital signs: 


                               Vital Signs - 12hr











  03/20/22 03/20/22 03/20/22





  04:13 04:20 04:25


 


Temperature 97.8 F  


 


Pulse Rate 105 H  


 


Respiratory 18  





Rate   


 


Blood Pressure 158/95  


 


Blood Pressure  150/90 145/89





[Left]   


 


O2 Sat by Pulse 100  





Oximetry   














  03/20/22 03/20/22 03/20/22





  07:05 07:44 11:24


 


Temperature  98.6 F 99.1 F


 


Pulse Rate  120 H 70


 


Respiratory  16 16





Rate   


 


Blood Pressure 115/80 165/96 130/73


 


Blood Pressure   





[Left]   


 


O2 Sat by Pulse  100 100





Oximetry   














  03/20/22





  11:30


 


Temperature 


 


Pulse Rate 


 


Respiratory 18





Rate 


 


Blood Pressure 


 


Blood Pressure 





[Left] 


 


O2 Sat by Pulse 100





Oximetry 














- Lab





                                 03/19/22 11:49





                                 03/20/22 04:54


                             Most recent lab results











Calcium  8.2 mg/dL (8.4-10.2)  L  03/20/22  04:54    














Medications & Allergies





- Medications


Allergies/Adverse Reactions: 


                                    Allergies





apple Adverse Reaction (Verified 03/15/22 10:35)


   Hives


   yellow and green apples. can eat red. 


shell fish Allergy (Uncoded 03/15/22 10:35)


   Swelling


   allergic to seafood except blue crab 








Home Medications: 


                                Home Medications











 Medication  Instructions  Recorded  Confirmed  Last Taken  Type


 


Quetiapine Fumarate [Seroquel] 100 mg PO BID 09/22/17 03/19/22 06/01/20 History


 


Albuterol Mdi (or & Nicu Only) 2 puff IH QID PRN #1 inha 02/12/18 03/19/22 06/01/20 Rx





[ProAir HFA Inhaler]     


 


Metoprolol [Lopressor TAB] 50 mg PO BID #60 tablet 07/20/20 03/19/22 Unknown Rx


 


amLODIPine 10 mg PO QDAY #30 tablet 07/20/20 03/19/22 Unknown Rx


 


oxyCODONE /ACETAMINOPHEN [Percocet 1 tab PO Q6H PRN #20 tablet 07/20/20 03/19/22

 Unknown Rx





5/325 mg]     


 


Amiodarone [Cordarone 200 MG TAB] 200 mg PO BID #60 tablet 03/15/22 03/19/22 

Unknown Rx


 


Apixaban [Eliquis] 5 mg PO BID 30 Days #60 tablet 03/15/22 03/19/22 Unknown Rx


 


Aspirin EC [Halfprin EC] 81 mg PO QDAY #30 tablet 03/15/22 03/19/22 Unknown Rx











Active Medications: 














Generic Name Dose Route Start Last Admin





  Trade Name Freq  PRN Reason Stop Dose Admin


 


Acetaminophen  650 mg  03/19/22 13:50  03/20/22 10:03





  Acetaminophen 325 Mg Tab  PO   650 mg





  Q4H PRN   Administration





  Pain MILD(1-3)/Fever >100.5/HA  


 


Albuterol  2.5 mg  03/19/22 13:50 





  Albuterol 2.5 Mg/3 Ml Nebu  IH  





  Q4HRT PRN  





  Shortness Of Breath  


 


Famotidine  20 mg  03/19/22 16:00  03/20/22 10:04





  Famotidine 20 Mg Tab  PO   20 mg





  QDAY DERIK   Administration


 


Hydromorphone HCl  0.5 mg  03/19/22 13:50  03/19/22 21:34





  Hydromorphone 1 Mg/1 Ml Inj  IV   0.5 mg





  Q23H PRN   Administration





  Pain , Severe (7-10)  


 


Nitroglycerin  0.4 mg  03/20/22 04:45  03/20/22 10:02





  Nitroglycerin 0.4 Mg Tab Subl  SL   0.4 mg





  .Q5MIN PRN   Administration





  Chest Pain  


 


Ondansetron HCl  4 mg  03/19/22 13:50 





  Ondansetron 4 Mg/2 Ml Inj  IV  





  Q8H PRN  





  Nausea And Vomiting  


 


Oxycodone/Acetaminophen  1 tab  03/19/22 13:50  03/20/22 13:13





  Oxycodone /Acetaminophen 5-325mg Tab  PO   1 tab





  Q16H PRN   Administration





  Pain, Moderate (4-6)  


 


Sodium Chloride  10 ml  03/19/22 22:00  03/20/22 10:10





  Sodium Chloride 0.9% 10 Ml Flush Syringe  IV   10 ml





  BID DERIK   Administration


 


Sodium Chloride  10 ml  03/19/22 13:50 





  Sodium Chloride 0.9% 10 Ml Flush Syringe  IV  





  PRN PRN  





  LINE FLUSH

## 2022-03-20 NOTE — CONSULTATION
History of Present Illness


Consult date: 03/20/22


Consult reason: chest pain


History of present illness: 





66-year-old -American female with a history of coronary artery disease 

status post PCI with a stent in/session to the LAD end-stage renal disease 

essential hypertension and paroxysmal atrial fibrillation presenting with chest 

pain which she describes as substernal pressing chest pain.  Recent admissions 

to [facility] on 14 March had a Lexiscan thallium scan which

was normal left ventricular ejection fraction was also normal.  Patient admitted

for further evaluation.  Recently moved from [place] to [facility] she does 

not have a regular cardiologist or primary care physician.





Past History


Past Medical History: acute MI, atrial fib, COPD, diabetes, ESRD, heart failure,

hypertension, hyperlipidemia, stroke, other (See HPI)


Past Surgical History: Other (stent placement x 2 in 2015, knee surgery 01/2018,

right upper extremity fistula, pacemaker insertion)


Social history: , smoking.  denies: alcohol abuse, prescription drug 

abuse


Family history: CAD, diabetes, hypertension





Medications and Allergies


                                    Allergies











Allergy/AdvReac Type Severity Reaction Status Date / Time


 


apple AdvReac  Hives Verified 03/15/22 10:35


 


shell fish Allergy  Swelling Uncoded 03/15/22 10:35











                                Home Medications











 Medication  Instructions  Recorded  Confirmed  Last Taken  Type


 


Quetiapine Fumarate [Seroquel] 100 mg PO BID 09/22/17 03/19/22 06/01/20 History


 


Albuterol Mdi (or & Nicu Only) 2 puff IH QID PRN #1 inha 02/12/18 03/19/22 06/01/20 Rx





[ProAir HFA Inhaler]     


 


Metoprolol [Lopressor TAB] 50 mg PO BID #60 tablet 07/20/20 03/19/22 Unknown Rx


 


amLODIPine 10 mg PO QDAY #30 tablet 07/20/20 03/19/22 Unknown Rx


 


oxyCODONE /ACETAMINOPHEN [Percocet 1 tab PO Q6H PRN #20 tablet 07/20/20 03/19/22

 Unknown Rx





5/325 mg]     


 


Amiodarone [Cordarone 200 MG TAB] 200 mg PO BID #60 tablet 03/15/22 03/19/22 

Unknown Rx


 


Apixaban [Eliquis] 5 mg PO BID 30 Days #60 tablet 03/15/22 03/19/22 Unknown Rx


 


Aspirin EC [Halfprin EC] 81 mg PO QDAY #30 tablet 03/15/22 03/19/22 Unknown Rx











Active Meds: 


Active Medications





Acetaminophen (Acetaminophen 325 Mg Tab)  650 mg PO Q4H PRN


   PRN Reason: Pain MILD(1-3)/Fever >100.5/HA


Albuterol (Albuterol 2.5 Mg/3 Ml Nebu)  2.5 mg IH Q4HRT PRN


   PRN Reason: Shortness Of Breath


Famotidine (Famotidine 20 Mg Tab)  20 mg PO QDAY [facility]


   Last Admin: 03/19/22 16:09 Dose:  20 mg


   


Hydromorphone HCl (Hydromorphone 1 Mg/1 Ml Inj)  0.5 mg IV Q23H PRN


   PRN Reason: Pain , Severe (7-10)


   Last Admin: 03/19/22 21:34 Dose:  0.5 mg


   


Nitroglycerin (Nitroglycerin 0.4 Mg Tab Subl)  0.4 mg SL .Q5MIN PRN


   PRN Reason: Chest Pain


   Last Admin: 03/20/22 04:25 Dose:  0.4 mg


   


Ondansetron HCl (Ondansetron 4 Mg/2 Ml Inj)  4 mg IV Q8H PRN


   PRN Reason: Nausea And Vomiting


Oxycodone/Acetaminophen (Oxycodone /Acetaminophen 5-325mg Tab)  1 tab PO Q16H 

PRN


   PRN Reason: Pain, Moderate (4-6)


   Last Admin: 03/19/22 16:11 Dose:  1 tab


   


Sodium Chloride (Sodium Chloride 0.9% 10 Ml Flush Syringe)  10 ml IV BID [facility]


   Last Admin: 03/19/22 21:33 Dose:  10 ml


   


Sodium Chloride (Sodium Chloride 0.9% 10 Ml Flush Syringe)  10 ml IV PRN PRN


   PRN Reason: LINE FLUSH











Review of Systems


Constitutional: no weight loss, no weight gain, no fever, no chills, no fatigue,

no weakness


Ears, nose, mouth and throat: no ear pain, no ear discharge, no tinnitis, no 

decreased hearing, no mouth pain


Cardiovascular: chest pain, dyspnea on exertion, no palpitations, no edema, no 

syncope


Respiratory: dyspnea on exertion, no cough with sputum, no congestion


Gastrointestinal: no abdominal pain, no nausea, no vomiting, no melena, no 

hematochezia


Genitourinary Female: no menorrhagia, no dysuria


Musculoskeletal: no neck pain, no shooting arm pain


Integumentary: no rash, no pruritis, no redness, no jaundice, no bullae


Neurological: no paralysis, no weakness, no numbness, no vertigo, no headaches


Psychiatric: no anxiety


Endocrine: no cold intolerance, no heat intolerance, no polyphagia, no 

polydipsia, no polyuria, no nocturia


Hematologic/Lymphatic: no easy bruising, no easy bleeding


Allergic/Immunologic: no urticaria, no allergic rhinitis





Physical Examination


                                   Vital Signs











Pulse Ox


 


 100 


 


 03/19/22 11:36











General appearance: no acute distress, well-nourished


HEENT: Positive: PERRL, Mucus Membranes Moist


Neck: Positive: neck supple, trachea midline


Cardiac: Positive: Reg Rate and Rhythm, S1/S2.  Negative: Audible Murmur


Lungs: Positive: clear to auscultation, Normal Breath Sounds, Other (Vascath 

right subclavian area, Pacemaker pocket left subclavian area)


Neuro: Positive: Grossly Intact


Abdomen: Positive: Unremarkable, Soft, Active Bowel Sounds


Skin: Positive: Clear


Extremities: Absent: edema





Results





                                 03/19/22 11:49





                                 03/20/22 04:54


                                 Cardiac Enzymes











  03/19/22 03/20/22 Range/Units





  11:49 04:59 


 


CK-MB (CK-2)  1.3  1.2  (0.0-4.0)  ng/mL








                                   Coagulation











  03/19/22 Range/Units





  11:49 


 


PT  15.2 H  (12.2-14.9)  Sec.


 


INR  1.08  (0.87-1.13)  








                                       CBC











  03/19/22 Range/Units





  11:49 


 


WBC  7.8  (4.5-11.0)  K/mm3


 


RBC  3.05 L  (3.65-5.03)  M/mm3


 


Hgb  9.6 L  (10.1-14.3)  gm/dl


 


Hct  29.0 L  (30.3-42.9)  %


 


Plt Count  147  (140-440)  K/mm3


 


Lymph # (Auto)  1.2  (1.2-5.4)  K/mm3


 


Mono # (Auto)  0.4  (0.0-0.8)  K/mm3


 


Eos # (Auto)  0.5 H  (0.0-0.4)  K/mm3


 


Baso # (Auto)  0.2 H  (0.0-0.1)  K/mm3








                          Comprehensive Metabolic Panel











  03/19/22 03/20/22 Range/Units





  11:49 04:54 


 


Sodium  144  141  (137-145)  mmol/L


 


Potassium  3.8  3.7  (3.6-5.0)  mmol/L


 


Chloride  106.3  103.4  ()  mmol/L


 


Carbon Dioxide  25  27  (22-30)  mmol/L


 


BUN  43 H  27 H  (7-17)  mg/dL


 


Creatinine  3.8 H  2.6 H  (0.6-1.2)  mg/dL


 


Glucose  122 H  109 H  ()  mg/dL


 


Calcium  8.5  8.2 L  (8.4-10.2)  mg/dL














EKG interpretations





- Telemetry


EKG Rhythm: Paced (paced Atrial rhythm with ventricular sensing)





Assessment and Plan





1.  Chest pain consistent with angina at rest.


2.  Coronary artery disease status post PCI and stenting in [place]


3.  Presence of permanent pacemaker dual-chamber


4.  Paroxysmal atrial fibrillation


5.  Essential hypertension


6.  Hyperlipidemia


7.  End-stage renal disease on hemodialysis


8.  Type 2 diabetes mellitus


9.  History of cerebrovascular disease





Patient is known to [facility] and has a history of 

noncompliance her descriptive features of chest pain could be consistent with 

angina at this point recent thallium scan done on last visit showed normal 

myocardial perfusion scan images and serum troponin levels now essentially 

negative.  Last cardiac cath was done 2 years ago which showed widely patent 

stent in the LAD artery.





Plan.


We will recommend maximizing anti-ischemic medication patient also advised to 

stop smoking encourage patient to seek out a new PCP as well as a cardiologist 

for follow-up if she hopes to remain in [place].

## 2022-03-21 NOTE — ELECTROCARDIOGRAPH REPORT
[facility]

                                       

Test Date:    2022               Test Time:    03:26:45

Pat Name:     LINDA GARCIA              Department:   

Patient ID:   [id number]          Room:         A458 1

Gender:       F                        Technician:   23

:          1955               Requested By: KIRIT RM

Order Number: [id number]              Reading MD:   Keagan Bacon

                                 Measurements

Intervals                              Axis          

Rate:         115                      P:            

WI:                                    QRS:          14

QRSD:         96                       T:            51

QT:           429                                    

QTc:          595                                    

                           Interpretive Statements

Atrial flutter with predominant 2:1 AV block

Low voltage QRS

Compared to ECG 2022 11:36:48

Atrial flutter has replaced sinus rhythm

Intermittent atrial pacing is no longer evident

Electronically Signed On 3- 9:05:42 EDT by Keagan Bacon

## 2022-03-21 NOTE — ELECTROCARDIOGRAPH REPORT
[facility]

                                       

Test Date:    2022               Test Time:    11:36:48

Pat Name:     LINDA GARCIA              Department:   

Patient ID:   [id number]          Room:         A458

Gender:       F                        Technician:   GP

:          1955               Requested By: GITA SHORE

Order Number: [id number]              Reading MD:   Keagan Bacon

                                 Measurements

Intervals                              Axis          

Rate:         70                       P:            

UT:           102                      QRS:          2

QRSD:         93                       T:            76

QT:           455                                    

QTc:          491                                    

                           Interpretive Statements

Atrial-paced complexes

Anteroseptal infarct, age indeterminate

Compared to ECG 2022 07:43:24

Mechanical atrial pacing has replaced sinus rhythm

Electronically Signed On 3- 8:58:04 EDT by Keagan Bacon

## 2022-03-29 ENCOUNTER — HOSPITAL ENCOUNTER (OUTPATIENT)
Dept: HOSPITAL 5 - ED | Age: 67
Setting detail: OBSERVATION
LOS: 1 days | Discharge: HOME | End: 2022-03-30
Attending: INTERNAL MEDICINE | Admitting: INTERNAL MEDICINE
Payer: MEDICARE

## 2022-03-29 DIAGNOSIS — J44.1: ICD-10-CM

## 2022-03-29 DIAGNOSIS — I25.10: ICD-10-CM

## 2022-03-29 DIAGNOSIS — E11.22: ICD-10-CM

## 2022-03-29 DIAGNOSIS — F17.210: ICD-10-CM

## 2022-03-29 DIAGNOSIS — Z99.2: ICD-10-CM

## 2022-03-29 DIAGNOSIS — Z86.73: ICD-10-CM

## 2022-03-29 DIAGNOSIS — Z79.899: ICD-10-CM

## 2022-03-29 DIAGNOSIS — Z95.1: ICD-10-CM

## 2022-03-29 DIAGNOSIS — Z79.82: ICD-10-CM

## 2022-03-29 DIAGNOSIS — I48.11: ICD-10-CM

## 2022-03-29 DIAGNOSIS — I13.2: ICD-10-CM

## 2022-03-29 DIAGNOSIS — I50.32: ICD-10-CM

## 2022-03-29 DIAGNOSIS — I24.9: Primary | ICD-10-CM

## 2022-03-29 DIAGNOSIS — E78.00: ICD-10-CM

## 2022-03-29 DIAGNOSIS — N18.6: ICD-10-CM

## 2022-03-29 DIAGNOSIS — Z98.890: ICD-10-CM

## 2022-03-29 LAB
ALBUMIN SERPL-MCNC: 4.1 G/DL (ref 3.9–5)
ALT SERPL-CCNC: 15 UNITS/L (ref 7–56)
APTT BLD: 68.5 SEC. (ref 24.2–36.6)
BASOPHILS # (AUTO): 0.1 K/MM3 (ref 0–0.1)
BASOPHILS NFR BLD AUTO: 0.9 % (ref 0–1.8)
BUN SERPL-MCNC: 17 MG/DL (ref 7–17)
BUN/CREAT SERPL: 8 %
CALCIUM SERPL-MCNC: 8.6 MG/DL (ref 8.4–10.2)
EOSINOPHIL # BLD AUTO: 0.4 K/MM3 (ref 0–0.4)
EOSINOPHIL NFR BLD AUTO: 4.2 % (ref 0–4.3)
HCT VFR BLD CALC: 29 % (ref 30.3–42.9)
HEMOLYSIS INDEX: 3
HGB BLD-MCNC: 9.5 GM/DL (ref 10.1–14.3)
INR PPP: 1.14 (ref 0.87–1.13)
LYMPHOCYTES # BLD AUTO: 1.6 K/MM3 (ref 1.2–5.4)
LYMPHOCYTES NFR BLD AUTO: 16.9 % (ref 13.4–35)
MCHC RBC AUTO-ENTMCNC: 33 % (ref 30–34)
MCV RBC AUTO: 96 FL (ref 79–97)
MONOCYTES # (AUTO): 0.6 K/MM3 (ref 0–0.8)
MONOCYTES % (AUTO): 6.7 % (ref 0–7.3)
PLATELET # BLD: 112 K/MM3 (ref 140–440)
RBC # BLD AUTO: 3.04 M/MM3 (ref 3.65–5.03)

## 2022-03-29 PROCEDURE — 85025 COMPLETE CBC W/AUTO DIFF WBC: CPT

## 2022-03-29 PROCEDURE — 78580 LUNG PERFUSION IMAGING: CPT

## 2022-03-29 PROCEDURE — 96375 TX/PRO/DX INJ NEW DRUG ADDON: CPT

## 2022-03-29 PROCEDURE — 85730 THROMBOPLASTIN TIME PARTIAL: CPT

## 2022-03-29 PROCEDURE — 71045 X-RAY EXAM CHEST 1 VIEW: CPT

## 2022-03-29 PROCEDURE — 96374 THER/PROPH/DIAG INJ IV PUSH: CPT

## 2022-03-29 PROCEDURE — 83880 ASSAY OF NATRIURETIC PEPTIDE: CPT

## 2022-03-29 PROCEDURE — 84484 ASSAY OF TROPONIN QUANT: CPT

## 2022-03-29 PROCEDURE — 85610 PROTHROMBIN TIME: CPT

## 2022-03-29 PROCEDURE — 99291 CRITICAL CARE FIRST HOUR: CPT

## 2022-03-29 PROCEDURE — 80053 COMPREHEN METABOLIC PANEL: CPT

## 2022-03-29 PROCEDURE — 96376 TX/PRO/DX INJ SAME DRUG ADON: CPT

## 2022-03-29 PROCEDURE — 85379 FIBRIN DEGRADATION QUANT: CPT

## 2022-03-29 PROCEDURE — A9540 TC99M MAA: HCPCS

## 2022-03-29 PROCEDURE — 99406 BEHAV CHNG SMOKING 3-10 MIN: CPT

## 2022-03-29 PROCEDURE — G0378 HOSPITAL OBSERVATION PER HR: HCPCS

## 2022-03-29 PROCEDURE — 80048 BASIC METABOLIC PNL TOTAL CA: CPT

## 2022-03-29 PROCEDURE — 36415 COLL VENOUS BLD VENIPUNCTURE: CPT

## 2022-03-29 PROCEDURE — 93005 ELECTROCARDIOGRAM TRACING: CPT

## 2022-03-29 NOTE — EMERGENCY DEPARTMENT REPORT
ED Chest Pain HPI





- General


Chief Complaint: Chest Pain


Stated Complaint: CHEST PAIN


Time Seen by Provider: 03/29/22 14:12


Source: patient, EMS


Mode of arrival: Stretcher


Limitations: No Limitations





- History of Present Illness


Initial Comments: 





Patient is a 66-year-old F American female with a past medical history of DM-2, 

HTN, HLD, Bipolar Disorder, CAD S/P Stent Placement, Paroxysmal atrial 

fibrillation, HFpEF, COPD, Anemia and ESRD on hemodialysis who presented to [facility]

ED with chest pain.  Patient was at dialysis this morning and started having 

chest pressure.  States that she has some accompanying shortness of breath.  

Denies cough cold congestion.  Patient states she did receive her full dialysis 

session.  Patient states that the pain is in the center chest and is pressure-

like sensation.





- Related Data


                                Home Medications











 Medication  Instructions  Recorded  Confirmed  Last Taken


 


Quetiapine Fumarate [Seroquel] 100 mg PO BID 09/22/17 03/19/22 06/01/20








                                  Previous Rx's











 Medication  Instructions  Recorded  Last Taken  Type


 


Albuterol Mdi (or & Nicu Only) 2 puff IH QID PRN #1 inha 02/12/18 06/01/20 Rx





[ProAir HFA Inhaler]    


 


Metoprolol [Lopressor TAB] 50 mg PO BID #60 tablet 07/20/20 Unknown Rx


 


amLODIPine 10 mg PO QDAY #30 tablet 07/20/20 Unknown Rx


 


oxyCODONE /ACETAMINOPHEN [Percocet 1 tab PO Q6H PRN #20 tablet 07/20/20 Unknown 

Rx





5/325 mg]    


 


Amiodarone [Cordarone 200 MG TAB] 200 mg PO BID #60 tablet 03/15/22 Unknown Rx


 


Apixaban [Eliquis] 5 mg PO BID 30 Days #60 tablet 03/15/22 Unknown Rx


 


Aspirin EC [Halfprin EC] 81 mg PO QDAY #30 tablet 03/15/22 Unknown Rx











                                    Allergies











Allergy/AdvReac Type Severity Reaction Status Date / Time


 


apple AdvReac  Hives Verified 03/29/22 14:00


 


shell fish Allergy  Swelling Uncoded 03/15/22 10:35














Heart Score





- HEART Score


History: Slightly suspicious


EKG: Non-specific


Age: > 65


Risk factors: > 3 risk factors or hx of atherosclerotic disease


Troponin: < normal limit


HEART Score: 5





- EKG Read Time


Time EKG Completed: 14:50


EKG Read Time: 14:52





ED Review of Systems


ROS: 


Stated complaint: CHEST PAIN


Other details as noted in HPI





Comment: All other systems reviewed and negative





ED Past Medical Hx





- Past Medical History


Hx Hypertension: Yes


Hx CVA: Yes (right sided weakness)


Hx Heart Attack/AMI: Yes (1 stent)


Hx Congestive Heart Failure: Yes


Hx Diabetes: Yes


Hx Renal Disease: Yes (RENAL INSUFFICIENCY- stent left kidney)


Hx Psychiatric Treatment: Yes (BIPOLAR/SCHIZOPHRENIA)


Hx COPD: Yes


Additional medical history: HIGH CHOLESTEROL





- Surgical History


Hx Coronary Stent: Yes (x 2 in July 2015)


Additional Surgical History: stent placement x 2 in 2015, knee surgery 01/2018





- Social History


Smoking Status: Current Every Day Smoker





- Medications


Home Medications: 


                                Home Medications











 Medication  Instructions  Recorded  Confirmed  Last Taken  Type


 


Quetiapine Fumarate [Seroquel] 100 mg PO BID 09/22/17 03/19/22 06/01/20 History


 


Albuterol Mdi (or & Nicu Only) 2 puff IH QID PRN #1 inha 02/12/18 03/19/22 06/01/20 Rx





[ProAir HFA Inhaler]     


 


Metoprolol [Lopressor TAB] 50 mg PO BID #60 tablet 07/20/20 03/19/22 Unknown Rx


 


amLODIPine 10 mg PO QDAY #30 tablet 07/20/20 03/19/22 Unknown Rx


 


oxyCODONE /ACETAMINOPHEN [Percocet 1 tab PO Q6H PRN #20 tablet 07/20/20 03/19/22

 Unknown Rx





5/325 mg]     


 


Amiodarone [Cordarone 200 MG TAB] 200 mg PO BID #60 tablet 03/15/22 03/19/22 

Unknown Rx


 


Apixaban [Eliquis] 5 mg PO BID 30 Days #60 tablet 03/15/22 03/19/22 Unknown Rx


 


Aspirin EC [Halfprin EC] 81 mg PO QDAY #30 tablet 03/15/22 03/19/22 Unknown Rx














ED Physical Exam





- General


Limitations: No Limitations


General appearance: alert, in no apparent distress





- Head


Head exam: Present: atraumatic, normocephalic





- Eye


Eye exam: Present: normal appearance





- ENT


ENT exam: Present: mucous membranes moist





- Neck


Neck exam: Present: normal inspection





- Respiratory


Respiratory exam: Present: respiratory distress, wheezes, rales.  Absent: normal

lung sounds bilaterally





- Cardiovascular


Cardiovascular Exam: Present: regular rate, normal rhythm, normal heart sounds. 

Absent: systolic murmur, diastolic murmur, rubs, gallop





- GI/Abdominal


GI/Abdominal exam: Present: soft, normal bowel sounds.  Absent: distended, 

guarding, rebound





- Extremities Exam


Extremities exam: Present: normal inspection





- Back Exam


Back exam: Present: normal inspection





- Neurological Exam


Neurological exam: Present: alert, oriented X3





- Psychiatric


Psychiatric exam: Present: normal affect, normal mood





- Skin


Skin exam: Present: warm, dry, intact, normal color.  Absent: rash





ED Course


                                   Vital Signs











  03/29/22





  13:59


 


Pulse Rate 110 H


 


Blood Pressure 146/92





[Left] 


 


O2 Sat by Pulse 98





Oximetry 














PANCHO score





- Pancho Score


Age > 65: (1) Yes


Aspirin use within the Past 7 Days: (0) No


3 or more CAD Risk Factors: (1) Yes


2 or more Angina events in past 24 hrs: (1) Yes


Known CAD with more than 50% Stenosis: (1) Yes


Elevated Cardiac Markers: (0) No


ST Deviation Greater than 0.5mm: (0) No


PANCHO Score: 4





ED Medical Decision Making





- Lab Data


Result diagrams: 


                                 03/29/22 14:29





                                 03/29/22 14:29








                                   Lab Results











  03/29/22 03/29/22 03/29/22 Range/Units





  14:29 14:29 14:29 


 


WBC  9.5    (4.5-11.0)  K/mm3


 


RBC  3.04 L    (3.65-5.03)  M/mm3


 


Hgb  9.5 L    (10.1-14.3)  gm/dl


 


Hct  29.0 L    (30.3-42.9)  %


 


MCV  96    (79-97)  fl


 


MCH  31    (28-32)  pg


 


MCHC  33    (30-34)  %


 


RDW  17.6 H    (13.2-15.2)  %


 


Plt Count  112 L    (140-440)  K/mm3


 


Lymph % (Auto)  16.9    (13.4-35.0)  %


 


Mono % (Auto)  6.7    (0.0-7.3)  %


 


Eos % (Auto)  4.2    (0.0-4.3)  %


 


Baso % (Auto)  0.9    (0.0-1.8)  %


 


Lymph # (Auto)  1.6    (1.2-5.4)  K/mm3


 


Mono # (Auto)  0.6    (0.0-0.8)  K/mm3


 


Eos # (Auto)  0.4    (0.0-0.4)  K/mm3


 


Baso # (Auto)  0.1    (0.0-0.1)  K/mm3


 


Seg Neutrophils %  71.3 H    (40.0-70.0)  %


 


Seg Neutrophils #  6.8    (1.8-7.7)  K/mm3


 


PT     (12.2-14.9)  Sec.


 


INR     (0.87-1.13)  


 


APTT     (24.2-36.6)  Sec.


 


Sodium   141   (137-145)  mmol/L


 


Potassium   3.2 L   (3.6-5.0)  mmol/L


 


Chloride   100.1   ()  mmol/L


 


Carbon Dioxide   29   (22-30)  mmol/L


 


Anion Gap   15   mmol/L


 


BUN   17   (7-17)  mg/dL


 


Creatinine   2.2 H   (0.6-1.2)  mg/dL


 


Estimated GFR   27   ml/min


 


BUN/Creatinine Ratio   8   %


 


Glucose   82   ()  mg/dL


 


Calcium   8.6   (8.4-10.2)  mg/dL


 


Total Bilirubin   0.60   (0.1-1.2)  mg/dL


 


AST   16   (5-40)  units/L


 


ALT   15   (7-56)  units/L


 


Alkaline Phosphatase   84   ()  units/L


 


Troponin T    0.013  (0.00-0.029)  ng/mL


 


NT-Pro-B Natriuret Pep    87294 H  (0-900)  pg/mL


 


Total Protein   5.9 L   (6.3-8.2)  g/dL


 


Albumin   4.1   (3.9-5)  g/dL


 


Albumin/Globulin Ratio   2.3   %














  03/29/22 Range/Units





  14:29 


 


WBC   (4.5-11.0)  K/mm3


 


RBC   (3.65-5.03)  M/mm3


 


Hgb   (10.1-14.3)  gm/dl


 


Hct   (30.3-42.9)  %


 


MCV   (79-97)  fl


 


MCH   (28-32)  pg


 


MCHC   (30-34)  %


 


RDW   (13.2-15.2)  %


 


Plt Count   (140-440)  K/mm3


 


Lymph % (Auto)   (13.4-35.0)  %


 


Mono % (Auto)   (0.0-7.3)  %


 


Eos % (Auto)   (0.0-4.3)  %


 


Baso % (Auto)   (0.0-1.8)  %


 


Lymph # (Auto)   (1.2-5.4)  K/mm3


 


Mono # (Auto)   (0.0-0.8)  K/mm3


 


Eos # (Auto)   (0.0-0.4)  K/mm3


 


Baso # (Auto)   (0.0-0.1)  K/mm3


 


Seg Neutrophils %   (40.0-70.0)  %


 


Seg Neutrophils #   (1.8-7.7)  K/mm3


 


PT  15.9 H  (12.2-14.9)  Sec.


 


INR  1.14 H  (0.87-1.13)  


 


APTT  68.5 H*  (24.2-36.6)  Sec.


 


Sodium   (137-145)  mmol/L


 


Potassium   (3.6-5.0)  mmol/L


 


Chloride   ()  mmol/L


 


Carbon Dioxide   (22-30)  mmol/L


 


Anion Gap   mmol/L


 


BUN   (7-17)  mg/dL


 


Creatinine   (0.6-1.2)  mg/dL


 


Estimated GFR   ml/min


 


BUN/Creatinine Ratio   %


 


Glucose   ()  mg/dL


 


Calcium   (8.4-10.2)  mg/dL


 


Total Bilirubin   (0.1-1.2)  mg/dL


 


AST   (5-40)  units/L


 


ALT   (7-56)  units/L


 


Alkaline Phosphatase   ()  units/L


 


Troponin T   (0.00-0.029)  ng/mL


 


NT-Pro-B Natriuret Pep   (0-900)  pg/mL


 


Total Protein   (6.3-8.2)  g/dL


 


Albumin   (3.9-5)  g/dL


 


Albumin/Globulin Ratio   %














- EKG Data


-: EKG Interpreted by Me





- EKG Data





03/29/22 16:32


EKG shows atrial fibrillation with a rate of 110.  There is a second-degree AV 

block type I.  Q waves inferior.  No ST segment elevations or depressions.  EKG 

stated acute MI this was repeated and no change.  Repeat was at 1500.  Dr. Vaca

with cardiology looked at the EKG and agrees that there is no STEMI present.





- Radiology Data





 


Ordering Physician: PATRICA MANRIQUEZ MD  


Date of Service: 03/29/22  


Procedure(s): XR chest 1V ap  


Accession Number(s): [id number]  


 


cc: PATRICA MANRIQUEZ MD   


 


Fluoro Time In Minutes:   


 


CHEST 1 VIEW   


 


 INDICATION:  chest pain.  


 


 COMPARISON:  3/19/2022  


 


 FINDINGS:  


 Support devices: Right IJ permacath and 2-lead pacemaker device remain in good 

position.   


 


 Heart: Stable borderline heart size   


 Lungs/Pleura: The lungs are generally clear with no evidence for infiltrate, 

pleural effusion or 


pneumothorax.   


 


 Additional findings: None.  


 


 IMPRESSION:  


  No acute findings. No change since 3/19/2022.  


 


 Signer Name: El Hou Jr, MD   


 Signed: 3/29/2022 2:38 PM  


 Workstation Name: [id number]   





- Medical Decision Making





Patient is a 66-year-old F American female who was at dialysis and started 

having chest pain.  Patient has been seen here multiple times for similar chest 

pain.  Her last cardiac cath was approximately 2 years ago.  Patient had a 

Lexiscan test which was negative for any significant deficits earlier this month

in [place].  Patient having pain despite nitroglycerin was given a dose of 

morphine.  Patient to be admitted to the hospital service as the patient's heart

score is greater than 3.


Critical Care Time: Yes (30)


Critical care attestation.: 


If time is entered above; I have spent that time in minutes in the direct care 

of this critically ill patient, excluding procedure time.








ED Disposition


Clinical Impression: 


 Acute chest pain, Afib, COPD exacerbation, CHF (congestive heart failure), End 

stage renal disease





Disposition: 09 ADMITTED AS INPATIENT


Is pt being admited?: Yes


Does the pt Need Aspirin: No


Condition: Stable


Instructions:  Chest Pain (ED), Chronic Bronchitis (ED)


Referrals: 


PRIMARY CARE,MD [Primary Care Provider] - 3-5 Days


Time of Disposition: 16:38

## 2022-03-29 NOTE — XRAY REPORT
CHEST 1 VIEW 



INDICATION:  chest pain.



COMPARISON:  3/19/2022



FINDINGS:

Support devices: Right IJ permacath and 2-lead pacemaker device remain in good position. 



Heart: Stable borderline heart size 

Lungs/Pleura: The lungs are generally clear with no evidence for infiltrate, pleural effusion or pneu
mothorax. 



Additional findings: None.



IMPRESSION:

 No acute findings. No change since 3/19/2022.



Signer Name: lE Hou Jr, MD 

Signed: 3/29/2022 2:38 PM

Workstation Name: [id number]

## 2022-03-29 NOTE — HISTORY AND PHYSICAL REPORT
History of Present Illness


Date of examination: 03/29/22


Date of admission: 


3/29/2022


Chief complaint: 


Chest pain since dialysis session was over





History of present illness: 


56-year-old female with past medical history of end-stage renal disease, 

hypertension, coronary artery disease with stent placement X1 abdomen permanent 

pacemaker insertion comes in for chest pain after hemodialysis session.  Patient

recently moved 4 weeks ago from [facility].  Patient had AV fistula about 1

month ago and on.  On the right upper extremity.  Patient has extensive history 

of coronary artery disease and stent placement in the past.  Smokes regularly 

but is able to give up.  She is down to 1 cigarette/day.


Chest pain is retrosternal and epigastric.  Dull in character.  About 6-7 on a 

scale of 1-10.  Nonradiating.  No nausea no vomiting no diaphoresis.  No 

shortness 


Heart Score





- HEART Score


History: Slightly suspicious


EKG: Non-specific


Age: > 65


Risk factors: > 3 risk factors or hx of atherosclerotic disease


Troponin: < normal limit


HEART Score: 5





- EKG Read Time


Time EKG Completed: 14:50


EKG Read Time: 14:52








- Past Medical History


--Hypertension: Yes


--CVA: Yes (right sided weakness)


--Heart Attack/AMI: Yes (1 stent)


--Congestive Heart Failure: Yes


--Diabetes: Yes


--Renal Disease: Yes (RENAL INSUFFICIENCY- stent left kidney)


--Psychiatric Treatment: Yes (BIPOLAR/SCHIZOPHRENIA)


--COPD: Yes


--Additional medical history: HIGH CHOLESTEROL





- Surgical History


--Coronary Stent: Yes (x 2 in July 2015)


--Additional Surgical History: stent placement x 2 in 2015, knee surgery 01/2018





- Social History 


--Smoking Status: Current Every Day Smoker 


--No alcohol or drugs





-Family history


--Htn





- Medications


Home Medications: 


                                Home Medications











 Medication  Instructions  Recorded  Confirmed  Last Taken  Type


 


Quetiapine Fumarate [Seroquel] 100 mg PO BID 09/22/17 03/19/22 06/01/20 History


 


Albuterol Mdi (or & Nicu Only) 2 puff IH QID PRN #1 inha 02/12/18 03/19/22 06/01/20 Rx





[ProAir HFA Inhaler]     


 


Metoprolol [Lopressor TAB] 50 mg PO BID #60 tablet 07/20/20 03/19/22 Unknown Rx


 


amLODIPine 10 mg PO QDAY #30 tablet 07/20/20 03/19/22 Unknown Rx


 


oxyCODONE /ACETAMINOPHEN [Percocet 1 tab PO Q6H PRN #20 tablet 07/20/20 03/19/22

 Unknown Rx





5/325 mg]     


 


Amiodarone [Cordarone 200 MG TAB] 200 mg PO BID #60 tablet 03/15/22 03/19/22 

Unknown Rx


 


Apixaban [Eliquis] 5 mg PO BID 30 Days #60 tablet 03/15/22 03/19/22 Unknown Rx


 


Aspirin EC [Halfprin EC] 81 mg PO QDAY #30 tablet 03/15/22 03/19/22 Unknown Rx














Review of Systems


ROS: 


Constitutional no weight loss or weight gain no fever or chills


HEENT no sore throat no post nasal drip no diplopia


Neck no neck stiffness no lymph gland enlargement


Chest and lungs no shortness of breath cough or wheezing


CVS chest pain since a.m.


GI no nausea no vomiting no diarrhea


Genitourinary system no dysuria no flank pain


Musculoskeletal system no muscle pains no joint pains


CNS no syncope no seizures


Skin no rash no itching


Psychiatric no depression no homicidal or suicidal tendencies


Hematologic no lymphedema or bruising


Endocrine no polydipsia no polyuria no cold intolerance no heat intolerance














Medications and Allergies


                                    Allergies











Allergy/AdvReac Type Severity Reaction Status Date / Time


 


apple AdvReac  Hives Verified 03/29/22 14:00


 


shell fish Allergy  Swelling Uncoded 03/15/22 10:35











                                Home Medications











 Medication  Instructions  Recorded  Confirmed  Last Taken  Type


 


RX: Quetiapine Fumarate [Seroquel] 100 mg PO BID 09/22/17 03/19/22 06/01/20 

History


 


RX: Albuterol Mdi (or & Nicu Only) 2 puff IH QID PRN #1 inha 02/12/18 03/19/22 06/01/20 Rx





[ProAir HFA Inhaler]     


 


RX: Metoprolol [Lopressor TAB] 50 mg PO BID #60 tablet 07/20/20 03/19/22 Unknown

Rx


 


RX: amLODIPine 10 mg PO QDAY #30 tablet 07/20/20 03/19/22 Unknown Rx


 


RX: oxyCODONE /ACETAMINOPHEN 1 tab PO Q6H PRN #20 tablet 07/20/20 03/19/22 

Unknown Rx





[Percocet 5/325 mg]     


 


RX: Amiodarone [Cordarone 200  mg PO BID #60 tablet 03/15/22 03/19/22 

Unknown Rx





TAB]     


 


RX: Apixaban [Eliquis] 5 mg PO BID 30 Days #60 tablet 03/15/22 03/19/22 Unknown 

Rx


 


RX: Aspirin EC [Halfprin EC] 81 mg PO QDAY #30 tablet 03/15/22 03/19/22 Unknown 

Rx











Active Meds: 


Active Medications





Nitroglycerin (Nitroglycerin 0.4 Mg Tab Subl)  0.4 mg SL .Q5MIN PRN


   PRN Reason: Chest Pain











Exam





- Constitutional


Vitals: 


                                        











Temp Pulse Resp BP Pulse Ox


 


    110 H     146/92   98 


 


    03/29/22 13:59     03/29/22 13:59  03/29/22 13:59











General appearance: Present: no acute distress, well-nourished





- EENT


Eyes: Present: PERRL


ENT: hearing intact, clear oral mucosa





- Neck


Neck: Present: supple, normal ROM





- Respiratory


Respiratory effort: normal


Respiratory: bilateral: CTA





- Cardiovascular


Heart rate: 78


Rhythm: irregularly irregular


Heart Sounds: Present: S1 & S2.  Absent: rub, click





- Extremities


Extremities: no ischemia, pulses intact, pulses symmetrical, No edema, abnormal 

(Right upper extremity AV fistula)


Peripheral Pulses: within normal limits





- Abdominal


General gastrointestinal: Present: soft, non-tender, non-distended, normal bowel

sounds


Female genitourinary: Present: normal





- Integumentary


Integumentary: Present: clear, warm, dry





- Musculoskeletal


Musculoskeletal: gait normal, strength equal bilaterally





- Psychiatric


Psychiatric: appropriate mood/affect, intact judgment & insight





- Neurologic


Neurologic: CNII-XII intact, moves all extremities





HEART Score





- HEART Score


EKG: Non-specific


Age: > 65


Risk factors: > 3 risk factors or hx of atherosclerotic disease


Troponin: 


                                        











Troponin T  0.013 ng/mL (0.00-0.029)   03/29/22  14:29    











Troponin: < normal limit





Results





- Labs


CBC & Chem 7: 


                                 03/29/22 14:29





                                 03/30/22 05:21


Labs: 


                             Laboratory Last Values











WBC  9.5 K/mm3 (4.5-11.0)   03/29/22  14:29    


 


RBC  3.04 M/mm3 (3.65-5.03)  L  03/29/22  14:29    


 


Hgb  9.5 gm/dl (10.1-14.3)  L  03/29/22  14:29    


 


Hct  29.0 % (30.3-42.9)  L  03/29/22  14:29    


 


MCV  96 fl (79-97)   03/29/22  14:29    


 


MCH  31 pg (28-32)   03/29/22  14:29    


 


MCHC  33 % (30-34)   03/29/22  14:29    


 


RDW  17.6 % (13.2-15.2)  H  03/29/22  14:29    


 


Plt Count  112 K/mm3 (140-440)  L  03/29/22  14:29    


 


Lymph % (Auto)  16.9 % (13.4-35.0)   03/29/22  14:29    


 


Mono % (Auto)  6.7 % (0.0-7.3)   03/29/22  14:29    


 


Eos % (Auto)  4.2 % (0.0-4.3)   03/29/22  14:29    


 


Baso % (Auto)  0.9 % (0.0-1.8)   03/29/22  14:29    


 


Lymph # (Auto)  1.6 K/mm3 (1.2-5.4)   03/29/22  14:29    


 


Mono # (Auto)  0.6 K/mm3 (0.0-0.8)   03/29/22  14:29    


 


Eos # (Auto)  0.4 K/mm3 (0.0-0.4)   03/29/22  14:29    


 


Baso # (Auto)  0.1 K/mm3 (0.0-0.1)   03/29/22  14:29    


 


Seg Neutrophils %  71.3 % (40.0-70.0)  H  03/29/22  14:29    


 


Seg Neutrophils #  6.8 K/mm3 (1.8-7.7)   03/29/22  14:29    


 


PT  15.9 Sec. (12.2-14.9)  H  03/29/22  14:29    


 


INR  1.14  (0.87-1.13)  H  03/29/22  14:29    


 


APTT  68.5 Sec. (24.2-36.6)  H*  03/29/22  14:29    


 


D-Dimer  1128.70 ng/mlDDU (0-234)  H  03/29/22  14:29    


 


Sodium  141 mmol/L (137-145)   03/29/22  14:29    


 


Potassium  3.2 mmol/L (3.6-5.0)  L  03/29/22  14:29    


 


Chloride  100.1 mmol/L ()   03/29/22  14:29    


 


Carbon Dioxide  29 mmol/L (22-30)   03/29/22  14:29    


 


Anion Gap  15 mmol/L  03/29/22  14:29    


 


BUN  17 mg/dL (7-17)   03/29/22  14:29    


 


Creatinine  2.2 mg/dL (0.6-1.2)  H  03/29/22  14:29    


 


Estimated GFR  27 ml/min  03/29/22  14:29    


 


BUN/Creatinine Ratio  8 %  03/29/22  14:29    


 


Glucose  82 mg/dL ()   03/29/22  14:29    


 


Calcium  8.6 mg/dL (8.4-10.2)   03/29/22  14:29    


 


Total Bilirubin  0.60 mg/dL (0.1-1.2)   03/29/22  14:29    


 


AST  16 units/L (5-40)   03/29/22  14:29    


 


ALT  15 units/L (7-56)   03/29/22  14:29    


 


Alkaline Phosphatase  84 units/L ()   03/29/22  14:29    


 


Troponin T  0.013 ng/mL (0.00-0.029)   03/29/22  14:29    


 


NT-Pro-B Natriuret Pep  18064 pg/mL (0-900)  H  03/29/22  14:29    


 


Total Protein  5.9 g/dL (6.3-8.2)  L  03/29/22  14:29    


 


Albumin  4.1 g/dL (3.9-5)   03/29/22  14:29    


 


Albumin/Globulin Ratio  2.3 %  03/29/22  14:29    








                                    Short CBC











  03/29/22 Range/Units





  14:29 


 


WBC  9.5  (4.5-11.0)  K/mm3


 


Hgb  9.5 L  (10.1-14.3)  gm/dl


 


Hct  29.0 L  (30.3-42.9)  %


 


Plt Count  112 L  (140-440)  K/mm3








                                       BMP











  03/29/22 03/30/22





  14:29 05:21


 


Sodium  141  141


 


Potassium  3.2 L  3.6


 


Chloride  100.1  101.1


 


Carbon Dioxide  29  30


 


BUN  17  20 H


 


Creatinine  2.2 H  3.0 H


 


Glucose  82  89


 


Calcium  8.6  8.4








                                 Cardiac Enzymes











  03/29/22 03/29/22 03/29/22 Range/Units





  14:29 18:27 23:16 


 


Troponin T  0.013  0.014  0.013  (0.00-0.029)  ng/mL














  03/30/22 Range/Units





  05:21 


 


Troponin T  0.015  (0.00-0.029)  ng/mL








                                 Liver Function











  03/29/22 Range/Units





  14:29 


 


Total Bilirubin  0.60  (0.1-1.2)  mg/dL


 


AST  16  (5-40)  units/L


 


ALT  15  (7-56)  units/L


 


Alkaline Phosphatase  84  ()  units/L


 


Albumin  4.1  (3.9-5)  g/dL














- Imaging and Cardiology


EKG: report reviewed (Sinus rhythm no acute ST-T wave changes)





Assessment and Plan


Advance Directives: Yes (Full code)


VTE prophylaxis?: Chemical


Plan of care discussed with patient/family: Yes





- Patient Problems


(1) Acute coronary syndrome


Current Visit: Yes   Status: Acute   


Plan to address problem: 


Serial troponins 


Possible discharge tomorrow in observation status 


Patient has multiple admissions in the last 6 weeks


Old records reviewed


Patient had a Lexiscan in May 2020 which was negative


Cardiac work-up till now








Echocardiogram for LV function ejection fraction


Heart cath 9/2017; widely patent proximal to mid and mid to distal LAD stents 


no significant in-stent restenosis EF 50 to 55%


Thallium stress test/2/2018; and 2020-- normal study EF 61% 


echo 6/2020; EF 50 to 55%


Cardiology following








If troponins are negative patient may be discharged with atypical chest pain on 

analgesics-NSAID/Tylenol


Lexiscan was canceled


Cardiology consult


             








(2) End stage renal disease


Current Visit: Yes   Status: Chronic   


Plan to address problem: 


Patient had dialysis today


No need for hemodialysis today


Nephrology consult requested








(3) CAD (coronary artery disease)


Current Visit: No   Status: Chronic   


Qualifiers: 


   Coronary Disease-Associated Artery/Lesion type: native artery   Native vs. 

transplanted heart: native heart   Associated angina: angina presence 

unspecified 


Plan to address problem: 


had sent X1


On Eliquis











(4) Atrial fibrillation


Current Visit: Yes   Status: Chronic   


Qualifiers: 


   Atrial fibrillation type: longstanding persistent   Qualified Code(s): I48.11

- Longstanding persistent atrial fibrillation   


Plan to address problem: 


On Eliquis








(5) Hypertension


Current Visit: Yes   Status: Chronic   


Qualifiers: 


   Hypertension type: primary hypertension   Qualified Code(s): I10 - Essential 

(primary) hypertension   


Plan to address problem: 


On antihypertensives and adjust medications








(6) DVT prophylaxis


Current Visit: Yes   Status: Acute   


Plan to address problem: 


On Eliquis and GI prophylaxis








(7) Advance care planning


Current Visit: Yes   Status: Acute   


Plan to address problem: 


Disease education disease education conducted, care plan discussed, diagnosis 

discussed, prognosis discussed.  Patient is full code.  Patient acknowledges 

understanding and agreement with care plan.  +30 minutes.








(8) Encounter for smoking cessation counseling


Current Visit: Yes   Status: Acute   


Plan to address problem: 


Patient was counseled about stopping smoking





Was given alternatives of NicoDerm patch, Chantix and Wellbutrin


Patient prefers NicoDerm patch


Patient promises to stop smoking after his discharge

## 2022-03-30 VITALS — DIASTOLIC BLOOD PRESSURE: 74 MMHG | SYSTOLIC BLOOD PRESSURE: 140 MMHG

## 2022-03-30 LAB
BUN SERPL-MCNC: 20 MG/DL (ref 7–17)
BUN/CREAT SERPL: 7 %
CALCIUM SERPL-MCNC: 8.4 MG/DL (ref 8.4–10.2)
HEMOLYSIS INDEX: 4

## 2022-03-30 RX ADMIN — ASPIRIN SCH: 81 TABLET, COATED ORAL at 08:08

## 2022-03-30 RX ADMIN — ASPIRIN SCH MG: 81 TABLET, COATED ORAL at 11:29

## 2022-03-30 RX ADMIN — Medication SCH ML: at 11:30

## 2022-03-30 RX ADMIN — METOPROLOL TARTRATE SCH MG: 50 TABLET, FILM COATED ORAL at 00:33

## 2022-03-30 RX ADMIN — Medication SCH ML: at 00:36

## 2022-03-30 RX ADMIN — METOPROLOL TARTRATE SCH MG: 50 TABLET, FILM COATED ORAL at 11:30

## 2022-03-30 RX ADMIN — AMIODARONE HYDROCHLORIDE SCH MG: 200 TABLET ORAL at 00:35

## 2022-03-30 RX ADMIN — AMIODARONE HYDROCHLORIDE SCH MG: 200 TABLET ORAL at 11:29

## 2022-03-30 NOTE — CONSULTATION
History of Present Illness


Consult date: 03/30/22


Consult reason: chest pain


History of present illness: 





Patient is a 66-year-old woman with end-stage renal disease on hemodialysis, 

chronic tobacco abuse, and coronary artery disease.  She was referred from the 

dialysis unit for complaints of chest pain during dialysis.  Chest pain was 

nonexertional, atypical, localized to the left chest and poorly characterized.  

In the hospital, and ECG was sinus rhythm with no acute ischemic changes.  

Serial troponin levels x4 were negative.





Patient's coronary artery disease history is longstanding.  She had single-

vessel disease with stents in the mid and distal LAD.  These LAD stents were 

widely patent on a cardiac catheterization done in 9/2017.  Following that, 

serial thallium stress test, most recently done in this hospital less than 2 

weeks ago was negative.  Serial echocardiograms and invasive left ventricular 

function assessment have documented well preserved ejection fraction 50 to 55%.





Patient is currently comfortable, looks and feels better, no acute distress.





Past History


Past Medical History: CAD, ESRD, hypertension, other (Tobacco abuse)





Medications and Allergies


                                    Allergies











Allergy/AdvReac Type Severity Reaction Status Date / Time


 


apple AdvReac  Hives Verified 03/29/22 14:00


 


shell fish Allergy  Swelling Uncoded 03/15/22 10:35











                                Home Medications











 Medication  Instructions  Recorded  Confirmed  Last Taken  Type


 


Quetiapine Fumarate [Seroquel] 100 mg PO BID 09/22/17 03/30/22 03/29/22 22:00 

History


 


Albuterol Mdi (or & Nicu Only) 2 puff IH QID PRN #1 inha 02/12/18 03/30/22 06/01/20 Rx





[ProAir HFA Inhaler]     


 


Metoprolol [Lopressor TAB] 50 mg PO BID #60 tablet 07/20/20 03/30/22 03/29/22 

22:00 Rx


 


Amiodarone [Cordarone 200 MG TAB] 200 mg PO BID #60 tablet 03/15/22 03/30/22 

03/29/22 22:00 Rx


 


Apixaban [Eliquis] 5 mg PO BID 30 Days #60 tablet 03/15/22 03/30/22 03/29/22 

09:00 Rx


 


Aspirin EC [Halfprin EC] 81 mg PO QDAY #30 tablet 03/15/22 03/30/22 03/29/22 

09:00 Rx


 


Hydralazine HCl 50 mg PO TID 03/30/22 03/30/22 03/29/22 09:00 History


 


cloNIDine [Catapres] 0.1 mg PO BID 03/30/22 03/30/22 03/29/22 09:00 History











Active Meds: 


Active Medications





Acetaminophen (Acetaminophen 325 Mg Tab)  650 mg PO Q4H PRN


   PRN Reason: Pain MILD(1-3)/Fever >100.5/HA


Albuterol (Albuterol 2.5 Mg/3 Ml Nebu)  2.5 mg IH QID PRN


   PRN Reason: Shortness Of Breath


Amiodarone HCl (Amiodarone 200 Mg Tab)  200 mg PO BID [facility]


   Last Admin: 03/30/22 11:29 Dose:  200 mg


   


Amlodipine Besylate (Amlodipine 10 Mg Tab)  10 mg PO QDAY [facility]


   Last Admin: 03/30/22 11:31 Dose:  10 mg


   


Apixaban (Apixaban 2.5 Mg Tab)  2.5 mg PO Q12HR [facility]; Protocol


   Last Admin: 03/30/22 11:29 Dose:  2.5 mg


   


Aspirin (Aspirin Ec 81 Mg Tab)  81 mg PO QDAY [facility]


   Last Admin: 03/30/22 11:29 Dose:  81 mg


   


Famotidine (Famotidine 10 Mg Tab)  10 mg PO BID [facility]


   Last Admin: 03/30/22 11:30 Dose:  10 mg


   


Metoclopramide HCl (Metoclopramide 10 Mg/2 Ml Inj)  10 mg IV Q6H PRN


   PRN Reason: Nausea And Vomiting


Metoprolol Tartrate (Metoprolol Tartrate 50 Mg Tab)  50 mg PO BID [facility]


   Last Admin: 03/30/22 11:30 Dose:  50 mg


   


Morphine Sulfate (Morphine 2 Mg/1 Ml Inj)  2 mg IV Q4H PRN


   PRN Reason: Pain, Moderate (4-6)


   Last Admin: 03/29/22 21:51 Dose:  2 mg


   


Nitroglycerin (Nitroglycerin 0.4 Mg Tab Subl)  0.4 mg SL .Q5MIN PRN


   PRN Reason: Chest Pain


Ondansetron HCl (Ondansetron 4 Mg/2 Ml Inj)  4 mg IV Q8H PRN


   PRN Reason: Nausea And Vomiting


Oxycodone/Acetaminophen (Oxycodone /Acetaminophen 5-325mg Tab)  1 tab PO Q6H PRN


   PRN Reason: Pain, Moderate (4-6)


   Last Admin: 03/30/22 11:39 Dose:  1 tab


   


Quetiapine Fumarate (Quetiapine 100 Mg Tab)  100 mg PO BID [facility]


   Last Admin: 03/30/22 11:30 Dose:  100 mg


   


Sodium Chloride (Sodium Chloride 0.9% 10 Ml Flush Syringe)  10 ml IV BID [facility]


   Last Admin: 03/30/22 11:30 Dose:  10 ml


   


Sodium Chloride (Sodium Chloride 0.9% 10 Ml Flush Syringe)  10 ml IV PRN PRN


   PRN Reason: LINE FLUSH











Review of Systems


Cardiovascular: chest pain, shortness of breath, no orthopnea, no palpitations, 

no rapid/irregular heart beat, no edema, no syncope, no lightheadedness





Physical Examination


                                   Vital Signs











Pulse BP Pulse Ox


 


 110 H  146/92   98 


 


 03/29/22 13:59  03/29/22 13:59  03/29/22 13:59











General appearance: no acute distress


HEENT: Positive: PERRL


Neck: Positive: neck supple


Cardiac: Positive: Reg Rate and Rhythm


Lungs: Positive: Decreased Breath Sounds


Neuro: Positive: Grossly Intact


Abdomen: Positive: Soft


Female genitourinary: deferred


Skin: Positive: Clear


Extremities: Absent: edema





Results





                                 03/29/22 14:29





                                 03/30/22 05:21


                                 Cardiac Enzymes











  03/29/22 Range/Units





  14:29 


 


AST  16  (5-40)  units/L








                                   Coagulation











  03/29/22 Range/Units





  14:29 


 


PT  15.9 H  (12.2-14.9)  Sec.


 


INR  1.14 H  (0.87-1.13)  


 


APTT  68.5 H*  (24.2-36.6)  Sec.








                                       CBC











  03/29/22 Range/Units





  14:29 


 


WBC  9.5  (4.5-11.0)  K/mm3


 


RBC  3.04 L  (3.65-5.03)  M/mm3


 


Hgb  9.5 L  (10.1-14.3)  gm/dl


 


Hct  29.0 L  (30.3-42.9)  %


 


Plt Count  112 L  (140-440)  K/mm3


 


Lymph # (Auto)  1.6  (1.2-5.4)  K/mm3


 


Mono # (Auto)  0.6  (0.0-0.8)  K/mm3


 


Eos # (Auto)  0.4  (0.0-0.4)  K/mm3


 


Baso # (Auto)  0.1  (0.0-0.1)  K/mm3








                          Comprehensive Metabolic Panel











  03/29/22 03/30/22 Range/Units





  14:29 05:21 


 


Sodium  141  141  (137-145)  mmol/L


 


Potassium  3.2 L  3.6  (3.6-5.0)  mmol/L


 


Chloride  100.1  101.1  ()  mmol/L


 


Carbon Dioxide  29  30  (22-30)  mmol/L


 


BUN  17  20 H  (7-17)  mg/dL


 


Creatinine  2.2 H  3.0 H  (0.6-1.2)  mg/dL


 


Glucose  82  89  ()  mg/dL


 


Calcium  8.6  8.4  (8.4-10.2)  mg/dL


 


AST  16   (5-40)  units/L


 


ALT  15   (7-56)  units/L


 


Alkaline Phosphatase  84   ()  units/L


 


Total Protein  5.9 L   (6.3-8.2)  g/dL


 


Albumin  4.1   (3.9-5)  g/dL














EKG interpretations





- Telemetry


EKG Rhythm: Sinus Rhythm





Assessment and Plan





- Patient Problems


(1) Chest pain


Current Visit: Yes   Status: Acute   


Plan to address problem: 


Patient presents with atypical chest pain, ECG and serial troponin levels are 

negative.  It will be recalled that less than 2 weeks ago, she had a negative 

thallium stress test.  No further cardiac work-up is indicated, on discharge 

patient should follow-up with her primary cardiologist within 5 to 7 days.

## 2022-03-30 NOTE — ELECTROCARDIOGRAPH REPORT
[facility]

                                       

Test Date:    2022               Test Time:    08:09:18

Pat Name:     LINDA GARCIA              Department:   

Patient ID:   [id number]          Room:         A474 1

Gender:       F                        Technician:   AURORA

:          1955               Requested By: PAKO GARCIA

Order Number: [id number]              Reading MD:   Keagan Bacon

                                 Measurements

Intervals                              Axis          

Rate:         70                       P:            

WI:           130                      QRS:          -11

QRSD:         98                       T:            62

QT:           471                                    

QTc:          508                                    

                           Interpretive Statements

Atrial-paced rhythm

Prolonged QT interval

Compared to ECG 2022 14:50:46

Atrial paced rhythm has replaced atrial flutter

Electronically Signed On 3- 14:33:10 EDT by Keagan Bacon

## 2022-03-30 NOTE — ELECTROCARDIOGRAPH REPORT
[facility]

                                       

Test Date:    2022               Test Time:    14:50:46

Pat Name:     LINDA GARCIA              Department:   

Patient ID:   [id number]          Room:         A474 1

Gender:       F                        Technician:   ED TECH

:          1955               Requested By: PAKO GARCIA

Order Number: [id number]              Reading MD:   Keagan Bacon

                                 Measurements

Intervals                              Axis          

Rate:         110                      P:            

[place]:                                    QRS:          -24

QRSD:         90                       T:            105

QT:           352                                    

QTc:          484                                    

                           Interpretive Statements

Atrial flutter with variable AV conduction

Left axis deviation

Possible old anterior myocardial infarction

Compared to ECG 2022 03:26:45

No significant change

Electronically Signed On 3- 14:26:29 EDT by Keagan Bacon

## 2022-03-30 NOTE — DISCHARGE SUMMARY
Providers





- Providers


Date of Admission: 


03/29/22 17:45





Date of discharge: 03/30/22


Attending physician: 


KIRIT RM





                                        





03/30/22 06:30


Consult to Physician [CONS] Routine 


   Comment: 


   Consulting Provider: AUSTIN CORONEL


   Physician Instructions: 


   Reason For Exam: ACS/atypical chest pain











Primary care physician: 


PRIMARY CARE MD








Hospitalization


Condition: Stable


Hospital course: 





Patient is a 66-year-old woman with end-stage renal disease on hemodialysis, 

chronic tobacco abuse, and coronary artery disease  with stents in the mid and 

distal LAD referred from the dialysis unit for complaints of chest pain during 

dialysis.  Chest pain was nonexertional, atypical, localized to the left chest 

and poorly characterized.  In the hospital, and ECG was sinus rhythm with no 

acute ischemic changes.  Serial troponin levels x4 were negative. Her  LAD 

stents were widely patent on a cardiac catheterization done in 9/2017.  

Following that, serial thallium stress test, most recently done in this hospital

 less than 2 weeks ago was negative.  Serial echocardiograms and invasive left 

ventricular function assessment have documented well preserved ejection fraction

 50 to 55%.  Patient was further evaluated by cardiologist and recommended No 

further cardiac work-up, patient to follow-up with her primary cardiologist 

within 5 to 7 days.  Patient was then discharged home in stable condition with 

outpatient follow-up.





Disposition: 01 HOME / SELF CARE / HOMELESS


Final Discharge Diagnosis (Prints w/discharge instructions): --Atypical chest 

pain, likely due to GERD.  --end-stage renal disease on hemodialysis,.  

--chronic tobacco abuse,.  -- coronary artery disease status post PCI


Time spent for discharge: 34 minutes





Core Measure Documentation





- Palliative Care


Palliative Care/ Comfort Measures: Not Applicable





- Core Measures


Any of the following diagnoses?: none





Exam





- Physical Exam


Narrative exam: 





GENERAL:  well-developed elderly -American female lying on bed appeared 

to be in no discomfort. 


HEENT: Normocephalic.  Atraumatic.  No conjunctival congestion or icterus. 

Patient has moist mucous membranes.


NECK: Supple.  Trachea midline.


CHEST/LUNGS: Clear to auscultated bilaterally, breathing nonlabored. No wheezes 

crackles or rhonchi.


HEART/CARDIOVASCULAR: Regular in rate and rhythm.  S1 and S2 positive.


ABDOMEN: Abdomen is soft, nontender.  Patient has normal bowel sounds.  


SKIN: There is no rash.  Warm and dry.


NEURO:  No focal motor deficit.  Follows command.


MUSCULOSKELETAL: No joint effusion or tenderness.


EXTRIMITY: No edema, no cyanosis or clubbing.


PSYCH:  Cooperative.











- Constitutional


Vitals: 


                                        











Temp Pulse Resp BP Pulse Ox


 


 97.4 F L  71   18   140/74   98 


 


 03/30/22 11:20  03/30/22 11:20  03/30/22 11:20  03/30/22 11:20  03/30/22 11:20














Plan


Activity: advance as tolerated


Weight Bearing Status: Weight Bear as Tolerated


Diet: low fat, renal


Additional Instructions: Follow-up with your cardiologist in 1 week


Follow up with: 


PRIMARY CARE,MD [Primary Care Provider] - 3-5 Days


Prescriptions: 


Apixaban [Eliquis] 2.5 mg PO Q12HR #60 tablet

## 2022-03-30 NOTE — NUCLEAR MEDICINE REPORT
NUCLEAR MEDICINE PERFUSION LUNG SCAN



INDICATION / CLINICAL INFORMATION:

possible PE.



TECHNIQUE:

5.3 mCi of Tc-99m MAA were given by IV.



COMPARISON:

Chest radiograph dated 3/29/2022.



FINDINGS:



PERFUSION: There are at least 3 patchy nonsegmental defects in both lungs.



ADDITIONAL FINDINGS: None.



IMPRESSION:

1. Intermediate probability for pulmonary embolism.



Signer Name: Abiel Hastings MD 

Signed: 3/30/2022 9:55 AM

Workstation Name: Get [email or web address] [id number]

## 2022-04-07 ENCOUNTER — HOSPITAL ENCOUNTER (OUTPATIENT)
Dept: HOSPITAL 5 - ED | Age: 67
Setting detail: OBSERVATION
LOS: 1 days | Discharge: HOME | End: 2022-04-08
Attending: STUDENT IN AN ORGANIZED HEALTH CARE EDUCATION/TRAINING PROGRAM | Admitting: INTERNAL MEDICINE
Payer: MEDICARE

## 2022-04-07 DIAGNOSIS — Z79.4: ICD-10-CM

## 2022-04-07 DIAGNOSIS — I13.2: ICD-10-CM

## 2022-04-07 DIAGNOSIS — Z99.2: ICD-10-CM

## 2022-04-07 DIAGNOSIS — Z79.82: ICD-10-CM

## 2022-04-07 DIAGNOSIS — J96.01: Primary | ICD-10-CM

## 2022-04-07 DIAGNOSIS — Z95.1: ICD-10-CM

## 2022-04-07 DIAGNOSIS — N18.6: ICD-10-CM

## 2022-04-07 DIAGNOSIS — E78.2: ICD-10-CM

## 2022-04-07 DIAGNOSIS — J44.1: ICD-10-CM

## 2022-04-07 DIAGNOSIS — D69.6: ICD-10-CM

## 2022-04-07 DIAGNOSIS — E46: ICD-10-CM

## 2022-04-07 DIAGNOSIS — E87.5: ICD-10-CM

## 2022-04-07 DIAGNOSIS — F17.200: ICD-10-CM

## 2022-04-07 DIAGNOSIS — I25.10: ICD-10-CM

## 2022-04-07 DIAGNOSIS — Z79.01: ICD-10-CM

## 2022-04-07 DIAGNOSIS — E78.00: ICD-10-CM

## 2022-04-07 DIAGNOSIS — Z86.73: ICD-10-CM

## 2022-04-07 DIAGNOSIS — I48.20: ICD-10-CM

## 2022-04-07 DIAGNOSIS — I50.9: ICD-10-CM

## 2022-04-07 DIAGNOSIS — Z95.0: ICD-10-CM

## 2022-04-07 LAB
ALBUMIN SERPL-MCNC: 3.7 G/DL (ref 3.9–5)
ALT SERPL-CCNC: 17 UNITS/L (ref 7–56)
BASOPHILS # (AUTO): 0.1 K/MM3 (ref 0–0.1)
BASOPHILS NFR BLD AUTO: 0.8 % (ref 0–1.8)
BUN SERPL-MCNC: 67 MG/DL (ref 7–17)
BUN/CREAT SERPL: 16 %
CALCIUM SERPL-MCNC: 7.8 MG/DL (ref 8.4–10.2)
EOSINOPHIL # BLD AUTO: 0.2 K/MM3 (ref 0–0.4)
EOSINOPHIL NFR BLD AUTO: 2.9 % (ref 0–4.3)
HCO3 BLDA-SCNC: 21.1 MMOL/L (ref 20–26)
HCT VFR BLD CALC: 28.6 % (ref 30.3–42.9)
HEMOLYSIS INDEX: 26
HGB BLD-MCNC: 9.2 GM/DL (ref 10.1–14.3)
LYMPHOCYTES # BLD AUTO: 0.7 K/MM3 (ref 1.2–5.4)
LYMPHOCYTES NFR BLD AUTO: 9.1 % (ref 13.4–35)
MCHC RBC AUTO-ENTMCNC: 32 % (ref 30–34)
MCV RBC AUTO: 99 FL (ref 79–97)
MONOCYTES # (AUTO): 0.6 K/MM3 (ref 0–0.8)
MONOCYTES % (AUTO): 8.5 % (ref 0–7.3)
PCO2 BLDA: 39.1 MM HG
PH BLDA: 7.35 PH UNITS (ref 7.35–7.45)
PLATELET # BLD: 139 K/MM3 (ref 140–440)
PO2 BLDA: 131 MM HG (ref 80–90)
RBC # BLD AUTO: 2.9 M/MM3 (ref 3.65–5.03)

## 2022-04-07 PROCEDURE — 96372 THER/PROPH/DIAG INJ SC/IM: CPT

## 2022-04-07 PROCEDURE — 84484 ASSAY OF TROPONIN QUANT: CPT

## 2022-04-07 PROCEDURE — 94644 CONT INHLJ TX 1ST HOUR: CPT

## 2022-04-07 PROCEDURE — 71045 X-RAY EXAM CHEST 1 VIEW: CPT

## 2022-04-07 PROCEDURE — 93005 ELECTROCARDIOGRAM TRACING: CPT

## 2022-04-07 PROCEDURE — 96376 TX/PRO/DX INJ SAME DRUG ADON: CPT

## 2022-04-07 PROCEDURE — G0257 UNSCHED DIALYSIS ESRD PT HOS: HCPCS

## 2022-04-07 PROCEDURE — G0378 HOSPITAL OBSERVATION PER HR: HCPCS

## 2022-04-07 PROCEDURE — 94640 AIRWAY INHALATION TREATMENT: CPT

## 2022-04-07 PROCEDURE — 80053 COMPREHEN METABOLIC PANEL: CPT

## 2022-04-07 PROCEDURE — 99291 CRITICAL CARE FIRST HOUR: CPT

## 2022-04-07 PROCEDURE — 36415 COLL VENOUS BLD VENIPUNCTURE: CPT

## 2022-04-07 PROCEDURE — 94760 N-INVAS EAR/PLS OXIMETRY 1: CPT

## 2022-04-07 PROCEDURE — 96375 TX/PRO/DX INJ NEW DRUG ADDON: CPT

## 2022-04-07 PROCEDURE — 85025 COMPLETE CBC W/AUTO DIFF WBC: CPT

## 2022-04-07 PROCEDURE — 82803 BLOOD GASES ANY COMBINATION: CPT

## 2022-04-07 PROCEDURE — 80048 BASIC METABOLIC PNL TOTAL CA: CPT

## 2022-04-07 PROCEDURE — 96365 THER/PROPH/DIAG IV INF INIT: CPT

## 2022-04-07 RX ADMIN — FAMOTIDINE SCH MG: 10 TABLET ORAL at 21:29

## 2022-04-07 RX ADMIN — AMIODARONE HYDROCHLORIDE SCH MG: 200 TABLET ORAL at 21:30

## 2022-04-07 RX ADMIN — Medication SCH ML: at 21:31

## 2022-04-07 RX ADMIN — FAMOTIDINE SCH: 10 TABLET ORAL at 19:49

## 2022-04-07 RX ADMIN — AMIODARONE HYDROCHLORIDE SCH: 200 TABLET ORAL at 19:48

## 2022-04-07 RX ADMIN — ASPIRIN SCH: 81 TABLET, COATED ORAL at 19:48

## 2022-04-07 RX ADMIN — METOPROLOL TARTRATE SCH MG: 50 TABLET, FILM COATED ORAL at 21:29

## 2022-04-07 RX ADMIN — HEPARIN SODIUM SCH UNIT: 5000 INJECTION, SOLUTION INTRAVENOUS; SUBCUTANEOUS at 21:30

## 2022-04-07 RX ADMIN — HEPARIN SODIUM SCH: 5000 INJECTION, SOLUTION INTRAVENOUS; SUBCUTANEOUS at 19:48

## 2022-04-07 NOTE — HISTORY AND PHYSICAL REPORT
History of Present Illness


Date of examination: 04/07/22


Date of admission: 


4/7/2022


Chief complaint: 





Increasing shortness of breath for 2 days


History of present illness: 


66 female with history of end-stage renal disease, COPD and hypertension 

recently discharged on March 30, 2022 comes in for increasing shortness of 

breath.  Did not go for hemodialysis sessions in the last 2 times.  Patient 

acknowledges to noncompliance with medications.  Patient increasingly short of 

breath and orthopneic.  When asked why she is noncompliant--she is very 

nonchalant.  No fever or chills.  Exacerbating factor is not taking medications 

and not attending hemodialysis.  Patient has class III NYHA symptoms.





----------------------------------------------------------------------

-------------------------------------------------------------------------------


Discharge summary from March 30, 2022


Patient is a 66-year-old woman with end-stage renal disease on hemodialysis, 

chronic tobacco abuse, and coronary artery disease  with stents in the mid and 

distal LAD referred from the dialysis unit for complaints of chest pain during 

dialysis.  Chest pain was nonexertional, atypical, localized to the left chest 

and poorly characterized.  In the hospital, and ECG was sinus rhythm with no 

acute ischemic changes.  Serial troponin levels x4 were negative. Her  LAD 

stents were widely patent on a cardiac catheterization done in 9/2017.  

Following that, serial thallium stress test, most recently done in this hospital

less than 2 weeks ago was negative.  Serial echocardiograms and invasive left 

ventricular function assessment have documented well preserved ejection fraction

50 to 55%.  Patient was further evaluated by cardiologist and recommended No 

further cardiac work-up, patient to follow-up with her primary cardiologist 

within 5 to 7 days.  Patient was then discharged home in stable condition with 

outpatient follow-up.





Disposition: 01 HOME / SELF CARE / HOMELESS


Final Discharge Diagnosis (Prints w/discharge instructions): --Atypical chest 

pain, likely due to GERD.  --end-stage renal disease on hemodialysis,.  --chr

onic tobacco abuse,.  -- coronary artery disease status post PCI


Time spent for discharge: 34 minutes


-------------------------------------------------------------------------

-------------------------------------------------------------------------------








- Past Medical History


--Hypertension: Yes


--CVA: Yes (right sided weakness)


--Heart Attack/AMI: Yes (1 stent)


--Congestive Heart Failure: Yes


--Diabetes: Yes


--Renal Disease: Yes (RENAL INSUFFICIENCY- stent left kidney)


--Psychiatric Treatment: Yes (BIPOLAR/SCHIZOPHRENIA)


--COPD: Yes


--Additional medical history: HIGH CHOLESTEROL





- Surgical History


Hx Coronary Stent: Yes


Hx Pacemaker: Yes


Additional Surgical History: stent placement x 2 in 2015, knee surgery 01/2018





- Social History


Smoking Status: Current Every Day Smoker





- Medications


Home Medications: 


                                Home Medications











 Medication  Instructions  Recorded  Confirmed  Last Taken  Type


 


Quetiapine Fumarate [Seroquel] 100 mg PO BID 09/22/17 03/30/22 03/29/22 22:00 

History


 


Albuterol Mdi (or & Nicu Only) 2 puff IH QID PRN #1 inha 02/12/18 03/30/22 06/01/20 Rx





[ProAir HFA Inhaler]     


 


Metoprolol [Lopressor TAB] 50 mg PO BID #60 tablet 07/20/20 03/30/22 03/29/22 

22:00 Rx


 


Amiodarone [Cordarone 200 MG TAB] 200 mg PO BID #60 tablet 03/15/22 03/30/22 

03/29/22 22:00 Rx


 


Aspirin EC [Halfprin EC] 81 mg PO QDAY #30 tablet 03/15/22 03/30/22 03/29/22 

09:00 Rx


 


Apixaban [Eliquis] 2.5 mg PO Q12HR #60 tablet 03/30/22  Unknown Rx


 


Hydralazine HCl 50 mg PO TID 03/30/22 03/30/22 03/29/22 09:00 History


 


cloNIDine [Catapres] 0.1 mg PO BID 03/30/22 03/30/22 03/29/22 09:00 History














 Review of Systems 


ROS: 


Stated complaint: SOB


Other details as noted in HPI





Comment: All other systems reviewed and negative


Respiratory: shortness of breath, SOB with exertion, SOB at rest, wheezing

















Medications and Allergies


                                    Allergies











Allergy/AdvReac Type Severity Reaction Status Date / Time


 


apple AdvReac  Hives Verified 03/29/22 14:00


 


shell fish Allergy  Swelling Uncoded 03/15/22 10:35











                                Home Medications











 Medication  Instructions  Recorded  Confirmed  Last Taken  Type


 


Quetiapine Fumarate [Seroquel] 100 mg PO BID 09/22/17 03/30/22 03/29/22 22:00 

History


 


Albuterol Mdi (or & Nicu Only) 2 puff IH QID PRN #1 inha 02/12/18 03/30/22 06/01/20 Rx





[ProAir HFA Inhaler]     


 


Metoprolol [Lopressor TAB] 50 mg PO BID #60 tablet 07/20/20 03/30/22 03/29/22 

22:00 Rx


 


Amiodarone [Cordarone 200 MG TAB] 200 mg PO BID #60 tablet 03/15/22 03/30/22 

03/29/22 22:00 Rx


 


Aspirin EC [Halfprin EC] 81 mg PO QDAY #30 tablet 03/15/22 03/30/22 03/29/22 

09:00 Rx


 


Apixaban [Eliquis] 2.5 mg PO Q12HR #60 tablet 03/30/22  Unknown Rx


 


Hydralazine HCl 50 mg PO TID 03/30/22 03/30/22 03/29/22 09:00 History


 


cloNIDine [Catapres] 0.1 mg PO BID 03/30/22 03/30/22 03/29/22 09:00 History














Exam





- Constitutional


Vitals: 


                                        











Temp Pulse Resp BP Pulse Ox


 


    112 H  18   173/92   100 


 


    04/07/22 11:15  04/07/22 11:15  04/07/22 11:06  04/07/22 11:06











General appearance: Present: severe distress, well-nourished





- EENT


Eyes: Present: PERRL


ENT: hearing intact, clear oral mucosa





- Neck


Neck: Present: supple, normal ROM





- Respiratory


Respiratory effort: normal


Respiratory: bilateral: CTA





- Cardiovascular


Heart rate: 98


Rhythm: regularly irregular


Heart Sounds: Present: S1 & S2.  Absent: rub, click





- Extremities


Extremities: no ischemia, pulses intact, pulses symmetrical, No edema


Peripheral Pulses: within normal limits





- Abdominal


General gastrointestinal: Present: soft, non-tender, non-distended, normal bowel

 sounds


Female genitourinary: Present: normal





- Integumentary


Integumentary: Present: clear, warm, dry





- Musculoskeletal


Musculoskeletal: gait normal, strength equal bilaterally





- Psychiatric


Psychiatric: appropriate mood/affect, intact judgment & insight





- Neurologic


Neurologic: CNII-XII intact, moves all extremities





- Allied Health


Allied health notes reviewed: nursing, case management





HEART Score





- HEART Score


History: Moderately suspicious


Age: > 65


Risk factors: > 3 risk factors or hx of atherosclerotic disease


Troponin: 


                                        











Troponin T  < 0.010 ng/mL (0.00-0.029)   04/07/22  12:11    











Troponin: 1-3x normal limit





- Critical Actions


Critical Actions: 4-6 pts:12-16.6% risk of adverse cardiac event. Should be 

admitted





Results





- Labs


CBC & Chem 7: 


                                 04/07/22 12:11





                                 04/07/22 12:11


Labs: 


                             Laboratory Last Values











WBC  7.4 K/mm3 (4.5-11.0)   04/07/22  12:11    


 


RBC  2.90 M/mm3 (3.65-5.03)  L  04/07/22  12:11    


 


Hgb  9.2 gm/dl (10.1-14.3)  L  04/07/22  12:11    


 


Hct  28.6 % (30.3-42.9)  L  04/07/22  12:11    


 


MCV  99 fl (79-97)  H  04/07/22  12:11    


 


MCH  32 pg (28-32)   04/07/22  12:11    


 


MCHC  32 % (30-34)   04/07/22  12:11    


 


RDW  19.0 % (13.2-15.2)  H  04/07/22  12:11    


 


Plt Count  139 K/mm3 (140-440)  L  04/07/22  12:11    


 


Lymph % (Auto)  9.1 % (13.4-35.0)  L  04/07/22  12:11    


 


Mono % (Auto)  8.5 % (0.0-7.3)  H  04/07/22  12:11    


 


Eos % (Auto)  2.9 % (0.0-4.3)   04/07/22  12:11    


 


Baso % (Auto)  0.8 % (0.0-1.8)   04/07/22  12:11    


 


Lymph # (Auto)  0.7 K/mm3 (1.2-5.4)  L  04/07/22  12:11    


 


Mono # (Auto)  0.6 K/mm3 (0.0-0.8)   04/07/22  12:11    


 


Eos # (Auto)  0.2 K/mm3 (0.0-0.4)   04/07/22  12:11    


 


Baso # (Auto)  0.1 K/mm3 (0.0-0.1)   04/07/22  12:11    


 


Seg Neutrophils %  78.7 % (40.0-70.0)  H  04/07/22  12:11    


 


Seg Neutrophils #  5.9 K/mm3 (1.8-7.7)   04/07/22  12:11    


 


ABG pH  7.350 pH Units (7.350-7.450)   04/07/22  11:20    


 


ABG pCO2  39.1 mm Hg  04/07/22  11:20    


 


ABG pO2  131.0 mm Hg (80.0-90.0)  H  04/07/22  11:20    


 


ABG HCO3  21.1 mmol/L (20.0-26.0)   04/07/22  11:20    


 


ABG O2 Saturation  98.5 % (95.0-99.0)   04/07/22  11:20    


 


ABG O2 Content  13.7  (0.0-44)   04/07/22  11:20    


 


ABG Base Excess  -4.1 mmol/L (-2.0-3.0)  L  04/07/22  11:20    


 


ABG Hemoglobin  10.0 gm/dl (12.0-16.0)  L  04/07/22  11:20    


 


ABG Carboxyhemoglobin  2.4 % (0.0-5.0)   04/07/22  11:20    


 


ABG Methemoglobin  0.5 % (0.0-1.5)   04/07/22  11:20    


 


Oxyhemoglobin  95.7 % (95.0-99.0)   04/07/22  11:20    


 


FiO2  35 %  04/07/22  11:20    


 


Sodium  141 mmol/L (137-145)   04/07/22  12:11    


 


Potassium  4.9 mmol/L (3.6-5.0)   04/07/22  12:11    


 


Chloride  108.0 mmol/L ()  H  04/07/22  12:11    


 


Carbon Dioxide  20 mmol/L (22-30)  L  04/07/22  12:11    


 


Anion Gap  18 mmol/L  04/07/22  12:11    


 


BUN  67 mg/dL (7-17)  H  04/07/22  12:11    


 


Creatinine  4.2 mg/dL (0.6-1.2)  H  04/07/22  12:11    


 


Estimated GFR  13 ml/min  04/07/22  12:11    


 


BUN/Creatinine Ratio  16 %  04/07/22  12:11    


 


Glucose  93 mg/dL ()   04/07/22  12:11    


 


Calcium  7.8 mg/dL (8.4-10.2)  L  04/07/22  12:11    


 


Total Bilirubin  0.60 mg/dL (0.1-1.2)   04/07/22  12:11    


 


AST  21 units/L (5-40)   04/07/22  12:11    


 


ALT  17 units/L (7-56)   04/07/22  12:11    


 


Alkaline Phosphatase  77 units/L ()   04/07/22  12:11    


 


Troponin T  < 0.010 ng/mL (0.00-0.029)   04/07/22  12:11    


 


Total Protein  5.8 g/dL (6.3-8.2)  L  04/07/22  12:11    


 


Albumin  3.7 g/dL (3.9-5)  L  04/07/22  12:11    


 


Albumin/Globulin Ratio  1.8 %  04/07/22  12:11    








                                    Short CBC











  04/07/22 04/07/22 04/07/22 Range/Units





  11:20 12:11 12:11 


 


WBC   7.4   (4.5-11.0)  K/mm3


 


RBC   2.90 L   (3.65-5.03)  M/mm3


 


Hgb   9.2 L   (10.1-14.3)  gm/dl


 


Hct   28.6 L   (30.3-42.9)  %


 


MCV   99 H   (79-97)  fl


 


MCH   32   (28-32)  pg


 


MCHC   32   (30-34)  %


 


RDW   19.0 H   (13.2-15.2)  %


 


Plt Count   139 L   (140-440)  K/mm3


 


Lymph % (Auto)   9.1 L   (13.4-35.0)  %


 


Mono % (Auto)   8.5 H   (0.0-7.3)  %


 


Eos % (Auto)   2.9   (0.0-4.3)  %


 


Baso % (Auto)   0.8   (0.0-1.8)  %


 


Lymph # (Auto)   0.7 L   (1.2-5.4)  K/mm3


 


Mono # (Auto)   0.6   (0.0-0.8)  K/mm3


 


Eos # (Auto)   0.2   (0.0-0.4)  K/mm3


 


Baso # (Auto)   0.1   (0.0-0.1)  K/mm3


 


Seg Neutrophils %   78.7 H   (40.0-70.0)  %


 


Seg Neutrophils #   5.9   (1.8-7.7)  K/mm3


 


ABG pH  7.350    (7.350-7.450)  pH Units


 


ABG pCO2  39.1    mm Hg


 


ABG pO2  131.0 H    (80.0-90.0)  mm Hg


 


ABG HCO3  21.1    (20.0-26.0)  mmol/L


 


ABG O2 Saturation  98.5    (95.0-99.0)  %


 


ABG O2 Content  13.7    (0.0-44)  


 


ABG Base Excess  -4.1 L    (-2.0-3.0)  mmol/L


 


ABG Hemoglobin  10.0 L    (12.0-16.0)  gm/dl


 


ABG Carboxyhemoglobin  2.4    (0.0-5.0)  %


 


ABG Methemoglobin  0.5    (0.0-1.5)  %


 


Oxyhemoglobin  95.7    (95.0-99.0)  %


 


FiO2  35    %


 


Sodium    141  (137-145)  mmol/L


 


Potassium    4.9  (3.6-5.0)  mmol/L


 


Chloride    108.0 H  ()  mmol/L


 


Carbon Dioxide    20 L  (22-30)  mmol/L


 


Anion Gap    18  mmol/L


 


BUN    67 H  (7-17)  mg/dL


 


Creatinine    4.2 H  (0.6-1.2)  mg/dL


 


Estimated GFR    13  ml/min


 


BUN/Creatinine Ratio    16  %


 


Glucose    93  ()  mg/dL


 


Calcium    7.8 L  (8.4-10.2)  mg/dL


 


Total Bilirubin    0.60  (0.1-1.2)  mg/dL


 


AST    21  (5-40)  units/L


 


ALT    17  (7-56)  units/L


 


Alkaline Phosphatase    77  ()  units/L


 


Troponin T    < 0.010  (0.00-0.029)  ng/mL


 


Total Protein    5.8 L  (6.3-8.2)  g/dL


 


Albumin    3.7 L  (3.9-5)  g/dL


 


Albumin/Globulin Ratio    1.8  %








                                       BMP











  04/07/22





  12:11


 


Sodium  141


 


Potassium  4.9


 


Chloride  108.0 H


 


Carbon Dioxide  20 L


 


BUN  67 H


 


Creatinine  4.2 H


 


Glucose  93


 


Calcium  7.8 L








                                 Cardiac Enzymes











  04/07/22 Range/Units





  12:11 


 


Troponin T  < 0.010  (0.00-0.029)  ng/mL








                                 Liver Function











  04/07/22 Range/Units





  12:11 


 


Total Bilirubin  0.60  (0.1-1.2)  mg/dL


 


AST  21  (5-40)  units/L


 


ALT  17  (7-56)  units/L


 


Alkaline Phosphatase  77  ()  units/L


 


Albumin  3.7 L  (3.9-5)  g/dL














- Imaging and Cardiology


EKG: report reviewed


Chest x-ray: report reviewed


Imaging and Cardiology: 





Chest x-ray


Bilateral interstitial opacities favoring mild pulmonary edema.  Trace right 

pleural effusion.





Assessment and Plan


Advance Directives: Yes (Full code)


VTE prophylaxis?: Chemical


Plan of care discussed with patient/family: Yes





- Patient Problems


(1) Acute respiratory failure with hypoxia


Current Visit: Yes   Status: Acute   


Plan to address problem: 


Patient is hypoxic in the emergency room at the time of admission with oxygen 

saturations of about 86%


Noncompliant with her hemodialysis


Volume overload


Needs emergent hemodialysis


Nephrology consulted








(2) End-stage renal disease on hemodialysis


Current Visit: Yes   Status: Acute   


Plan to address problem: 


Emergent hemodialysis


Nephrology consulted


Noncompliance








(3) CAD (coronary artery disease)


Current Visit: No   Status: Chronic   


Qualifiers: 


   Coronary Disease-Associated Artery/Lesion type: native artery   Native vs. 

transplanted heart: native heart   Associated angina: with stable angina   

Qualified Code(s): I25.118 - Atherosclerotic heart disease of native coronary 

artery with other forms of angina pectoris   


Plan to address problem: 


Continue isosorbide and aspirin








(4) CHF (congestive heart failure)


Current Visit: No   Status: Chronic   


Qualifiers: 


   Heart failure type: combined systolic and diastolic 


Plan to address problem: 


Needs emergent hemodialysis for volume overload








(5) HLD (hyperlipidemia)


Current Visit: No   Status: Chronic   


Qualifiers: 


   Hyperlipidemia type: mixed hyperlipidemia   Qualified Code(s): E78.2 - Mixed 

hyperlipidemia   


Plan to address problem: 


Continue statins








(6) Hypertension


Current Visit: No   Status: Chronic   


Qualifiers: 


   Hypertension type: primary hypertension   Qualified Code(s): I10 - Essential 

(primary) hypertension   


Plan to address problem: 


Continue antihypertensives and adjust medications








(7) Atrial fibrillation


Current Visit: Yes   Status: Chronic   


Qualifiers: 


   Atrial fibrillation type: persistent (not longstanding)   Qualified Code(s): 

I48.19 - Other persistent atrial fibrillation; I48.1 - Persistent atrial 

fibrillation   


Plan to address problem: 


On Eliquis and amiodarone








(8) Malnutrition


Current Visit: Yes   Status: Chronic   


Qualifiers: 


   Protein-calorie malnutrition severity: mild 


Plan to address problem: 


Dietary supplements initiated








(9) DVT prophylaxis


Current Visit: No   Status: Acute   


Plan to address problem: 


On anticoagulation GI prophylaxis








(10) Advance care planning


Current Visit: No   Status: Acute   


Plan to address problem: 


Disease education conducted, care plan discussed, diagnosis discussed, prognosis

 discussed.  Patient is full code.  Patient acknowledges understanding and 

agreement with care plan +30 minutes.

## 2022-04-07 NOTE — EMERGENCY DEPARTMENT REPORT
ED Shortness of Breath HPI





- General


Chief Complaint: Dyspnea/Respdistress


Stated Complaint: SOB


Time Seen by Provider: 04/07/22 10:53





- History of Present Illness


Initial Comments: 





66-year-old female with a history of COPD and end-stage renal disease currently 

on dialysis 3 days a week who now presented with shortness of breath that 

started about 5 days ago.  Patient miss her dialysis on Tuesday with the last 

dialysis last Saturday.  When asked patients that she was too sick to go to 

dialysis center.  Patient denied any chest pain or palpitation.  She reported 

swelling on her lower leg.  Patient denied any fever or chills.  Walking from 

one room to the other worsening shortness of breath.  No other modifying or 

positive factors reported.





- Related Data


                                Home Medications











 Medication  Instructions  Recorded  Confirmed  Last Taken


 


Quetiapine Fumarate [Seroquel] 100 mg PO BID 09/22/17 03/30/22 03/29/22 22:00


 


Hydralazine HCl 50 mg PO TID 03/30/22 03/30/22 03/29/22 09:00


 


cloNIDine [Catapres] 0.1 mg PO BID 03/30/22 03/30/22 03/29/22 09:00








                                  Previous Rx's











 Medication  Instructions  Recorded  Last Taken  Type


 


Albuterol Mdi (or & Nicu Only) 2 puff IH QID PRN #1 inha 02/12/18 06/01/20 Rx





[ProAir HFA Inhaler]    


 


Metoprolol [Lopressor TAB] 50 mg PO BID #60 tablet 07/20/20 03/29/22 22:00 Rx


 


Amiodarone [Cordarone 200 MG TAB] 200 mg PO BID #60 tablet 03/15/22 03/29/22 

22:00 Rx


 


Aspirin EC [Halfprin EC] 81 mg PO QDAY #30 tablet 03/15/22 03/29/22 09:00 Rx


 


Apixaban [Eliquis] 2.5 mg PO Q12HR #60 tablet 03/30/22 Unknown Rx











                                    Allergies











Allergy/AdvReac Type Severity Reaction Status Date / Time


 


apple AdvReac  Hives Verified 03/29/22 14:00


 


shell fish Allergy  Swelling Uncoded 03/15/22 10:35














ED Review of Systems


ROS: 


Stated complaint: SOB


Other details as noted in HPI





Comment: All other systems reviewed and negative


Respiratory: shortness of breath, SOB with exertion, SOB at rest, wheezing





ED Past Medical Hx





- Past Medical History


Hx Hypertension: Yes


Hx CVA: Yes (right sided weakness)


Hx Heart Attack/AMI: Yes (1 stent)


Hx Congestive Heart Failure: Yes


Hx Diabetes: Yes


Hx Renal Disease: Yes (RENAL INSUFFICIENCY- stent left kidney)


Hx Psychiatric Treatment: Yes (BIPOLAR/SCHIZOPHRENIA)


Hx COPD: Yes


Additional medical history: HIGH CHOLESTEROL





- Surgical History


Hx Coronary Stent: Yes


Hx Pacemaker: Yes


Additional Surgical History: stent placement x 2 in 2015, knee surgery 01/2018





- Social History


Smoking Status: Current Every Day Smoker





- Medications


Home Medications: 


                                Home Medications











 Medication  Instructions  Recorded  Confirmed  Last Taken  Type


 


Quetiapine Fumarate [Seroquel] 100 mg PO BID 09/22/17 03/30/22 03/29/22 22:00 

History


 


Albuterol Mdi (or & Nicu Only) 2 puff IH QID PRN #1 inha 02/12/18 03/30/22 06/01/20 Rx





[ProAir HFA Inhaler]     


 


Metoprolol [Lopressor TAB] 50 mg PO BID #60 tablet 07/20/20 03/30/22 03/29/22 

22:00 Rx


 


Amiodarone [Cordarone 200 MG TAB] 200 mg PO BID #60 tablet 03/15/22 03/30/22 

03/29/22 22:00 Rx


 


Aspirin EC [Halfprin EC] 81 mg PO QDAY #30 tablet 03/15/22 03/30/22 03/29/22 

09:00 Rx


 


Apixaban [Eliquis] 2.5 mg PO Q12HR #60 tablet 03/30/22  Unknown Rx


 


Hydralazine HCl 50 mg PO TID 03/30/22 03/30/22 03/29/22 09:00 History


 


cloNIDine [Catapres] 0.1 mg PO BID 03/30/22 03/30/22 03/29/22 09:00 History














ED Physical Exam





- General


Limitations: No Limitations


General appearance: in distress (Due to shortness of breath)





- Head


Head exam: Present: atraumatic, normal inspection





- Eye


Eye exam: Present: normal appearance


Pupils: Present: normal accommodation





- ENT


ENT exam: Present: normal exam, normal orophraynx, mucous membranes moist





- Neck


Neck exam: Present: normal inspection





- Respiratory


Respiratory exam: Present: respiratory distress, wheezes





- Cardiovascular


Cardiovascular Exam: Present: regular rate, normal rhythm





- GI/Abdominal


GI/Abdominal exam: Present: soft, normal bowel sounds.  Absent: tenderness





- Extremities Exam


Extremities exam: Present: pedal edema (+1 bilateral pitting edema)





- Back Exam


Back exam: Present: normal inspection, full ROM





- Neurological Exam


Neurological exam: Present: alert





- Psychiatric


Psychiatric exam: Present: normal affect, normal mood





ED Course


                                   Vital Signs











  04/07/22 04/07/22 04/07/22





  10:49 11:06 11:15


 


Pulse Rate 110 H 117 H 


 


Pulse Rate [   112 H





Anterior   





Bilateral   





Throughout]   


 


Respiratory 18 21 





Rate   


 


Respiratory   18





Rate [Anterior   





Bilateral   





Throughout]   


 


Blood Pressure 161/103  


 


Blood Pressure  173/92 





[Left]   


 


O2 Sat by Pulse 100 100 





Oximetry   














- Reevaluation(s)


Reevaluation #1: 





04/07/22 13:17


Patient here with shortness of breath--after missing her dialysis with a history

 of COPD--she has been treated with Solu-Medrol, DuoNeb, and Lasix and reports 

feeling much better after the treatment.  We will continue CPAP and consider 

admission--





Dr. Baeza the hospitalist consulted who accepts the patient for further 

evaluation and treatment.  Noted with anemia with hemoglobin of 9.2 and 

hematocrit of 28.6 which is likely as a result of chronic kidney failure, 

platelet of 139 and BUN 67/creatinine of 4.29--with a normal potassium at 

4.9--this patient will be needing none urgent dialysis at this point.  Dr. Baeza 

agreed to have the patient admitted on telemetry to medical floor on BiPAP.





ED Medical Decision Making





- Lab Data


Result diagrams: 


                                 04/07/22 12:11





                                 04/07/22 12:11





- Medical Decision Making





Patient presents with dyspnea to be in severe respiratory distress--with a 

history of COPD and end-stage renal disease--on recent 4 days of missing 

dialysis this could be as a result of COPD exacerbation all electrolyte 

abnormality due to missing dialysis--we will continue to evaluate and treat 

accordingly--initially given 125 mg of Solu-Medrol, DuoNeb and 40 mg of Lasix


Critical Care Time: Yes (60 minutes)


Critical care attestation.: 


If time is entered above; I have spent that time in minutes in the direct care 

of this critically ill patient, excluding procedure time.


This patient came in with severe respiratory distress and was started on 

BiPAP--after noting tachycardia and severe bilateral expiratory wheezing--given 

Solu-Medrol plus DuoNeb for her likely COPD exacerbation, and also was given 40 

mg of Lasix continue that patient missed dialysis for likely vascular 

congestion--





ED Disposition


Clinical Impression: 


 Anemia of chronic disease, Thrombocytopenia





Dyspnea


Qualifiers:


 Dyspnea type: dyspnea on exertion Qualified Code(s): R06.00 - Dyspnea, 

unspecified





Acute on chronic renal failure


Qualifiers:


 Acute renal failure type: unspecified Chronic kidney disease stage: unspecified

 stage Qualified Code(s): N17.9 - Acute kidney failure, unspecified; N18.9 - 

Chronic kidney disease, unspecified





COPD (chronic obstructive pulmonary disease)


Qualifiers:


 COPD type: COPD with acute exacerbation Qualified Code(s): J44.1 - Chronic 

obstructive pulmonary disease with (acute) exacerbation





Disposition: 09 ADMITTED AS INPATIENT


Is pt being admited?: Yes


Does the pt Need Aspirin: No


Condition: Stable


Instructions:  Chronic Obstructive Pulmonary Disease (ED)


Referrals: 


PRIMARY CARE,MD [Primary Care Provider] - 3-5 Days

## 2022-04-07 NOTE — XRAY REPORT
CHEST 1 VIEW 4/7/2022 1:10 PM



INDICATION / CLINICAL INFORMATION: dyspnea.



COMPARISON: 3/29/2020



FINDINGS:



SUPPORT DEVICES: Stable, satisfactory device positioning.

HEART / MEDIASTINUM: Stable. 

LUNGS / PLEURA: Bilateral mild interstitial opacities likely reflect underlying edema. Trace right pl
eural effusion. No pneumothorax. 



ADDITIONAL FINDINGS: No significant additional findings.



IMPRESSION:

1. Bilateral interstitial opacities favor mild pulmonary edema. Trace right pleural effusion.



Signer Name: Angel Gonzalez MD 

Signed: 4/7/2022 1:24 PM

Workstation Name: [id number]

## 2022-04-08 VITALS — DIASTOLIC BLOOD PRESSURE: 81 MMHG | SYSTOLIC BLOOD PRESSURE: 157 MMHG

## 2022-04-08 LAB
ALBUMIN SERPL-MCNC: 4 G/DL (ref 3.9–5)
ALT SERPL-CCNC: 18 UNITS/L (ref 7–56)
BUN SERPL-MCNC: 28 MG/DL (ref 7–17)
BUN SERPL-MCNC: 80 MG/DL (ref 7–17)
BUN/CREAT SERPL: 13 %
BUN/CREAT SERPL: 18 %
CALCIUM SERPL-MCNC: 8.6 MG/DL (ref 8.4–10.2)
CALCIUM SERPL-MCNC: 9 MG/DL (ref 8.4–10.2)
HEMOLYSIS INDEX: 18
HEMOLYSIS INDEX: 9

## 2022-04-08 RX ADMIN — Medication SCH ML: at 09:37

## 2022-04-08 RX ADMIN — AMIODARONE HYDROCHLORIDE SCH MG: 200 TABLET ORAL at 09:04

## 2022-04-08 RX ADMIN — HEPARIN SODIUM SCH UNIT: 5000 INJECTION, SOLUTION INTRAVENOUS; SUBCUTANEOUS at 09:04

## 2022-04-08 RX ADMIN — METOPROLOL TARTRATE SCH: 50 TABLET, FILM COATED ORAL at 09:32

## 2022-04-08 RX ADMIN — FAMOTIDINE SCH MG: 10 TABLET ORAL at 09:04

## 2022-04-08 RX ADMIN — ASPIRIN SCH MG: 81 TABLET, COATED ORAL at 09:04

## 2022-04-08 NOTE — DISCHARGE SUMMARY
Providers





- Providers


Date of Admission: 


04/07/22 13:53





Date of discharge: 04/08/22


Attending physician: 


SAMARA FRANK MD





                                        





04/07/22 13:53


Consult to Physician [CONS] Routine 


   Comment: 


   Consulting Provider: EMELINA CH


   Physician Instructions: 


   Reason For Exam: esrd











Primary care physician: 


PRIMARY CARE MD








Hospitalization


Reason for admission: Acute hypoxic respiratory failure


Condition: Stable


Pertinent studies: 





Reviewed.


Procedures: 





None.


Hospital course: 





The patient is a 66-year-old female with past medical history of ESRD (on 

hemodialysispermacath in right upper chest), COPD, hypertension, chronic tobacco

 dependence, atrial fibrillation on Eliquis, and coronary artery disease with 

stents in the mid and distal LAD who presented with increased shortness of 

breath and orthopnea after missing 2 hemodialysis sessions last week.  The 

patient endorses having abdominal symptoms and being sick as the reasons as to 

why she missed hemodialysis x2.  The patient on presentation was tachycardic to 

117, tachypneic to 24, and hypertensive at 173/92.  The patient was placed on 

BiPAP for symptomatic improvement.  The patient has since been weaned down to 

room air.  Later labs revealed hyperkalemia to 6.2.  Nephrology was consulted 

about performing emergent hemodialysis.  The patient received medical management

 for hyperkalemia and successfully underwent hemodialysis on 4/8/2022.  After 

hemodialysis, the patient will be medically clear for discharge.  The patient 

will return home, continue her hemodialysis on her TTS schedule.  Patient is 

medically clear for discharge.


Disposition: 01 HOME / SELF CARE / HOMELESS


Final Discharge Diagnosis (Prints w/discharge instructions): Acute hypoxic 

respiratory failure, ESRD on hemodialysis, hypertension, CAD complicated by 

prior stent, hyperlipidemia, schizophrenia, COPD, history of pacemaker, tobacco 

dependence, hyperkalemia, hyperlipidemia, atrial fibrillation on anticoagulation


Time spent for discharge: 45 min





Core Measure Documentation





- Palliative Care


Palliative Care/ Comfort Measures: Not Applicable





- Core Measures


Any of the following diagnoses?: history only





Exam





- Constitutional


Vitals: 


                                        











Temp Pulse Resp BP Pulse Ox


 


 97.9 F   102 H  18   152/97   99 


 


 04/08/22 07:57  04/08/22 11:45  04/08/22 07:57  04/08/22 11:45  04/08/22 11:07











General appearance: Present: no acute distress, well-nourished





- EENT


Eyes: Present: PERRL, EOM intact


ENT: hearing intact, clear oral mucosa, dentition normal





- Neck


Neck: Present: supple, normal ROM, other (Permacath and right upper chest)





- Respiratory


Respiratory effort: normal


Respiratory: bilateral: diminished





- Cardiovascular


Rhythm: irregularly irregular


Heart Sounds: Present: S1 & S2





- Extremities


Extremities: no ischemia, pulses intact, pulses symmetrical, No edema, normal 

temperature, normal color, Full ROM, abnormal (Right upper extremity AV fistula 

with palpable thrill)


Peripheral Pulses: within normal limits





- Abdominal


General gastrointestinal: Present: soft, non-tender, non-distended, normal bowel

 sounds


Female genitourinary: Present: deferred





- Rectal


Rectal Exam: deferred





- Integumentary


Integumentary: Present: clear, warm, dry





- Musculoskeletal


Musculoskeletal: strength equal bilaterally





- Psychiatric


Psychiatric: appropriate mood/affect, cooperative





- Neurologic


Neurologic: CNII-XII intact, moves all extremities





- Allied Health


Allied health notes reviewed: nursing





Plan


Activity: no restrictions


Diet: low salt, renal


Additional Instructions: The patient is a 66-year-old female with past medical 

history of ESRD (on hemodialysispermacath in right upper chest), COPD, 

hypertension, chronic tobacco dependence, atrial fibrillation on Eliquis, and 

coronary artery disease with stents in the mid and distal LAD who presented with

 increased shortness of breath and orthopnea after missing 2 hemodialysis 

sessions last week.  The patient endorses having abdominal symptoms and being 

sick as the reasons as to why she missed hemodialysis x2.  The patient on 

presentation was tachycardic to 117, tachypneic to 24, and hypertensive at 

173/92.  The patient was placed on BiPAP for symptomatic improvement.  The 

patient has since been weaned down to room air.  Later labs revealed 

hyperkalemia to 6.2.  Nephrology was consulted about performing emergent 

hemodialysis.  The patient received medical management for hyperkalemia and 

successfully underwent hemodialysis on 4/8/2022.  After hemodialysis, the 

patient will be medically clear for discharge.  The patient will return home, 

continue her hemodialysis on her TTS schedule.  Patient is medically clear for 

discharge.


Care Plan Goals: 


Patient is medically clear for discharge.


Assessment: 





The patient is a 66-year-old female with past medical history of ESRD (on 

hemodialysispermacath in right upper chest), COPD, hypertension, chronic tobacco

 dependence, atrial fibrillation on Eliquis, and coronary artery disease with 

stents in the mid and distal LAD who presented with increased shortness of 

breath and orthopnea after missing 2 hemodialysis sessions last week.  The 

patient endorses having abdominal symptoms and being sick as the reasons as to 

why she missed hemodialysis x2.  The patient on presentation was tachycardic to 

117, tachypneic to 24, and hypertensive at 173/92.  The patient was placed on 

BiPAP for symptomatic improvement.  The patient has since been weaned down to 

room air.  Later labs revealed hyperkalemia to 6.2.  Nephrology was consulted 

about performing emergent hemodialysis.  The patient received medical management

 for hyperkalemia and successfully underwent hemodialysis on 4/8/2022.  After 

hemodialysis, the patient will be medically clear for discharge.  The patient 

will return home, continue her hemodialysis on her TTS schedule.  Patient is 

medically clear for discharge.


Follow up with: 


PRIMARY CARE,MD [Primary Care Provider] - 3-5 Days

## 2022-04-08 NOTE — CONSULTATION
History of Present Illness





- Reason for Consult


Consult date: 04/08/22


end stage renal disease, hyperkalemia





Medications and Allergies


                                    Allergies











Allergy/AdvReac Type Severity Reaction Status Date / Time


 


apple AdvReac  Hives Verified 03/29/22 14:00


 


shell fish Allergy  Swelling Uncoded 03/15/22 10:35











                                Home Medications











 Medication  Instructions  Recorded  Confirmed  Last Taken  Type


 


Quetiapine Fumarate [Seroquel] 100 mg PO BID 09/22/17 03/30/22 03/29/22 22:00 

History


 


Albuterol Mdi (or & Nicu Only) 2 puff IH QID PRN #1 inha 02/12/18 03/30/22 06/01/20 Rx





[ProAir HFA Inhaler]     


 


Metoprolol [Lopressor TAB] 50 mg PO BID #60 tablet 07/20/20 03/30/22 03/29/22 

22:00 Rx


 


Amiodarone [Cordarone 200 MG TAB] 200 mg PO BID #60 tablet 03/15/22 03/30/22 

03/29/22 22:00 Rx


 


Aspirin EC [Halfprin EC] 81 mg PO QDAY #30 tablet 03/15/22 03/30/22 03/29/22 

09:00 Rx


 


Apixaban [Eliquis] 2.5 mg PO Q12HR #60 tablet 03/30/22  Unknown Rx


 


Hydralazine HCl 50 mg PO TID 03/30/22 03/30/22 03/29/22 09:00 History


 


cloNIDine [Catapres] 0.1 mg PO BID 03/30/22 03/30/22 03/29/22 09:00 History











Active Meds: 


Active Medications





Acetaminophen (Acetaminophen 325 Mg Tab)  650 mg PO Q4H PRN


   PRN Reason: Pain MILD(1-3)/Fever >100.5/HA


Albuterol (Albuterol 2.5 Mg/3 Ml Nebu)  2.5 mg IH Q4HRT PRN


   PRN Reason: Shortness Of Breath


   Last Admin: 04/07/22 16:40 Dose:  2.5 mg


   


Amiodarone HCl (Amiodarone 200 Mg Tab)  200 mg PO BID [facility]


   Last Admin: 04/08/22 09:04 Dose:  200 mg


   


Aspirin (Aspirin Ec 81 Mg Tab)  81 mg PO QDAY [facility]


   Last Admin: 04/08/22 09:04 Dose:  81 mg


   


Clonidine HCl (Clonidine 0.1 Mg Tab)  0.1 mg PO BID [facility]


   Last Admin: 04/08/22 09:38 Dose:  Not Given


   


Dextrose (Dextrose 50% In Water (25gm) 50 Ml Syringe)  50 ml IV ONCE@0800 [facility]; 

Protocol


   Stop: 04/08/22 11:00


   Last Admin: 04/08/22 09:31 Dose:  Not Given


   


Epoetin Teodoro-epbx (Epoetin Teodoro-Epbx 10,000 Unit/1 Ml Vial)  10,000 unit SUB-Q 

VANCE PRN


   PRN Reason: hemodialysis


Famotidine (Famotidine 10 Mg Tab)  10 mg PO BID [facility]


   Last Admin: 04/08/22 09:04 Dose:  10 mg


   


Heparin Sodium (Porcine) (Heparin 5,000 Unit/1 Ml Vial)  5,000 unit SUB-Q Q12HR 

[facility]


   Last Admin: 04/08/22 09:04 Dose:  5,000 unit


   


Heparin Sodium (Porcine) (Heparin 10,000 Units/10 Ml Vial)  3,000 unit IV VANCE 

PRN


   PRN Reason: hemodialysis


Hydralazine HCl (Hydralazine 25 Mg Tab)  50 mg PO TID [facility]


   Last Admin: 04/08/22 09:32 Dose:  Not Given


   


Sodium Chloride (Nacl 0.9%)  100 mls @ 999 mls/hr IV VANCE PRN


   PRN Reason: Hypotension


Insulin Human Regular (Insulin Regular, Human 100 Units/1 Ml)  5 units IV 

ONCE@0800 [facility]


   Stop: 04/08/22 12:00


   Last Admin: 04/08/22 09:04 Dose:  Not Given


   


Metoprolol Tartrate (Metoprolol Tartrate 50 Mg Tab)  50 mg PO BID [facility]


   Last Admin: 04/08/22 09:32 Dose:  Not Given


   


Morphine Sulfate (Morphine 2 Mg/1 Ml Inj)  2 mg IV Q4H PRN


   PRN Reason: Pain, Moderate (4-6)


   Last Admin: 04/07/22 21:31 Dose:  2 mg


   


Ondansetron HCl (Ondansetron 4 Mg/2 Ml Inj)  4 mg IV Q8H PRN


   PRN Reason: Nausea And Vomiting


Oxycodone/Acetaminophen (Oxycodone /Acetaminophen 5-325mg Tab)  1 tab PO Q6H PRN


   PRN Reason: Pain, Moderate (4-6)


Quetiapine Fumarate (Quetiapine 100 Mg Tab)  100 mg PO BID [facility]


   Last Admin: 04/08/22 09:04 Dose:  100 mg


   


Sodium Chloride (Sodium Chloride 0.9% 10 Ml Flush Syringe)  10 ml IV BID [facility]


   Last Admin: 04/08/22 09:37 Dose:  10 ml


   


Sodium Chloride (Sodium Chloride 0.9% 10 Ml Flush Syringe)  10 ml IV PRN PRN


   PRN Reason: LINE FLUSH











Exam





- Vital Signs


Vital signs: 


                                   Vital Signs











Pulse Resp BP Pulse Ox


 


 110 H  18   161/103   100 


 


 04/07/22 10:49  04/07/22 10:49  04/07/22 10:49  04/07/22 10:49














Results





- Lab Results





                                 04/07/22 12:11





                                 04/08/22 06:12


                             Most recent lab results











ABG pH  7.350 pH Units (7.350-7.450)   04/07/22  11:20    


 


ABG pCO2  39.1 mm Hg  04/07/22  11:20    


 


ABG pO2  131.0 mm Hg (80.0-90.0)  H  04/07/22  11:20    


 


ABG HCO3  21.1 mmol/L (20.0-26.0)   04/07/22  11:20    


 


ABG O2 Saturation  98.5 % (95.0-99.0)   04/07/22  11:20    


 


Calcium  8.6 mg/dL (8.4-10.2)   04/08/22  06:12

## 2022-04-08 NOTE — ELECTROCARDIOGRAPH REPORT
[facility]

                                       

Test Date:    2022               Test Time:    12:17:06

Pat Name:     LINDA GARCIA              Department:   

Patient ID:   [id number]          Room:         A472

Gender:       F                        Technician:   [id number]

:          1955               Requested By: PAKO GARCIA

Order Number: [id number]              Reading MD:   Dawood Duvall

                                 Measurements

Intervals                              Axis          

Rate:         70                       P:            

TX:           142                      QRS:          -1

QRSD:         86                       T:            48

QT:           437                                    

QTc:          472                                    

                           Interpretive Statements

Atrial-paced complexes

Consider anteroseptal infarct

Compared to ECG 2022 08:09:18

Myocardial infarct finding now present

Ventricular-paced complex(es) or rhythm no longer present

Prolonged QT interval no longer present

Electronically Signed On 2022 11:39:05 EDT by Dawood Duvall

## 2022-04-20 ENCOUNTER — HOSPITAL ENCOUNTER (OUTPATIENT)
Dept: HOSPITAL 5 - ED | Age: 67
Setting detail: OBSERVATION
LOS: 1 days | Discharge: HOME | End: 2022-04-21
Attending: STUDENT IN AN ORGANIZED HEALTH CARE EDUCATION/TRAINING PROGRAM | Admitting: INTERNAL MEDICINE
Payer: MEDICARE

## 2022-04-20 DIAGNOSIS — I50.33: ICD-10-CM

## 2022-04-20 DIAGNOSIS — Z95.1: ICD-10-CM

## 2022-04-20 DIAGNOSIS — Z98.890: ICD-10-CM

## 2022-04-20 DIAGNOSIS — E78.2: ICD-10-CM

## 2022-04-20 DIAGNOSIS — R07.89: ICD-10-CM

## 2022-04-20 DIAGNOSIS — Z79.899: ICD-10-CM

## 2022-04-20 DIAGNOSIS — I26.99: ICD-10-CM

## 2022-04-20 DIAGNOSIS — Z99.2: ICD-10-CM

## 2022-04-20 DIAGNOSIS — I48.11: ICD-10-CM

## 2022-04-20 DIAGNOSIS — Z96.659: ICD-10-CM

## 2022-04-20 DIAGNOSIS — N18.6: ICD-10-CM

## 2022-04-20 DIAGNOSIS — J96.01: Primary | ICD-10-CM

## 2022-04-20 DIAGNOSIS — E11.22: ICD-10-CM

## 2022-04-20 DIAGNOSIS — E78.5: ICD-10-CM

## 2022-04-20 DIAGNOSIS — I13.2: ICD-10-CM

## 2022-04-20 DIAGNOSIS — F31.89: ICD-10-CM

## 2022-04-20 DIAGNOSIS — J44.9: ICD-10-CM

## 2022-04-20 LAB
ALBUMIN SERPL-MCNC: 5 G/DL (ref 3.9–5)
ALT SERPL-CCNC: 10 UNITS/L (ref 7–56)
APTT BLD: 39.6 SEC. (ref 24.2–36.6)
APTT BLD: 41.1 SEC. (ref 24.2–36.6)
BASOPHILS # (AUTO): 0.1 K/MM3 (ref 0–0.1)
BASOPHILS NFR BLD AUTO: 0.6 % (ref 0–1.8)
BILIRUB DIRECT SERPL-MCNC: 0.2 MG/DL (ref 0–0.2)
BUN SERPL-MCNC: 23 MG/DL (ref 7–17)
BUN/CREAT SERPL: 7 %
CALCIUM SERPL-MCNC: 10.3 MG/DL (ref 8.4–10.2)
EOSINOPHIL # BLD AUTO: 0.2 K/MM3 (ref 0–0.4)
EOSINOPHIL NFR BLD AUTO: 2.4 % (ref 0–4.3)
HCT VFR BLD CALC: 41.6 % (ref 30.3–42.9)
HCT VFR BLD CALC: 42 % (ref 30.3–42.9)
HEMOLYSIS INDEX: 4
HGB BLD-MCNC: 13.6 GM/DL (ref 10.1–14.3)
HGB BLD-MCNC: 13.7 GM/DL (ref 10.1–14.3)
INR PPP: 1.04 (ref 0.87–1.13)
INR PPP: 1.07 (ref 0.87–1.13)
LYMPHOCYTES # BLD AUTO: 1.5 K/MM3 (ref 1.2–5.4)
LYMPHOCYTES NFR BLD AUTO: 16 % (ref 13.4–35)
MCHC RBC AUTO-ENTMCNC: 33 % (ref 30–34)
MCHC RBC AUTO-ENTMCNC: 33 % (ref 30–34)
MCV RBC AUTO: 96 FL (ref 79–97)
MCV RBC AUTO: 97 FL (ref 79–97)
MONOCYTES # (AUTO): 0.6 K/MM3 (ref 0–0.8)
MONOCYTES % (AUTO): 6.1 % (ref 0–7.3)
PLATELET # BLD: 172 K/MM3 (ref 140–440)
PLATELET # BLD: 179 K/MM3 (ref 140–440)
RBC # BLD AUTO: 4.34 M/MM3 (ref 3.65–5.03)
RBC # BLD AUTO: 4.35 M/MM3 (ref 3.65–5.03)

## 2022-04-20 PROCEDURE — 96375 TX/PRO/DX INJ NEW DRUG ADDON: CPT

## 2022-04-20 PROCEDURE — 93005 ELECTROCARDIOGRAM TRACING: CPT

## 2022-04-20 PROCEDURE — 96376 TX/PRO/DX INJ SAME DRUG ADON: CPT

## 2022-04-20 PROCEDURE — 84484 ASSAY OF TROPONIN QUANT: CPT

## 2022-04-20 PROCEDURE — 96374 THER/PROPH/DIAG INJ IV PUSH: CPT

## 2022-04-20 PROCEDURE — 36415 COLL VENOUS BLD VENIPUNCTURE: CPT

## 2022-04-20 PROCEDURE — 71045 X-RAY EXAM CHEST 1 VIEW: CPT

## 2022-04-20 PROCEDURE — 80076 HEPATIC FUNCTION PANEL: CPT

## 2022-04-20 PROCEDURE — 85379 FIBRIN DEGRADATION QUANT: CPT

## 2022-04-20 PROCEDURE — 85730 THROMBOPLASTIN TIME PARTIAL: CPT

## 2022-04-20 PROCEDURE — 80048 BASIC METABOLIC PNL TOTAL CA: CPT

## 2022-04-20 PROCEDURE — 85027 COMPLETE CBC AUTOMATED: CPT

## 2022-04-20 PROCEDURE — 94760 N-INVAS EAR/PLS OXIMETRY 1: CPT

## 2022-04-20 PROCEDURE — 99291 CRITICAL CARE FIRST HOUR: CPT

## 2022-04-20 PROCEDURE — A9540 TC99M MAA: HCPCS

## 2022-04-20 PROCEDURE — 82565 ASSAY OF CREATININE: CPT

## 2022-04-20 PROCEDURE — 83690 ASSAY OF LIPASE: CPT

## 2022-04-20 PROCEDURE — 85025 COMPLETE CBC W/AUTO DIFF WBC: CPT

## 2022-04-20 PROCEDURE — G0378 HOSPITAL OBSERVATION PER HR: HCPCS

## 2022-04-20 PROCEDURE — 80074 ACUTE HEPATITIS PANEL: CPT

## 2022-04-20 PROCEDURE — 78580 LUNG PERFUSION IMAGING: CPT

## 2022-04-20 PROCEDURE — 85610 PROTHROMBIN TIME: CPT

## 2022-04-20 RX ADMIN — OXYCODONE AND ACETAMINOPHEN PRN TAB: 5; 325 TABLET ORAL at 19:40

## 2022-04-20 RX ADMIN — APIXABAN SCH MG: 5 TABLET, FILM COATED ORAL at 18:10

## 2022-04-20 RX ADMIN — AMIODARONE HYDROCHLORIDE SCH MG: 200 TABLET ORAL at 21:15

## 2022-04-20 RX ADMIN — MORPHINE SULFATE PRN MG: 4 INJECTION, SOLUTION INTRAMUSCULAR; INTRAVENOUS at 18:11

## 2022-04-20 RX ADMIN — METOPROLOL TARTRATE SCH MG: 50 TABLET, FILM COATED ORAL at 21:14

## 2022-04-20 RX ADMIN — APIXABAN SCH MG: 5 TABLET, FILM COATED ORAL at 21:18

## 2022-04-20 NOTE — HISTORY AND PHYSICAL REPORT
History of Present Illness


Chief complaint: 





My chest hurts when I breathe


History of present illness: 


65 YO Female with ESRD on HD(T,R,Sa), HTN, CVA, MI, CHF, Nicotine Dependence,  

Atrial Fib on Renal Dose Therapeutic Anticoagulation, DM, CKD, COPD, HLD, 

Bipolar Disorder, CAD S/P Stent Placement presents to ED for evaluation.  

Patient reports "my chest hurts when I breathe".  Patient states that she 

experienced a sudden onset of chest pain and tightness today.  Patient states 

that the level of her pain was 8/10, constant, associate with shortness of 

breath, not worsened with exertion, not relieved with rest, worsened with deep 

breathing, radiates to the back, mild relief with nitro.  Patient acknowledges 

decreased exercise tolerance, dyspnea on exertion, as well as dyspnea at rest.  

EMS notified and upon arrival the patient was found to be in distress and 

subsequent transported to [facility] for further care and evaluation of the 

aforementioned symptoms.  Patient was seen and evaluated in the emergency 

department.  All lab and imaging studies reviewed.  Patient found to have pulse 

oximetry of 86% on room air with exertion which is consistent with acute 

hypoxemic respiratory failure, as well as clinical symptoms consistent with 

acute diastolic congestive heart failure, and a systolic blood pressure in the 

190s which is consistent with hypertensive urgency, end-stage renal disease, 

fluid overload.  Patient underwent VQ scan and was found to have findings 

consistent with pulmonary embolism.  Patient admitted to telemetry due to 

increased risk of cardiac decompensation and for medical stabilization.  Patient

initiated on therapeutic anticoagulation with heparin in the emergency 

department as well as noninvasive positive pressure ventilation due to only mild

improvement in symptoms with supplemental oxygen.  Nephrology team consulted in 

ED for urgent dialysis.  Cardiology team consulted in ED.  Pt denies fever, 

chills, productive cough, unilateral leg swelling, calf pain, NVD, syncope, 

dizziness, or recent ill contacts, syncope, known exposure to COVID-19.  Prior 

admission on 4/7/2022 reviewed.  All medication listed at time of admission has 

been reconciled.  Advanced care planning conducted in ED.











Past History


Past Medical History: acute MI, atrial fib, COPD, diabetes, heart failure, 

hypertension, stroke, other (See HPI)


Past Surgical History: total knee replacement, Other (Pacemaker placement, 

cardiac stent placement)


Social history: .  denies: smoking, alcohol abuse, prescription drug 

abuse


Family history: diabetes, hypertension





Medications and Allergies


                                    Allergies











Allergy/AdvReac Type Severity Reaction Status Date / Time


 


apple AdvReac  Hives Verified 04/20/22 09:44


 


aspirin AdvReac  Unknown Verified 04/20/22 09:44


 


shell fish Allergy  Swelling Uncoded 04/20/22 09:44











                                Home Medications











 Medication  Instructions  Recorded  Confirmed  Last Taken  Type


 


Quetiapine Fumarate [Seroquel] 100 mg PO BID 09/22/17 03/30/22 03/29/22 22:00 

History


 


Albuterol Mdi (or & Nicu Only) 2 puff IH QID PRN #1 inha 02/12/18 03/30/22 06/01/20 Rx





[ProAir HFA Inhaler]     


 


Metoprolol [Lopressor TAB] 50 mg PO BID #60 tablet 07/20/20 03/30/22 03/29/22 

22:00 Rx


 


Amiodarone [Cordarone 200 MG TAB] 200 mg PO BID #60 tablet 03/15/22 03/30/22 

03/29/22 22:00 Rx


 


Aspirin EC [Halfprin EC] 81 mg PO QDAY #30 tablet 03/15/22 03/30/22 03/29/22 

09:00 Rx


 


Apixaban [Eliquis] 2.5 mg PO Q12HR #60 tablet 03/30/22  Unknown Rx


 


Hydralazine HCl 50 mg PO TID 03/30/22 03/30/22 03/29/22 09:00 History


 


cloNIDine [Catapres] 0.1 mg PO BID 03/30/22 03/30/22 03/29/22 09:00 History











Active Meds: 


Active Medications





Heparin Sodium/Sodium Chloride (Heparin/ 0.45% Nacl-25,000 Unit/500 Ml)  25,000 

unit in 500 mls @ 16.329 mls/hr IV TITR DERIK; Protocol











Review of Systems


Constitutional: no weight loss, no weight gain, no fever, no chills


Ears, nose, mouth and throat: no ear pain, no ear discharge, no tinnitis, no de

creased hearing, no nose pain, no nasal discharge


Breasts: no change in shape


Cardiovascular: chest pain, shortness of breath, dyspnea on exertion, high blood

 pressure, decreased exercise tolerance


Respiratory: shortness of breath, pleurisy


Gastrointestinal: no abdominal pain, no nausea, no vomiting, no diarrhea


Genitourinary Female: no pelvic pain, no flank pain, no dysuria, no urinary 

frequency, no urgency


Rectal: no pain, no incontinence, no bleeding


Musculoskeletal: no neck stiffness, no neck pain, no shooting arm pain, no arm 

numbness/tingling, no low back pain, no shooting leg pain


Integumentary: no rash, no pruritis, no redness, no sores, no jaundice, no 

boils, no blisters


Neurological: no head injury, no paralysis, no weakness, no numbness, no 

syncope, no tremors


Psychiatric: no anxiety, no change in sleep habits, no insomnia, no change in 

appetite, no change in libido, no disorientation


Endocrine: no cold intolerance, no polyphagia, no polyuria, no weight change


Hematologic/Lymphatic: no easy bruising, no easy bleeding


Allergic/Immunologic: no allergic rhinitis, no wheezing





Exam





- Constitutional


Vitals: 


                                        











Temp Pulse Resp BP Pulse Ox


 


    120 H     190/100   98 


 


    04/20/22 09:41     04/20/22 09:41  04/20/22 10:01











General appearance: Present: mild distress





- EENT


Eyes: Present: PERRL


ENT: clear oral mucosa, hearing decreased





- Neck


Neck: Present: supple, normal ROM, masses or JVD





- Respiratory


Respiratory effort: labored, accessory muscle use


Respiratory: bilateral: diminished, rales





- Cardiovascular


Heart Sounds: Present: S1 & S2.  Absent: rub, click





- Extremities


Extremity abnormal: edema


Peripheral Pulses: within normal limits





- Abdominal


General gastrointestinal: Present: soft, non-tender, non-distended, normal bowel

 sounds


Female genitourinary: Present: normal





- Integumentary


Integumentary: Present: clear, warm, dry





- Musculoskeletal


Musculoskeletal: generalized weakness





- Psychiatric


Psychiatric: appropriate mood/affect, cooperative





- Neurologic


Neurologic: CNII-XII intact





HEART Score





- HEART Score


EKG: Non-specific


Age: > 65


Risk factors: > 3 risk factors or hx of atherosclerotic disease


Troponin: 


                                        











Troponin T  0.016 ng/mL (0.00-0.029)   04/20/22  12:25    











Troponin: < normal limit





Results





- Labs


CBC & Chem 7: 


                                 04/20/22 10:20





                                 04/20/22 10:20


Labs: 


                              Abnormal lab results











  04/20/22 04/20/22 04/20/22 Range/Units





  10:20 10:20 10:20 


 


RDW  18.6 H    (13.2-15.2)  %


 


Seg Neutrophils %  74.9 H    (40.0-70.0)  %


 


APTT    39.6 H  (24.2-36.6)  Sec.


 


D-Dimer    921.97 H  (0-234)  ng/mlDDU


 


Chloride   97.9 L   ()  mmol/L


 


BUN   23 H   (7-17)  mg/dL


 


Creatinine   3.1 H   (0.6-1.2)  mg/dL


 


Calcium   10.3 H   (8.4-10.2)  mg/dL














Assessment and Plan





- Patient Problems


(1) Acute hypoxemic respiratory failure


Current Visit: Yes   Status: Acute   


Plan to address problem: 


Supplemental oxygen, pulse oximetry, nebulizer therapy, continue noninvasive 

positive pressure ventilation as clinically indicated, pulmonary toilet, 

incentive spirometry, chest x-ray, VQ scan








(2) CHF (congestive heart failure)


Current Visit: Yes   Status: Acute   


Qualifiers: 


   Heart failure type: diastolic   Heart failure chronicity: acute on chronic   

Qualified Code(s): I50.33 - Acute on chronic diastolic (congestive) heart 

failure   


Plan to address problem: 


Strict I's/O, monitor urine output every shift, daily weight, afterload 

reduction, blood pressure control, diuresis, cardiology team consulted, 

supplemental oxygen, pulse oximetry.








(3) Acute pulmonary embolism


Current Visit: Yes   Status: Acute   


Qualifiers: 


   Pulmonary embolism type: unspecified 


Plan to address problem: 


Patient initiated on renal dosed therapeutic anticoagulation for pulmonary 

embolism, supportive care.  Continue medical management.








(4) End-stage renal disease on hemodialysis


Current Visit: Yes   Status: Acute   


Plan to address problem: 


Nephrology team consulted in ED, dialysis as per renal team.  Strict I/O, 

monitor urine output every shift, monitor fluid balance, avoid nephrotoxic 

agents.








(5) Malignant hypertension


Current Visit: No   Status: Acute   


Plan to address problem: 


Monitor blood pressure every shift, continue medical management, continue 

prehospital antihypertensive therapy, hydralazine 10 mg IV every 6 hours as 

needed for systolic blood pressure greater than or equal to 155 mmHg.








(6) Atrial fibrillation


Current Visit: No   Status: Chronic   


Qualifiers: 


   Atrial fibrillation type: longstanding persistent   Qualified Code(s): I48.11

 - Longstanding persistent atrial fibrillation   


Plan to address problem: 


Continue therapeutic anticoagulation, rate control, supportive care.








(7) Diabetes


Current Visit: Yes   Status: Acute   


Plan to address problem: 


Consistent carbohydrate diet, Accu-Chek, insulin protocol, hypoglycemia protocol

 








(8) COPD (chronic obstructive pulmonary disease)


Current Visit: Yes   Status: Acute   


Plan to address problem: 


Supplemental oxygen, pulse oximetry, supportive care, continue medical 

management.  No acute decompensation at this time.








(9) Bipolar 1 disorder


Current Visit: No   Status: Chronic   


Plan to address problem: 


Supportive care, outpatient psychiatry follow-up.








(10) HLD (hyperlipidemia)


Current Visit: No   Status: Chronic   


Qualifiers: 


   Hyperlipidemia type: mixed hyperlipidemia   Qualified Code(s): E78.2 - Mixed 

hyperlipidemia   


Plan to address problem: 


Low-cholesterol diet, statin therapy as clinically indicated.








(11) DVT prophylaxis


Current Visit: No   Status: Acute   


Plan to address problem: 


SCDs bilateral lower extremities while in bed, continue therapeutic 

anticoagulation








(12) Advance care planning


Current Visit: Yes   Status: Acute   


Plan to address problem: 


Disease education conducted, care plan discussed, diagnoses discussed, prognosis

 discussed, patient full code.  Patient acknowledges understanding and agreement

 with care plan, +30 minutes.

## 2022-04-20 NOTE — NUCLEAR MEDICINE REPORT
NUCLEAR MEDICINE PERFUSION LUNG SCAN



INDICATION / CLINICAL INFORMATION: CHEST PAIN,SOB,.



TECHNIQUE: 5.5 mCi of Tc-99m MAA were given by IV.



COMPARISON: Chest radiograph dated 4/20/2022.



FINDINGS:



PERFUSION: There are several peripheral wedge-shaped defects.



ADDITIONAL FINDINGS: None.



IMPRESSION:

1. High probability for pulmonary embolism.



Signer Name: Ced Bruce DO 

Signed: 4/20/2022 2:27 PM

Workstation Name: [id number]

## 2022-04-20 NOTE — XRAY REPORT
CHEST 1 VIEW



INDICATION / CLINICAL INFORMATION: Chest Pain.



COMPARISON: 4/7/2022



FINDINGS:



SUPPORT DEVICES: Unchanged.

HEART / MEDIASTINUM: No significant abnormality. 

LUNGS / PLEURA: No significant pulmonary or pleural abnormality. No pneumothorax. 



ADDITIONAL FINDINGS: No significant additional findings.



IMPRESSION:

1.  No acute findings.



Signer Name: Kaz Luz MD 

Signed: 4/20/2022 10:06 AM

Workstation Name: [email or web address]

## 2022-04-20 NOTE — EMERGENCY DEPARTMENT REPORT
ED Chest Pain HPI





- General


Chief Complaint: Chest Pain


Stated Complaint: CHEST PAIN


Time Seen by Provider: 04/20/22 09:50


Source: patient, EMS


Mode of arrival: Stretcher


Limitations: No Limitations





- History of Present Illness


Initial Comments: 





Patient is 66-year-old female with history of end-stage renal disease on 

hemodialysis, coronary artery disease and hypertension.  Patient brought to the 

emergency room via EMS from home for evaluation of sudden onset of left-sided 

chest pain, pressure with radiation to the back.  Patient stated that this is 

similar to when she had a heart attack and a stent 1 year ago.  Patient states 

that she took nitroglycerin and it helped a little bit with the pain.  Patient 

denied any fever or chills.  No cough.  EMS stated that patient oxygen 

saturation initially was 89% on room air improved to 100% on 2 L.


MD Complaint: chest pain


-: Sudden, hour(s) (1)


Onset: during rest


Pain Location: substernal


Pain Radiation: back


Severity: severe


Severity scale (0 -10): 8


Quality: tightness, heaviness


Consistency: constant





- Related Data


                                Home Medications











 Medication  Instructions  Recorded  Confirmed  Last Taken


 


Quetiapine Fumarate [Seroquel] 100 mg PO BID 09/22/17 03/30/22 03/29/22 22:00


 


Hydralazine HCl 50 mg PO TID 03/30/22 03/30/22 03/29/22 09:00


 


cloNIDine [Catapres] 0.1 mg PO BID 03/30/22 03/30/22 03/29/22 09:00








                                  Previous Rx's











 Medication  Instructions  Recorded  Last Taken  Type


 


Albuterol Mdi (or & Nicu Only) 2 puff IH QID PRN #1 inha 02/12/18 06/01/20 Rx





[ProAir HFA Inhaler]    


 


Metoprolol [Lopressor TAB] 50 mg PO BID #60 tablet 07/20/20 03/29/22 22:00 Rx


 


Amiodarone [Cordarone 200 MG TAB] 200 mg PO BID #60 tablet 03/15/22 03/29/22 

22:00 Rx


 


Aspirin EC [Halfprin EC] 81 mg PO QDAY #30 tablet 03/15/22 03/29/22 09:00 Rx


 


Apixaban [Eliquis] 5 mg PO Q12HR #60 tablet 04/21/22 Unknown Rx











                                    Allergies











Allergy/AdvReac Type Severity Reaction Status Date / Time


 


apple AdvReac  Hives Verified 04/20/22 09:44


 


aspirin AdvReac  Unknown Verified 04/20/22 09:44


 


shell fish Allergy  Swelling Uncoded 04/20/22 09:44














Heart Score





- HEART Score


History: Moderately suspicious


EKG: Non-specific


Age: > 65


Risk factors: > 3 risk factors or hx of atherosclerotic disease


Troponin: < normal limit


HEART Score: 6





- EKG Read Time


Time EKG Completed: 09:15


EKG Read Time: 09:15





- Critical Actions


Critical Actions: 4-6 pts:12-16.6% risk of adverse cardiac event. Should be 

admitted





ED Review of Systems


ROS: 


Stated complaint: CHEST PAIN


Other details as noted in HPI





Comment: All other systems reviewed and negative


Constitutional: denies: chills, fever


Respiratory: denies: cough, shortness of breath, SOB with exertion


Cardiovascular: chest pain, palpitations


Gastrointestinal: denies: abdominal pain, nausea, vomiting, diarrhea, 

constipation, hematemesis, melena, hematochezia


Musculoskeletal: denies: back pain


Neurological: denies: headache, weakness, numbness, paresthesias, confusion





ED Past Medical Hx





- Past Medical History


Hx Hypertension: Yes


Hx CVA: Yes (right sided weakness)


Hx Heart Attack/AMI: Yes (1 stent)


Hx Congestive Heart Failure: Yes


Hx Diabetes: Yes


Hx Renal Disease: Yes (RENAL INSUFFICIENCY- stent left kidney)


Hx Psychiatric Treatment: Yes (BIPOLAR/SCHIZOPHRENIA)


Hx COPD: Yes


Additional medical history: HIGH CHOLESTEROL





- Surgical History


Hx Coronary Stent: Yes


Hx Pacemaker: Yes


Additional Surgical History: stent placement x 2 in 2015, knee surgery 01/2018





- Social History


Smoking Status: Never Smoker





- Medications


Home Medications: 


                                Home Medications











 Medication  Instructions  Recorded  Confirmed  Last Taken  Type


 


Quetiapine Fumarate [Seroquel] 100 mg PO BID 09/22/17 03/30/22 03/29/22 22:00 

History


 


Albuterol Mdi (or & Nicu Only) 2 puff IH QID PRN #1 inha 02/12/18 03/30/22 06/01/20 Rx





[ProAir HFA Inhaler]     


 


Metoprolol [Lopressor TAB] 50 mg PO BID #60 tablet 07/20/20 03/30/22 03/29/22 

22:00 Rx


 


Amiodarone [Cordarone 200 MG TAB] 200 mg PO BID #60 tablet 03/15/22 03/30/22 

03/29/22 22:00 Rx


 


Aspirin EC [Halfprin EC] 81 mg PO QDAY #30 tablet 03/15/22 03/30/22 03/29/22 

09:00 Rx


 


Hydralazine HCl 50 mg PO TID 03/30/22 03/30/22 03/29/22 09:00 History


 


cloNIDine [Catapres] 0.1 mg PO BID 03/30/22 03/30/22 03/29/22 09:00 History


 


Apixaban [Eliquis] 5 mg PO Q12HR #60 tablet 04/21/22  Unknown Rx














ED Physical Exam





- General


Limitations: No Limitations


General appearance: alert, in distress





- Head


Head exam: Present: atraumatic, normocephalic, normal inspection





- Eye


Eye exam: Present: normal appearance





- ENT


ENT exam: Present: normal exam, normal orophraynx, mucous membranes moist





- Neck


Neck exam: Present: normal inspection, full ROM.  Absent: tenderness, 

meningismus, lymphadenopathy, thyromegaly





- Respiratory


Respiratory exam: Present: normal lung sounds bilaterally





- Cardiovascular


Cardiovascular Exam: Present: tachycardia





- GI/Abdominal


GI/Abdominal exam: Present: soft, normal bowel sounds.  Absent: distended, ten

derness, guarding, rebound, rigid, organomegaly, mass, bruit, pulsatile mass, 

hernia





- Extremities Exam


Extremities exam: Present: normal inspection, full ROM, normal capillary refill.

  Absent: tenderness, pedal edema, joint swelling, calf tenderness





- Back Exam


Back exam: Present: normal inspection, full ROM.  Absent: CVA tenderness (R), 

CVA tenderness (L)





- Neurological Exam


Neurological exam: Present: alert, oriented X3, CN II-XII intact, normal gait, 

reflexes normal





- Psychiatric


Psychiatric exam: Present: normal mood





- Skin


Skin exam: Present: warm, intact, normal color





ED Course


                                   Vital Signs











  04/20/22 04/20/22





  09:41 10:01


 


Pulse Rate 120 H 


 


Blood Pressure 190/100 





[Left]  


 


O2 Sat by Pulse 89 98





Oximetry  














- Reevaluation(s)


Reevaluation #1: 





04/20/22 14:12


Patient stated that chest pain improved a little bit with morphine however came 

back again.





- Consultations


Consultation #1: 





04/20/22 14:10


I discussed the patient with Dr. Bacon, patient cardiologist.  He advised admit 

the patient to hospitalist for further management.





PANCHO score





- Pancho Score


Age > 65: (1) Yes


Aspirin use within the Past 7 Days: (0) No


3 or more CAD Risk Factors: (1) Yes


2 or more Angina events in past 24 hrs: (1) Yes


Known CAD with more than 50% Stenosis: (1) Yes


Elevated Cardiac Markers: (0) No


ST Deviation Greater than 0.5mm: (0) No


PANCHO Score: 4





ED Medical Decision Making





- Lab Data


Result diagrams: 


                                 04/21/22 06:03





                                 04/21/22 06:03





- EKG Data


-: EKG Interpreted by Me


Rate: tachycardia





- EKG Data





04/20/22 14:44


Atrial flutter with a heart rate of 119.





- Radiology Data


Radiology results: report reviewed





- Medical Decision Making





Patient is 66-year-old female with history of end-stage renal disease on 

hemodialysis, coronary artery disease and hypertension.  Patient brought to the 

emergency room via EMS from home for evaluation of sudden onset of left-sided 

chest pain, pressure with radiation to the back.  Patient stated that this is 

similar to when she had a heart attack and a stent 1 year ago.  Patient states 

that she took nitroglycerin and it helped a little bit with the pain.  Patient 

denied any fever or chills.  No cough.  EMS stated that patient oxygen 

saturation initially was 89% on room air improved to 100% on 2 L.





EKG showed atrial flutter with heart rate of 119.  Chest x-ray is unremarkable. 

 Labs reviewed and showed elevated D-dimer.  Patient's symptoms concerning for 

pulmonary embolism.  VQ scan showed high probability for PE.  Patient received 

heparin bolus and started on heparin drip.  I discussed the patient with Dr. Adams, he agreed to admit the patient to medical service for further 

management.





Critical Care Time: Yes


Critical care time in (mins) excluding proc time.: 35


Critical care attestation.: 


If time is entered above; I have spent that time in minutes in the direct care 

of this critically ill patient, excluding procedure time.








ED Disposition


Clinical Impression: 


 Unstable angina, End-stage renal disease on hemodialysis, Acute respiratory 

failure with hypoxemia





Acute pulmonary embolism


Qualifiers:


 Pulmonary embolism type: unspecified 





Disposition: 09 ADMITTED AS INPATIENT


Is pt being admited?: Yes


Condition: Stable

## 2022-04-20 NOTE — CONSULTATION
History of Present Illness


Consult date: 04/20/22


Consult reason: chest pain


History of present illness: 





Patient is a 66-year-old woman admitted with complaints of nonexertional chest 

pain.  She presented to the emergency room and was evaluated and referred for 

admission.  On presentation, her ECG on my review showed atrial flutter with 

variable AV conduction.  Currently on the telemetry floor, she is back in a 

stable sinus rhythm at 85, and her symptoms have resolved.  She has a history of

paroxysmal atrial flutter fibrillation, on rhythm control therapy, and oral 

anticoagulation with Eliquis.  On questioning, she states that her symptoms are 

reminiscent of a previous admission to [facility] in September 2020, for rapid atrial fibrillation.





Comorbidities include end-stage renal disease on hemodialysis, and coronary 

artery disease with previous coronary stents to the LAD.  3 years ago, a repeat 

cardiac catheterization showed wide patency of the proximal, mid and distal 

vessel LAD stents, with no significant residual disease in other coronary 

segments.  Left ventricular systolic function has been well preserved, 50 to 65%

on serial function assessments.





Past History


Past Medical History: atrial fib, CAD, ESRD, hypertension


Past Surgical History: PTCA





Medications and Allergies


                                    Allergies











Allergy/AdvReac Type Severity Reaction Status Date / Time


 


apple AdvReac  Hives Verified 04/20/22 09:44


 


aspirin AdvReac  Unknown Verified 04/20/22 09:44


 


shell fish Allergy  Swelling Uncoded 04/20/22 09:44











                                Home Medications











 Medication  Instructions  Recorded  Confirmed  Last Taken  Type


 


Quetiapine Fumarate [Seroquel] 100 mg PO BID 09/22/17 03/30/22 03/29/22 22:00 

History


 


Albuterol Mdi (or & Nicu Only) 2 puff IH QID PRN #1 inha 02/12/18 03/30/22 06/01/20 Rx





[ProAir HFA Inhaler]     


 


Metoprolol [Lopressor TAB] 50 mg PO BID #60 tablet 07/20/20 03/30/22 03/29/22 

22:00 Rx


 


Amiodarone [Cordarone 200 MG TAB] 200 mg PO BID #60 tablet 03/15/22 03/30/22 

03/29/22 22:00 Rx


 


Aspirin EC [Halfprin EC] 81 mg PO QDAY #30 tablet 03/15/22 03/30/22 03/29/22 

09:00 Rx


 


Apixaban [Eliquis] 2.5 mg PO Q12HR #60 tablet 03/30/22  Unknown Rx


 


Hydralazine HCl 50 mg PO TID 03/30/22 03/30/22 03/29/22 09:00 History


 


cloNIDine [Catapres] 0.1 mg PO BID 03/30/22 03/30/22 03/29/22 09:00 History











Active Meds: 


Active Medications





Acetaminophen (Acetaminophen 325 Mg Tab)  650 mg PO Q4H PRN


   PRN Reason: Pain MILD(1-3)/Fever >100.5/HA


Albuterol (Albuterol 2.5 Mg/3 Ml Nebu)  2.5 mg IH Q4HRT PRN


   PRN Reason: Shortness Of Breath


Amiodarone HCl (Amiodarone 200 Mg Tab)  200 mg PO BID [facility]


Apixaban (Apixaban 5 Mg Tab)  5 mg PO Q12HR [facility]; Protocol


Aspirin (Aspirin Ec 81 Mg Tab)  81 mg PO QDAY [facility]


Clonidine HCl (Clonidine 0.1 Mg Tab)  0.1 mg PO BID [facility]


Hydralazine HCl (Hydralazine 25 Mg Tab)  50 mg PO Q8HR [facility]


Metoprolol Tartrate (Metoprolol Tartrate 50 Mg Tab)  50 mg PO BID [facility]


Morphine Sulfate (Morphine 4 Mg/1 Ml Inj)  2 mg IV Q8H PRN


   PRN Reason: Pain , Severe (7-10)


Ondansetron HCl (Ondansetron 4 Mg/2 Ml Inj)  4 mg IV Q8H PRN


   PRN Reason: Nausea And Vomiting


Oxycodone/Acetaminophen (Oxycodone /Acetaminophen 5-325mg Tab)  1 tab PO Q6H PRN


   PRN Reason: Pain, Moderate (4-6)


Quetiapine Fumarate (Quetiapine 100 Mg Tab)  100 mg PO BID [facility]


Sodium Chloride (Sodium Chloride 0.9% 10 Ml Flush Syringe)  10 ml IV BID [facility]


Sodium Chloride (Sodium Chloride 0.9% 10 Ml Flush Syringe)  10 ml IV PRN PRN


   PRN Reason: LINE FLUSH











Review of Systems


Cardiovascular: chest pain, rapid/irregular heart beat, shortness of breath, no 

orthopnea, no palpitations, no edema, no syncope, no lightheadedness





Physical Examination


                                   Vital Signs











Pulse BP Pulse Ox


 


 120 H  190/100   89 


 


 04/20/22 09:41  04/20/22 09:41  04/20/22 09:41











General appearance: no acute distress


HEENT: Positive: PERRL


Neck: Positive: neck supple


Cardiac: Positive: Reg Rate and Rhythm


Lungs: Positive: Decreased Breath Sounds


Neuro: Positive: Grossly Intact


Abdomen: Positive: Soft


Female genitourinary: deferred


Skin: Positive: Clear


Extremities: Absent: edema





Results





                                 04/20/22 10:20





                                 04/20/22 10:20


                                 Cardiac Enzymes











  04/20/22 Range/Units





  10:20 


 


AST  13  (5-40)  units/L








                                   Coagulation











  04/20/22 Range/Units





  10:20 


 


PT  14.8  (12.2-14.9)  Sec.


 


INR  1.04  (0.87-1.13)  


 


APTT  39.6 H  (24.2-36.6)  Sec.








                                       CBC











  04/20/22 Range/Units





  10:20 


 


WBC  9.1  (4.5-11.0)  K/mm3


 


RBC  4.34  (3.65-5.03)  M/mm3


 


Hgb  13.6  (10.1-14.3)  gm/dl


 


Hct  41.6  (30.3-42.9)  %


 


Plt Count  172  (140-440)  K/mm3


 


Lymph # (Auto)  1.5  (1.2-5.4)  K/mm3


 


Mono # (Auto)  0.6  (0.0-0.8)  K/mm3


 


Eos # (Auto)  0.2  (0.0-0.4)  K/mm3


 


Baso # (Auto)  0.1  (0.0-0.1)  K/mm3








                          Comprehensive Metabolic Panel











  04/20/22 04/20/22 Range/Units





  10:20 10:20 


 


Sodium  141   (137-145)  mmol/L


 


Potassium  3.9   (3.6-5.0)  mmol/L


 


Chloride  97.9 L   ()  mmol/L


 


Carbon Dioxide  23   (22-30)  mmol/L


 


BUN  23 H   (7-17)  mg/dL


 


Creatinine  3.1 H   (0.6-1.2)  mg/dL


 


Glucose  80   ()  mg/dL


 


Calcium  10.3 H   (8.4-10.2)  mg/dL


 


Direct Bilirubin   0.2  (0-0.2)  mg/dL


 


Indirect Bilirubin   0.8  mg/dL


 


AST   13  (5-40)  units/L


 


ALT   10  (7-56)  units/L


 


Alkaline Phosphatase   92  ()  units/L


 


Total Protein   7.0  (6.3-8.2)  g/dL


 


Albumin   5.0  (3.9-5)  g/dL














EKG interpretations





- Telemetry


EKG Rhythm: Atrial Flutter (With variable AV conduction)





Assessment and Plan





66-year-old woman who presents with chest pain and atrial flutter.  Likely that 

her chest pain is the presentation of her atrial arrhythmia, but considering 

history of coronary disease, we will proceed with ischemic assessment with a 

Lexiscan thallium stress test.





Otherwise, continue rhythm control with amiodarone, and oral anticoagulation 

with Eliquis.

## 2022-04-21 VITALS — DIASTOLIC BLOOD PRESSURE: 68 MMHG | SYSTOLIC BLOOD PRESSURE: 129 MMHG

## 2022-04-21 LAB
BASOPHILS # (AUTO): 0.1 K/MM3 (ref 0–0.1)
BASOPHILS NFR BLD AUTO: 0.8 % (ref 0–1.8)
BUN SERPL-MCNC: 31 MG/DL (ref 7–17)
BUN/CREAT SERPL: 8 %
CALCIUM SERPL-MCNC: 8.4 MG/DL (ref 8.4–10.2)
EOSINOPHIL # BLD AUTO: 0.4 K/MM3 (ref 0–0.4)
EOSINOPHIL NFR BLD AUTO: 5.1 % (ref 0–4.3)
HCT VFR BLD CALC: 37.8 % (ref 30.3–42.9)
HEMOLYSIS INDEX: 46
HGB BLD-MCNC: 11.9 GM/DL (ref 10.1–14.3)
LYMPHOCYTES # BLD AUTO: 1.8 K/MM3 (ref 1.2–5.4)
LYMPHOCYTES NFR BLD AUTO: 21.8 % (ref 13.4–35)
MCHC RBC AUTO-ENTMCNC: 32 % (ref 30–34)
MCV RBC AUTO: 98 FL (ref 79–97)
MONOCYTES # (AUTO): 0.5 K/MM3 (ref 0–0.8)
MONOCYTES % (AUTO): 6.5 % (ref 0–7.3)
PLATELET # BLD: 147 K/MM3 (ref 140–440)
RBC # BLD AUTO: 3.87 M/MM3 (ref 3.65–5.03)

## 2022-04-21 RX ADMIN — MORPHINE SULFATE PRN MG: 4 INJECTION, SOLUTION INTRAMUSCULAR; INTRAVENOUS at 12:43

## 2022-04-21 RX ADMIN — METOPROLOL TARTRATE SCH MG: 50 TABLET, FILM COATED ORAL at 10:27

## 2022-04-21 RX ADMIN — AMIODARONE HYDROCHLORIDE SCH MG: 200 TABLET ORAL at 10:27

## 2022-04-21 RX ADMIN — OXYCODONE AND ACETAMINOPHEN PRN TAB: 5; 325 TABLET ORAL at 05:34

## 2022-04-21 NOTE — ELECTROCARDIOGRAPH REPORT
[facility]

                                       

Test Date:    2022               Test Time:    09:56:12

Pat Name:     LINDA GARCIA              Department:   

Patient ID:   [id number]          Room:         A485 1

Gender:       F                        Technician:   SINTIA

:          1955               Requested By: BRYAN GRAF

Order Number: [id number]              Reading MD:   Sarbjit Del Rosario

                                 Measurements

Intervals                              Axis          

Rate:         119                      P:            

AL:                                    QRS:          -12

QRSD:         92                       T:            79

QT:           345                                    

QTc:          500                                    

                           Interpretive Statements

Atrial flutter with 2:1 AV block

Left ventricular hypertrophy

Anterior Q waves,

Compared to ECG 2022 12:17:06

2:1 AV block now present

Left ventricular hypertrophy now present

Q waves now present

Atrial-paced complex(es) or rhythm no longer present

Myocardial infarct finding no longer present

Electronically Signed On 2022 10:30:53 EDT by Sarbjit Del Rosario

## 2022-04-21 NOTE — CONSULTATION
History of Present Illness





- Reason for Consult


Consult date: 04/21/22


end stage renal disease





- History of Present Illness


The patient is a 65 YO female known to our service with history of DM-2, HTN, 

HLD, Bipolar Disorder, CAD S/P Stent Placement, Paroxysmal atrial fibrillation, 

HFpEF, COPD, Anemia and ESRD on hemodialysis (TTS) who presented to [facility] ED 

4/20/22 with c/o CP.  Patient states that she experienced a sudden onset of 

chest pain and tightness that started yesterday.  Patient states that the pain 

was 8/10, constant, associated with shortness of breath, not worsened with 

exertion, worsened with deep breathing and radiates to the back.  Patient has 

some baseline dyspnea on exertion.  Patient denies any N, V, D, abd pain, 

dizziness, cough, weakness, fever, chills, rash, blurry vision, hematuria or 

hemoptysis.  CXR negative.  Patient was admitted for further evaluation.  Labs 

and imaging noted.  Nephrology was consulted for ESRD management.








Past History


Past Medical History: acute MI, atrial fib, COPD, diabetes, dialysis, ESRD, hea

rt failure, hypertension, stroke, other (See HPI)


Past Surgical History: total knee replacement, Other (Pacemaker placement, 

cardiac stent placement)


Social history: .  denies: smoking, alcohol abuse, prescription drug 

abuse


Family history: diabetes, hypertension





Medications and Allergies


                                    Allergies











Allergy/AdvReac Type Severity Reaction Status Date / Time


 


apple AdvReac  Hives Verified 04/20/22 09:44


 


aspirin AdvReac  Unknown Verified 04/20/22 09:44


 


shell fish Allergy  Swelling Uncoded 04/20/22 09:44











                                Home Medications











 Medication  Instructions  Recorded  Confirmed  Last Taken  Type


 


Quetiapine Fumarate [Seroquel] 100 mg PO BID 09/22/17 03/30/22 03/29/22 22:00 

History


 


Albuterol Mdi (or & Nicu Only) 2 puff IH QID PRN #1 inha 02/12/18 03/30/22 06/01/20 Rx





[ProAir HFA Inhaler]     


 


Metoprolol [Lopressor TAB] 50 mg PO BID #60 tablet 07/20/20 03/30/22 03/29/22 

22:00 Rx


 


Amiodarone [Cordarone 200 MG TAB] 200 mg PO BID #60 tablet 03/15/22 03/30/22 

03/29/22 22:00 Rx


 


Aspirin EC [Halfprin EC] 81 mg PO QDAY #30 tablet 03/15/22 03/30/22 03/29/22 

09:00 Rx


 


Hydralazine HCl 50 mg PO TID 03/30/22 03/30/22 03/29/22 09:00 History


 


cloNIDine [Catapres] 0.1 mg PO BID 03/30/22 03/30/22 03/29/22 09:00 History


 


Apixaban [Eliquis] 5 mg PO Q12HR #60 tablet 04/21/22  Unknown Rx











Active Meds: 


Active Medications





Acetaminophen (Acetaminophen 325 Mg Tab)  650 mg PO Q4H PRN


   PRN Reason: Pain MILD(1-3)/Fever >100.5/HA


Albuterol (Albuterol 2.5 Mg/3 Ml Nebu)  2.5 mg IH Q4HRT PRN


   PRN Reason: Shortness Of Breath


Amiodarone HCl (Amiodarone 200 Mg Tab)  200 mg PO BID [facility]


   Last Admin: 04/20/22 21:15 Dose:  200 mg


   


Apixaban (Apixaban 5 Mg Tab)  5 mg PO Q12HR [facility]; Protocol


Aspirin (Aspirin Ec 81 Mg Tab)  81 mg PO QDAY [facility]


Clonidine HCl (Clonidine 0.1 Mg Tab)  0.1 mg PO BID [facility]


   Last Admin: 04/20/22 21:14 Dose:  0.1 mg


   


Hydralazine HCl (Hydralazine 25 Mg Tab)  50 mg PO Q8HR [facility]


   Last Admin: 04/21/22 05:29 Dose:  50 mg


   


Sodium Chloride (Nacl 0.9%)  100 mls @ 999 mls/hr IV VANCE PRN


   PRN Reason: Hypotension


Metoprolol Tartrate (Metoprolol Tartrate 50 Mg Tab)  50 mg PO BID [facility]


   Last Admin: 04/20/22 21:14 Dose:  50 mg


   


Morphine Sulfate (Morphine 4 Mg/1 Ml Inj)  2 mg IV Q8H PRN


   PRN Reason: Pain , Severe (7-10)


   Last Admin: 04/20/22 18:11 Dose:  2 mg


   


Ondansetron HCl (Ondansetron 4 Mg/2 Ml Inj)  4 mg IV Q8H PRN


   PRN Reason: Nausea And Vomiting


Oxycodone/Acetaminophen (Oxycodone /Acetaminophen 5-325mg Tab)  1 tab PO Q6H PRN


   PRN Reason: Pain, Moderate (4-6)


   Last Admin: 04/21/22 05:34 Dose:  1 tab


   


Quetiapine Fumarate (Quetiapine 100 Mg Tab)  100 mg PO BID [facility]


   Last Admin: 04/20/22 21:27 Dose:  100 mg


   


Sodium Chloride (Sodium Chloride 0.9% 10 Ml Flush Syringe)  10 ml IV BID [facility]


   Last Admin: 04/20/22 21:18 Dose:  10 ml


   


Sodium Chloride (Sodium Chloride 0.9% 10 Ml Flush Syringe)  10 ml IV PRN PRN


   PRN Reason: LINE FLUSH











Review of Systems


All systems: negative





Exam





- Vital Signs


Vital signs: 


                                   Vital Signs











Pulse BP Pulse Ox


 


 120 H  190/100   89 


 


 04/20/22 09:41  04/20/22 09:41  04/20/22 09:41














Results





- Lab Results





                                 04/21/22 06:03





                                 04/21/22 06:03


                             Most recent lab results











Calcium  8.4 mg/dL (8.4-10.2)  D 04/21/22  06:03    














Assessment and Plan





1. ESRD:


Patient is on maintenance hemodialysis, TTS schedule.


Last outpatient HD 4/19.


Hemodialysis: today.





2. FEN:


UF with HD as tolerated.


Monitor lytes and volume status.





3. Chest pain //  H/o CAD s/p PCI:


Followed by Cards.





4. Acute PE, POA:


Eliquis.


Seen by Heme-Onc.





5. Chronic HFpEF:


Volume control thru HD.


Fluid restriction, monitor I/O.





6. H/o COPD:


Not in exacerbation.


Nebs and O2 as needed.





7. Paroxysmal A.fib:


Eliquis.


Monitor.





8. Hypertension:


Volume control with HD.


Continue home BP meds.


Monitor BP.





9. Normocytic Anemia, POA:


2/2 ESRD.


Epogen with HD as needed.


Monitor.











Subjective:


Patient was seen and examined at the bedside.











Examination:


General appearance: well-developed, appears stated age, no distress


HEENT: atraumatic, IVETTE


Neck: trachea midline


Respiratory: ctab


Heart: S1S2, no murmur


Abdomen: soft, bowel sounds heard, NT


Integumentary: no obvious rash


Neurologic: AO, able to move extremities


Ext: no edema


Hemodialysis access: R IJ tunnel catheter, R arm AVG

## 2022-04-21 NOTE — PROGRESS NOTE
Assessment and Plan





Chest pain: 


66-year-old woman who presents with chest pain and atrial flutter.  Chest pain 

is atypical, unlikely to be ischemic.  Lexiscan thallium stress test done last 

month was normal, no further ischemic assessment is indicated.  Continue 

guideline directed medical therapy for coronary artery disease and patent mid 

and distal LAD stents.





Pulmonary embolism: 


She has a positive pulmonary perfusion study, high probability for pulmonary 

embolism.  It will be noted that the patient is on Eliquis 2.5 mg on 

presentation, indicated for her paroxysmal atrial tachyarrhythmias.  We will 

defer to pulmonary service for assessment of this apparent failure of Eliquis, 

suggested remedies may include a switch to warfarin versus increase in dose of 

Eliquis to 5 mg.





Atrial flutter fibrillation: 


With respect to atrial flutter, rate is well controlled, will continue 

metoprolol and amiodarone, and referred for EP consultation as an outpatient 

regarding long-term rhythm control options.





Subjective


Date of service: 04/21/22


Principal diagnosis: Chest pain


Interval history: 





Patient looks and feels comfortable today.  On further review of her and 

hospital records, we found that she was here last month and underwent a Lexiscan

thallium stress test, which did not have a formal report in the record.  My 

review of the images show that the myocardial perfusion scan was normal.





In addition, a pulmonary perfusion study done yesterday was reported high 

probability for PE.  This morning, her telemetry shows that there is a 

recurrence of atrial flutter with variable ventricular conduction.





Objective


                                   Vital Signs











  Temp Pulse Resp Resp BP BP Pulse Ox


 


 04/21/22 08:25  98.7 F  71  16   146/79   100


 


 04/21/22 07:48        99


 


 04/21/22 05:34    18    


 


 04/21/22 04:57  97.9 F  70  17   144/82   100


 


 04/20/22 23:54  97.7 F  73  17   160/89   100


 


 04/20/22 22:00        100


 


 04/20/22 21:14   10 L     


 


 04/20/22 20:06    18     100


 


 04/20/22 20:05     18   


 


 04/20/22 20:02  99.2 F  85  18    200/102  3 L


 


 04/20/22 19:49   84    201/102   100


 


 04/20/22 19:40    18    


 


 04/20/22 15:25        98














- Physical Examination


General: No Apparent Distress


HEENT: Positive: PERRL


Neck: Positive: neck supple


Cardiac: Positive: irregularly irregular


Lungs: Positive: Decreased Breath Sounds


Neuro: Positive: Grossly Intact


Abdomen: Positive: Soft


Skin: Positive: Clear


Extremities: Absent: edema





- Labs and Meds


                                 Cardiac Enzymes











  04/20/22 Range/Units





  10:20 


 


AST  13  (5-40)  units/L








                                   Coagulation











  04/20/22 04/20/22 Range/Units





  10:20 18:30 


 


PT  14.8  15.1 H  (12.2-14.9)  Sec.


 


INR  1.04  1.07  (0.87-1.13)  


 


APTT  39.6 H  41.1 H  (24.2-36.6)  Sec.








                                       CBC











  04/20/22 04/20/22 04/21/22 Range/Units





  10:20 18:30 06:03 


 


WBC  9.1  9.4  8.3  (4.5-11.0)  K/mm3


 


RBC  4.34  4.35  3.87  (3.65-5.03)  M/mm3


 


Hgb  13.6  13.7  11.9  (10.1-14.3)  gm/dl


 


Hct  41.6  42.0  37.8  (30.3-42.9)  %


 


Plt Count  172  179  147  (140-440)  K/mm3


 


Lymph # (Auto)  1.5   1.8  (1.2-5.4)  K/mm3


 


Mono # (Auto)  0.6   0.5  (0.0-0.8)  K/mm3


 


Eos # (Auto)  0.2   0.4  (0.0-0.4)  K/mm3


 


Baso # (Auto)  0.1   0.1  (0.0-0.1)  K/mm3








                          Comprehensive Metabolic Panel











  04/20/22 04/20/22 04/20/22 Range/Units





  10:20 10:20 18:30 


 


Sodium  141    (137-145)  mmol/L


 


Potassium  3.9    (3.6-5.0)  mmol/L


 


Chloride  97.9 L    ()  mmol/L


 


Carbon Dioxide  23    (22-30)  mmol/L


 


BUN  23 H    (7-17)  mg/dL


 


Creatinine  3.1 H   3.4 H  (0.6-1.2)  mg/dL


 


Glucose  80    ()  mg/dL


 


Calcium  10.3 H    (8.4-10.2)  mg/dL


 


Direct Bilirubin   0.2   (0-0.2)  mg/dL


 


Indirect Bilirubin   0.8   mg/dL


 


AST   13   (5-40)  units/L


 


ALT   10   (7-56)  units/L


 


Alkaline Phosphatase   92   ()  units/L


 


Total Protein   7.0   (6.3-8.2)  g/dL


 


Albumin   5.0   (3.9-5)  g/dL














  04/21/22 Range/Units





  06:03 


 


Sodium  138  (137-145)  mmol/L


 


Potassium  4.4  (3.6-5.0)  mmol/L


 


Chloride  99.0  ()  mmol/L


 


Carbon Dioxide  24  (22-30)  mmol/L


 


BUN  31 H  (7-17)  mg/dL


 


Creatinine  4.0 H  (0.6-1.2)  mg/dL


 


Glucose  84  ()  mg/dL


 


Calcium  8.4  D  (8.4-10.2)  mg/dL


 


Direct Bilirubin   (0-0.2)  mg/dL


 


Indirect Bilirubin   mg/dL


 


AST   (5-40)  units/L


 


ALT   (7-56)  units/L


 


Alkaline Phosphatase   ()  units/L


 


Total Protein   (6.3-8.2)  g/dL


 


Albumin   (3.9-5)  g/dL

## 2022-04-21 NOTE — ELECTROCARDIOGRAPH REPORT
[facility]

                                       

Test Date:    2022               Test Time:    10:17:11

Pat Name:     LINDA GARCIA              Department:   

Patient ID:   [id number]          Room:         A485 1

Gender:       F                        Technician:   DIANA

:          1955               Requested By: BRYAN GRAF

Order Number: [id number]              Reading MD:   Sarbjit Del Rosario

                                 Measurements

Intervals                              Axis          

Rate:         89                       P:            

[place]:                                    QRS:          -12

QRSD:         89                       T:            42

QT:           412                                    

QTc:          523                                    

                           Interpretive Statements

Atrial flutter with predominant 3:1 AV block vs afib

nonspecific st-t

Compared to ECG 2022 09:56:12

AV block, advanced (high-grade) now present

Electronically Signed On 2022 10:39:25 EDT by Sarbjit Del Rosario

## 2022-04-21 NOTE — DISCHARGE SUMMARY
Providers





- Providers


Date of Admission: 


04/20/22 14:56





Date of discharge: 04/21/22


Attending physician: 


SAMARA FRANK MD





                                        





04/20/22 14:09


Consult to Physician [CONS] Stat 


   Comment: 


   Consulting Provider: AUSTIN CORONEL


   Physician Instructions: 


   Reason For Exam: chest pain





04/20/22 18:15


Consult to Physician [CONS] Routine 


   Comment: 


   Consulting Provider: EMELINA CH


   Physician Instructions: 


   Reason For Exam: esrd











Primary care physician: 


SWATI DIETZ DO








Hospitalization


Reason for admission: Acute hypoxic respiratory failure, acute pulmonary 

embolism


Condition: Stable


Pertinent studies: 





Patient 66-year-old female past medical history of ESRD on hemodialysis (TTS), 

hypertension, history of CVA, history of myocardial infarction, CHF, tobacco 

dependence, chronic atrial fibrillation on anticoagulation (Eliquis 2.5 mg twice

 daily), non-insulin-dependent type 2 diabetes mellitus, COPD, hyperlipidemia, 

bipolar disorder, CAD status post stent who presented with 2-3 weeks of chest 

pain that was rated 8/10, constant, associated with shortness of breath, not 

worsened with exertion or relieved by rest, and radiates to her back.  The 

patient endorsed presenting to an outside hospital approximately 1 week ago with

 the same complaint, and she was discharged from the ED.  Upon presentation, the

 patient was found to be hemodynamically stable; however, the patient was 

hypoxic at 86% on room air consistent with acute hypoxic respiratory failure.  

The patient required supplemental oxygen.  Patient underwent VQ scan that was 

consistent with pulmonary embolism.  Troponins were found to be negative x3.  

Cardiology was consulted for additional management.  The patient was admitted 

for ACS work-up.  Further chart review revealed that the patient had a stress 

test approximately 3 months ago at outside hospital that was relatively 

unremarkable.  Patient was successfully weaned to room air.  She was counseled 

about how I will pulmonary embolism can result in her symptoms, and the patient 

expressed understanding.  Cardiology recommended *** . Patient is medically 

clear for discharge.


Procedures: 





None.


Hospital course: 





Patient 66-year-old female past medical history of ESRD on hemodialysis (TTS), 

hypertension, history of CVA, history of myocardial infarction, CHF, tobacco 

dependence, chronic atrial fibrillation on anticoagulation (Eliquis 2.5 mg twice

 daily), non-insulin-dependent type 2 diabetes mellitus, COPD, hyperlipidemia, 

bipolar disorder, CAD status post stent who presented with 2-3 weeks of chest 

pain that was rated 8/10, constant, associated with shortness of breath, not 

worsened with exertion or relieved by rest, and radiates to her back.  The 

patient endorsed presenting to an outside hospital approximately 1 week ago with

 the same complaint, and she was discharged from the ED.  Upon presentation, the

 patient was found to be hemodynamically stable; however, the patient was 

hypoxic at 86% on room air consistent with acute hypoxic respiratory failure.  

The patient required supplemental oxygen.  Patient underwent VQ scan that was 

consistent with pulmonary embolism.  Troponins were found to be negative x3.  

Cardiology was consulted for additional management.  The patient was admitted 

for ACS work-up.  Further chart review revealed that the patient had a stress 

test approximately 3 months ago at outside hospital that was relatively 

unremarkable.  Patient was successfully weaned to room air.  She was counseled 

about how pulmonary embolism can result in her symptoms, and the patient 

expressed understanding.  Cardiology recommended continuation of goal-directed 

therapy with respect to her atrial flutter, and they recommended EP consultation

 in outpatient setting regarding long-term rhythm control options.  

Hematology/oncology was consulted regarding anticoagulation recommendations in 

the setting of ESRD + chronic A. fib/flutter + acute pulmonary embolism.  The 

recommendation was to increase Eliquis to 5 mg twice daily, and the patient will

 follow-up with hematology/oncology in outpatient setting regarding further 

work-up for possible antibiotics.  Patient is medically clear for discharge.


Disposition: 01 HOME / SELF CARE / HOMELESS


Final Discharge Diagnosis (Prints w/discharge instructions): Acute pulmonary 

embolism, acute hypoxic respiratory failure, chronic diastolic heart failure, 

ESRD on hemodialysis, hypertension, noninsulin-dependent type 2 diabetes 

mellitus, chronic atrial fibrillation on anticoagulation, COPD, bipolar 1 

disorder, hyperlipidemia, tobacco dependence


Time spent for discharge: 45 min





Core Measure Documentation





- Palliative Care


Palliative Care/ Comfort Measures: Not Applicable





- Core Measures


Any of the following diagnoses?: DVT/PE, history only





- VTE Discharge Requirements


Deep Vein Thrombosis/Pulmonary Embolism Present on Admission: Yes


Has pt received <5 days of overlap therapy or INR<2.0: No


Anticoagulant overlap therapy prescribed at discharge: Yes





Exam





- Constitutional


Vitals: 


                                        











Temp Pulse Resp BP Pulse Ox


 


 98.7 F   71   16   146/79   100 


 


 04/21/22 08:25  04/21/22 08:25  04/21/22 08:25  04/21/22 08:25  04/21/22 08:25











General appearance: Present: no acute distress, well-nourished





- EENT


Eyes: Present: PERRL, EOM intact


ENT: hearing intact, clear oral mucosa, dentition normal





- Neck


Neck: Present: supple, normal ROM





- Respiratory


Respiratory effort: normal


Respiratory: bilateral: CTA





- Cardiovascular


Rhythm: irregularly irregular


Heart Sounds: Present: S1 & S2





- Extremities


Extremities: no ischemia, pulses intact, pulses symmetrical, No edema, normal 

temperature, normal color, Full ROM


Peripheral Pulses: within normal limits





- Abdominal


General gastrointestinal: Present: soft, non-tender, non-distended, normal bowel

 sounds


Female genitourinary: Present: deferred





- Rectal


Rectal Exam: deferred





- Integumentary


Integumentary: Present: clear, warm, dry





- Musculoskeletal


Musculoskeletal: strength equal bilaterally





- Psychiatric


Psychiatric: appropriate mood/affect, intact judgment & insight, memory intact, 

cooperative





- Neurologic


Neurologic: CNII-XII intact, moves all extremities





- Allied Health


Allied health notes reviewed: nursing





Plan


Activity: advance as tolerated


Diet: low salt, diabetic


Additional Instructions: Patient 66-year-old female past medical history of ESRD

 on hemodialysis (TTS), hypertension, history of CVA, history of myocardial inf

arction, CHF, tobacco dependence, chronic atrial fibrillation on anticoagulation

 (Eliquis 2.5 mg twice daily), non-insulin-dependent type 2 diabetes mellitus, 

COPD, hyperlipidemia, bipolar disorder, CAD status post stent who presented with

 2-3 weeks of chest pain that was rated 8/10, constant, associated with 

shortness of breath, not worsened with exertion or relieved by rest, and 

radiates to her back.  The patient endorsed presenting to an outside hospital 

approximately 1 week ago with the same complaint, and she was discharged from 

the ED.  Upon presentation, the patient was found to be hemodynamically stable; 

however, the patient was hypoxic at 86% on room air consistent with acute 

hypoxic respiratory failure.  The patient required supplemental oxygen.  Patient

 underwent VQ scan that was consistent with pulmonary embolism.  Troponins were 

found to be negative x3.  Cardiology was consulted for additional management.  

The patient was admitted for ACS work-up.  Further chart review revealed that 

the patient had a stress test approximately 3 months ago at outside hospital 

that was relatively unremarkable.  Patient was successfully weaned to room air. 

 She was counseled about how pulmonary embolism can result in her symptoms, and 

the patient expressed understanding.  Cardiology recommended continuation of 

goal-directed therapy with respect to her atrial flutter, and they recommended 

EP consultation in outpatient setting regarding long-term rhythm control 

options.  Hematology/oncology was consulted regarding anticoagulation 

recommendations in the setting of ESRD + chronic A. fib/flutter + acute 

pulmonary embolism.  The recommendation was to increase Eliquis to 5 mg twice 

daily, and the patient will follow-up with hematology/oncology in outpatient 

setting regarding further work-up for possible antibiotics.  Patient is 

medically clear for discharge.


Care Plan Goals: 


Patient is medically clear for discharge.


Assessment: 





Patient 66-year-old female past medical history of ESRD on hemodialysis (TTS), 

hypertension, history of CVA, history of myocardial infarction, CHF, tobacco 

dependence, chronic atrial fibrillation on anticoagulation (Eliquis 2.5 mg twice

 daily), non-insulin-dependent type 2 diabetes mellitus, COPD, hyperlipidemia, 

bipolar disorder, CAD status post stent who presented with 2-3 weeks of chest 

pain that was rated 8/10, constant, associated with shortness of breath, not 

worsened with exertion or relieved by rest, and radiates to her back.  The 

patient endorsed presenting to an outside hospital approximately 1 week ago with

 the same complaint, and she was discharged from the ED.  Upon presentation, the

 patient was found to be hemodynamically stable; however, the patient was 

hypoxic at 86% on room air consistent with acute hypoxic respiratory failure.  

The patient required supplemental oxygen.  Patient underwent VQ scan that was 

consistent with pulmonary embolism.  Troponins were found to be negative x3.  

Cardiology was consulted for additional management.  The patient was admitted 

for ACS work-up.  Further chart review revealed that the patient had a stress 

test approximately 3 months ago at outside hospital that was relatively 

unremarkable.  Patient was successfully weaned to room air.  She was counseled 

about how pulmonary embolism can result in her symptoms, and the patient 

expressed understanding.  Cardiology recommended continuation of goal-directed 

therapy with respect to her atrial flutter, and they recommended EP consultation

 in outpatient setting regarding long-term rhythm control options.  

Hematology/oncology was consulted regarding anticoagulation recommendations in 

the setting of ESRD + chronic A. fib/flutter + acute pulmonary embolism.  The 

recommendation was to increase Eliquis to 5 mg twice daily, and the patient will

 follow-up with hematology/oncology in outpatient setting regarding further 

work-up for possible antibiotics.  Patient is medically clear for discharge.


Follow up with: 


NOLAN MIRELA,SWATI, DO [Primary Care Provider] - 3-5 Days


Forms:  Work/School Release Form


Prescriptions: 


Apixaban [Eliquis] 5 mg PO Q12HR #60 tablet

## 2022-04-21 NOTE — HEM/ONC CONSULTATION
History of Present Illness





- Reason for Consult


Consult date: 04/21/22


Esrd, PE.





- History of Present Illness


Heme consult note


Televisit cart unavailable--Discussed with RN and Dr. Avelar.


CPT 57399


Dx PE 





This is a 67yo female who presents to [facility] ED with complaints of left sided 

chest pain and tightness. Past medical history of ESRD on HD (T,R,Sa), HTN, CVA,

MI, CHF, nicotine dependence, chronic afib on renal dose Eliquis, DM, CKD, COPD,

HLD, Bipolar Disorder, CAD s/p stent placement. 


VQ scan revealed high probability of pulmonary embolism.  Patient started on 

therapeutic anticoagulation with heparin and transitioned back to Eliquis. 

Followed by Nephrology and Cardiology. Hematology consulted for evaluation of PE

and medical management.





Patient currently receiving HD


Complains of chest pain, on and off, but feels better overall. 


Pharmacy unavailable at this time to provide medications; Discussed receiving 

medications at an outside pharmacy instead, if being discharged.








DATA REVIEWED BELOW


IMP:


Clotting while Eliquis, with acute PE


Investigate compliance with anticoagulant 


HIT due to HD, with normal platelet count is also possible





PLAN:


If not stable or not being discharged, labs to include clot mutation testing, pf

4 ab, consider argatroban drip


If stable for discharge, ok for Eliquis 5mg BID


Recommend Hematology outpatient follow-up 





                             Laboratory Last Values











WBC  8.3 K/mm3 (4.5-11.0)   04/21/22  06:03    


 


      


 


Hgb  11.9 gm/dl (10.1-14.3)   04/21/22  06:03    


 


Hct  37.8 % (30.3-42.9)   04/21/22  06:03    


 


  


 


  


 


  


 


     


 


Plt Count  147 K/mm3 (140-440)   04/21/22  06:03    


 


  


 


  


 


  


 


  


 


  


 


  


 


  


 


  


 


  


 


     


 


PT  15.1 Sec. (12.2-14.9)  H  04/20/22  18:30    


 


INR  1.07  (0.87-1.13)   04/20/22  18:30    


 


APTT  41.1 Sec. (24.2-36.6)  H  04/20/22  18:30    


 


D-Dimer  921.97 ng/mlDDU (0-234)  H  04/20/22  10:20    


 


  


 


  


 


  


 


  


 


  


 


     


 


Creatinine  4.0 mg/dL (0.6-1.2)  H  04/21/22  06:03    


 


  


 


  


 


  


 


  


 


  


 


  


 


      


 


AST  13 units/L (5-40)   04/20/22  10:20    


 


ALT  10 units/L (7-56)   04/20/22  10:20    


 


Alkaline Phosphatase  92 units/L ()   04/20/22  10:20    


 


  


 


  


 


  


 


  


 


     


 


Hepatitis A IgM Ab  Non-reactive  (NonReactive)   04/21/22  06:03    


 


Hep Bs Antigen  Non-reactive  (Negative)   04/21/22  06:03    


 


Hep B Core IgM Ab  Non-reactive  (NonReactive)   04/21/22  06:03    


 


Hepatitis C Antibody  Non-reactive  (NonReactive)   04/21/22  06:03    














Past History


Past Medical History: acute MI, atrial fib, COPD, diabetes, heart failure, 

hypertension, stroke, other (See HPI)


Past Surgical History: total knee replacement, Other (Pacemaker placement, 

cardiac stent placement)


Social history: .  denies: smoking, alcohol abuse, prescription drug 

abuse


Family history: diabetes, hypertension





Medications and Allergies


                                    Allergies











Allergy/AdvReac Type Severity Reaction Status Date / Time


 


apple AdvReac  Hives Verified 04/20/22 09:44


 


aspirin AdvReac  Unknown Verified 04/20/22 09:44


 


shell fish Allergy  Swelling Uncoded 04/20/22 09:44











                                Home Medications











 Medication  Instructions  Recorded  Confirmed  Last Taken  Type


 


Quetiapine Fumarate [Seroquel] 100 mg PO BID 09/22/17 03/30/22 03/29/22 22:00 

History


 


Albuterol Mdi (or & Nicu Only) 2 puff IH QID PRN #1 inha 02/12/18 03/30/22 06/01/20 Rx





[ProAir HFA Inhaler]     


 


Metoprolol [Lopressor TAB] 50 mg PO BID #60 tablet 07/20/20 03/30/22 03/29/22 

22:00 Rx


 


Amiodarone [Cordarone 200 MG TAB] 200 mg PO BID #60 tablet 03/15/22 03/30/22 

03/29/22 22:00 Rx


 


Aspirin EC [Halfprin EC] 81 mg PO QDAY #30 tablet 03/15/22 03/30/22 03/29/22 

09:00 Rx


 


Hydralazine HCl 50 mg PO TID 03/30/22 03/30/22 03/29/22 09:00 History


 


cloNIDine [Catapres] 0.1 mg PO BID 03/30/22 03/30/22 03/29/22 09:00 History


 


Apixaban [Eliquis] 5 mg PO Q12HR #60 tablet 04/21/22  Unknown Rx











Active Meds: 


Active Medications





Acetaminophen (Acetaminophen 325 Mg Tab)  650 mg PO Q4H PRN


   PRN Reason: Pain MILD(1-3)/Fever >100.5/HA


Albuterol (Albuterol 2.5 Mg/3 Ml Nebu)  2.5 mg IH Q4HRT PRN


   PRN Reason: Shortness Of Breath


Amiodarone HCl (Amiodarone 200 Mg Tab)  200 mg PO BID [facility]


   Last Admin: 04/20/22 21:15 Dose:  200 mg


   


Apixaban (Apixaban 5 Mg Tab)  5 mg PO Q12HR [facility]; Protocol


Aspirin (Aspirin Ec 81 Mg Tab)  81 mg PO QDAY [facility]


Clonidine HCl (Clonidine 0.1 Mg Tab)  0.1 mg PO BID [facility]


   Last Admin: 04/20/22 21:14 Dose:  0.1 mg


   


Hydralazine HCl (Hydralazine 25 Mg Tab)  50 mg PO Q8HR [facility]


   Last Admin: 04/21/22 05:29 Dose:  50 mg


   


Sodium Chloride (Nacl 0.9%)  100 mls @ 999 mls/hr IV VANCE PRN


   PRN Reason: Hypotension


Metoprolol Tartrate (Metoprolol Tartrate 50 Mg Tab)  50 mg PO BID [facility]


   Last Admin: 04/20/22 21:14 Dose:  50 mg


   


Morphine Sulfate (Morphine 4 Mg/1 Ml Inj)  2 mg IV Q8H PRN


   PRN Reason: Pain , Severe (7-10)


   Last Admin: 04/20/22 18:11 Dose:  2 mg


   


Ondansetron HCl (Ondansetron 4 Mg/2 Ml Inj)  4 mg IV Q8H PRN


   PRN Reason: Nausea And Vomiting


Oxycodone/Acetaminophen (Oxycodone /Acetaminophen 5-325mg Tab)  1 tab PO Q6H PRN


   PRN Reason: Pain, Moderate (4-6)


   Last Admin: 04/21/22 05:34 Dose:  1 tab


   


Quetiapine Fumarate (Quetiapine 100 Mg Tab)  100 mg PO BID [facility]


   Last Admin: 04/20/22 21:27 Dose:  100 mg


   


Sodium Chloride (Sodium Chloride 0.9% 10 Ml Flush Syringe)  10 ml IV BID [facility]


   Last Admin: 04/20/22 21:18 Dose:  10 ml


   


Sodium Chloride (Sodium Chloride 0.9% 10 Ml Flush Syringe)  10 ml IV PRN PRN


   PRN Reason: LINE FLUSH











Exam





- Constitutional


Vitals: 


                                Last Vital Signs











Temp  98.7 F   04/21/22 08:25


 


Pulse  71   04/21/22 08:25


 


Resp  16   04/21/22 08:25


 


BP  146/79   04/21/22 08:25


 


Pulse Ox  100   04/21/22 08:25














Results





- Labs


lab Results: 


                         Laboratory Results - last 24 hr











  04/20/22 04/20/22 04/20/22





  10:20 10:20 10:20


 


WBC  9.1  


 


RBC  4.34  


 


Hgb  13.6  


 


Hct  41.6  


 


MCV  96  


 


MCH  31  


 


MCHC  33  


 


RDW  18.6 H  


 


Plt Count  172  


 


Lymph % (Auto)  16.0  


 


Mono % (Auto)  6.1  


 


Eos % (Auto)  2.4  


 


Baso % (Auto)  0.6  


 


Lymph # (Auto)  1.5  


 


Mono # (Auto)  0.6  


 


Eos # (Auto)  0.2  


 


Baso # (Auto)  0.1  


 


Seg Neutrophils %  74.9 H  


 


Seg Neutrophils #  6.8  


 


PT    14.8


 


INR    1.04


 


APTT    39.6 H


 


D-Dimer    921.97 H


 


Sodium   141 


 


Potassium   3.9 


 


Chloride   97.9 L 


 


Carbon Dioxide   23 


 


Anion Gap   24 


 


BUN   23 H 


 


Creatinine   3.1 H 


 


Estimated GFR   18 


 


BUN/Creatinine Ratio   7 


 


Glucose   80 


 


Calcium   10.3 H 


 


Total Bilirubin   


 


Direct Bilirubin   


 


Indirect Bilirubin   


 


AST   


 


ALT   


 


Alkaline Phosphatase   


 


Troponin T   0.017 


 


Total Protein   


 


Albumin   


 


Albumin/Globulin Ratio   


 


Lipase   


 


Hepatitis A IgM Ab   


 


Hep Bs Antigen   


 


Hep B Core IgM Ab   


 


Hepatitis C Antibody   














  04/20/22 04/20/22 04/20/22





  10:20 12:25 18:30


 


WBC   


 


RBC   


 


Hgb   


 


Hct   


 


MCV   


 


MCH   


 


MCHC   


 


RDW   


 


Plt Count   


 


Lymph % (Auto)   


 


Mono % (Auto)   


 


Eos % (Auto)   


 


Baso % (Auto)   


 


Lymph # (Auto)   


 


Mono # (Auto)   


 


Eos # (Auto)   


 


Baso # (Auto)   


 


Seg Neutrophils %   


 


Seg Neutrophils #   


 


PT   


 


INR   


 


APTT   


 


D-Dimer   


 


Sodium   


 


Potassium   


 


Chloride   


 


Carbon Dioxide   


 


Anion Gap   


 


BUN   


 


Creatinine   


 


Estimated GFR   


 


BUN/Creatinine Ratio   


 


Glucose   


 


Calcium   


 


Total Bilirubin  1.00  


 


Direct Bilirubin  0.2  


 


Indirect Bilirubin  0.8  


 


AST  13  


 


ALT  10  


 


Alkaline Phosphatase  92  


 


Troponin T   0.016  < 0.010


 


Total Protein  7.0  


 


Albumin  5.0  


 


Albumin/Globulin Ratio  2.5  


 


Lipase  15  


 


Hepatitis A IgM Ab   


 


Hep Bs Antigen   


 


Hep B Core IgM Ab   


 


Hepatitis C Antibody   














  04/20/22 04/20/22 04/20/22





  18:30 18:30 18:30


 


WBC  9.4  


 


RBC  4.35  


 


Hgb  13.7  


 


Hct  42.0  


 


MCV  97  


 


MCH  31  


 


MCHC  33  


 


RDW  18.7 H  


 


Plt Count  179  


 


Lymph % (Auto)   


 


Mono % (Auto)   


 


Eos % (Auto)   


 


Baso % (Auto)   


 


Lymph # (Auto)   


 


Mono # (Auto)   


 


Eos # (Auto)   


 


Baso # (Auto)   


 


Seg Neutrophils %   


 


Seg Neutrophils #   


 


PT   15.1 H 


 


INR   1.07 


 


APTT   41.1 H 


 


D-Dimer   


 


Sodium   


 


Potassium   


 


Chloride   


 


Carbon Dioxide   


 


Anion Gap   


 


BUN   


 


Creatinine    3.4 H


 


Estimated GFR    16


 


BUN/Creatinine Ratio   


 


Glucose   


 


Calcium   


 


Total Bilirubin   


 


Direct Bilirubin   


 


Indirect Bilirubin   


 


AST   


 


ALT   


 


Alkaline Phosphatase   


 


Troponin T   


 


Total Protein   


 


Albumin   


 


Albumin/Globulin Ratio   


 


Lipase   


 


Hepatitis A IgM Ab   


 


Hep Bs Antigen   


 


Hep B Core IgM Ab   


 


Hepatitis C Antibody   














  04/21/22 04/21/22 04/21/22





  06:03 06:03 06:03


 


WBC  8.3  


 


RBC  3.87  


 


Hgb  11.9  


 


Hct  37.8  


 


MCV  98 H  


 


MCH  31  


 


MCHC  32  


 


RDW  18.8 H  


 


Plt Count  147  


 


Lymph % (Auto)  21.8  


 


Mono % (Auto)  6.5  


 


Eos % (Auto)  5.1 H  


 


Baso % (Auto)  0.8  


 


Lymph # (Auto)  1.8  


 


Mono # (Auto)  0.5  


 


Eos # (Auto)  0.4  


 


Baso # (Auto)  0.1  


 


Seg Neutrophils %  65.8  


 


Seg Neutrophils #  5.4  


 


PT   


 


INR   


 


APTT   


 


D-Dimer   


 


Sodium   138 


 


Potassium   4.4 


 


Chloride   99.0 


 


Carbon Dioxide   24 


 


Anion Gap   19 


 


BUN   31 H 


 


Creatinine   4.0 H 


 


Estimated GFR   14 


 


BUN/Creatinine Ratio   8 


 


Glucose   84 


 


Calcium   8.4  D 


 


Total Bilirubin   


 


Direct Bilirubin   


 


Indirect Bilirubin   


 


AST   


 


ALT   


 


Alkaline Phosphatase   


 


Troponin T   


 


Total Protein   


 


Albumin   


 


Albumin/Globulin Ratio   


 


Lipase   


 


Hepatitis A IgM Ab    Non-reactive


 


Hep Bs Antigen    Non-reactive


 


Hep B Core IgM Ab    Non-reactive


 


Hepatitis C Antibody    Non-reactive

## 2022-04-23 ENCOUNTER — HOSPITAL ENCOUNTER (INPATIENT)
Dept: HOSPITAL 5 - ED | Age: 67
LOS: 2 days | Discharge: HOME HEALTH | DRG: 640 | End: 2022-04-25
Attending: INTERNAL MEDICINE | Admitting: INTERNAL MEDICINE
Payer: MEDICARE

## 2022-04-23 DIAGNOSIS — F31.9: ICD-10-CM

## 2022-04-23 DIAGNOSIS — E11.22: ICD-10-CM

## 2022-04-23 DIAGNOSIS — Z79.82: ICD-10-CM

## 2022-04-23 DIAGNOSIS — E05.90: ICD-10-CM

## 2022-04-23 DIAGNOSIS — Z91.013: ICD-10-CM

## 2022-04-23 DIAGNOSIS — Z99.2: ICD-10-CM

## 2022-04-23 DIAGNOSIS — R07.89: ICD-10-CM

## 2022-04-23 DIAGNOSIS — Z91.15: ICD-10-CM

## 2022-04-23 DIAGNOSIS — Z71.6: ICD-10-CM

## 2022-04-23 DIAGNOSIS — I69.351: ICD-10-CM

## 2022-04-23 DIAGNOSIS — I48.0: ICD-10-CM

## 2022-04-23 DIAGNOSIS — F17.200: ICD-10-CM

## 2022-04-23 DIAGNOSIS — Z91.018: ICD-10-CM

## 2022-04-23 DIAGNOSIS — Z88.6: ICD-10-CM

## 2022-04-23 DIAGNOSIS — J44.9: ICD-10-CM

## 2022-04-23 DIAGNOSIS — I13.2: ICD-10-CM

## 2022-04-23 DIAGNOSIS — E78.00: ICD-10-CM

## 2022-04-23 DIAGNOSIS — E87.70: Primary | ICD-10-CM

## 2022-04-23 DIAGNOSIS — I25.10: ICD-10-CM

## 2022-04-23 DIAGNOSIS — N18.6: ICD-10-CM

## 2022-04-23 DIAGNOSIS — I25.2: ICD-10-CM

## 2022-04-23 DIAGNOSIS — Z95.0: ICD-10-CM

## 2022-04-23 DIAGNOSIS — R00.0: ICD-10-CM

## 2022-04-23 DIAGNOSIS — F20.9: ICD-10-CM

## 2022-04-23 DIAGNOSIS — I50.32: ICD-10-CM

## 2022-04-23 LAB
APTT BLD: 42.3 SEC. (ref 24.2–36.6)
BASOPHILS # (AUTO): 0 K/MM3 (ref 0–0.1)
BASOPHILS NFR BLD AUTO: 0.4 % (ref 0–1.8)
BUN SERPL-MCNC: 17 MG/DL (ref 7–17)
BUN/CREAT SERPL: 7 %
CALCIUM SERPL-MCNC: 9.5 MG/DL (ref 8.4–10.2)
EOSINOPHIL # BLD AUTO: 0.4 K/MM3 (ref 0–0.4)
EOSINOPHIL NFR BLD AUTO: 5.2 % (ref 0–4.3)
HCT VFR BLD CALC: 38.4 % (ref 30.3–42.9)
HCT VFR BLD CALC: 39.3 % (ref 30.3–42.9)
HEMOLYSIS INDEX: 6
HGB BLD-MCNC: 12.6 GM/DL (ref 10.1–14.3)
HGB BLD-MCNC: 12.8 GM/DL (ref 10.1–14.3)
INR PPP: 0.87 (ref 0.87–1.13)
INR PPP: 0.96 (ref 0.87–1.13)
LYMPHOCYTES # BLD AUTO: 1.8 K/MM3 (ref 1.2–5.4)
LYMPHOCYTES NFR BLD AUTO: 21.3 % (ref 13.4–35)
MCHC RBC AUTO-ENTMCNC: 33 % (ref 30–34)
MCHC RBC AUTO-ENTMCNC: 33 % (ref 30–34)
MCV RBC AUTO: 95 FL (ref 79–97)
MCV RBC AUTO: 96 FL (ref 79–97)
MONOCYTES # (AUTO): 0.6 K/MM3 (ref 0–0.8)
MONOCYTES % (AUTO): 7.7 % (ref 0–7.3)
PLATELET # BLD: 135 K/MM3 (ref 140–440)
PLATELET # BLD: 145 K/MM3 (ref 140–440)
RBC # BLD AUTO: 4.04 M/MM3 (ref 3.65–5.03)
RBC # BLD AUTO: 4.12 M/MM3 (ref 3.65–5.03)
T4 FREE SERPL-MCNC: 1.77 NG/DL (ref 0.76–1.46)

## 2022-04-23 PROCEDURE — 85379 FIBRIN DEGRADATION QUANT: CPT

## 2022-04-23 PROCEDURE — 80048 BASIC METABOLIC PNL TOTAL CA: CPT

## 2022-04-23 PROCEDURE — 84443 ASSAY THYROID STIM HORMONE: CPT

## 2022-04-23 PROCEDURE — 85027 COMPLETE CBC AUTOMATED: CPT

## 2022-04-23 PROCEDURE — 82565 ASSAY OF CREATININE: CPT

## 2022-04-23 PROCEDURE — 96375 TX/PRO/DX INJ NEW DRUG ADDON: CPT

## 2022-04-23 PROCEDURE — 96376 TX/PRO/DX INJ SAME DRUG ADON: CPT

## 2022-04-23 PROCEDURE — 83690 ASSAY OF LIPASE: CPT

## 2022-04-23 PROCEDURE — 71275 CT ANGIOGRAPHY CHEST: CPT

## 2022-04-23 PROCEDURE — 94760 N-INVAS EAR/PLS OXIMETRY 1: CPT

## 2022-04-23 PROCEDURE — G0378 HOSPITAL OBSERVATION PER HR: HCPCS

## 2022-04-23 PROCEDURE — 5A1D70Z PERFORMANCE OF URINARY FILTRATION, INTERMITTENT, LESS THAN 6 HOURS PER DAY: ICD-10-PCS | Performed by: INTERNAL MEDICINE

## 2022-04-23 PROCEDURE — 99406 BEHAV CHNG SMOKING 3-10 MIN: CPT

## 2022-04-23 PROCEDURE — 82962 GLUCOSE BLOOD TEST: CPT

## 2022-04-23 PROCEDURE — 87641 MR-STAPH DNA AMP PROBE: CPT

## 2022-04-23 PROCEDURE — 96374 THER/PROPH/DIAG INJ IV PUSH: CPT

## 2022-04-23 PROCEDURE — 71045 X-RAY EXAM CHEST 1 VIEW: CPT

## 2022-04-23 PROCEDURE — 82550 ASSAY OF CK (CPK): CPT

## 2022-04-23 PROCEDURE — 83735 ASSAY OF MAGNESIUM: CPT

## 2022-04-23 PROCEDURE — 85730 THROMBOPLASTIN TIME PARTIAL: CPT

## 2022-04-23 PROCEDURE — 78580 LUNG PERFUSION IMAGING: CPT

## 2022-04-23 PROCEDURE — 93005 ELECTROCARDIOGRAM TRACING: CPT

## 2022-04-23 PROCEDURE — 80053 COMPREHEN METABOLIC PANEL: CPT

## 2022-04-23 PROCEDURE — 80076 HEPATIC FUNCTION PANEL: CPT

## 2022-04-23 PROCEDURE — A9540 TC99M MAA: HCPCS

## 2022-04-23 PROCEDURE — 80074 ACUTE HEPATITIS PANEL: CPT

## 2022-04-23 PROCEDURE — 84484 ASSAY OF TROPONIN QUANT: CPT

## 2022-04-23 PROCEDURE — 84439 ASSAY OF FREE THYROXINE: CPT

## 2022-04-23 PROCEDURE — 36415 COLL VENOUS BLD VENIPUNCTURE: CPT

## 2022-04-23 PROCEDURE — 85610 PROTHROMBIN TIME: CPT

## 2022-04-23 PROCEDURE — 82553 CREATINE MB FRACTION: CPT

## 2022-04-23 PROCEDURE — 85025 COMPLETE CBC W/AUTO DIFF WBC: CPT

## 2022-04-23 RX ADMIN — OXYCODONE AND ACETAMINOPHEN PRN TAB: 5; 325 TABLET ORAL at 19:59

## 2022-04-23 RX ADMIN — INSULIN LISPRO SCH: 100 INJECTION, SOLUTION INTRAVENOUS; SUBCUTANEOUS at 23:54

## 2022-04-23 RX ADMIN — APIXABAN SCH MG: 5 TABLET, FILM COATED ORAL at 23:07

## 2022-04-23 RX ADMIN — AMIODARONE HYDROCHLORIDE SCH MG: 200 TABLET ORAL at 23:07

## 2022-04-23 RX ADMIN — METOPROLOL TARTRATE SCH MG: 50 TABLET, FILM COATED ORAL at 23:07

## 2022-04-23 RX ADMIN — Medication SCH ML: at 23:08

## 2022-04-23 NOTE — HISTORY AND PHYSICAL REPORT
History of Present Illness


Date of examination: 04/23/22


Date of admission: 


4/22/2023


Chief complaint: 


Missed hemodialysis





History of present illness: 


Patient is a 66-year-old female present with chief complaint of chest pain.  

Patient states she was at dialysis and receiving hemodialysis when she developed

onset of substernal chest pain described as sharp and squeezing in nature.  

Patient admits to shortness of breath, diaphoresis and nausea without vomiting 

associated with the pain.  Patient denies pleurisy or palpitations.  Patient 

currently gives her pain a score of 10/10.  The patient's nephrologist (Dr. Sebastian) is in the emergency department and states the patient had a negative 

stress test approximately 2 weeks ago and had a VQ scan that was high pro

bability for PE approximately 2 days ago.  The patient had already been started 

on Eliquis for atrial fibrillation











- Past Medical History


--Hypertension: Yes


--CVA: Yes (right sided weakness)


--Heart Attack/AMI: Yes (1 stent)


--Congestive Heart Failure: Yes


--Diabetes: Yes


--Renal Disease: Yes (RENAL INSUFFICIENCY- stent left kidney)


--Psychiatric Treatment: Yes (BIPOLAR/SCHIZOPHRENIA)


--COPD: Yes


--Additional medical history: HIGH CHOLESTEROL





- Surgical History


--Coronary Stent: Yes


--Pacemaker: Yes


--Additional Surgical History: stent placement x 2 in 2015, knee surgery 01/2018





- Family History


--Family history: no significant





- Social History


--Smoking Status: Current Every Day Smoker (1/14 pack/day)


--Substance Use Type: None (Denies illicit drug use)





- Medications


Home Medications: 


                                Home Medications











 Medication  Instructions  Recorded  Confirmed  Last Taken  Type


 


Quetiapine Fumarate [Seroquel] 100 mg PO BID 09/22/17 03/30/22 03/29/22 22:00 

History


 


Albuterol Mdi (or & Nicu Only) 2 puff IH QID PRN #1 inha 02/12/18 03/30/22 06/01/20 Rx





[ProAir HFA Inhaler]     


 


Metoprolol [Lopressor TAB] 50 mg PO BID #60 tablet 07/20/20 03/30/22 03/29/22 

22:00 Rx


 


Amiodarone [Cordarone 200 MG TAB] 200 mg PO BID #60 tablet 03/15/22 03/30/22 

03/29/22 22:00 Rx


 


Aspirin EC [Halfprin EC] 81 mg PO QDAY #30 tablet 03/15/22 03/30/22 03/29/22 

09:00 Rx


 


Hydralazine HCl 50 mg PO TID 03/30/22 03/30/22 03/29/22 09:00 History


 


cloNIDine [Catapres] 0.1 mg PO BID 03/30/22 03/30/22 03/29/22 09:00 History


 


Apixaban [Eliquis] 5 mg PO BID #60 tab 04/21/22  Unknown Rx














Review of Systems


ROS: 


Stated complaint: CHEST PAIN


Other details as noted in HPI





Constitutional: diaphoresis


Eyes: denies: eye pain


ENT: denies: throat pain


Respiratory: shortness of breath


Cardiovascular: chest pain.  denies: palpitations


Endocrine: no symptoms reported


Gastrointestinal: nausea.  denies: vomiting


Musculoskeletal: denies: back pain


Neurological: denies: headache





Medications and Allergies


                                    Allergies











Allergy/AdvReac Type Severity Reaction Status Date / Time


 


apple AdvReac  Hives Verified 04/20/22 09:44


 


aspirin AdvReac  Unknown Verified 04/20/22 09:44


 


shell fish Allergy  Swelling Uncoded 04/20/22 09:44











                                Home Medications











 Medication  Instructions  Recorded  Confirmed  Last Taken  Type


 


Quetiapine Fumarate [Seroquel] 100 mg PO BID 09/22/17 04/23/22 04/22/22 22:00 

History


 


Albuterol Mdi (or & Nicu Only) 2 puff IH QID PRN #1 inha 02/12/18 04/24/22 04/23/22 10:00 Rx





[ProAir HFA Inhaler]     


 


Metoprolol [Lopressor TAB] 50 mg PO BID #60 tablet 07/20/20 04/23/22 04/22/22 

22:00 Rx


 


Amiodarone [Cordarone 200 MG TAB] 200 mg PO BID #60 tablet 03/15/22 04/23/22 

04/23/22 10:00 Rx


 


Aspirin EC [Halfprin EC] 81 mg PO QDAY #30 tablet 03/15/22 04/24/22 03/29/22 

09:00 Rx


 


Hydralazine HCl 50 mg PO TID 03/30/22 04/23/22 04/22/22 22:00 History


 


cloNIDine [Catapres] 0.1 mg PO BID 03/30/22 04/24/22 04/23/22 10:00 History


 


Apixaban [Eliquis] 5 mg PO BID #60 tab 04/21/22 04/23/22 04/23/22 10:00 Rx


 


Gabapentin 1 mg PO BID 04/23/22 04/24/22 Unknown History


 


Losartan [Cozaar] 50 mg PO QDAY 04/23/22 04/24/22 04/22/22 10:00 History


 


Nitroglycerin [Nitro-Bid] 0.1 mg TRANSDERMA Q6HR PRN 04/23/22 04/24/22 Unknown 

History


 


Pantoprazole [Protonix] 40 mg PO QDAY 04/23/22 04/24/22 04/23/22 10:00 History


 


Sennosides/Docusate Sodium [Senna 8.6 mg PO DAILY 04/23/22 04/24/22 04/22/22 

10:00 History





Plus 8.6-50 mg Tablet]     


 


carvediloL [Coreg] 25 mg PO BID 04/23/22 04/24/22 Unknown History


 


dilTIAZem 240 mg PO DAILY 04/23/22 04/24/22 Unknown History


 


methIMAzole [Methimazole] 10 mg PO TID 04/23/22 04/24/22 04/23/22 10:00 History


 


methocarbamoL [Methocarbamol] 1 mg PO BID 04/23/22 04/24/22 Unknown History











Active Meds: 


Active Medications





Heparin Sodium (Porcine) (Heparin 10,000 Units/10 Ml Vial)  2,000 unit IV VANCE 

PRN


   PRN Reason: hemodialysis


Sodium Chloride (Nacl 0.9%)  100 mls @ 999 mls/hr IV VANCE PRN


   PRN Reason: Hypotension











Exam





- Constitutional


Vitals: 


                                        











Temp Pulse Resp BP Pulse Ox


 


 98.2 F   105 H  20   126/72   97 


 


 04/23/22 17:52  04/23/22 18:15  04/23/22 17:52  04/23/22 18:15  04/23/22 17:52











General appearance: Present: no acute distress, well-nourished





- EENT


Eyes: Present: PERRL


ENT: hearing intact, clear oral mucosa





- Neck


Neck: Present: supple, normal ROM





- Respiratory


Respiratory effort: normal


Respiratory: bilateral: CTA





- Cardiovascular


Heart rate: 78


Rhythm: regular


Heart Sounds: Present: S1 & S2.  Absent: rub, click





- Extremities


Extremities: pulses symmetrical, No edema


Peripheral Pulses: within normal limits





- Abdominal


General gastrointestinal: Present: soft, non-tender, non-distended, normal bowel

sounds


Female genitourinary: Present: normal





- Integumentary


Integumentary: Present: clear, warm, dry





- Musculoskeletal


Musculoskeletal: gait normal, strength equal bilaterally





- Psychiatric


Psychiatric: appropriate mood/affect, intact judgment & insight





- Neurologic


Neurologic: CNII-XII intact, moves all extremities





HEART Score





- HEART Score


History: Slightly suspicious


Age: > 65


Risk factors: > 3 risk factors or hx of atherosclerotic disease


Troponin: 


                                        











Troponin T  0.015 ng/mL (0.00-0.029)   04/23/22  14:38    











Troponin: < normal limit





- Critical Actions


Critical Actions: 0-3 pts:0.9-1.7%risk of adverse cardiac event.Candidate for 

discharge





Results





- Labs


CBC & Chem 7: 


                                 04/24/22 05:03





                                 04/24/22 05:03


Labs: 


                             Laboratory Last Values











WBC  8.2 K/mm3 (4.5-11.0)   04/23/22  14:38    


 


RBC  4.04 M/mm3 (3.65-5.03)   04/23/22  14:38    


 


Hgb  12.6 gm/dl (10.1-14.3)   04/23/22  14:38    


 


Hct  38.4 % (30.3-42.9)   04/23/22  14:38    


 


MCV  95 fl (79-97)   04/23/22  14:38    


 


MCH  31 pg (28-32)   04/23/22  14:38    


 


MCHC  33 % (30-34)   04/23/22  14:38    


 


RDW  18.3 % (13.2-15.2)  H  04/23/22  14:38    


 


Plt Count  135 K/mm3 (140-440)  L  04/23/22  14:38    


 


Lymph % (Auto)  21.3 % (13.4-35.0)   04/23/22  14:38    


 


Mono % (Auto)  7.7 % (0.0-7.3)  H  04/23/22  14:38    


 


Eos % (Auto)  5.2 % (0.0-4.3)  H  04/23/22  14:38    


 


Baso % (Auto)  0.4 % (0.0-1.8)   04/23/22  14:38    


 


Lymph # (Auto)  1.8 K/mm3 (1.2-5.4)   04/23/22  14:38    


 


Mono # (Auto)  0.6 K/mm3 (0.0-0.8)   04/23/22  14:38    


 


Eos # (Auto)  0.4 K/mm3 (0.0-0.4)   04/23/22  14:38    


 


Baso # (Auto)  0.0 K/mm3 (0.0-0.1)   04/23/22  14:38    


 


Seg Neutrophils %  65.4 % (40.0-70.0)   04/23/22  14:38    


 


Seg Neutrophils #  5.4 K/mm3 (1.8-7.7)   04/23/22  14:38    


 


PT  13.8 Sec. (12.2-14.9)   04/23/22  14:38    


 


INR  0.96  (0.87-1.13)   04/23/22  14:38    


 


Sodium  139 mmol/L (137-145)   04/23/22  14:38    


 


Potassium  3.1 mmol/L (3.6-5.0)  L D 04/23/22  14:38    


 


Chloride  95.9 mmol/L ()  L  04/23/22  14:38    


 


Carbon Dioxide  29 mmol/L (22-30)   04/23/22  14:38    


 


Anion Gap  17 mmol/L  04/23/22  14:38    


 


BUN  17 mg/dL (7-17)   04/23/22  14:38    


 


Creatinine  2.3 mg/dL (0.6-1.2)  H  04/23/22  14:38    


 


Estimated GFR  26 ml/min  04/23/22  14:38    


 


BUN/Creatinine Ratio  7 %  04/23/22  14:38    


 


Glucose  81 mg/dL ()   04/23/22  14:38    


 


Calcium  9.5 mg/dL (8.4-10.2)   04/23/22  14:38    


 


Magnesium  1.90 mg/dL (1.7-2.3)   04/23/22  14:38    


 


Total Creatine Kinase  34 units/L ()   04/23/22  14:38    


 


CK-MB (CK-2)  1.3 ng/mL (0.0-4.0)   04/23/22  14:38    


 


CK-MB (CK-2) Rel Index  3.8  (0-4)   04/23/22  14:38    


 


Troponin T  0.015 ng/mL (0.00-0.029)   04/23/22  14:38    


 


TSH  0.463 mlU/mL (0.270-4.200)   04/23/22  14:38    


 


Free T4  1.77 ng/dL (0.76-1.46)  H  04/23/22  14:38    








                                    Short CBC











  04/23/22 04/23/22 04/24/22 Range/Units





  14:38 19:44 05:03 


 


WBC  8.2  7.3  6.7  (4.5-11.0)  K/mm3


 


Hgb  12.6  12.8  12.4  (10.1-14.3)  gm/dl


 


Hct  38.4  39.3  39.4  (30.3-42.9)  %


 


Plt Count  135 L  145  151  (140-440)  K/mm3








                                       BMP











  04/23/22 04/23/22 04/24/22





  14:38 19:44 05:03


 


Sodium  139   138


 


Potassium  3.1 L D   3.3 L


 


Chloride  95.9 L   95.7 L


 


Carbon Dioxide  29   31 H


 


BUN  17   14


 


Creatinine  2.3 H  1.4 H  2.5 H D


 


Glucose  81   72


 


Calcium  9.5   9.1








                                 Cardiac Enzymes











  04/23/22 Range/Units





  14:38 


 


Total Creatine Kinase  34  ()  units/L


 


CK-MB (CK-2)  1.3  (0.0-4.0)  ng/mL


 


Troponin T  0.015  (0.00-0.029)  ng/mL








                                 Liver Function











  04/24/22 Range/Units





  05:03 


 


Total Bilirubin  0.50  (0.1-1.2)  mg/dL


 


AST  10  (5-40)  units/L


 


ALT  6 L  (7-56)  units/L


 


Alkaline Phosphatase  83  ()  units/L


 


Albumin  4.2  (3.9-5)  g/dL














- Imaging and Cardiology


EKG: report reviewed (Sinus rhythm no acute ST-T wave changes)


Chest x-ray: report reviewed


Imaging and Cardiology: 





Chest CTA


No evidence of pulmonary embolism


Cardiac enlargement








Assessment and Plan


Advance Directives: Yes (Full code)


VTE prophylaxis?: Chemical


Plan of care discussed with patient/family: Yes





- Patient Problems


(1) Volume overload


Current Visit: Yes   Status: Acute   


Plan to address problem: 


Secondary to inadequate hemodialysis


Patient being taken to emergent hemodialysis today


Possible discharge tomorrow








(2) End-stage renal disease on hemodialysis


Current Visit: Yes   Status: Chronic   


Plan to address problem: 


Continue hemodialysis as per schedule








(3) Tachycardia


Current Visit: Yes   Status: Acute   


Plan to address problem: 


PE ruled out


Patient on Eliquis for atrial fibrillation








(4) Hypertension


Current Visit: Yes   Status: Chronic   


Qualifiers: 


   Hypertension type: primary hypertension   Qualified Code(s): I10 - Essential 

(primary) hypertension   


Plan to address problem: 


Continue antihypertensives and adjust medications








(5) T2DM (type 2 diabetes mellitus)


Current Visit: Yes   Status: Chronic   


Qualifiers: 


   Diabetes mellitus long term insulin use: unspecified long term insulin use 

status 


Plan to address problem: 


Coverage for now








(6) Coronary artery disease


Current Visit: Yes   Status: Chronic   


Qualifiers: 


   Coronary Disease-Associated Artery/Lesion type: native artery   Native vs. 

transplanted heart: native heart 


Plan to address problem: 


Patient had a recent  stress test which is negative


No further work-up








(7) DVT prophylaxis


Current Visit: Yes   Status: Acute   


Plan to address problem: 


On Eliquis and GI prophylaxis








(8) Advance care planning


Current Visit: Yes   Status: Acute   


Plan to address problem: 


Disease education conducted, care plan discussed, diagnosis discussed, prognosis

is.  Patient is full code.  Patient acknowledges understanding and agreement 

with care plan.  +30 minutes.

## 2022-04-23 NOTE — CONSULTATION
History of Present Illness





- Reason for Consult


Consult date: 04/23/22


end stage renal disease


Requesting physician: GITA SHORE





- History of Present Illness


The patient is a 65 YO female known to our service with history of DM-2, HTN, 

HLD, Bipolar Disorder, CAD S/P Stent Placement, Paroxysmal atrial fibrillation, 

HFpEF, COPD, Anemia and ESRD on hemodialysis (TTS) who presented to [facility] ED 

4/23/22 from outpatient hemodialysis center with c/o CP while on HD.  Patient 

states that she experienced a sudden onset of chest pain while on HD, pain was 

10/10, constant, associated with shortness of breath, diaphoresis and nausea 

without vomiting.  Patient was admitted multiple times with CP, with recent 

admission found to have high prob V/Q while patient is on Eliquis.  Patient has 

some baseline dyspnea on exertion.  Patient denies any abd pain, dizziness, 

cough, weakness, fever, chills, rash, blurry vision, hematuria or hemoptysis.  

CTA chest negative for PE.  Patient was admitted for further evaluation.  Labs 

and imaging noted.  Nephrology was consulted for ESRD management.








Past History


Past Medical History: other (See HPI.)





Medications and Allergies


                                    Allergies











Allergy/AdvReac Type Severity Reaction Status Date / Time


 


apple AdvReac  Hives Verified 04/20/22 09:44


 


aspirin AdvReac  Unknown Verified 04/20/22 09:44


 


shell fish Allergy  Swelling Uncoded 04/20/22 09:44











                                Home Medications











 Medication  Instructions  Recorded  Confirmed  Last Taken  Type


 


Quetiapine Fumarate [Seroquel] 100 mg PO BID 09/22/17 04/23/22 04/22/22 22:00 

History


 


Albuterol Mdi (or & Nicu Only) 2 puff IH QID PRN #1 inha 02/12/18 04/24/22 04/23/22 10:00 Rx





[ProAir HFA Inhaler]     


 


Metoprolol [Lopressor TAB] 50 mg PO BID #60 tablet 07/20/20 04/23/22 04/22/22 

22:00 Rx


 


Amiodarone [Cordarone 200 MG TAB] 200 mg PO BID #60 tablet 03/15/22 04/23/22 

04/23/22 10:00 Rx


 


Aspirin EC [Halfprin EC] 81 mg PO QDAY #30 tablet 03/15/22 04/24/22 03/29/22 

09:00 Rx


 


Hydralazine HCl 50 mg PO TID 03/30/22 04/23/22 04/22/22 22:00 History


 


cloNIDine [Catapres] 0.1 mg PO BID 03/30/22 04/24/22 04/23/22 10:00 History


 


Apixaban [Eliquis] 5 mg PO BID #60 tab 04/21/22 04/23/22 04/23/22 10:00 Rx


 


Gabapentin 1 mg PO BID 04/23/22 04/24/22 Unknown History


 


Losartan [Cozaar] 50 mg PO QDAY 04/23/22 04/24/22 04/22/22 10:00 History


 


Nitroglycerin [Nitro-Bid] 0.1 mg TRANSDERMA Q6HR PRN 04/23/22 04/24/22 Unknown 

History


 


Pantoprazole [Protonix] 40 mg PO QDAY 04/23/22 04/24/22 04/23/22 10:00 History


 


Sennosides/Docusate Sodium [Senna 8.6 mg PO DAILY 04/23/22 04/24/22 04/22/22 

10:00 History





Plus 8.6-50 mg Tablet]     


 


carvediloL [Coreg] 25 mg PO BID 04/23/22 04/24/22 Unknown History


 


dilTIAZem 240 mg PO DAILY 04/23/22 04/24/22 Unknown History


 


methIMAzole [Methimazole] 10 mg PO TID 04/23/22 04/24/22 04/23/22 10:00 History


 


methocarbamoL [Methocarbamol] 1 mg PO BID 04/23/22 04/24/22 Unknown History














Review of Systems


All systems: negative





Exam





- Vital Signs


Vital signs: 


                                   Vital Signs











Temp Pulse Resp BP Pulse Ox


 


 98.7 F   125 H  16   160/100   100 


 


 04/23/22 13:58  04/23/22 13:58  04/23/22 13:58  04/23/22 13:58  04/23/22 13:58














Results





- Lab Results





                                 04/24/22 05:03





                                 04/24/22 05:03





Assessment and Plan





1. ESRD:


Patient is on maintenance hemodialysis, TTS schedule.


Last outpatient HD 4/23.


Hemodialysis: .





2. FEN:


UF with HD as tolerated.


Monitor lytes and volume status.





3. Chest pain //  H/o CAD s/p PCI:


Troponin not elevated.


CTa negative for PE.


Monitor.





4. Paroxysmal A.fib with RVR:


Rate control.


Eliquis.


Monitor.





5. Chronic HFpEF:


Volume control thru HD.


Fluid restriction, monitor I/O.





6. H/o COPD:


Not in exacerbation.


Nebs and O2 as needed.





7. Hypertension:


Volume control with HD.


Continue home BP meds.


Monitor BP.











Subjective:


Patient was seen and examined at the bedside.











Examination:


General appearance: well-developed, appears stated age, no distress


HEENT: atraumatic, IVETTE


Neck: trachea midline


Respiratory: ctab


Heart: S1S2, no murmur


Abdomen: soft, bowel sounds heard, NT


Integumentary: no obvious rash


Neurologic: AO, able to move extremities


Ext: no edema


Hemodialysis access: R IJ tunnel catheter, R arm AVG

## 2022-04-23 NOTE — CAT SCAN REPORT
CT angio chest



INDICATION / CLINICAL INFORMATION: Chest pain  OMNIPAQUE 350 100ML  DIALYSIS PT.



TECHNIQUE: Axial CT images were obtained through the chest after injection of 100 cc of Omnipaque 350
 IV contrast. 3 plane MIP and/or 3D reconstructions were produced. All CT scans at this location are 
performed using CT dose reduction for ALARA by means of automated exposure control. 



COMPARISON: Chest radiograph from 4/20/2022.



FINDINGS:

PULMONARY ARTERIES: No central or segmental pulmonary embolus.



THORACIC AORTA: Mild atherosclerotic calcification without acute abnormality. 

HEART: There is cardiac enlargement. No pericardial effusion. Left-sided cardiac conduction device le
ads terminate within the right atrium and right ventricle.

CORONARY ARTERY CALCIFICATION: Stent or calcifications in the LAD.

LYMPHADENOPATHY: No significant thoracic lymphadenopathy.

LUNGS/PLEURA: There is mild emphysema. No acute interstitial or airspace disease. No pleural effusion
 or pneumothorax.

OTHER FINDINGS: Right IJ dialysis catheter terminates in the right atrium.



UPPER ABDOMEN: No acute findings.



SKELETAL SYSTEM: No acute osseous findings.



IMPRESSION:



1. No evidence of pulmonary embolus.

2. Cardiac enlargement. No acute findings in the chest.

3. Other incidental findings as above.



Signer Name: Bryan Bennett MD 

Signed: 4/23/2022 4:38 PM

Workstation Name: [email or web address] [employer] Governance-

## 2022-04-23 NOTE — EMERGENCY DEPARTMENT REPORT
HPI





- General


Chief Complaint: Chest Pain


Time Seen by Provider: 22 14:07





- HPI


HPI: 





Room 20








Patient is a 66-year-old female present with chief complaint of chest pain.  

Patient states she was at dialysis and receiving hemodialysis when she developed

onset of substernal chest pain described as sharp and squeezing in nature.  

Patient admits to shortness of breath, diaphoresis and nausea without vomiting 

associated with the pain.  Patient denies pleurisy or palpitations.  Patient 

currently gives her pain a score of 10/10.  The patient's nephrologist (Dr. Sebastian) is in the emergency department and states the patient had a negative 

stress test approximately 2 weeks ago and had a VQ scan that was high 

probability for PE approximately 2 days ago.  The patient had already been 

started on Eliquis for atrial fibrillation





ED Past Medical Hx





- Past Medical History


Hx Hypertension: Yes


Hx CVA: Yes (right sided weakness)


Hx Heart Attack/AMI: Yes (1 stent)


Hx Congestive Heart Failure: Yes


Hx Diabetes: Yes


Hx Renal Disease: Yes (RENAL INSUFFICIENCY- stent left kidney)


Hx Psychiatric Treatment: Yes (BIPOLAR/SCHIZOPHRENIA)


Hx COPD: Yes


Additional medical history: HIGH CHOLESTEROL





- Surgical History


Hx Coronary Stent: Yes


Hx Pacemaker: Yes


Additional Surgical History: stent placement x 2 in , knee surgery 2018





- Family History


Family history: no significant





- Social History


Smoking Status: Current Every Day Smoker (1/14 pack/day)


Substance Use Type: None (Denies illicit drug use)





- Medications


Home Medications: 


                                Home Medications











 Medication  Instructions  Recorded  Confirmed  Last Taken  Type


 


Quetiapine Fumarate [Seroquel] 100 mg PO BID 17 22:00 

History


 


Albuterol Mdi (or & Nicu Only) 2 puff IH QID PRN #1 inha 18 Rx





[ProAir HFA Inhaler]     


 


Metoprolol [Lopressor TAB] 50 mg PO BID #60 tablet 20 

22:00 Rx


 


Amiodarone [Cordarone 200 MG TAB] 200 mg PO BID #60 tablet 03/15/22 03/30/22 

03/29/22 22:00 Rx


 


Aspirin EC [Halfprin EC] 81 mg PO QDAY #30 tablet 03/15/22 03/30/22 03/29/22 

09:00 Rx


 


Hydralazine HCl 50 mg PO TID 22 09:00 History


 


cloNIDine [Catapres] 0.1 mg PO BID 22 09:00 History


 


Apixaban [Eliquis] 5 mg PO BID #60 tab 22  Unknown Rx














ED Review of Systems


ROS: 


Stated complaint: CHEST PAIN


Other details as noted in HPI





Constitutional: diaphoresis


Eyes: denies: eye pain


ENT: denies: throat pain


Respiratory: shortness of breath


Cardiovascular: chest pain.  denies: palpitations


Endocrine: no symptoms reported


Gastrointestinal: nausea.  denies: vomiting


Musculoskeletal: denies: back pain


Neurological: denies: headache





Physical Exam





- Physical Exam


Vital Signs: 


                                   Vital Signs











  22





  13:58


 


Temperature 98.7 F


 


Pulse Rate 125 H


 


Respiratory 16





Rate 


 


Blood Pressure 160/100





[Left] 


 


O2 Sat by Pulse 100





Oximetry 











Physical Exam: 





GENERAL: The patient is well-developed well-nourished female lying on stretcher 

holding chest appear to be in moderate. []


HEENT: Normocephalic.  Atraumatic.  Extraocular motions are intact.  Patient has

 moist mucous membranes.


NECK: Supple.  Trachea midline


CHEST/LUNGS: Clear to auscultation.  There is no respiratory distress noted.


HEART/CARDIOVASCULAR: Irregularly irregular.  There is tachycardia.  There is no

 gallop rub or murmur.


ABDOMEN: Abdomen is soft, nontender.  Patient has normal bowel sounds.  There is

 no abdominal distention.


SKIN: There is no rash.  There is no edema.  There is no diaphoresis.


NEURO: The patient is awake, alert, and oriented.  The patient is cooperative.  

The patient has no focal neurologic deficits.  The patient has normal speech.  

GCS 15


MUSCULOSKELETAL: There is no evidence of acute injury.





ED Course


                                   Vital Signs











  22





  13:58


 


Temperature 98.7 F


 


Pulse Rate 125 H


 


Respiratory 16





Rate 


 


Blood Pressure 160/100





[Left] 


 


O2 Sat by Pulse 100





Oximetry 














ED Medical Decision Making





- Radiology Data


Radiology results: report reviewed (CT chest), image reviewed (CT chest)





[facility] [address] [place] [address] Cat

 Scan Report Signed Patient: LINDA GARCIA MR#: [id number] : 1955 

Acct:[id number] Age/Sex: 66 / F ADM Date: 22 Loc: ED Attending Dr: 

Ordering Physician: GITA SHORE MD Date of Service: 22 Procedure(s): CT 

angio chest Accession Number(s): [id number] cc: GITA SHORE MD CT angio chest 

INDICATION / CLINICAL INFORMATION: Chest pain OMNIPAQUE 350 100ML DIALYSIS PT. 

TECHNIQUE: Axial CT images were obtained through the chest after injection of 

100 cc of Omnipaque 350 IV contrast. 3 plane MIP and/or 3D reconstructions were 

produced. All CT scans at this location are performed using CT dose reduction 

for ALARA by means of automated exposure control. COMPARISON: Chest radiograph 

from 2022. FINDINGS: PULMONARY ARTERIES: No central or segmental pulmonary 

embolus. THORACIC AORTA: Mild atherosclerotic calcification without acute 

abnormality. HEART: There is cardiac enlargement. No pericardial effusion. Left-

sided cardiac conduction device leads terminate within the right atrium and 

right ventricle. CORONARY ARTERY CALCIFICATION: Stent or calcifications in the 

LAD. LYMPHADENOPATHY: No significant thoracic lymphadenopathy. LUNGS/PLEURA: 

There is mild emphysema. No acute interstitial or airspace disease. No pleural 

effusion or pneumothorax. OTHER FINDINGS: Right IJ dialysis catheter terminates 

in the right atrium. UPPER ABDOMEN: No acute findings. SKELETAL SYSTEM: No acute

 osseous findings. IMPRESSION: 1. No evidence of pulmonary embolus. 2. Cardiac 

enlargement. No acute findings in the chest. 3. Other incidental findings as 

above. Signer Name: Rose Mary Bennett MD Signed: 2022 4:38 PM Workstation 

Name: [email or web address]- Transcribed By: SHARIFA Dictated By: ROSE MARY BENNETT MD 

Electronically Authenticated By: ROSE MARY BENNETT MD Signed Date/Time: 

22 163 DD/DT: 22 163 TD/TT:





- Differential Diagnosis


PE, ACS, pericarditis, GERD


Critical care attestation.: 


If time is entered above; I have spent that time in minutes in the direct care 

of this critically ill patient, excluding procedure time.








ED Disposition


Clinical Impression: 


 Chest pain, Atrial fibrillation with RVR





Disposition:  ADMITTED AS INPATIENT


Is pt being admited?: Yes


Does the pt Need Aspirin: No


Condition: Fair


Instructions:  Nonspecific Chest Pain, Adult


Time of Disposition: 17:34 (Care transferred to hospitalist (Dr. Baeza))

## 2022-04-24 LAB
ALBUMIN SERPL-MCNC: 4.2 G/DL (ref 3.9–5)
ALT SERPL-CCNC: 6 UNITS/L (ref 7–56)
BASOPHILS # (AUTO): 0.1 K/MM3 (ref 0–0.1)
BASOPHILS NFR BLD AUTO: 0.8 % (ref 0–1.8)
BUN SERPL-MCNC: 14 MG/DL (ref 7–17)
BUN/CREAT SERPL: 6 %
CALCIUM SERPL-MCNC: 9.1 MG/DL (ref 8.4–10.2)
EOSINOPHIL # BLD AUTO: 0.5 K/MM3 (ref 0–0.4)
EOSINOPHIL NFR BLD AUTO: 8.1 % (ref 0–4.3)
HCT VFR BLD CALC: 39.4 % (ref 30.3–42.9)
HEMOLYSIS INDEX: 4
HGB BLD-MCNC: 12.4 GM/DL (ref 10.1–14.3)
LYMPHOCYTES # BLD AUTO: 1.7 K/MM3 (ref 1.2–5.4)
LYMPHOCYTES NFR BLD AUTO: 26.1 % (ref 13.4–35)
MCHC RBC AUTO-ENTMCNC: 31 % (ref 30–34)
MCV RBC AUTO: 97 FL (ref 79–97)
MONOCYTES # (AUTO): 0.5 K/MM3 (ref 0–0.8)
MONOCYTES % (AUTO): 7.4 % (ref 0–7.3)
PLATELET # BLD: 151 K/MM3 (ref 140–440)
RBC # BLD AUTO: 4.08 M/MM3 (ref 3.65–5.03)

## 2022-04-24 RX ADMIN — OXYCODONE AND ACETAMINOPHEN PRN TAB: 5; 325 TABLET ORAL at 22:23

## 2022-04-24 RX ADMIN — METOPROLOL TARTRATE SCH: 100 TABLET, FILM COATED ORAL at 22:17

## 2022-04-24 RX ADMIN — ASPIRIN SCH MG: 81 TABLET, COATED ORAL at 08:49

## 2022-04-24 RX ADMIN — OXYCODONE AND ACETAMINOPHEN PRN TAB: 5; 325 TABLET ORAL at 14:45

## 2022-04-24 RX ADMIN — APIXABAN SCH MG: 5 TABLET, FILM COATED ORAL at 08:48

## 2022-04-24 RX ADMIN — AMIODARONE HYDROCHLORIDE SCH MG: 200 TABLET ORAL at 22:19

## 2022-04-24 RX ADMIN — Medication SCH ML: at 10:55

## 2022-04-24 RX ADMIN — METOPROLOL TARTRATE SCH MG: 50 TABLET, FILM COATED ORAL at 08:48

## 2022-04-24 RX ADMIN — INSULIN LISPRO SCH: 100 INJECTION, SOLUTION INTRAVENOUS; SUBCUTANEOUS at 11:35

## 2022-04-24 RX ADMIN — INSULIN LISPRO SCH: 100 INJECTION, SOLUTION INTRAVENOUS; SUBCUTANEOUS at 17:00

## 2022-04-24 RX ADMIN — APIXABAN SCH MG: 5 TABLET, FILM COATED ORAL at 22:20

## 2022-04-24 RX ADMIN — ASPIRIN SCH: 81 TABLET, COATED ORAL at 10:41

## 2022-04-24 RX ADMIN — MORPHINE SULFATE PRN MG: 2 INJECTION, SOLUTION INTRAMUSCULAR; INTRAVENOUS at 18:46

## 2022-04-24 RX ADMIN — AMIODARONE HYDROCHLORIDE SCH: 200 TABLET ORAL at 10:41

## 2022-04-24 RX ADMIN — AMIODARONE HYDROCHLORIDE SCH MG: 200 TABLET ORAL at 08:49

## 2022-04-24 RX ADMIN — Medication SCH ML: at 22:20

## 2022-04-24 RX ADMIN — INSULIN LISPRO SCH: 100 INJECTION, SOLUTION INTRAVENOUS; SUBCUTANEOUS at 08:23

## 2022-04-24 RX ADMIN — INSULIN LISPRO SCH: 100 INJECTION, SOLUTION INTRAVENOUS; SUBCUTANEOUS at 22:17

## 2022-04-24 RX ADMIN — APIXABAN SCH: 5 TABLET, FILM COATED ORAL at 10:45

## 2022-04-24 NOTE — PROGRESS NOTE
Assessment and Plan


Assessment and plan: 


Advance Directives: Yes (Full code)


VTE prophylaxis?: Chemical


Plan of care discussed with patient/family: Yes





Assessment and plan


--Volume overload


Current Visit: Yes   Status: Acute   


Secondary to inadequate hemodialysis


Patient being taken to emergent hemodialysis today


Possible discharge tomorrow





--End-stage renal disease on hemodialysis


Current Visit: Yes   Status: Chronic   


Continue hemodialysis as per schedule





-- Tachycardia


Current Visit: Yes   Status: Acute   


CTA chest negative for PE 


Patient on Eliquis for atrial fibrillation





--Hypertension


Current Visit: Yes   Status: Chronic   


Continue antihypertensives and adjust medications





--T2DM (type 2 diabetes mellitus)


Current Visit: Yes   Status: Chronic   


Accu-Cheks sliding scale coverage ADA diet





--Coronary artery disease


Current Visit: Yes   Status: Chronic   


Patient had a recent  stress test which is negative


No further work-up





-- DVT prophylaxis


Current Visit: Yes   Status: Acute   


On Eliquis and GI prophylaxis





--Full CODE STATUS;





--Advance care planning


Current Visit: Yes   Status: Acute   


Disease education conducted, care plan discussed, 


diagnosis discussed, prognosis is.  


Patient acknowledges understanding and agreement with care plan.  +30 minutes.





We will closely monitor the patient and adjust management as needed


Plan of care reviewed with the patient and nurse


Nephrology evaluation and recommendations noted and appreciated














History


Interval history: 


I have seen and examined the patient at the bedside


Patient's chart and medications reviewed


Patient feels slightly better 


still has some shortness of breath and generalized weakness


Vital signs noted








Hospitalist Physical





- Constitutional


Vitals: 


                                        











Temp Pulse Resp BP Pulse Ox


 


 97.5 F L  71   16   112/69   97 


 


 04/24/22 04:49  04/24/22 04:49  04/24/22 04:49  04/24/22 04:49  04/24/22 04:49











General appearance: Present: no acute distress, well-nourished





- EENT


Eyes: Present: PERRL, EOM intact





- Neck


Neck: Present: supple, normal ROM





- Respiratory


Respiratory effort: normal


Respiratory: bilateral: diminished, negative: rales, rhonchi, wheezing





- Cardiovascular


Rhythm: regular


Heart Sounds: Present: S1 & S2





- Extremities


Extremities: no ischemia, No edema





- Abdominal


General gastrointestinal: soft, non-tender, non-distended, normal bowel sounds





- Integumentary


Integumentary: Present: clear, warm





- Psychiatric


Psychiatric: appropriate mood/affect, cooperative





- Neurologic


Neurologic: CNII-XII intact, moves all extremities





HEART Score





- HEART Score


Age: > 65


Risk factors: > 3 risk factors or hx of atherosclerotic disease


Troponin: 


                                        











Troponin T  0.015 ng/mL (0.00-0.029)   04/23/22  14:38    











Troponin: < normal limit





- Critical Actions


Critical Actions: 0-3 pts:0.9-1.7%risk of adverse cardiac event.Candidate for 

discharge





Results





- Labs


CBC & Chem 7: 


                                 04/24/22 05:03





                                 04/24/22 05:03


Labs: 


                             Laboratory Last Values











WBC  6.7 K/mm3 (4.5-11.0)   04/24/22  05:03    


 


RBC  4.08 M/mm3 (3.65-5.03)   04/24/22  05:03    


 


Hgb  12.4 gm/dl (10.1-14.3)   04/24/22  05:03    


 


Hct  39.4 % (30.3-42.9)   04/24/22  05:03    


 


MCV  97 fl (79-97)   04/24/22  05:03    


 


MCH  30 pg (28-32)   04/24/22  05:03    


 


MCHC  31 % (30-34)   04/24/22  05:03    


 


RDW  18.1 % (13.2-15.2)  H  04/24/22  05:03    


 


Plt Count  151 K/mm3 (140-440)   04/24/22  05:03    


 


Lymph % (Auto)  26.1 % (13.4-35.0)   04/24/22  05:03    


 


Mono % (Auto)  7.4 % (0.0-7.3)  H  04/24/22  05:03    


 


Eos % (Auto)  8.1 % (0.0-4.3)  H  04/24/22  05:03    


 


Baso % (Auto)  0.8 % (0.0-1.8)   04/24/22  05:03    


 


Lymph # (Auto)  1.7 K/mm3 (1.2-5.4)   04/24/22  05:03    


 


Mono # (Auto)  0.5 K/mm3 (0.0-0.8)   04/24/22  05:03    


 


Eos # (Auto)  0.5 K/mm3 (0.0-0.4)  H  04/24/22  05:03    


 


Baso # (Auto)  0.1 K/mm3 (0.0-0.1)   04/24/22  05:03    


 


Seg Neutrophils %  57.6 % (40.0-70.0)   04/24/22  05:03    


 


Seg Neutrophils #  3.8 K/mm3 (1.8-7.7)   04/24/22  05:03    


 


PT  12.8 Sec. (12.2-14.9)   04/23/22  19:44    


 


INR  0.87  (0.87-1.13)   04/23/22  19:44    


 


APTT  42.3 Sec. (24.2-36.6)  H  04/23/22  19:44    


 


Sodium  138 mmol/L (137-145)   04/24/22  05:03    


 


Potassium  3.3 mmol/L (3.6-5.0)  L  04/24/22  05:03    


 


Chloride  95.7 mmol/L ()  L  04/24/22  05:03    


 


Carbon Dioxide  31 mmol/L (22-30)  H  04/24/22  05:03    


 


Anion Gap  15 mmol/L  04/24/22  05:03    


 


BUN  14 mg/dL (7-17)   04/24/22  05:03    


 


Creatinine  2.5 mg/dL (0.6-1.2)  H D 04/24/22  05:03    


 


Estimated GFR  23 ml/min  04/24/22  05:03    


 


BUN/Creatinine Ratio  6 %  04/24/22  05:03    


 


Glucose  72 mg/dL ()   04/24/22  05:03    


 


POC Glucose  85 mg/dL ()   04/24/22  07:12    


 


Calcium  9.1 mg/dL (8.4-10.2)   04/24/22  05:03    


 


Magnesium  1.90 mg/dL (1.7-2.3)   04/23/22  14:38    


 


Total Bilirubin  0.50 mg/dL (0.1-1.2)   04/24/22  05:03    


 


AST  10 units/L (5-40)   04/24/22  05:03    


 


ALT  6 units/L (7-56)  L  04/24/22  05:03    


 


Alkaline Phosphatase  83 units/L ()   04/24/22  05:03    


 


Total Creatine Kinase  34 units/L ()   04/23/22  14:38    


 


CK-MB (CK-2)  1.3 ng/mL (0.0-4.0)   04/23/22  14:38    


 


CK-MB (CK-2) Rel Index  3.8  (0-4)   04/23/22  14:38    


 


Troponin T  0.015 ng/mL (0.00-0.029)   04/23/22  14:38    


 


Total Protein  6.1 g/dL (6.3-8.2)  L  04/24/22  05:03    


 


Albumin  4.2 g/dL (3.9-5)   04/24/22  05:03    


 


Albumin/Globulin Ratio  2.2 %  04/24/22  05:03    


 


TSH  0.463 mlU/mL (0.270-4.200)   04/23/22  14:38    


 


Free T4  1.77 ng/dL (0.76-1.46)  H  04/23/22  14:38    


 


Nasal Screen MRSA (PCR)  Negative  (Negative)   04/24/22  00:30    











[place]/IV: 


                                        





Voiding Method                   Toilet











Active Medications





- Current Medications


Current Medications: 














Generic Name Dose Route Start Last Admin





  Trade Name Freq  PRN Reason Stop Dose Admin


 


Acetaminophen  650 mg  04/23/22 19:17 





  Acetaminophen 325 Mg Tab  PO  





  Q4H PRN  





  Pain MILD(1-3)/Fever >100.5/HA  


 


Albuterol  2 puff  04/23/22 19:16 





  Albuterol 8.5 Gm Mdi Inhalation  IH  





  QID PRN  





  Shortness Of Breath  


 


Amiodarone HCl  200 mg  04/23/22 22:00  04/24/22 10:41





  Amiodarone 200 Mg Tab  PO   Not Given





  BID DERIK  


 


Apixaban  5 mg  04/23/22 22:00  04/24/22 10:45





  Apixaban 5 Mg Tab  PO   Not Given





  Q12HR [facility]  





  Protocol  


 


Aspirin  81 mg  04/23/22 20:00  04/24/22 10:41





  Aspirin Ec 81 Mg Tab  PO   Not Given





  QDAY DERIK  


 


Clonidine HCl  0.1 mg  04/23/22 22:00  04/23/22 23:07





  Clonidine 0.1 Mg Tab  PO   0.1 mg





  BID DERIK   Administration


 


Heparin Sodium (Porcine)  2,000 unit  04/23/22 14:52 





  Heparin 10,000 Units/10 Ml Vial  IV  





  VANCE PRN  





  hemodialysis  


 


Sodium Chloride  100 mls @ 999 mls/hr  04/23/22 14:52 





  Nacl 0.9%  IV  





  VANCE PRN  





  Hypotension  


 


Insulin Human Lispro  0 unit  04/23/22 22:05  04/24/22 08:23





  Insulin Lispro 100 Unit/Ml  SUB-Q   Not Given





  ACHS [facility]  





  Protocol  


 


Metoclopramide HCl  10 mg  04/23/22 19:17 





  Metoclopramide 10 Mg/2 Ml Inj  IV  





  Q6H PRN  





  Nausea And Vomiting  


 


Metoprolol Tartrate  100 mg  04/24/22 22:00 





  Metoprolol Tartrate 100 Mg Tab  PO  





  BID [facility]  


 


Morphine Sulfate  2 mg  04/23/22 19:17 





  Morphine 2 Mg/1 Ml Inj  IV  





  Q4H PRN  





  Pain, Moderate (4-6)  


 


Ondansetron HCl  4 mg  04/23/22 19:17 





  Ondansetron 4 Mg/2 Ml Inj  IV  





  Q8H PRN  





  Nausea And Vomiting  


 


Oxycodone/Acetaminophen  1 tab  04/23/22 19:17  04/23/22 19:59





  Oxycodone /Acetaminophen 5-325mg Tab  PO   1 tab





  Q6H PRN   Administration





  Pain, Moderate (4-6)  


 


Quetiapine Fumarate  100 mg  04/23/22 22:00  04/24/22 10:41





  Quetiapine 100 Mg Tab  PO   Not Given





  BID DERIK  


 


Sodium Chloride  10 ml  04/23/22 22:00  04/24/22 10:55





  Sodium Chloride 0.9% 10 Ml Flush Syringe  IV   10 ml





  BID DERIK   Administration


 


Sodium Chloride  10 ml  04/23/22 19:17 





  Sodium Chloride 0.9% 10 Ml Flush Syringe  IV  





  PRN PRN  





  LINE FLUSH

## 2022-04-24 NOTE — ELECTROCARDIOGRAPH REPORT
[facility]

                                       

Test Date:    2022               Test Time:    14:10:11

Pat Name:     LINDA GARCIA              Department:   

Patient ID:   [id number]          Room:         A369

Gender:       F                        Technician:   BREEZY

:          1955               Requested By: GITA SHORE

Order Number: [id number]              Reading MD:   Segundo Denise

                                 Measurements

Intervals                              Axis          

Rate:         109                      P:            

OH:                                    QRS:          -34

QRSD:         97                       T:            95

QT:           369                                    

QTc:          497                                    

                           Interpretive Statements

Atrial fibrillation

LVH with secondary repolarization abnormality

Anterior Q waves, possibly due to LVH

Compared to ECG 2022 10:17:11

Electronically Signed On 2022 10:45:02 EDT by Segundo Denise

## 2022-04-24 NOTE — PROGRESS NOTE
Assessment and Plan





1. ESRD:


Patient is on maintenance hemodialysis, TTS schedule.


Last outpatient HD 4/23.


Hemodialysis: .





2. FEN:


UF with HD as tolerated.


Monitor lytes and volume status.





3. Chest pain //  H/o CAD s/p PCI:


Troponin not elevated.


CTA negative for PE.


Monitor.





4. Paroxysmal A.fib with RVR:


Amiodarone, Metoprolol and Eliquis.


Monitor.





5. Chronic HFpEF:


Volume control thru HD.


Fluid restriction, monitor I/O.





6. H/o COPD:


Not in exacerbation.


Nebs and O2 as needed.





7. Hypertension:


Volume control with HD.


Continue home BP meds.


Monitor BP.











Subjective:


Patient was not examined today.  However the examination findings from other 

providers noted.  The current and previous medical records are reviewed in d

etail as are laboratory and imaging data reviewed when appropriate. Medications 

being given are also reviewed.  In addition the case has been discussed with the

attending hospitalist and the nurse when needed.  New renal recommendations as 

above.











Subjective


Date of service: 04/24/22





Objective





- Vital Signs


Vital signs: 


                               Vital Signs - 12hr











  04/24/22 04/24/22 04/24/22





  12:52 16:16 19:16


 


Temperature  97.9 F 


 


Pulse Rate  71 


 


Respiratory  18 17





Rate   


 


Blood Pressure  104/66 


 


O2 Sat by Pulse 97 96 





Oximetry   














- Lab





                                 04/24/22 05:03





                                 04/24/22 05:03


                             Most recent lab results











Calcium  9.1 mg/dL (8.4-10.2)   04/24/22  05:03    


 


Magnesium  1.90 mg/dL (1.7-2.3)   04/23/22  14:38    














Medications & Allergies





- Medications


Allergies/Adverse Reactions: 


                                    Allergies





apple Adverse Reaction (Verified 04/20/22 09:44)


   Hives


   yellow and green apples. can eat red. 


aspirin Adverse Reaction (Verified 04/20/22 09:44)


   Unknown


shell fish Allergy (Uncoded 04/20/22 09:44)


   Swelling


   allergic to seafood except blue crab 








Home Medications: 


                                Home Medications











 Medication  Instructions  Recorded  Confirmed  Last Taken  Type


 


Quetiapine Fumarate [Seroquel] 100 mg PO BID 09/22/17 04/23/22 04/22/22 22:00 

History


 


Albuterol Mdi (or & Nicu Only) 2 puff IH QID PRN #1 inha 02/12/18 04/24/22 04/23/22 10:00 Rx





[ProAir HFA Inhaler]     


 


Metoprolol [Lopressor TAB] 50 mg PO BID #60 tablet 07/20/20 04/23/22 04/22/22 

22:00 Rx


 


Amiodarone [Cordarone 200 MG TAB] 200 mg PO BID #60 tablet 03/15/22 04/23/22 

04/23/22 10:00 Rx


 


Aspirin EC [Halfprin EC] 81 mg PO QDAY #30 tablet 03/15/22 04/24/22 03/29/22 

09:00 Rx


 


Hydralazine HCl 50 mg PO TID 03/30/22 04/23/22 04/22/22 22:00 History


 


cloNIDine [Catapres] 0.1 mg PO BID 03/30/22 04/24/22 04/23/22 10:00 History


 


Apixaban [Eliquis] 5 mg PO BID #60 tab 04/21/22 04/23/22 04/23/22 10:00 Rx


 


Gabapentin 1 mg PO BID 04/23/22 04/24/22 Unknown History


 


Losartan [Cozaar] 50 mg PO QDAY 04/23/22 04/24/22 04/22/22 10:00 History


 


Nitroglycerin [Nitro-Bid] 0.1 mg TRANSDERMA Q6HR PRN 04/23/22 04/24/22 Unknown H

istory


 


Pantoprazole [Protonix] 40 mg PO QDAY 04/23/22 04/24/22 04/23/22 10:00 History


 


Sennosides/Docusate Sodium [Senna 8.6 mg PO DAILY 04/23/22 04/24/22 04/22/22 

10:00 History





Plus 8.6-50 mg Tablet]     


 


carvediloL [Coreg] 25 mg PO BID 04/23/22 04/24/22 Unknown History


 


dilTIAZem 240 mg PO DAILY 04/23/22 04/24/22 Unknown History


 


methIMAzole [Methimazole] 10 mg PO TID 04/23/22 04/24/22 04/23/22 10:00 History


 


methocarbamoL [Methocarbamol] 1 mg PO BID 04/23/22 04/24/22 Unknown History











Active Medications: 














Generic Name Dose Route Start Last Admin





  Trade Name Freq  PRN Reason Stop Dose Admin


 


Acetaminophen  650 mg  04/23/22 19:17 





  Acetaminophen 325 Mg Tab  PO  





  Q4H PRN  





  Pain MILD(1-3)/Fever >100.5/HA  


 


Albuterol  2 puff  04/23/22 19:16 





  Albuterol 8.5 Gm Mdi Inhalation  IH  





  QID PRN  





  Shortness Of Breath  


 


Amiodarone HCl  200 mg  04/23/22 22:00  04/24/22 10:41





  Amiodarone 200 Mg Tab  PO   Not Given





  BID [facility]  


 


Apixaban  5 mg  04/23/22 22:00  04/24/22 10:45





  Apixaban 5 Mg Tab  PO   Not Given





  Q12HR [facility]  





  Protocol  


 


Aspirin  81 mg  04/23/22 20:00  04/24/22 10:41





  Aspirin Ec 81 Mg Tab  PO   Not Given





  QDAY [facility]  


 


Clonidine HCl  0.1 mg  04/23/22 22:00  04/24/22 14:06





  Clonidine 0.1 Mg Tab  PO   0.1 mg





  BID [facility]   Administration


 


Heparin Sodium (Porcine)  2,000 unit  04/23/22 14:52 





  Heparin 10,000 Units/10 Ml Vial  IV  





  VANCE PRN  





  hemodialysis  


 


Sodium Chloride  100 mls @ 999 mls/hr  04/23/22 14:52 





  Nacl 0.9%  IV  





  VANCE PRN  





  Hypotension  


 


Insulin Human Lispro  0 unit  04/23/22 22:05  04/24/22 17:00





  Insulin Lispro 100 Unit/Ml  SUB-Q   Not Given





  ACHS [facility]  





  Protocol  


 


Metoclopramide HCl  10 mg  04/23/22 19:17 





  Metoclopramide 10 Mg/2 Ml Inj  IV  





  Q6H PRN  





  Nausea And Vomiting  


 


Metoprolol Tartrate  100 mg  04/24/22 22:00 





  Metoprolol Tartrate 100 Mg Tab  PO  





  BID [facility]  


 


Morphine Sulfate  2 mg  04/23/22 19:17  04/24/22 18:46





  Morphine 2 Mg/1 Ml Inj  IV   2 mg





  Q4H PRN   Administration





  Pain, Moderate (4-6)  


 


Ondansetron HCl  4 mg  04/23/22 19:17 





  Ondansetron 4 Mg/2 Ml Inj  IV  





  Q8H PRN  





  Nausea And Vomiting  


 


Oxycodone/Acetaminophen  1 tab  04/23/22 19:17  04/24/22 14:45





  Oxycodone /Acetaminophen 5-325mg Tab  PO   1 tab





  Q6H PRN   Administration





  Pain, Moderate (4-6)  


 


Quetiapine Fumarate  100 mg  04/23/22 22:00  04/24/22 10:41





  Quetiapine 100 Mg Tab  PO   Not Given





  BID DERIK  


 


Sodium Chloride  10 ml  04/23/22 22:00  04/24/22 10:55





  Sodium Chloride 0.9% 10 Ml Flush Syringe  IV   10 ml





  BID DERIK   Administration


 


Sodium Chloride  10 ml  04/23/22 19:17 





  Sodium Chloride 0.9% 10 Ml Flush Syringe  IV  





  PRN PRN  





  LINE FLUSH

## 2022-04-25 VITALS — SYSTOLIC BLOOD PRESSURE: 137 MMHG | DIASTOLIC BLOOD PRESSURE: 73 MMHG

## 2022-04-25 LAB
BUN SERPL-MCNC: 30 MG/DL (ref 7–17)
BUN/CREAT SERPL: 7 %
CALCIUM SERPL-MCNC: 9.3 MG/DL (ref 8.4–10.2)
HCT VFR BLD CALC: 37.7 % (ref 30.3–42.9)
HEMOLYSIS INDEX: 114
HGB BLD-MCNC: 11.8 GM/DL (ref 10.1–14.3)
MCHC RBC AUTO-ENTMCNC: 31 % (ref 30–34)
MCV RBC AUTO: 97 FL (ref 79–97)
PLATELET # BLD: 146 K/MM3 (ref 140–440)
RBC # BLD AUTO: 3.88 M/MM3 (ref 3.65–5.03)

## 2022-04-25 RX ADMIN — INSULIN LISPRO SCH: 100 INJECTION, SOLUTION INTRAVENOUS; SUBCUTANEOUS at 08:31

## 2022-04-25 RX ADMIN — Medication SCH ML: at 09:34

## 2022-04-25 RX ADMIN — AMIODARONE HYDROCHLORIDE SCH MG: 200 TABLET ORAL at 09:33

## 2022-04-25 RX ADMIN — MORPHINE SULFATE PRN MG: 2 INJECTION, SOLUTION INTRAMUSCULAR; INTRAVENOUS at 09:32

## 2022-04-25 RX ADMIN — INSULIN LISPRO SCH: 100 INJECTION, SOLUTION INTRAVENOUS; SUBCUTANEOUS at 14:15

## 2022-04-25 RX ADMIN — APIXABAN SCH MG: 5 TABLET, FILM COATED ORAL at 09:33

## 2022-04-25 RX ADMIN — METOPROLOL TARTRATE SCH MG: 100 TABLET, FILM COATED ORAL at 14:14

## 2022-04-25 RX ADMIN — ASPIRIN SCH MG: 81 TABLET, COATED ORAL at 09:33

## 2022-04-25 NOTE — PROGRESS NOTE
Assessment and Plan





1. ESRD:


Patient is on maintenance hemodialysis, TTS schedule.


Last outpatient HD 4/23.


Hemodialysis: .





2. FEN:


UF with HD as tolerated.


Monitor lytes and volume status.





3. Chest pain //  H/o CAD s/p PCI:


Troponin not elevated.


CTA negative for PE.


Monitor.





4. Paroxysmal A.fib with RVR:


Amiodarone, Metoprolol and Eliquis.


Monitor.





5. Chronic HFpEF:


Volume control thru HD.


Fluid restriction, monitor I/O.





6. H/o COPD:


Not in exacerbation.


Nebs and O2 as needed.





7. Hypertension:


Volume control with HD.


Continue home BP meds.


Monitor BP.





Please remove the R femoral central line prior to discharge.











Subjective:


Patient was seen and examined at the bedside.


Patient is doing better.











Examination:


General appearance: well-developed, appears stated age, no distress


HEENT: atraumatic, IVETTE


Neck: trachea midline


Respiratory: ctab


Heart: S1S2, no murmur


Abdomen: soft, bowel sounds heard, NT


Integumentary: no obvious rash


Neurologic: AO, able to move extremities


Ext: no edema


Hemodialysis access: R IJ tunnel catheter, R arm AVG








Subjective


Date of service: 04/25/22





Objective





- Vital Signs


Vital signs: 


                               Vital Signs - 12hr











  04/25/22 04/25/22 04/25/22





  04:28 08:44 08:57


 


Temperature 97.5 F L  98.7 F


 


Pulse Rate 70  70


 


Respiratory 16 20 20





Rate   


 


Blood Pressure 142/79  141/72


 


O2 Sat by Pulse 98 97 100





Oximetry   














  04/25/22 04/25/22 04/25/22





  09:32 12:21 14:14


 


Temperature  97.2 F L 


 


Pulse Rate  70 


 


Respiratory 20 18 





Rate   


 


Blood Pressure  154/76 154/76


 


O2 Sat by Pulse  98 





Oximetry   














- Lab





                                 04/25/22 04:06





                                 04/25/22 13:06


                             Most recent lab results











Calcium  9.3 mg/dL (8.4-10.2)   04/25/22  13:06    


 


Magnesium  1.90 mg/dL (1.7-2.3)   04/23/22  14:38    














Medications & Allergies





- Medications


Allergies/Adverse Reactions: 


                                    Allergies





apple Adverse Reaction (Verified 04/20/22 09:44)


   Hives


   yellow and green apples. can eat red. 


aspirin Adverse Reaction (Verified 04/20/22 09:44)


   Unknown


shell fish Allergy (Uncoded 04/20/22 09:44)


   Swelling


   allergic to seafood except blue crab 








Home Medications: 


                                Home Medications











 Medication  Instructions  Recorded  Confirmed  Last Taken  Type


 


Quetiapine Fumarate [Seroquel] 100 mg PO BID 09/22/17 04/23/22 04/22/22 22:00 

History


 


Albuterol Mdi (or & Nicu Only) 2 puff IH QID PRN #1 inha 02/12/18 04/24/22 04/23/22 10:00 Rx





[ProAir HFA Inhaler]     


 


Amiodarone [Cordarone 200 MG TAB] 200 mg PO BID #60 tablet 03/15/22 04/23/22 

04/23/22 10:00 Rx


 


Aspirin EC [Halfprin EC] 81 mg PO QDAY #30 tablet 03/15/22 04/24/22 03/29/22 

09:00 Rx


 


Hydralazine HCl 50 mg PO TID 03/30/22 04/23/22 04/22/22 22:00 History


 


cloNIDine [Catapres] 0.1 mg PO BID 03/30/22 04/24/22 04/23/22 10:00 History


 


Apixaban [Eliquis] 5 mg PO BID #60 tab 04/21/22 04/23/22 04/23/22 10:00 Rx


 


Gabapentin 1 mg PO BID 04/23/22 04/24/22 Unknown History


 


Losartan [Cozaar] 50 mg PO QDAY 04/23/22 04/24/22 04/22/22 10:00 History


 


Nitroglycerin [Nitro-Bid] 0.1 mg TRANSDERMA Q6HR PRN 04/23/22 04/24/22 Unknown 

History


 


Pantoprazole [Protonix TAB] 40 mg PO QDAY 04/23/22 04/24/22 04/23/22 10:00 

History


 


Sennosides/Docusate Sodium [Senna 8.6 mg PO DAILY 04/23/22 04/24/22 04/22/22 

10:00 History





Plus 8.6-50 mg Tablet]     


 


methIMAzole [Methimazole] 10 mg PO TID 04/23/22 04/24/22 04/23/22 10:00 History


 


methocarbamoL [Methocarbamol] 1 mg PO BID 04/23/22 04/24/22 Unknown History


 


Metoprolol [Lopressor TAB] 100 mg PO BID #60 tablet 04/25/22  Unknown Rx


 


Zolpidem [Ambien] 5 mg PO QHS PRN #7 tablet 04/25/22  Unknown Rx











Active Medications: 














Generic Name Dose Route Start Last Admin





  Trade Name Freq  PRN Reason Stop Dose Admin


 


Acetaminophen  650 mg  04/23/22 19:17 





  Acetaminophen 325 Mg Tab  PO  





  Q4H PRN  





  Pain MILD(1-3)/Fever >100.5/HA  


 


Albuterol  2 puff  04/23/22 19:16 





  Albuterol 8.5 Gm Mdi Inhalation  IH  





  QID PRN  





  Shortness Of Breath  


 


Amiodarone HCl  200 mg  04/23/22 22:00  04/25/22 09:33





  Amiodarone 200 Mg Tab  PO   200 mg





  BID DERIK   Administration


 


Apixaban  5 mg  04/23/22 22:00  04/25/22 09:33





  Apixaban 5 Mg Tab  PO   5 mg





  Q12HR DERIK   Administration





  Protocol  


 


Aspirin  81 mg  04/23/22 20:00  04/25/22 09:33





  Aspirin Ec 81 Mg Tab  PO   81 mg





  QDAY DERIK   Administration


 


Clonidine HCl  0.1 mg  04/23/22 22:00  04/25/22 14:14





  Clonidine 0.1 Mg Tab  PO   0.1 mg





  BID DERIK   Administration


 


Heparin Sodium (Porcine)  2,000 unit  04/23/22 14:52 





  Heparin 10,000 Units/10 Ml Vial  IV  





  VANCE PRN  





  hemodialysis  


 


Sodium Chloride  100 mls @ 999 mls/hr  04/23/22 14:52 





  Nacl 0.9%  IV  





  VANCE PRN  





  Hypotension  


 


Insulin Human Lispro  0 unit  04/23/22 22:05  04/25/22 14:15





  Insulin Lispro 100 Unit/Ml  SUB-Q   Not Given





  [facility]  





  Protocol  


 


Metoclopramide HCl  10 mg  04/23/22 19:17 





  Metoclopramide 10 Mg/2 Ml Inj  IV  





  Q6H PRN  





  Nausea And Vomiting  


 


Metoprolol Tartrate  100 mg  04/24/22 22:00  04/25/22 14:14





  Metoprolol Tartrate 100 Mg Tab  PO   100 mg





  BID DERIK   Administration


 


Morphine Sulfate  2 mg  04/23/22 19:17  04/25/22 09:32





  Morphine 2 Mg/1 Ml Inj  IV   2 mg





  Q4H PRN   Administration





  Pain, Moderate (4-6)  


 


Ondansetron HCl  4 mg  04/23/22 19:17 





  Ondansetron 4 Mg/2 Ml Inj  IV  





  Q8H PRN  





  Nausea And Vomiting  


 


Oxycodone/Acetaminophen  1 tab  04/23/22 19:17  04/24/22 22:23





  Oxycodone /Acetaminophen 5-325mg Tab  PO   1 tab





  Q6H PRN   Administration





  Pain, Moderate (4-6)  


 


Quetiapine Fumarate  100 mg  04/23/22 22:00  04/25/22 10:09





  Quetiapine 100 Mg Tab  PO   100 mg





  BID DERIK   Administration


 


Sodium Chloride  10 ml  04/23/22 22:00  04/25/22 09:34





  Sodium Chloride 0.9% 10 Ml Flush Syringe  IV   10 ml





  BID DERIK   Administration


 


Sodium Chloride  10 ml  04/23/22 19:17 





  Sodium Chloride 0.9% 10 Ml Flush Syringe  IV  





  PRN PRN  





  LINE FLUSH  


 


Sodium Polystyrene Sulfonate  30 gm  04/25/22 14:55 





  Sodium Polystyrene 15 Gm/60 Ml Oral Liqd  PO  04/25/22 14:56 





  ONCE ONE  


 


Zolpidem Tartrate  5 mg  04/24/22 22:00 





  Zolpidem 5 Mg Tab  PO  





  QHS PRN  





  Sleep

## 2022-04-25 NOTE — DISCHARGE SUMMARY
Providers





- Providers


Date of Admission: 


04/23/22 15:00





Date of discharge: 04/25/22


Attending physician: 


AMY J KOCHERLA





                                        





04/23/22 19:17


Consult to Physician [CONS] Routine 


   Comment: 


   Consulting Provider: EMELINA CH


   Physician Instructions: 


   Reason For Exam: Hypervolemia











Primary care physician: 


PRIMARY CARE MD








Hospitalization


Reason for admission: Atypical chest pain/missed hemodialysis


Condition: Fair


Pertinent studies: 





CTA chest; no evidence of PE, cardiac enlargement, no acute abnormality noted


Hospital course: 


Patient is a 66-year-old female hypertension hypothyroidism end-stage renal 

disease on hemodialysis was admitted through the emergency room with atypical 

chest pain , and missed hemodialysis due to noncompliance .  Patient had 

negative stress test in March 2022.


Patient was evaluated by nephrology, received hemodialysis per schedule


Patient's chest pain slowly but gradually improved,Today patient is comfortable,

 no new complaints, hemodynamically and clinically stable at discharge


I discussed with nephrologist, cleared for discharge HD TTS,


Triple-lumen right groin catheter removed


Strongly advised to comply with medications, diet, hemodialysis and follow-up 

visits








Discharge diagnosis:


--Volume overload


Secondary to inadequate hemodialysis


Received hemodialysis per schedule





--End-stage renal disease on hemodialysis


Continue hemodialysis as per schedule





-- Tachycardia/resolvedHome


CTA chest negative for PE 


Patient on Eliquis for atrial fibrillation





-History of hyperthyroidism;


Continue methimazole


Patient will follow private endocrinologist upon discharge





--Hypertension


Well-controlled ,continue antihypertensives and adjust medications





--T2DM (type 2 diabetes mellitus)


HbA1c 6.3 , blood sugars in the lower range 


Patient is not requiring any insulin , 


Accu-Cheks sliding scale coverage ADA diet





--Coronary artery disease


Patient had a recent  stress test which is negative


No further work-up





--Ongoing tobacco use;


Smoking cessation counseling, nicotine patch as needed





-- DVT prophylaxis


On Eliquis and GI prophylaxis





--Full CODE STATUS;





--Advance care planning


Current Visit: Yes   Status: Acute   


Disease education conducted, care plan discussed, 


diagnosis discussed, prognosis is.  


Patient acknowledges understanding and agreement with care plan.  +30 minutes.





Cleared by consultants for discharge and follow-up per schedule


Stable at discharge





Disposition: 01 HOME / SELF CARE / HOMELESS


Final Discharge Diagnosis (Prints w/discharge instructions): Volume overload/due

 to ESRD/resolved.  End-stage renal disease on hemodialysis.  Tachycardia.  

History of hyperthyroidism.  Hypertension.  Type 2 diabetes mellitus.  History 

of coronary artery disease.  Atypical chest pain resolved


Time spent for discharge: 35 min





Core Measure Documentation





- Palliative Care


Palliative Care/ Comfort Measures: Not Applicable





- Core Measures


Any of the following diagnoses?: none





Exam





- Constitutional


Vitals: 


                                        











Temp Pulse Resp BP Pulse Ox


 


 97.2 F L  70   18   154/76   98 


 


 04/25/22 12:21  04/25/22 12:21  04/25/22 12:21  04/25/22 14:14  04/25/22 12:21











General appearance: Present: no acute distress, well-nourished





- EENT


Eyes: Present: PERRL, EOM intact





- Neck


Neck: Present: supple, normal ROM





- Respiratory


Respiratory effort: normal


Respiratory: bilateral: diminished, negative: rales, rhonchi, wheezing





- Cardiovascular


Rhythm: regular


Heart Sounds: Present: S1 & S2





- Extremities


Extremities: no ischemia, No edema





- Abdominal


General gastrointestinal: Present: soft, non-tender, non-distended, normal bowel

 sounds





- Integumentary


Integumentary: Present: clear, warm





- Musculoskeletal


Musculoskeletal: strength equal bilaterally





Plan


Activity: advance as tolerated


Diet: renal, other (Cardiac  diet as tolerated)


Additional Instructions: Follow renal/hemodialysis per schedule TTS.  If you 

have worsening symptoms contact MD or go to the nearest emergency room as 

needed.  Advised to follow cardiology in 2 week or as needed.  Strongly advised 

to comply with medications diet and follow-up visits.  Smoking cessation advised


Follow up with: 


PRIMARY CARE,MD [Primary Care Provider] - 7 Days


EMELINA CH MD [Staff Physician] - 7 Days


AUSTIN CORONEL MD [Staff Physician] - 14 Days


Prescriptions: 


Zolpidem [Ambien] 5 mg PO QHS PRN #7 tablet


 PRN Reason: Sleep


Metoprolol [Lopressor TAB] 100 mg PO BID #60 tablet

## 2022-04-25 NOTE — PROGRESS NOTE
Assessment and Plan


Assessment and plan: 


Assessment and plan: 


Advance Directives: Yes (Full code)


VTE prophylaxis?: Chemical


Plan of care discussed with patient/family: Yes





Assessment and plan


--Volume overload


Current Visit: Yes   Status: Acute   


Secondary to inadequate hemodialysis


Patient being taken to emergent hemodialysis today


Possible discharge tomorrow





--End-stage renal disease on hemodialysis


Current Visit: Yes   Status: Chronic   


Continue hemodialysis as per schedule





-- Tachycardia


Current Visit: Yes   Status: Acute   


CTA chest negative for PE 


Patient on Eliquis for atrial fibrillation





-History of hyperthyroidism;


Continue methimazole


Patient will follow private endocrinologist upon discharge





--Hypertension


Current Visit: Yes   Status: Chronic   


Well-controlled ,continue antihypertensives and adjust medications





--T2DM (type 2 diabetes mellitus)


Current Visit: Yes   Status: Chronic   


HbA1c 6.3 , blood sugars in the lower range 


Patient is not requiring any insulin , 


Accu-Cheks sliding scale coverage ADA diet





--Coronary artery disease


Current Visit: Yes   Status: Chronic   


Patient had a recent  stress test which is negative


No further work-up





-- DVT prophylaxis


Current Visit: Yes   Status: Acute   


On Eliquis and GI prophylaxis





--Full CODE STATUS;





--Advance care planning


Current Visit: Yes   Status: Acute   


Disease education conducted, care plan discussed, 


diagnosis discussed, prognosis is.  


Patient acknowledges understanding and agreement with care plan.  +30 minutes.





We will closely monitor the patient and adjust management as needed


Plan of care reviewed with the patient and nurse


Nephrology evaluation and recommendations noted and appreciated




















Hospitalist Physical





- Constitutional


Vitals: 


                                        











Temp Pulse Resp BP Pulse Ox


 


 97.8 F   70   17   123/70   98 


 


 04/24/22 21:24  04/24/22 22:17  04/25/22 00:00  04/24/22 22:17  04/25/22 00:00











General appearance: Present: no acute distress, well-nourished





HEART Score





- HEART Score


Age: > 65


Risk factors: > 3 risk factors or hx of atherosclerotic disease


Troponin: 


                                        











Troponin T  0.015 ng/mL (0.00-0.029)   04/23/22  14:38    











Troponin: < normal limit





- Critical Actions


Critical Actions: 0-3 pts:0.9-1.7%risk of adverse cardiac event.Candidate for 

discharge





Results





- Labs


CBC & Chem 7: 


                                 04/25/22 04:06





                                 04/24/22 05:03


Labs: 


                             Laboratory Last Values











WBC  6.7 K/mm3 (4.5-11.0)   04/25/22  04:06    


 


RBC  3.88 M/mm3 (3.65-5.03)   04/25/22  04:06    


 


Hgb  11.8 gm/dl (10.1-14.3)   04/25/22  04:06    


 


Hct  37.7 % (30.3-42.9)   04/25/22  04:06    


 


MCV  97 fl (79-97)   04/25/22  04:06    


 


MCH  30 pg (28-32)   04/25/22  04:06    


 


MCHC  31 % (30-34)   04/25/22  04:06    


 


RDW  17.9 % (13.2-15.2)  H  04/25/22  04:06    


 


Plt Count  146 K/mm3 (140-440)   04/25/22  04:06    


 


Lymph % (Auto)  26.1 % (13.4-35.0)   04/24/22  05:03    


 


Mono % (Auto)  7.4 % (0.0-7.3)  H  04/24/22  05:03    


 


Eos % (Auto)  8.1 % (0.0-4.3)  H  04/24/22  05:03    


 


Baso % (Auto)  0.8 % (0.0-1.8)   04/24/22  05:03    


 


Lymph # (Auto)  1.7 K/mm3 (1.2-5.4)   04/24/22  05:03    


 


Mono # (Auto)  0.5 K/mm3 (0.0-0.8)   04/24/22  05:03    


 


Eos # (Auto)  0.5 K/mm3 (0.0-0.4)  H  04/24/22  05:03    


 


Baso # (Auto)  0.1 K/mm3 (0.0-0.1)   04/24/22  05:03    


 


Seg Neutrophils %  57.6 % (40.0-70.0)   04/24/22  05:03    


 


Seg Neutrophils #  3.8 K/mm3 (1.8-7.7)   04/24/22  05:03    


 


PT  12.8 Sec. (12.2-14.9)   04/23/22  19:44    


 


INR  0.87  (0.87-1.13)   04/23/22  19:44    


 


APTT  42.3 Sec. (24.2-36.6)  H  04/23/22  19:44    


 


Sodium  138 mmol/L (137-145)   04/24/22  05:03    


 


Potassium  3.3 mmol/L (3.6-5.0)  L  04/24/22  05:03    


 


Chloride  95.7 mmol/L ()  L  04/24/22  05:03    


 


Carbon Dioxide  31 mmol/L (22-30)  H  04/24/22  05:03    


 


Anion Gap  15 mmol/L  04/24/22  05:03    


 


BUN  14 mg/dL (7-17)   04/24/22  05:03    


 


Creatinine  2.5 mg/dL (0.6-1.2)  H D 04/24/22  05:03    


 


Estimated GFR  23 ml/min  04/24/22  05:03    


 


BUN/Creatinine Ratio  6 %  04/24/22  05:03    


 


Glucose  72 mg/dL ()   04/24/22  05:03    


 


POC Glucose  95 mg/dL ()   04/24/22  21:22    


 


Calcium  9.1 mg/dL (8.4-10.2)   04/24/22  05:03    


 


Magnesium  1.90 mg/dL (1.7-2.3)   04/23/22  14:38    


 


Total Bilirubin  0.50 mg/dL (0.1-1.2)   04/24/22  05:03    


 


AST  10 units/L (5-40)   04/24/22  05:03    


 


ALT  6 units/L (7-56)  L  04/24/22  05:03    


 


Alkaline Phosphatase  83 units/L ()   04/24/22  05:03    


 


Total Creatine Kinase  34 units/L ()   04/23/22  14:38    


 


CK-MB (CK-2)  1.3 ng/mL (0.0-4.0)   04/23/22  14:38    


 


CK-MB (CK-2) Rel Index  3.8  (0-4)   04/23/22  14:38    


 


Troponin T  0.015 ng/mL (0.00-0.029)   04/23/22  14:38    


 


Total Protein  6.1 g/dL (6.3-8.2)  L  04/24/22  05:03    


 


Albumin  4.2 g/dL (3.9-5)   04/24/22  05:03    


 


Albumin/Globulin Ratio  2.2 %  04/24/22  05:03    


 


TSH  0.463 mlU/mL (0.270-4.200)   04/23/22  14:38    


 


Free T4  1.77 ng/dL (0.76-1.46)  H  04/23/22  14:38    


 


Nasal Screen MRSA (PCR)  Negative  (Negative)   04/24/22  00:30    











[place]/IV: 


                                        





Voiding Method                   Toilet











Active Medications





- Current Medications


Current Medications: 














Generic Name Dose Route Start Last Admin





  Trade Name Freq  PRN Reason Stop Dose Admin


 


Acetaminophen  650 mg  04/23/22 19:17 





  Acetaminophen 325 Mg Tab  PO  





  Q4H PRN  





  Pain MILD(1-3)/Fever >100.5/HA  


 


Albuterol  2 puff  04/23/22 19:16 





  Albuterol 8.5 Gm Mdi Inhalation  IH  





  QID PRN  





  Shortness Of Breath  


 


Amiodarone HCl  200 mg  04/23/22 22:00  04/24/22 22:19





  Amiodarone 200 Mg Tab  PO   200 mg





  BID DERIK   Administration


 


Apixaban  5 mg  04/23/22 22:00  04/24/22 22:20





  Apixaban 5 Mg Tab  PO   5 mg





  Q12HR DERIK   Administration





  Protocol  


 


Aspirin  81 mg  04/23/22 20:00  04/24/22 10:41





  Aspirin Ec 81 Mg Tab  PO   Not Given





  QDAY [facility]  


 


Clonidine HCl  0.1 mg  04/23/22 22:00  04/24/22 22:16





  Clonidine 0.1 Mg Tab  PO   Not Given





  BID [facility]  


 


Heparin Sodium (Porcine)  2,000 unit  04/23/22 14:52 





  Heparin 10,000 Units/10 Ml Vial  IV  





  VANCE PRN  





  hemodialysis  


 


Sodium Chloride  100 mls @ 999 mls/hr  04/23/22 14:52 





  Nacl 0.9%  IV  





  VANCE PRN  





  Hypotension  


 


Insulin Human Lispro  0 unit  04/23/22 22:05  04/24/22 22:17





  Insulin Lispro 100 Unit/Ml  SUB-Q   Not Given





  ACHS [facility]  





  Protocol  


 


Metoclopramide HCl  10 mg  04/23/22 19:17 





  Metoclopramide 10 Mg/2 Ml Inj  IV  





  Q6H PRN  





  Nausea And Vomiting  


 


Metoprolol Tartrate  100 mg  04/24/22 22:00  04/24/22 22:17





  Metoprolol Tartrate 100 Mg Tab  PO   Not Given





  BID [facility]  


 


Morphine Sulfate  2 mg  04/23/22 19:17  04/24/22 18:46





  Morphine 2 Mg/1 Ml Inj  IV   2 mg





  Q4H PRN   Administration





  Pain, Moderate (4-6)  


 


Ondansetron HCl  4 mg  04/23/22 19:17 





  Ondansetron 4 Mg/2 Ml Inj  IV  





  Q8H PRN  





  Nausea And Vomiting  


 


Oxycodone/Acetaminophen  1 tab  04/23/22 19:17  04/24/22 22:23





  Oxycodone /Acetaminophen 5-325mg Tab  PO   1 tab





  Q6H PRN   Administration





  Pain, Moderate (4-6)  


 


Quetiapine Fumarate  100 mg  04/23/22 22:00  04/24/22 22:19





  Quetiapine 100 Mg Tab  PO   100 mg





  BID DERIK   Administration


 


Sodium Chloride  10 ml  04/23/22 22:00  04/24/22 22:20





  Sodium Chloride 0.9% 10 Ml Flush Syringe  IV   10 ml





  BID DERIK   Administration


 


Sodium Chloride  10 ml  04/23/22 19:17 





  Sodium Chloride 0.9% 10 Ml Flush Syringe  IV  





  PRN PRN  





  LINE FLUSH  


 


Zolpidem Tartrate  5 mg  04/24/22 22:00 





  Zolpidem 5 Mg Tab  PO  





  QHS PRN  





  Sleep  














Nutrition/Malnutrition Assess





- Dietary Evaluation


Nutrition/Malnutrition Findings: 


                                        





Nutrition Notes                                            Start:  04/24/22 

12:11


Freq:                                                      Status: Active       




Protocol:                                                                       




 Document     04/24/22 12:11  MARGARET  (Rec: 04/24/22 12:31  MARGARET  [id number])


 Nutrition Notes


     Need for Assessment generated from:         RN Referral,MST


     Initial or Follow up                        Assessment


     Current Diagnosis                           CKD (stage V CKD),COPD,


                                                 Coronary Artery Disease,


                                                 Diabetes,Hypertension,Heart


                                                 Failure,Stroke,Hyperlipidemia


     Other Pertinent Diagnosis                   ESRD+HD, s/p MI w/Stent, CKD w


                                                 /Stent, Atrial Fibrilation w/


                                                 Pacemaker, ...


     Current Diet                                Renal Diet (since D 04/23).


     Labs/Tests                                  04/24: K 3.3, Cl 95.7, CO2 31,


                                                 Crea 2.5.


     Pertinent Medications                       04/24: Nutritionally


                                                 unrermarkable.


     Height                                      5 ft 4 in


     Weight                                      68.038 kg


     Ideal Body Weight (kg)                      54.54


     BMI                                         25.7


     Intake Prior to Admission                   Poor


     Weight change and time frame                Pt states having,


                                                 unintentionally, loss between


                                                 2 and 13 lb recently.


     Weight Status                               Overweight


     Subjective/Other Information                RD consult for risk of


                                                 malnutrition assessment.


                                                 No reports available on Pt's


                                                 PO intake of meals at the time


                                                 , will assess at F/U.


                                                 Pt is on Room Air, O2


                                                 saturation @ 100%, according


                                                 to Physical Assessment History


                                                 notes.


                                                 Pt shows no signs of concern


                                                 for risk of malnutrition at


                                                 the time, according to


                                                 Physical Assessment History


                                                 notes.


     Percent of energy/protein needs met:        Prescribed Renal Diet provides


                                                 for energy/protein needs (2,


                                                 072 Kcal/77 g) during LOS.


     Burn                                        Absent


     Trauma                                      Absent


     GI Symptoms                                 Nausea


     Food Allergy                                Yes


     Skin Integrity/Comment                      Assessment WNL.


     Minimum of two criteria                     No


     Interpretation of Weight Loss (non-         10% in 6 months


      severe)                                    


     Protein-Calorie Malnutrition                N\A


     #1


      Nutrition Diagnosis                        No nutrition diagnosis at this


                                                 time


      Comments:                                  Will assess Pt's PO intake of


                                                 meals at F/U.


     Is patient on ventilator?                   No


     Is Patient Ambulatory and/or Out of Bed     Yes


     REE-(Benzie-St. Jeor-ambulatory/OOB) [     1566.994


      [id number]]                               


     Calculation Used for Recommendations        [facility]


     Additional Notes                            Protein: >1.2 g/Kg ABW; >82 g/


                                                 day.


                                                 Fluids: 1 ml/Kcal, or as per


                                                 MD.


 Nutrition Intervention


     Change Diet Order:                          Continue renal Diet.


     Follow-Up By:                               04/28/22


     Additional Comments                         Continue monitoring food


                                                 tolerance, %PO intake of meals


                                                 , and BM.

## 2022-04-30 ENCOUNTER — HOSPITAL ENCOUNTER (INPATIENT)
Dept: HOSPITAL 5 - ED | Age: 67
LOS: 3 days | Discharge: HOME | DRG: 308 | End: 2022-05-03
Attending: STUDENT IN AN ORGANIZED HEALTH CARE EDUCATION/TRAINING PROGRAM | Admitting: INTERNAL MEDICINE
Payer: MEDICARE

## 2022-04-30 DIAGNOSIS — E11.22: ICD-10-CM

## 2022-04-30 DIAGNOSIS — F20.9: ICD-10-CM

## 2022-04-30 DIAGNOSIS — I48.20: ICD-10-CM

## 2022-04-30 DIAGNOSIS — E78.00: ICD-10-CM

## 2022-04-30 DIAGNOSIS — F31.9: ICD-10-CM

## 2022-04-30 DIAGNOSIS — J44.1: ICD-10-CM

## 2022-04-30 DIAGNOSIS — E87.1: ICD-10-CM

## 2022-04-30 DIAGNOSIS — Z79.82: ICD-10-CM

## 2022-04-30 DIAGNOSIS — Z91.018: ICD-10-CM

## 2022-04-30 DIAGNOSIS — Z71.6: ICD-10-CM

## 2022-04-30 DIAGNOSIS — I25.10: ICD-10-CM

## 2022-04-30 DIAGNOSIS — I69.351: ICD-10-CM

## 2022-04-30 DIAGNOSIS — E78.5: ICD-10-CM

## 2022-04-30 DIAGNOSIS — Z82.49: ICD-10-CM

## 2022-04-30 DIAGNOSIS — N18.6: ICD-10-CM

## 2022-04-30 DIAGNOSIS — Z83.3: ICD-10-CM

## 2022-04-30 DIAGNOSIS — E46: ICD-10-CM

## 2022-04-30 DIAGNOSIS — I48.92: Primary | ICD-10-CM

## 2022-04-30 DIAGNOSIS — Z95.0: ICD-10-CM

## 2022-04-30 DIAGNOSIS — Z88.6: ICD-10-CM

## 2022-04-30 DIAGNOSIS — E78.2: ICD-10-CM

## 2022-04-30 DIAGNOSIS — E11.40: ICD-10-CM

## 2022-04-30 DIAGNOSIS — Z98.61: ICD-10-CM

## 2022-04-30 DIAGNOSIS — Z79.899: ICD-10-CM

## 2022-04-30 DIAGNOSIS — E87.5: ICD-10-CM

## 2022-04-30 DIAGNOSIS — I50.33: ICD-10-CM

## 2022-04-30 DIAGNOSIS — F17.200: ICD-10-CM

## 2022-04-30 DIAGNOSIS — Z79.01: ICD-10-CM

## 2022-04-30 DIAGNOSIS — I25.2: ICD-10-CM

## 2022-04-30 DIAGNOSIS — J96.01: ICD-10-CM

## 2022-04-30 DIAGNOSIS — Z91.013: ICD-10-CM

## 2022-04-30 DIAGNOSIS — I13.2: ICD-10-CM

## 2022-04-30 DIAGNOSIS — Z99.2: ICD-10-CM

## 2022-04-30 LAB
ALBUMIN SERPL-MCNC: 4.3 G/DL (ref 3.9–5)
ALT SERPL-CCNC: 7 UNITS/L (ref 7–56)
BASOPHILS # (AUTO): 0.1 K/MM3 (ref 0–0.1)
BASOPHILS NFR BLD AUTO: 0.8 % (ref 0–1.8)
BUN SERPL-MCNC: 30 MG/DL (ref 7–17)
BUN/CREAT SERPL: 7 %
CALCIUM SERPL-MCNC: 9.3 MG/DL (ref 8.4–10.2)
EOSINOPHIL # BLD AUTO: 0.3 K/MM3 (ref 0–0.4)
EOSINOPHIL NFR BLD AUTO: 3.4 % (ref 0–4.3)
HCT VFR BLD CALC: 35.9 % (ref 30.3–42.9)
HEMOLYSIS INDEX: 7
HGB BLD-MCNC: 11.8 GM/DL (ref 10.1–14.3)
LYMPHOCYTES # BLD AUTO: 1.4 K/MM3 (ref 1.2–5.4)
LYMPHOCYTES NFR BLD AUTO: 16.8 % (ref 13.4–35)
MCHC RBC AUTO-ENTMCNC: 33 % (ref 30–34)
MCV RBC AUTO: 95 FL (ref 79–97)
MONOCYTES # (AUTO): 0.7 K/MM3 (ref 0–0.8)
MONOCYTES % (AUTO): 7.7 % (ref 0–7.3)
PLATELET # BLD: 131 K/MM3 (ref 140–440)
RBC # BLD AUTO: 3.77 M/MM3 (ref 3.65–5.03)

## 2022-04-30 PROCEDURE — 71046 X-RAY EXAM CHEST 2 VIEWS: CPT

## 2022-04-30 PROCEDURE — 93005 ELECTROCARDIOGRAM TRACING: CPT

## 2022-04-30 PROCEDURE — 96374 THER/PROPH/DIAG INJ IV PUSH: CPT

## 2022-04-30 PROCEDURE — 5A1D70Z PERFORMANCE OF URINARY FILTRATION, INTERMITTENT, LESS THAN 6 HOURS PER DAY: ICD-10-PCS | Performed by: INTERNAL MEDICINE

## 2022-04-30 PROCEDURE — 94760 N-INVAS EAR/PLS OXIMETRY 1: CPT

## 2022-04-30 PROCEDURE — 84484 ASSAY OF TROPONIN QUANT: CPT

## 2022-04-30 PROCEDURE — 99291 CRITICAL CARE FIRST HOUR: CPT

## 2022-04-30 PROCEDURE — 82962 GLUCOSE BLOOD TEST: CPT

## 2022-04-30 PROCEDURE — 82550 ASSAY OF CK (CPK): CPT

## 2022-04-30 PROCEDURE — 96376 TX/PRO/DX INJ SAME DRUG ADON: CPT

## 2022-04-30 PROCEDURE — 96375 TX/PRO/DX INJ NEW DRUG ADDON: CPT

## 2022-04-30 PROCEDURE — 84100 ASSAY OF PHOSPHORUS: CPT

## 2022-04-30 PROCEDURE — 36415 COLL VENOUS BLD VENIPUNCTURE: CPT

## 2022-04-30 PROCEDURE — 83735 ASSAY OF MAGNESIUM: CPT

## 2022-04-30 PROCEDURE — 94644 CONT INHLJ TX 1ST HOUR: CPT

## 2022-04-30 PROCEDURE — 83880 ASSAY OF NATRIURETIC PEPTIDE: CPT

## 2022-04-30 PROCEDURE — 82553 CREATINE MB FRACTION: CPT

## 2022-04-30 PROCEDURE — 85025 COMPLETE CBC W/AUTO DIFF WBC: CPT

## 2022-04-30 PROCEDURE — 87641 MR-STAPH DNA AMP PROBE: CPT

## 2022-04-30 PROCEDURE — 94640 AIRWAY INHALATION TREATMENT: CPT

## 2022-04-30 PROCEDURE — 80053 COMPREHEN METABOLIC PANEL: CPT

## 2022-04-30 PROCEDURE — 83036 HEMOGLOBIN GLYCOSYLATED A1C: CPT

## 2022-04-30 PROCEDURE — 80048 BASIC METABOLIC PNL TOTAL CA: CPT

## 2022-04-30 RX ADMIN — GABAPENTIN SCH MG: 100 CAPSULE ORAL at 21:37

## 2022-04-30 RX ADMIN — OXYCODONE AND ACETAMINOPHEN PRN TAB: 5; 325 TABLET ORAL at 14:00

## 2022-04-30 RX ADMIN — AMIODARONE HYDROCHLORIDE SCH MG: 200 TABLET ORAL at 21:38

## 2022-04-30 RX ADMIN — DILTIAZEM HYDROCHLORIDE SCH MLS/HR: 100 INJECTION, POWDER, LYOPHILIZED, FOR SOLUTION INTRAVENOUS at 14:05

## 2022-04-30 RX ADMIN — METOPROLOL TARTRATE SCH MG: 100 TABLET, FILM COATED ORAL at 21:37

## 2022-04-30 RX ADMIN — HYDROMORPHONE HYDROCHLORIDE PRN MG: 1 INJECTION, SOLUTION INTRAMUSCULAR; INTRAVENOUS; SUBCUTANEOUS at 21:42

## 2022-04-30 RX ADMIN — AMIODARONE HYDROCHLORIDE SCH MG: 200 TABLET ORAL at 12:30

## 2022-04-30 RX ADMIN — Medication SCH ML: at 21:37

## 2022-04-30 RX ADMIN — APIXABAN SCH MG: 5 TABLET, FILM COATED ORAL at 21:37

## 2022-04-30 RX ADMIN — LOSARTAN POTASSIUM SCH MG: 50 TABLET, FILM COATED ORAL at 11:47

## 2022-04-30 RX ADMIN — ASPIRIN SCH MG: 81 TABLET, COATED ORAL at 11:47

## 2022-04-30 NOTE — CONSULTATION
History of Present Illness





- Reason for Consult


Consult date: 22





- History of Present Illness





66yr F with prior Cardiac hx s/p ICD, presented to ED c/o CP & Nausea. Pt has 

h/o ESRD on HD q TTS at a [facility] but does not know location or 

Nephrologist





Past History


Past Medical History: atrial fib, CAD, diabetes, ESRD (On HD q TTS), hyperten

ghanshyam


Family history: other (Mother, Brother had ESRD on dialysis, both )





Medications and Allergies


                                    Allergies











Allergy/AdvReac Type Severity Reaction Status Date / Time


 


apple AdvReac  Hives Verified 22 09:44


 


aspirin AdvReac  Unknown Verified 22 09:44


 


shell fish Allergy  Swelling Uncoded 22 09:44











                                Home Medications











 Medication  Instructions  Recorded  Confirmed  Last Taken  Type


 


Quetiapine Fumarate [Seroquel] 100 mg PO BID 17 22:00 

History


 


Albuterol Mdi (or & Nicu Only) 2 puff IH QID PRN #1 inha 18 10:00 Rx





[ProAir HFA Inhaler]     


 


Amiodarone [Cordarone 200 MG TAB] 200 mg PO BID #60 tablet 03/15/22 04/23/22 0

22 10:00 Rx


 


Aspirin EC [Halfprin EC] 81 mg PO QDAY #30 tablet 03/15/22 04/24/22 03/29/22 

09:00 Rx


 


Hydralazine HCl 50 mg PO TID 22 22:00 History


 


cloNIDine [Catapres] 0.1 mg PO BID 22 10:00 History


 


Apixaban [Eliquis] 5 mg PO BID #60 tab 22 10:00 Rx


 


Gabapentin 1 mg PO BID 22 Unknown History


 


Losartan [Cozaar] 50 mg PO QDAY 22 10:00 History


 


Nitroglycerin [Nitro-Bid] 0.1 mg TRANSDERMA Q6HR PRN 22 Unknown 

History


 


Pantoprazole [Protonix TAB] 40 mg PO QDAY 22 10:00 

History


 


Sennosides/Docusate Sodium [Senna 8.6 mg PO DAILY 22 

10:00 History





Plus 8.6-50 mg Tablet]     


 


methIMAzole [Methimazole] 10 mg PO TID 22 10:00 History


 


methocarbamoL [Methocarbamol] 1 mg PO BID 22 Unknown History


 


Metoprolol [Lopressor TAB] 100 mg PO BID #60 tablet 22  Unknown Rx


 


Zolpidem [Ambien] 5 mg PO QHS PRN #7 tablet 22  Unknown Rx


 


Acetaminophen [Non-Aspirin Extra 500 mg PO Q6HR PRN #30 tablet 22  Unknown

 Rx





Strength]     


 


Albuterol Sulfate [Proair 90 mcg IH Q4HR PRN #2 aer.[email or web address] 22  Unknown Rx





Respiclick]     


 


Ipratropium (Nf) [Atrovent] 2 puff IH Q6HR PRN #1 inha 22  Unknown Rx


 


predniSONE [Deltasone] 40 mg PO QDAY #8 tab 22  Unknown Rx











Active Meds: 


Active Medications





Acetaminophen (Acetaminophen 325 Mg Tab)  650 mg PO Q6H PRN


   PRN Reason: Pain MILD(1-3)/Fever >100.5/HA


Albuterol (Albuterol 2.5 Mg/3 Ml Nebu)  2.5 mg IH Q3HRT PRN


   PRN Reason: Shortness Of Breath


Amiodarone HCl (Amiodarone 200 Mg Tab)  200 mg PO BID [facility]


   Last Admin: 22 12:30 Dose:  200 mg


   


Apixaban (Apixaban 5 Mg Tab)  5 mg PO Q12HR [facility]


Aspirin (Aspirin Ec 81 Mg Tab)  81 mg PO QDAY [facility]


   Last Admin: 22 11:47 Dose:  81 mg


   


Gabapentin (Gabapentin 100 Mg Cap)  100 mg PO BID [facility]


Hydromorphone HCl (Hydromorphone 1 Mg/1 Ml Inj)  0.5 mg IV Q13H PRN


   PRN Reason: Pain , Severe (7-10)


Diltiazem HCl (Cardizem/D5w 100mg/100ml)  100 mg in 100 mls @ 5 mls/hr IV TITR 

DERIK; Protocol


Losartan Potassium (Losartan 50 Mg Tab)  50 mg PO QDAY [facility]


   Last Admin: 22 11:47 Dose:  50 mg


   


Metoprolol Tartrate (Metoprolol Tartrate 100 Mg Tab)  100 mg PO BID DERIK


Oxycodone/Acetaminophen (Oxycodone /Acetaminophen 5-325mg Tab)  1 tab PO Q6H PRN


   PRN Reason: Pain, Moderate (4-6)


Sodium Chloride (Sodium Chloride 0.9% 10 Ml Flush Syringe)  10 ml IV BID DERIK


Sodium Chloride (Sodium Chloride 0.9% 10 Ml Flush Syringe)  10 ml IV PRN PRN


   PRN Reason: LINE FLUSH











Review of Systems


Constitutional: no fever, no chills


Cardiovascular: chest pain, shortness of breath, no palpitations, no leg edema


Respiratory: shortness of breath, no cough


Gastrointestinal: nausea, no abdominal pain, no vomiting, no diarrhea


Neurological: no numbness





Exam





- Vital Signs


Vital signs: 


                                   Vital Signs











Temp Pulse Resp BP Pulse Ox


 


 98.3 F   92 H  18   184/106   99 


 


 22 08:23  22 08:23  22 08:23  22 08:23  22 08:23














- General Appearance


General appearance: other (Awake & verbally responsive)


EENT: PERRL, mucous membranes moist


Neck: Present: neck supple.  Absent: JVD/HJR


Respiratory: Other (Good air entry)


Heart: regular, S1S2


Gastrointestinal: Present: normal


Neurologic: other (Moves extremities)





Results





- Lab Results





                                 22 11:19





                                 22 11:19


                             Most recent lab results











Calcium  9.3 mg/dL (8.4-10.2)   22  11:19    


 


Phosphorus  4.80 mg/dL (2.5-4.5)  H  22  11:19    


 


Magnesium  2.00 mg/dL (1.7-2.3)   22  11:19    














Assessment and Plan





[facility]





HTN - Review meds & adjust as necessary





CP - W/u & Mx per Cardiology





Thanks, will f/u with you

## 2022-04-30 NOTE — EMERGENCY DEPARTMENT REPORT
ED General Adult HPI





- General


Chief complaint: Chest Pain


Stated complaint: CHEST PAIN/THADDEUS/HEADACHE


Time Seen by Provider: 04/30/22 10:09


Source: patient, RN notes reviewed, old records reviewed


Mode of arrival: Ambulatory


Limitations: No Limitations





- History of Present Illness


Initial comments: 





This is a 66-year-old female.  Past medical history is complex including end-

stage renal disease on hemodialysis, Tuesday, Thursday, Sunday, hypertension, h

istory of stroke, history of myocardial infarction, CHF, tobacco dependence, 

chronic A. fib on anticoagulation, currently on Eliquis, type 2 diabetes, COPD, 

hyperlipidemia, bipolar disorder, and cardiac stent.





Patient recently admitted to this hospital for similar symptoms.  Patient had 

extensive work-up here in the emergency room, and multiple consultations 

including cardiology.  She had a CTA of her chest which was negative for 

pulmonary embolism.  She has recently had an unremarkable cardiac nuclear 

ischemic work-up.





Cardiology specifically advised that no additional cardiac or stratification is 

necessary.  With respect to the patient's atrial flutter, they recommend 

metoprolol and amiodarone, referred to outpatient EP.





They also recommend guideline directed medical therapy for coronary artery 

disease and patent mid and distal LAD stents.





The patient was last dialyzed 2 days ago.  She presents to the ER today with 

complaints of chest wall pain, back pain, cough, but denies headache to me, neck

pain, abdominal pain, vomiting, diaphoresis, exertional shortness of breath 

which is new or different, and she furthermore endorses compliance with her 

systemic anticoagulation.  She is not homicidal suicidal.  Her chest wall pain 

increases with palpation.  It decreases with rest.





She does feel mildly anxious at this time








-: Gradual, hour(s), days(s)


Location: chest, back


Severity scale (0 -10): 8


Quality: aching


Consistency: constant


Improves with: rest


Worsens with: movement





- Related Data


                                Home Medications











 Medication  Instructions  Recorded  Confirmed  Last Taken


 


Quetiapine Fumarate [Seroquel] 100 mg PO BID 09/22/17 04/23/22 04/22/22 22:00


 


Hydralazine HCl 50 mg PO TID 03/30/22 04/23/22 04/22/22 22:00


 


cloNIDine [Catapres] 0.1 mg PO BID 03/30/22 04/24/22 04/23/22 10:00


 


Gabapentin 1 mg PO BID 04/23/22 04/24/22 Unknown


 


Losartan [Cozaar] 50 mg PO QDAY 04/23/22 04/24/22 04/22/22 10:00


 


Nitroglycerin [Nitro-Bid] 0.1 mg TRANSDERMA Q6HR PRN 04/23/22 04/24/22 Unknown


 


Pantoprazole [Protonix TAB] 40 mg PO QDAY 04/23/22 04/24/22 04/23/22 10:00


 


Sennosides/Docusate Sodium [Senna 8.6 mg PO DAILY 04/23/22 04/24/22 04/22/22 

10:00





Plus 8.6-50 mg Tablet]    


 


methIMAzole [Methimazole] 10 mg PO TID 04/23/22 04/24/22 04/23/22 10:00


 


methocarbamoL [Methocarbamol] 1 mg PO BID 04/23/22 04/24/22 Unknown








                                  Previous Rx's











 Medication  Instructions  Recorded  Last Taken  Type


 


Albuterol Mdi (or & Nicu Only) 2 puff IH QID PRN #1 inha 02/12/18 04/23/22 10:00

Rx





[ProAir HFA Inhaler]    


 


Amiodarone [Cordarone 200 MG TAB] 200 mg PO BID #60 tablet 03/15/22 04/23/22 

10:00 Rx


 


Aspirin EC [Halfprin EC] 81 mg PO QDAY #30 tablet 03/15/22 03/29/22 09:00 Rx


 


Apixaban [Eliquis] 5 mg PO BID #60 tab 04/21/22 04/23/22 10:00 Rx


 


Metoprolol [Lopressor TAB] 100 mg PO BID #60 tablet 04/25/22 Unknown Rx


 


Zolpidem [Ambien] 5 mg PO QHS PRN #7 tablet 04/25/22 Unknown Rx


 


Acetaminophen [Non-Aspirin Extra 500 mg PO Q6HR PRN #30 tablet 04/30/22 Unknown 

Rx





Strength]    


 


Albuterol Sulfate [Proair 90 mcg IH Q4HR PRN #2 aer.pow.ba 04/30/22 Unknown Rx





Respiclick]    


 


Ipratropium (Nf) [Atrovent] 2 puff IH Q6HR PRN #1 inha 04/30/22 Unknown Rx


 


predniSONE [Deltasone] 40 mg PO QDAY #8 tab 04/30/22 Unknown Rx











                                    Allergies











Allergy/AdvReac Type Severity Reaction Status Date / Time


 


apple AdvReac  Hives Verified 04/20/22 09:44


 


aspirin AdvReac  Unknown Verified 04/20/22 09:44


 


shell fish Allergy  Swelling Uncoded 04/20/22 09:44














ED Review of Systems


ROS: 


Stated complaint: CHEST PAIN/THADDEUS/HEADACHE


Other details as noted in HPI





Constitutional: denies: fever


Eyes: denies: eye discharge


ENT: denies: congestion


Respiratory: cough, shortness of breath, wheezing


Cardiovascular: chest pain


Gastrointestinal: denies: abdominal pain, hematemesis, melena, hematochezia


Musculoskeletal: back pain


Neurological: denies: weakness


Psychiatric: denies: homicidal thoughts, suicidal thoughts





ED Past Medical Hx





- Past Medical History


Previous Medical History?: Yes


Hx Hypertension: Yes


Hx CVA: Yes (right sided weakness)


Hx Heart Attack/AMI: Yes (1 stent)


Hx Congestive Heart Failure: Yes


Hx Diabetes: Yes


Hx Renal Disease: Yes (RENAL INSUFFICIENCY- stent left kidney)


Hx Sickle Cell Disease: No


Hx Kidney Stones: No


Hx Psychiatric Treatment: Yes (BIPOLAR/SCHIZOPHRENIA)


Hx COPD: Yes


Hx HIV: No


Additional medical history: HIGH CHOLESTEROL





- Surgical History


Past Surgical History?: Yes


Hx Coronary Stent: Yes


Hx Pacemaker: Yes


Additional Surgical History: stent placement x 2 in 2015, knee surgery 01/2018





- Social History


Smoking Status: Never Smoker


Substance Use Type: None





- Medications


Home Medications: 


                                Home Medications











 Medication  Instructions  Recorded  Confirmed  Last Taken  Type


 


Quetiapine Fumarate [Seroquel] 100 mg PO BID 09/22/17 04/23/22 04/22/22 22:00 

History


 


Albuterol Mdi (or & Nicu Only) 2 puff IH QID PRN #1 inha 02/12/18 04/24/22 04/23/22 10:00 Rx





[ProAir HFA Inhaler]     


 


Amiodarone [Cordarone 200 MG TAB] 200 mg PO BID #60 tablet 03/15/22 04/23/22 

04/23/22 10:00 Rx


 


Aspirin EC [Halfprin EC] 81 mg PO QDAY #30 tablet 03/15/22 04/24/22 03/29/22 

09:00 Rx


 


Hydralazine HCl 50 mg PO TID 03/30/22 04/23/22 04/22/22 22:00 History


 


cloNIDine [Catapres] 0.1 mg PO BID 03/30/22 04/24/22 04/23/22 10:00 History


 


Apixaban [Eliquis] 5 mg PO BID #60 tab 04/21/22 04/23/22 04/23/22 10:00 Rx


 


Gabapentin 1 mg PO BID 04/23/22 04/24/22 Unknown History


 


Losartan [Cozaar] 50 mg PO QDAY 04/23/22 04/24/22 04/22/22 10:00 History


 


Nitroglycerin [Nitro-Bid] 0.1 mg TRANSDERMA Q6HR PRN 04/23/22 04/24/22 Unknown 

History


 


Pantoprazole [Protonix TAB] 40 mg PO QDAY 04/23/22 04/24/22 04/23/22 10:00 

History


 


Sennosides/Docusate Sodium [Senna 8.6 mg PO DAILY 04/23/22 04/24/22 04/22/22 

10:00 History





Plus 8.6-50 mg Tablet]     


 


methIMAzole [Methimazole] 10 mg PO TID 04/23/22 04/24/22 04/23/22 10:00 History


 


methocarbamoL [Methocarbamol] 1 mg PO BID 04/23/22 04/24/22 Unknown History


 


Metoprolol [Lopressor TAB] 100 mg PO BID #60 tablet 04/25/22  Unknown Rx


 


Zolpidem [Ambien] 5 mg PO QHS PRN #7 tablet 04/25/22  Unknown Rx


 


Acetaminophen [Non-Aspirin Extra 500 mg PO Q6HR PRN #30 tablet 04/30/22  Unknown

 Rx





Strength]     


 


Albuterol Sulfate [Proair 90 mcg IH Q4HR PRN #2 aer.pow.ba 04/30/22  Unknown Rx





Respiclick]     


 


Ipratropium (Nf) [Atrovent] 2 puff IH Q6HR PRN #1 inha 04/30/22  Unknown Rx


 


predniSONE [Deltasone] 40 mg PO QDAY #8 tab 04/30/22  Unknown Rx














ED Physical Exam





- General


Limitations: No Limitations


General appearance: alert, anxious





- Head


Head exam: Present: atraumatic, normocephalic





- Eye


Eye exam: Present: normal appearance, EOMI.  Absent: nystagmus





- ENT


ENT exam: Present: normal exam, normal orophraynx, mucous membranes moist, 

normal external ear exam





- Neck


Neck exam: Present: normal inspection, full ROM.  Absent: tenderness, 

meningismus





- Respiratory


Respiratory exam: Present: wheezes, rhonchi, chest wall tenderness.  Absent: 

respiratory distress





- Cardiovascular


Cardiovascular Exam: Present: tachycardia, irregular rhythm, normal heart 

sounds.  Absent: bradycardia, systolic murmur, diastolic murmur, rubs, gallop





- GI/Abdominal


GI/Abdominal exam: Present: soft.  Absent: distended, tenderness, guarding, 

rebound, rigid, pulsatile mass





- Extremities Exam


Extremities exam: Present: normal inspection (Right upper extremity thrill, no 

redness, there is no redness, pus), full ROM, pedal edema (Lower extremity 

edema), other (2+ pulses noted in the bilateral upper and lower extremities.  

There is no palpable cord.   negative Homans sign.  Muscular compartments are 

soft.  The pelvis is stable.).  Absent: calf tenderness





- Back Exam


Back exam: Present: normal inspection.  Absent: tenderness, CVA tenderness (R), 

CVA tenderness (L), paraspinal tenderness, vertebral tenderness





- Neurological Exam


Neurological exam: Present: alert, oriented X3, other (No facial droop.  Tongue 

midline.  Extraocular movements intact bilaterally.  Facial sensation intact to 

light touch in V1, V2, V3 distribution bilaterally.  5 and a 5 strength in 4 

extremities.  Sensation intact to light touch in 4 extremities.).  Absent: motor

 sensory deficit





- Psychiatric


Psychiatric exam: Absent: homicidal ideation, suicidal ideation





- Skin


Skin exam: Present: warm, dry, intact, normal color.  Absent: rash





ED Course


                                   Vital Signs











  04/30/22 04/30/22 04/30/22





  08:23 10:44 11:31


 


Temperature 98.3 F  


 


Pulse Rate 92 H  


 


Pulse Rate [   18 L





Anterior   





Bilateral   





Throughout]   


 


Respiratory 18  





Rate   


 


Respiratory   108 H





Rate [Anterior   





Bilateral   





Throughout]   


 


Blood Pressure 184/106  





[Right]   


 


O2 Sat by Pulse 99 94 





Oximetry   














- Reevaluation(s)


Reevaluation #1: 





04/30/22 11:28


Differential diagnosis, including but not limited to: Costochondritis, 

congestive heart failure, end-stage renal disease, chronic coronary artery 

disease, COPD, pneumonia


A. fib with RVR














Assessment and plan: 66-year-old female with a primary complaint of chest wall 

pain.  She was recently ruled out for pulmonary embolism at this facility, and 

she is currently maintained on systemic anticoagulation, Eliquis.  I think she 

is low risk by Wells criteria for pulmonary embolism, and she is very unlikely 

to have a PE or DVT at this time.





Her cardiovascular risk factor profile is reviewed and appreciated.  However, 

she was just recently seen by cardiology at this facility, and cardiology 

advises that the patient recently had a nuclear stress test which is negative 

for ischemic findings.  Her EKG is unchanged from prior, and she has been having

 nonspecific chest discomfort since yesterday.  Pain is present for greater than

 8 hours, and we anticipate some degree of elevated troponin given her chronic 

renal insufficiency.  However, should first troponin be similar to prior 

troponin levels, acute c change in her CAD is unlikely.


She is found to be in A. fib with RVR, and has some wheezes and rhonchi.  Also 

mildly hypoxic








 start diltiazem, give albuterol and Atrovent.  Treat symptoms.  The patient 

makes no complaint of chest pain to myself, and she is clinically sober with a 

GCS of 15.


04/30/22 12:59





Reevaluation #2: 





04/30/22 12:12


Heart rate 106 to 114 bpm at this time.  This is likely secondary to albuterol. 

 Troponin negative x1.  Laboratory studies with chronic abnormalities, elevated 

proBNP likely secondary to chronic end-stage renal disease, as well as COPD.  

Patient on a cell phone, and appears to be more comfortable.





Additional home oral medications pending


04/30/22 12:42


Heart rate now 130 bpm. 


Suspect that tachycardia may be secondary to underlying albuterol, which in turn

 was required for cough, wheezing, shortness of breath, O2 sat of 88% initially.

 








 Patient has received multiple intravenous and little medication at home.  I 

have diltiazem drip as ordered.





Contacted her primary nephrologist, Dr. KARLIE Ch








Discussed the patient's history, physical, laboratory studies and imaging 

studies and clinical impression.  He will follow in consultation.





Hospital physician, Dr. Vuong to assume care.





Awaiting callback from  critical care





Given need for diltiazem drip, admit to the ICU


04/30/22 12:58





Reevaluation #3: 





04/30/22 12:58


Dr Bonilla of critical care to follow


04/30/22 12:59








ED Medical Decision Making





- Lab Data


Result diagrams: 


                                 04/30/22 11:19





                                 04/30/22 11:19








                                   Vital Signs











  04/30/22 04/30/22





  08:23 10:44


 


Temperature 98.3 F 


 


Pulse Rate 92 H 


 


Respiratory 18 





Rate  


 


Blood Pressure 184/106 





[Right]  


 


O2 Sat by Pulse 99 94





Oximetry  











                                   Lab Results











  04/30/22 04/30/22 04/30/22 Range/Units





  11:19 11:19 11:19 


 


WBC  8.6    (4.5-11.0)  K/mm3


 


RBC  3.77    (3.65-5.03)  M/mm3


 


Hgb  11.8    (10.1-14.3)  gm/dl


 


Hct  35.9    (30.3-42.9)  %


 


MCV  95    (79-97)  fl


 


MCH  31    (28-32)  pg


 


MCHC  33    (30-34)  %


 


RDW  18.0 H    (13.2-15.2)  %


 


Plt Count  131 L    (140-440)  K/mm3


 


Lymph % (Auto)  16.8    (13.4-35.0)  %


 


Mono % (Auto)  7.7 H    (0.0-7.3)  %


 


Eos % (Auto)  3.4    (0.0-4.3)  %


 


Baso % (Auto)  0.8    (0.0-1.8)  %


 


Lymph # (Auto)  1.4    (1.2-5.4)  K/mm3


 


Mono # (Auto)  0.7    (0.0-0.8)  K/mm3


 


Eos # (Auto)  0.3    (0.0-0.4)  K/mm3


 


Baso # (Auto)  0.1    (0.0-0.1)  K/mm3


 


Seg Neutrophils %  71.3 H    (40.0-70.0)  %


 


Seg Neutrophils #  6.1    (1.8-7.7)  K/mm3


 


Sodium   143   (137-145)  mmol/L


 


Potassium   4.0  D   (3.6-5.0)  mmol/L


 


Chloride   102.1   ()  mmol/L


 


Carbon Dioxide   26   (22-30)  mmol/L


 


Anion Gap   19   mmol/L


 


BUN   30 H   (7-17)  mg/dL


 


Creatinine   4.1 H   (0.6-1.2)  mg/dL


 


Estimated GFR   13   ml/min


 


BUN/Creatinine Ratio   7   %


 


Glucose   81   ()  mg/dL


 


Calcium   9.3   (8.4-10.2)  mg/dL


 


Phosphorus    4.80 H  (2.5-4.5)  mg/dL


 


Magnesium    2.00  (1.7-2.3)  mg/dL


 


Total Bilirubin   0.50   (0.1-1.2)  mg/dL


 


AST   13   (5-40)  units/L


 


ALT   7   (7-56)  units/L


 


Alkaline Phosphatase   73   ()  units/L


 


Troponin T   0.011   (0.00-0.029)  ng/mL


 


NT-Pro-B Natriuret Pep    59212 H  (0-900)  pg/mL


 


Total Protein   6.1 L   (6.3-8.2)  g/dL


 


Albumin   4.3   (3.9-5)  g/dL


 


Albumin/Globulin Ratio   2.4   %














- EKG Data


-: EKG Interpreted by Me


Rate: tachycardia





- EKG Data





04/30/22 11:27











The EKG is interpreted at 08: 3 3





A flutter/fib, 108 bpm.  Left axis deviation,  ms, Q waves noted in the 

inferior leads.  This is an abnormal EKG.  This is not a STEMI.  Poor R wave 

progression.  Appears grossly unchanged from prior EKG from April 2022.  Left 

ventricular hypertrophy





- Radiology Data


Radiology results: pending, report reviewed, image reviewed





CHEST 2 VIEWS  INDICATION / CLINICAL INFORMATION: cp.  COMPARISON: 4/20/2022  

FINDINGS:  SUPPORT DEVICES: Stable, satisfactory device positioning. HEART / 

MEDIASTINUM: Stable. LUNGS / PLEURA: No significant pulmonary or pleural 

abnormality. No pneumothorax.  ADDITIONAL FINDINGS: No significant additional 

findings.  IMPRESSION: 1. No acute findings.  Signer Name: Angel Gonzalez MD 

Signed: 4/30/2022 9:38 AM Workstation Name: [id number]








CT angio chest  INDICATION / CLINICAL INFORMATION: Chest pain OMNIPAQUE 350 

100ML DIALYSIS PT.  TECHNIQUE: Axial CT images were obtained through the chest 

after injection of 100 cc of Omnipaque 350 IV contrast. 3 plane MIP and/or 3D 

reconstructions were produced. All CT scans at this location are performed using

 CT dose reduction for ALARA by means of automated exposure control.  

COMPARISON: Chest radiograph from 4/20/2022.  FINDINGS: PULMONARY ARTERIES: No 

central or segmental pulmonary embolus.  THORACIC AORTA: Mild atherosclerotic 

calcification without acute abnormality. HEART: There is cardiac enlargement. No

 pericardial effusion. Left-sided cardiac conduction device leads terminate 

within the right atrium and right ventricle. CORONARY ARTERY CALCIFICATION: 

Stent or calcifications in the LAD. LYMPHADENOPATHY: No significant thoracic 

lymphadenopathy. LUNGS/PLEURA: There is mild emphysema. No acute interstitial or

 airspace disease. No pleural effusion or pneumothorax. OTHER FINDINGS: Right IJ

 dialysis catheter terminates in the right atrium.  UPPER ABDOMEN: No acute 

findings.  SKELETAL SYSTEM: No acute osseous findings.  IMPRESSION:  1. No e

vidence of pulmonary embolus. 2. Cardiac enlargement. No acute findings in the 

chest. 3. Other incidental findings as above.  Signer Name: Bryan Bennett MD

 Signed: 4/23/2022 3:38 PM


Critical Care Time: Yes


Critical care time in (mins) excluding proc time.: 35


Critical care attestation.: 


If time is entered above; I have spent that time in minutes in the direct care 

of this critically ill patient, excluding procedure time.








ED Disposition


Clinical Impression: 


 Atrial fibrillation with RVR, COPD exacerbation, Chest wall pain, End stage 

renal disease, Hypertension, End-stage renal disease on hemodialysis





Disposition: 09 ADMITTED AS INPATIENT


Is pt being admited?: Yes


Does the pt Need Aspirin: No


Condition: Serious


Instructions:  Nonspecific Chest Pain, Adult, Chronic Bronchitis (ED), 

Hypertension (ED)


Additional Instructions: 


Please continue current outpatient medications.  Please take the prescribed 

medications as needed and directed.  Please follow-up with your outpatient 

nephrologist in 24 to 48 hours for repeat checkup and evaluation and repeat 

laboratory studies.





Please return to the emergency room right away with new pain, worsened pain, 

migration of pain, projectile vomiting, change in mental status, confusion, 

inability tolerate liquid feeds, new, worsened or different symptoms not present

 on the initial emergency room evaluation


Prescriptions: 


Ipratropium (Nf) [Atrovent] 2 puff IH Q6HR PRN #1 inha


 PRN Reason: Wheezing


predniSONE [Deltasone] 40 mg PO QDAY #8 tab


Acetaminophen [Non-Aspirin Extra Strength] 500 mg PO Q6HR PRN #30 tablet


 PRN Reason: Pain , Severe (7-10)


Albuterol Sulfate [Proair Respiclick] 90 mcg IH Q4HR PRN #2 aer.pow.ba


 PRN Reason: Wheezing


Referrals: 


SWATI CRUZ DO [Primary Care Provider] - 3-5 Days


EMELINA CH MD [Staff Physician] - 3-5 Days

## 2022-04-30 NOTE — HISTORY AND PHYSICAL REPORT
History of Present Illness


Chief complaint: 


My chest is hurting and my heart is beating really fast





History of present illness: 


65 YO Female with ESRD on HD(T,R,Sa), HTN, CVA, MI, CHF, Nicotine Dependence,  

Atrial Fib on Therapeutic Anticoagulation, DM, CKD, COPD, HLD, Bipolar Disorder,

CAD S/P Stent Placement presents to ED for evaluation.  Patient reports "my 

chest is hurting and my heart is beating fast".  Patient states that she 

experienced a sudden onset of chest pain today which began shortly after 

awakening from sleep.  Patient states that the level of her chest pain is 8/10, 

constant, associated with shortness of breath, not worsened with exertion, not 

relieved with rest.  Patient acknowledges chest palpitations, decreased exercise

tolerance, dyspnea on exertion, dyspnea at rest, subjective weight gain over the

past 1 week.  EMS notified and upon arrival the patient was found to be in 

distress and subsequent transported to [facility] for further care and evaluation of 

the aforementioned symptoms.  Patient was seen and evaluated in the emergency 

department.  All lab and imaging studies reviewed.  Patient found to have EKG 

and atrial fibrillation clinical symptoms consistent with CHF decompensation, as

well as end-stage renal disease, fluid overload.  Patient admitted to telemetry 

due to increased risk of cardiac decompensation and for medical stabilization.  

Nephrology team consulted in ED for urgent dialysis.  Cardiology team consulted 

in ED.  Pt denies fever, chills, productive cough, unilateral leg swelling, calf

pain, NVD, syncope, dizziness, or recent ill contacts, syncope, known exposure 

to COVID-19.  Prior admission on 3/12/2022 reviewed.  All medication listed at 

time of admission has been reconciled.  Advanced care planning conducted in ED.








Past History


Past Medical History: acute MI, atrial fib, COPD, ESRD, hypertension, 

hyperlipidemia, stroke, other (See HPI)


Past Surgical History: total knee replacement, Other ((stent placement x 2 in 

2015, knee surgery 01/2018, right upper extremity fistula))


Social history: .  denies: smoking, alcohol abuse, prescription drug 

abuse


Family history: diabetes, hypertension





Medications and Allergies


                                    Allergies











Allergy/AdvReac Type Severity Reaction Status Date / Time


 


apple AdvReac  Hives Verified 04/20/22 09:44


 


aspirin AdvReac  Unknown Verified 04/20/22 09:44


 


shell fish Allergy  Swelling Uncoded 04/20/22 09:44











                                Home Medications











 Medication  Instructions  Recorded  Confirmed  Last Taken  Type


 


Quetiapine Fumarate [Seroquel] 100 mg PO BID 09/22/17 04/23/22 04/22/22 22:00 

History


 


Albuterol Mdi (or & Nicu Only) 2 puff IH QID PRN #1 inha 02/12/18 04/24/22 04/23/22 10:00 Rx





[ProAir HFA Inhaler]     


 


Amiodarone [Cordarone 200 MG TAB] 200 mg PO BID #60 tablet 03/15/22 04/23/22 

04/23/22 10:00 Rx


 


Aspirin EC [Halfprin EC] 81 mg PO QDAY #30 tablet 03/15/22 04/24/22 03/29/22 

09:00 Rx


 


Hydralazine HCl 50 mg PO TID 03/30/22 04/23/22 04/22/22 22:00 History


 


cloNIDine [Catapres] 0.1 mg PO BID 03/30/22 04/24/22 04/23/22 10:00 History


 


Apixaban [Eliquis] 5 mg PO BID #60 tab 04/21/22 04/23/22 04/23/22 10:00 Rx


 


Gabapentin 1 mg PO BID 04/23/22 04/24/22 Unknown History


 


Losartan [Cozaar] 50 mg PO QDAY 04/23/22 04/24/22 04/22/22 10:00 History


 


Nitroglycerin [Nitro-Bid] 0.1 mg TRANSDERMA Q6HR PRN 04/23/22 04/24/22 Unknown 

History


 


Pantoprazole [Protonix TAB] 40 mg PO QDAY 04/23/22 04/24/22 04/23/22 10:00 

History


 


Sennosides/Docusate Sodium [Senna 8.6 mg PO DAILY 04/23/22 04/24/22 04/22/22 

10:00 History





Plus 8.6-50 mg Tablet]     


 


methIMAzole [Methimazole] 10 mg PO TID 04/23/22 04/24/22 04/23/22 10:00 History


 


methocarbamoL [Methocarbamol] 1 mg PO BID 04/23/22 04/24/22 Unknown History


 


Metoprolol [Lopressor TAB] 100 mg PO BID #60 tablet 04/25/22  Unknown Rx


 


Zolpidem [Ambien] 5 mg PO QHS PRN #7 tablet 04/25/22  Unknown Rx


 


Acetaminophen [Non-Aspirin Extra 500 mg PO Q6HR PRN #30 tablet 04/30/22  Unknown

 Rx





Strength]     


 


Albuterol Sulfate [Proair 90 mcg IH Q4HR PRN #2 aer.[id number].ba 04/30/22  Unknown Rx





Respiclick]     


 


Ipratropium (Nf) [Atrovent] 2 puff IH Q6HR PRN #1 inha 04/30/22  Unknown Rx


 


predniSONE [Deltasone] 40 mg PO QDAY #8 tab 04/30/22  Unknown Rx











Active Meds: 


Active Medications





Amiodarone HCl (Amiodarone 200 Mg Tab)  200 mg PO BID [facility]


   Last Admin: 04/30/22 12:30 Dose:  200 mg


   


Apixaban (Apixaban 5 Mg Tab)  5 mg PO Q12HR DERIK


Aspirin (Aspirin Ec 81 Mg Tab)  81 mg PO QDAY [facility]


   Last Admin: 04/30/22 11:47 Dose:  81 mg


   


Gabapentin (Gabapentin 100 Mg Cap)  100 mg PO BID DERIK


Diltiazem HCl (Cardizem/D5w 100mg/100ml)  100 mg in 100 mls @ 5 mls/hr IV TITR 

[facility]; Protocol


Losartan Potassium (Losartan 50 Mg Tab)  50 mg PO QDAY [facility]


   Last Admin: 04/30/22 11:47 Dose:  50 mg


   











Review of Systems


Constitutional: weight gain, fatigue, weakness, no weight loss, no fever, no 

chills


Ears, nose, mouth and throat: no ear pain, no tinnitis, no decreased hearing, no

 nose pain, no nasal congestion


Cardiovascular: chest pain, shortness of breath, dyspnea on exertion, high blood

 pressure, decreased exercise tolerance


Respiratory: no cough


Gastrointestinal: no abdominal pain, no change in bowel habits


Genitourinary Female: no pelvic pain, no flank pain, no dysuria, no urinary 

frequency, no urgency


Rectal: no pain, no incontinence, no bleeding


Musculoskeletal: no neck stiffness, no neck pain, no shooting arm pain, no low 

back pain, no shooting leg pain, no leg numbness/tingling


Integumentary: no rash, no pruritis, no redness, no sores, no wounds


Neurological: no head injury, no transient paralysis, no weakness, no 

parathesias, no tingling, no seizures


Psychiatric: no anxiety, no change in sleep habits, no insomnia, no change in 

libido


Endocrine: no cold intolerance, no polyphagia, no excessive thirst, no 

polydipsia, no polyuria, no nocturia, no excessive sweating


Hematologic/Lymphatic: no easy bruising, no easy bleeding


Allergic/Immunologic: no urticaria, no allergic rhinitis, no wheezing





Exam





- Constitutional


Vitals: 


                                        











Temp Pulse Resp BP Pulse Ox


 


 98.3 F   18 L  108 H  184/106   94 


 


 04/30/22 08:23  04/30/22 11:31  04/30/22 11:31  04/30/22 08:23  04/30/22 10:44











General appearance: Present: mild distress





- EENT


Eyes: Present: PERRL


ENT: hearing intact, clear oral mucosa





- Neck


Neck: Present: supple, normal ROM





- Respiratory


Respiratory effort: normal


Respiratory: bilateral: diminished, rales





- Cardiovascular


Rhythm: irregularly irregular





- Extremities


Extremities: no ischemia


Extremity abnormal: edema


Peripheral Pulses: within normal limits





- Abdominal


General gastrointestinal: Present: soft, non-tender, non-distended, normal bowel

 sounds


Female genitourinary: Present: normal





- Integumentary


Integumentary: Present: clear, warm, dry





- Musculoskeletal


Musculoskeletal: generalized weakness





- Psychiatric


Psychiatric: appropriate mood/affect, intact judgment & insight





- Neurologic


Neurologic: CNII-XII intact, moves all extremities





HEART Score





- HEART Score


Troponin: 


                                        











Troponin T  0.011 ng/mL (0.00-0.029)   04/30/22  11:19    














Results





- Labs


CBC & Chem 7: 


                                 04/30/22 11:19





                                 04/30/22 11:19


Labs: 


                              Abnormal lab results











  04/30/22 04/30/22 04/30/22 Range/Units





  11:19 11:19 11:19 


 


RDW  18.0 H    (13.2-15.2)  %


 


Plt Count  131 L    (140-440)  K/mm3


 


Mono % (Auto)  7.7 H    (0.0-7.3)  %


 


Seg Neutrophils %  71.3 H    (40.0-70.0)  %


 


BUN   30 H   (7-17)  mg/dL


 


Creatinine   4.1 H   (0.6-1.2)  mg/dL


 


Phosphorus    4.80 H  (2.5-4.5)  mg/dL


 


NT-Pro-B Natriuret Pep    54962 H  (0-900)  pg/mL


 


Total Protein   6.1 L   (6.3-8.2)  g/dL














Assessment and Plan





- Patient Problems


(1) Atrial fibrillation with rapid ventricular response


Current Visit: Yes   Status: Acute   


Plan to address problem: 


Admit to ICU, patient initiated on Cardizem drip for rate control, telemetry 

monitoring, titrate Cardizem drip to maintain a heart rate less than or equal to

 100 bpm.  Cardiology team consulted.





The high probability of a clinically significant, sudden or life threatening 

deterioration of the [cardiac, renal, endocrine, pulmonary] system(s) required 

my full and direct attention, intervention and personal management. The 

aggregate critical care time was [65] minutes. This time is in addition to time 

spent performing reported procedures but includes the following: 





[x] Data Review and interpretation 





[x] Patient assessment and monitoring of vital signs 





[x] Documentation 





[x] Medication orders and management








(2) CHF (congestive heart failure)


Current Visit: No   Status: Acute   


Qualifiers: 


   Heart failure type: diastolic   Heart failure chronicity: acute on chronic   

Qualified Code(s): I50.33 - Acute on chronic diastolic (congestive) heart 

failure   


Plan to address problem: 


CHF protocol: Strict I's/O, monitor urine output every shift, daily weight, 

afterload reduction, blood pressure control, urgent dialysis, cardiology team 

consulted.








(3) End stage renal disease


Current Visit: Yes   Status: Chronic   


Plan to address problem: 


Nephrology team consulted in ED, dialysis as per renal team.








(4) Hypertension


Current Visit: Yes   Status: Chronic   


Qualifiers: 


   Hypertension type: primary hypertension 


Plan to address problem: 


Monitor blood pressure every shift, continue medical management.








(5) Hypertension


Current Visit: Yes   Status: Acute   





(6) Hyperlipidemia


Current Visit: Yes   Status: Acute   


Qualifiers: 


   Hyperlipidemia type: mixed hyperlipidemia   Qualified Code(s): E78.2 - Mixed 

hyperlipidemia   


Plan to address problem: 


Low-cholesterol diet, statin therapy as clinically indicated.








(7) Diabetes


Current Visit: Yes   Status: Acute   


Plan to address problem: 


Consistent carbohydrate diet, Accu-Chek, insulin protocol, hypoglycemia protoc

ol.








(8) DVT prophylaxis


Current Visit: Yes   Status: Acute   


Plan to address problem: 


SCD to bilateral lower extremities while in bed, continue therapeutic 

anticoagulation.








(9) Advance care planning


Current Visit: Yes   Status: Acute   


Plan to address problem: 


Disease education data, care plan discussed, diagnoses discussed, prognosis 

discussed, patient is full code.  Patient acknowledges understanding and agreem

ent with care plan, +30 minutes.

## 2022-04-30 NOTE — XRAY REPORT
CHEST 2 VIEWS 



INDICATION / CLINICAL INFORMATION: cp.



COMPARISON: 4/20/2022



FINDINGS:



SUPPORT DEVICES: Stable, satisfactory device positioning.

HEART / MEDIASTINUM: Stable. 

LUNGS / PLEURA: No significant pulmonary or pleural abnormality. No pneumothorax. 



ADDITIONAL FINDINGS: No significant additional findings.



IMPRESSION:

1. No acute findings.



Signer Name: Angel Gonzalez MD 

Signed: 4/30/2022 10:38 AM

Workstation Name: [id number]

## 2022-05-01 LAB
BUN SERPL-MCNC: 17 MG/DL (ref 7–17)
BUN/CREAT SERPL: 7 %
CALCIUM SERPL-MCNC: 9.7 MG/DL (ref 8.4–10.2)
HEMOLYSIS INDEX: 9

## 2022-05-01 PROCEDURE — 5A1D70Z PERFORMANCE OF URINARY FILTRATION, INTERMITTENT, LESS THAN 6 HOURS PER DAY: ICD-10-PCS | Performed by: INTERNAL MEDICINE

## 2022-05-01 RX ADMIN — APIXABAN SCH MG: 5 TABLET, FILM COATED ORAL at 10:33

## 2022-05-01 RX ADMIN — DILTIAZEM HYDROCHLORIDE SCH MLS/HR: 100 INJECTION, POWDER, LYOPHILIZED, FOR SOLUTION INTRAVENOUS at 01:37

## 2022-05-01 RX ADMIN — INSULIN LISPRO SCH: 100 INJECTION, SOLUTION INTRAVENOUS; SUBCUTANEOUS at 17:44

## 2022-05-01 RX ADMIN — GABAPENTIN SCH MG: 100 CAPSULE ORAL at 21:25

## 2022-05-01 RX ADMIN — OXYCODONE AND ACETAMINOPHEN PRN TAB: 5; 325 TABLET ORAL at 01:35

## 2022-05-01 RX ADMIN — HYDROMORPHONE HYDROCHLORIDE PRN MG: 1 INJECTION, SOLUTION INTRAMUSCULAR; INTRAVENOUS; SUBCUTANEOUS at 21:25

## 2022-05-01 RX ADMIN — ASPIRIN SCH MG: 81 TABLET, COATED ORAL at 10:32

## 2022-05-01 RX ADMIN — HYDROMORPHONE HYDROCHLORIDE PRN MG: 1 INJECTION, SOLUTION INTRAMUSCULAR; INTRAVENOUS; SUBCUTANEOUS at 15:10

## 2022-05-01 RX ADMIN — PANTOPRAZOLE SODIUM SCH MG: 40 TABLET, DELAYED RELEASE ORAL at 10:36

## 2022-05-01 RX ADMIN — Medication SCH ML: at 21:27

## 2022-05-01 RX ADMIN — AMIODARONE HYDROCHLORIDE SCH MG: 200 TABLET ORAL at 21:25

## 2022-05-01 RX ADMIN — AMIODARONE HYDROCHLORIDE SCH MG: 200 TABLET ORAL at 10:34

## 2022-05-01 RX ADMIN — INSULIN LISPRO SCH: 100 INJECTION, SOLUTION INTRAVENOUS; SUBCUTANEOUS at 12:53

## 2022-05-01 RX ADMIN — APIXABAN SCH MG: 5 TABLET, FILM COATED ORAL at 21:25

## 2022-05-01 RX ADMIN — METOPROLOL TARTRATE SCH MG: 100 TABLET, FILM COATED ORAL at 10:34

## 2022-05-01 RX ADMIN — LOSARTAN POTASSIUM SCH MG: 50 TABLET, FILM COATED ORAL at 10:33

## 2022-05-01 RX ADMIN — Medication SCH ML: at 10:36

## 2022-05-01 RX ADMIN — GABAPENTIN SCH MG: 100 CAPSULE ORAL at 10:33

## 2022-05-01 RX ADMIN — INSULIN LISPRO SCH: 100 INJECTION, SOLUTION INTRAVENOUS; SUBCUTANEOUS at 22:00

## 2022-05-01 RX ADMIN — METOPROLOL TARTRATE SCH MG: 100 TABLET, FILM COATED ORAL at 21:24

## 2022-05-01 RX ADMIN — SENNOSIDES AND DOCUSATE SODIUM SCH TAB: 50; 8.6 TABLET ORAL at 10:35

## 2022-05-01 NOTE — CONSULTATION
History of Present Illness


Consult date: 05/01/22


Requesting physician: SANDI GONZALEZ


Reason for consult: other (Atrial fibrillation with RVR)


History of present illness: 





PULMONARY/CCM CONSULT NOTE (Full dictation # [id number])





Please see dictated notes for full details





Past History


Past Medical History: acute MI, atrial fib, COPD, ESRD, hypertension, 

hyperlipidemia, stroke, other (See HPI)


Past Surgical History: total knee replacement, Other ((stent placement x 2 in 

2015, knee surgery 01/2018, right upper extremity fistula))


Social history: .  denies: smoking, alcohol abuse, prescription drug 

abuse


Family history: diabetes, hypertension





Medications and Allergies


                                    Allergies











Allergy/AdvReac Type Severity Reaction Status Date / Time


 


apple AdvReac  Hives Verified 04/20/22 09:44


 


aspirin AdvReac  Unknown Verified 04/20/22 09:44


 


shell fish Allergy  Swelling Uncoded 04/20/22 09:44











                                Home Medications











 Medication  Instructions  Recorded  Confirmed  Last Taken  Type


 


Hydralazine HCl 50 mg PO TID 03/30/22 05/01/22 04/22/22 22:00 History


 


cloNIDine [Catapres] 0.1 mg PO Q8HR 03/30/22 05/01/22 04/23/22 10:00 History


 


Apixaban [Eliquis] 5 mg PO BID #60 tab 04/21/22 05/01/22 04/23/22 10:00 Rx


 


Gabapentin 100 mg PO BID 04/23/22 05/01/22 Unknown History


 


Losartan [Cozaar] 50 mg PO QDAY 04/23/22 05/01/22 04/22/22 10:00 History


 


Nitroglycerin [Nitro-Bid] 0.1 mg TRANSDERMA Q6HR PRN 04/23/22 05/01/22 Unknown 

History


 


Pantoprazole [Protonix TAB] 40 mg PO QDAY 04/23/22 05/01/22 04/23/22 10:00 

History


 


Diltiazem HCl [Cardizem] 240 mg PO DAILY 05/01/22 05/01/22 Unknown History


 


Isosorbide Mononitrate [Isosorbide 60 mg PO DAILY 05/01/22 05/01/22 Unknown 

History





Mononitrate ER]     


 


Sennosides/Docusate Sodium [Senna 1 cap PO DAILY 05/01/22 05/01/22 Unknown 

History





Plus 8.6-50 mg Softgel]     


 


carvediloL [Coreg] 25 mg PO BID 05/01/22 05/01/22 Unknown History


 


methIMAzole [Methimazole] 10 mg PO TID 05/01/22 05/01/22 Unknown History











Active Meds: 


Active Medications





Acetaminophen (Acetaminophen 325 Mg Tab)  650 mg PO Q6H PRN


   PRN Reason: Pain MILD(1-3)/Fever >100.5/HA


Albuterol (Albuterol 2.5 Mg/3 Ml Nebu)  2.5 mg IH Q3HRT PRN


   PRN Reason: Shortness Of Breath


Amiodarone HCl (Amiodarone 200 Mg Tab)  200 mg PO BID [facility]


   Last Admin: 05/01/22 10:34 Dose:  200 mg


   


Apixaban (Apixaban 5 Mg Tab)  5 mg PO Q12HR [facility]


   Last Admin: 05/01/22 10:33 Dose:  5 mg


   


Aspirin (Aspirin Ec 81 Mg Tab)  81 mg PO QDAY [facility]


   Last Admin: 05/01/22 10:32 Dose:  81 mg


   


Dextrose (Dextrose 50% In Water (25gm) 50 Ml Syringe)  50 ml IV Q30MIN PRN; 

Protocol


   PRN Reason: Hypoglycemia


Gabapentin (Gabapentin 100 Mg Cap)  100 mg PO BID [facility]


   Last Admin: 05/01/22 10:33 Dose:  100 mg


   


Heparin Sodium (Porcine) (Heparin 10,000 Unit/1 Ml Vial)  5,000 unit IV VANCE PRN


   PRN Reason: hemodialysis


Hydralazine HCl (Hydralazine 25 Mg Tab)  50 mg PO Q8HR [facility]


   Last Admin: 05/01/22 10:34 Dose:  50 mg


   


Hydromorphone HCl (Hydromorphone 1 Mg/1 Ml Inj)  0.5 mg IV Q13H PRN


   PRN Reason: Pain , Severe (7-10)


   Last Admin: 04/30/22 21:42 Dose:  0.5 mg


   


Diltiazem HCl (Cardizem/D5w 100mg/100ml)  100 mg in 100 mls @ 5 mls/hr IV TITR 

[facility]; Protocol


   Last Titration: 05/01/22 08:05 Dose:  0 mg/hr, 0 mls/hr


   


Sodium Chloride (Nacl 0.9%)  100 mls @ 999 mls/hr IV VANCE PRN


   PRN Reason: Hypotension


Insulin Human Lispro (Insulin Lispro 100 Unit/Ml)  0 unit SUB-Q ACHS [facility]; 

Protocol


   Last Admin: 05/01/22 12:53 Dose:  Not Given


   


Isosorbide Mononitrate (Isosorbide Mononitrate Er 60 Mg Tab)  60 mg PO QDAY [facility]


   Last Admin: 05/01/22 10:35 Dose:  60 mg


   


Losartan Potassium (Losartan 50 Mg Tab)  50 mg PO QDAY [facility]


   Last Admin: 05/01/22 10:33 Dose:  50 mg


   


Metoprolol Tartrate (Metoprolol Tartrate 100 Mg Tab)  100 mg PO BID [facility]


   Last Admin: 05/01/22 10:34 Dose:  100 mg


   


Oxycodone/Acetaminophen (Oxycodone /Acetaminophen 5-325mg Tab)  1 tab PO Q6H PRN


   PRN Reason: Pain, Moderate (4-6)


   Last Admin: 05/01/22 01:35 Dose:  1 tab


   


Pantoprazole Sodium (Pantoprazole 40 Mg Tab)  40 mg PO QDAC [facility]


   Last Admin: 05/01/22 10:36 Dose:  40 mg


   


Senna/Docusate Sodium (Sennosides/Docusate Sodium 8.6/50 Mg Tab)  1 tab PO DAILY

 [facility]


   Last Admin: 05/01/22 10:35 Dose:  1 tab


   


Sodium Chloride (Sodium Chloride 0.9% 10 Ml Flush Syringe)  10 ml IV BID [facility]


   Last Admin: 05/01/22 10:36 Dose:  10 ml


   


Sodium Chloride (Sodium Chloride 0.9% 10 Ml Flush Syringe)  10 ml IV PRN PRN


   PRN Reason: LINE FLUSH











Physical Examination


Vital signs: 


                                   Vital Signs











Temp Pulse Resp BP Pulse Ox


 


 98.3 F   92 H  18   184/106   99 


 


 04/30/22 08:23  04/30/22 08:23  04/30/22 08:23  04/30/22 08:23  04/30/22 08:23














Results





- Laboratory Findings


CBC and BMP: 


                                 04/30/22 11:19





                                 05/01/22 04:16


Abnormal lab findings: 


                                  Abnormal Labs











  04/30/22 04/30/22 04/30/22





  11:19 11:19 11:19


 


RDW  18.0 H  


 


Plt Count  131 L  


 


Mono % (Auto)  7.7 H  


 


Seg Neutrophils %  71.3 H  


 


BUN   30 H 


 


Creatinine   4.1 H 


 


Glucose   


 


POC Glucose   


 


Phosphorus    4.80 H


 


NT-Pro-B Natriuret Pep    50919 H


 


Total Protein   6.1 L 














  04/30/22 05/01/22 05/01/22





  23:50 04:16 11:03


 


RDW   


 


Plt Count   


 


Mono % (Auto)   


 


Seg Neutrophils %   


 


BUN   


 


Creatinine   2.5 H 


 


Glucose   153 H 


 


POC Glucose  183 H   113 H


 


Phosphorus   


 


NT-Pro-B Natriuret Pep   


 


Total Protein

## 2022-05-01 NOTE — PROGRESS NOTE
Assessment and Plan


Assessment and plan: 


This is a 66-year-old female with known past medical history of ESRD on HD 

(T,R,Sat), HTN, CVA, MI, CAD s/p stent placement, CHF, PPM, history of PE, 

Atrial fibrillation on Eliquis at home, DM, COPD, HLD, bipolar disorder, and 

nicotine dependence admitted for CHF exacerbation and AFIB with RVR s/p Cardizem

gtt.





Hospital Course to Date:


5/1: Off cardizem gtt off this am, Apacing on the monitor HR in the 70s, VSS. 

Patient tolerated HD overnight, 2L removed. Patient is stable on RA this am, in 

no acute distress. Patient home medications reconciled. Cardiology consulted.








Assessment and Plan


#Atrial Fibrillation with Rapid Ventricular Response


#CHF (Congestive Heart Failure) Exacerbation


#Hypertension


#Hyperlipidemia


#Possible Noncompliance with meds


- Frequent Flyer, this is X4 visit this month with same complaints


- Presented with CP, found in afib with RVR s/p Cardizem gtt


- Patient is A-pacing on the monitor, HR in the 70s


- BNP 54861, most likely due to fluid overload- HD per Nephro


- On PO Amio, BB, and Statin


- Home antihypertensive resumed


- Cardiology consulted


- Continue blood pressure monitor per protocol


- Maintain SBP less than 160


- Home Eliquis resumed


- Strict I's/O- monitor urine output every shift


- Daily weight





#End Stage Renal Disease(ESRD) on HD


- HD days (T,R,Sa)


- Nephrology on consult, appreciated recommendation


- Tolerated HD overnight, 2L removed


- Continue HD per Nephro


- Strict intake and output


- Avoid nephrotoxic medications; Renally dose medications 


- Monitor and replace electrolytes as needed





#History of PE


- Home Eliquis resumed





#Type 2 Diabetes Mellitus 


- Consistent carbohydrate diet


- Accu-Chek and SSI ACHS


- Avoid Hypoglycemia





#Tobacco Dependence


- Smoking Cessation provided. Patient verbalized understanding of the info 

provided


- Patient denied any urges at this time, offer Nicotine patch if needed





#GI/DVT Prophylaxis


- PPI- Protonix


- Home Eliquis resumed


- SCD to bilateral lower extremities while in bed





#Advance Care Planning


- Disease education, care plan, diagnoses, and prognosis discussed with patient.

Smoking cessation and medication compliance was also discussed. Patient acknow

ledges understanding of educations provided and agreement with current care 

plan.











The high probability of a clinically significant, sudden or life threatening 

deterioration of the [multiple] system(s) required my full and direct attention,

 intervention and personal management. The aggregate critical care time was [60]

 minutes. This time is in addition to time spent performing reported procedures 

but includes the following: 





[x] Data Review and interpretation 





[x] Patient assessment and monitoring of vital signs 





[x] Documentation 





[x] Medication orders and management





Disposition Plan: ICU


Total Time Spent with Patient (Minutes): 60





History


Interval history: 


Patient seen and examined at the beside. Fully AAO, on RA, denied any pain nor 

any discomfort at this time. Patient is off cardizem gtt this am, Apaced at 70 

on the monitor, VSS.





Hospitalist Physical





- Constitutional


Vitals: 


                                        











Temp Pulse Resp BP Pulse Ox


 


 98.3 F   70   19   126/71   100 


 


 05/01/22 11:36  05/01/22 11:00  05/01/22 11:00  05/01/22 11:00  05/01/22 11:00











General appearance: Present: no acute distress, cachectic





- EENT


Eyes: Present: PERRL, EOM intact


ENT: hearing intact





- Neck


Neck: Present: normal ROM





- Respiratory


Respiratory effort: normal


Respiratory: bilateral: diminished





- Cardiovascular


Rhythm: regular (A-Paced)


Heart Sounds: Present: S1 & S2





- Extremities


Extremities: no ischemia, pulses intact, pulses symmetrical


Peripheral Pulses: within normal limits





- Abdominal


General gastrointestinal: soft, non-distended, normal bowel sounds





- Integumentary


Integumentary: Present: clear, warm, dry





- Psychiatric


Psychiatric: appropriate mood/affect, cooperative





- Neurologic


Neurologic: CNII-XII intact, moves all extremities





- Allied Health


Allied health notes reviewed: nursing





HEART Score





- HEART Score


Troponin: 


                                        











Troponin T  < 0.010 ng/mL (0.00-0.029)   04/30/22  20:22    














Results





- Labs


CBC & Chem 7: 


                                 04/30/22 11:19





                                 05/01/22 04:16


Labs: 


                             Laboratory Last Values











WBC  8.6 K/mm3 (4.5-11.0)   04/30/22  11:19    


 


RBC  3.77 M/mm3 (3.65-5.03)   04/30/22  11:19    


 


Hgb  11.8 gm/dl (10.1-14.3)   04/30/22  11:19    


 


Hct  35.9 % (30.3-42.9)   04/30/22  11:19    


 


MCV  95 fl (79-97)   04/30/22  11:19    


 


MCH  31 pg (28-32)   04/30/22  11:19    


 


MCHC  33 % (30-34)   04/30/22  11:19    


 


RDW  18.0 % (13.2-15.2)  H  04/30/22  11:19    


 


Plt Count  131 K/mm3 (140-440)  L  04/30/22  11:19    


 


Lymph % (Auto)  16.8 % (13.4-35.0)   04/30/22  11:19    


 


Mono % (Auto)  7.7 % (0.0-7.3)  H  04/30/22  11:19    


 


Eos % (Auto)  3.4 % (0.0-4.3)   04/30/22  11:19    


 


Baso % (Auto)  0.8 % (0.0-1.8)   04/30/22  11:19    


 


Lymph # (Auto)  1.4 K/mm3 (1.2-5.4)   04/30/22  11:19    


 


Mono # (Auto)  0.7 K/mm3 (0.0-0.8)   04/30/22  11:19    


 


Eos # (Auto)  0.3 K/mm3 (0.0-0.4)   04/30/22  11:19    


 


Baso # (Auto)  0.1 K/mm3 (0.0-0.1)   04/30/22  11:19    


 


Seg Neutrophils %  71.3 % (40.0-70.0)  H  04/30/22  11:19    


 


Seg Neutrophils #  6.1 K/mm3 (1.8-7.7)   04/30/22  11:19    


 


Sodium  138 mmol/L (137-145)   05/01/22  04:16    


 


Potassium  4.7 mmol/L (3.6-5.0)   05/01/22  04:16    


 


Chloride  98.8 mmol/L ()   05/01/22  04:16    


 


Carbon Dioxide  27 mmol/L (22-30)   05/01/22  04:16    


 


Anion Gap  17 mmol/L  05/01/22  04:16    


 


BUN  17 mg/dL (7-17)   05/01/22  04:16    


 


Creatinine  2.5 mg/dL (0.6-1.2)  H  05/01/22  04:16    


 


Estimated GFR  23 ml/min  05/01/22  04:16    


 


BUN/Creatinine Ratio  7 %  05/01/22  04:16    


 


Glucose  153 mg/dL ()  H  05/01/22  04:16    


 


POC Glucose  183 mg/dL ()  H  04/30/22  23:50    


 


Calcium  9.7 mg/dL (8.4-10.2)   05/01/22  04:16    


 


Phosphorus  4.80 mg/dL (2.5-4.5)  H  04/30/22  11:19    


 


Magnesium  2.00 mg/dL (1.7-2.3)   04/30/22  11:19    


 


Total Bilirubin  0.50 mg/dL (0.1-1.2)   04/30/22  11:19    


 


AST  13 units/L (5-40)   04/30/22  11:19    


 


ALT  7 units/L (7-56)   04/30/22  11:19    


 


Alkaline Phosphatase  73 units/L ()   04/30/22  11:19    


 


Troponin T  < 0.010 ng/mL (0.00-0.029)   04/30/22  20:22    


 


NT-Pro-B Natriuret Pep  89006 pg/mL (0-900)  H  04/30/22  11:19    


 


Total Protein  6.1 g/dL (6.3-8.2)  L  04/30/22  11:19    


 


Albumin  4.3 g/dL (3.9-5)   04/30/22  11:19    


 


Albumin/Globulin Ratio  2.4 %  04/30/22  11:19    














Active Medications





- Current Medications


Current Medications: 














Generic Name Dose Route Start Last Admin





  Trade Name Freq  PRN Reason Stop Dose Admin


 


Acetaminophen  650 mg  04/30/22 12:42 





  Acetaminophen 325 Mg Tab  PO  





  Q6H PRN  





  Pain MILD(1-3)/Fever >100.5/HA  


 


Albuterol  2.5 mg  04/30/22 12:42 





  Albuterol 2.5 Mg/3 Ml Nebu  IH  





  Q3HRT PRN  





  Shortness Of Breath  


 


Amiodarone HCl  200 mg  04/30/22 13:00  05/01/22 10:34





  Amiodarone 200 Mg Tab  PO   200 mg





  BID DERIK   Administration


 


Apixaban  5 mg  04/30/22 22:00  05/01/22 10:33





  Apixaban 5 Mg Tab  PO   5 mg





  Q12HR DERIK   Administration


 


Aspirin  81 mg  04/30/22 12:00  05/01/22 10:32





  Aspirin Ec 81 Mg Tab  PO   81 mg





  QDAY DERIK   Administration


 


Dextrose  50 ml  05/01/22 08:12 





  Dextrose 50% In Water (25gm) 50 Ml Syringe  IV  





  Q30MIN PRN  





  Hypoglycemia  





  Protocol  


 


Gabapentin  100 mg  04/30/22 22:00  05/01/22 10:33





  Gabapentin 100 Mg Cap  PO   100 mg





  BID DERIK   Administration


 


Heparin Sodium (Porcine)  5,000 unit  04/30/22 13:17 





  Heparin 10,000 Unit/1 Ml Vial  IV  





  VANCE PRN  





  hemodialysis  


 


Hydralazine HCl  50 mg  05/01/22 10:00  05/01/22 10:34





  Hydralazine 25 Mg Tab  PO   50 mg





  Q8HR DERIK   Administration


 


Hydromorphone HCl  0.5 mg  04/30/22 12:42  04/30/22 21:42





  Hydromorphone 1 Mg/1 Ml Inj  IV   0.5 mg





  Q13H PRN   Administration





  Pain , Severe (7-10)  


 


Diltiazem HCl  100 mg in 100 mls @ 5 mls/hr  04/30/22 13:00  05/01/22 08:05





  Cardizem/D5w 100mg/100ml  IV   0 mg/hr





  TITR DERIK   0 mls/hr





    Titration





  Protocol  





  5 MG/HR  


 


Sodium Chloride  100 mls @ 999 mls/hr  04/30/22 13:17 





  Nacl 0.9%  IV  





  VANCE PRN  





  Hypotension  


 


Insulin Human Lispro  0 unit  05/01/22 11:30 





  Insulin Lispro 100 Unit/Ml  SUB-Q  





  [facility]HS DERIK  





  Protocol  


 


Isosorbide Mononitrate  60 mg  05/01/22 10:00  05/01/22 10:35





  Isosorbide Mononitrate Er 60 Mg Tab  PO   60 mg





  QDAY DERIK   Administration


 


Losartan Potassium  50 mg  04/30/22 12:00  05/01/22 10:33





  Losartan 50 Mg Tab  PO   50 mg





  QDAY DERIK   Administration


 


Metoprolol Tartrate  100 mg  04/30/22 22:00  05/01/22 10:34





  Metoprolol Tartrate 100 Mg Tab  PO   100 mg





  BID DERIK   Administration


 


Oxycodone/Acetaminophen  1 tab  04/30/22 12:42  05/01/22 01:35





  Oxycodone /Acetaminophen 5-325mg Tab  PO   1 tab





  Q6H PRN   Administration





  Pain, Moderate (4-6)  


 


Pantoprazole Sodium  40 mg  05/01/22 10:00  05/01/22 10:36





  Pantoprazole 40 Mg Tab  PO   40 mg





  QDAC DERIK   Administration


 


Senna/Docusate Sodium  1 tab  05/01/22 10:00  05/01/22 10:35





  Sennosides/Docusate Sodium 8.6/50 Mg Tab  PO   1 tab





  DAILY DERIK   Administration


 


Sodium Chloride  10 ml  04/30/22 22:00  05/01/22 10:36





  Sodium Chloride 0.9% 10 Ml Flush Syringe  IV   10 ml





  BID DERIK   Administration


 


Sodium Chloride  10 ml  04/30/22 12:42 





  Sodium Chloride 0.9% 10 Ml Flush Syringe  IV  





  PRN PRN  





  LINE FLUSH

## 2022-05-01 NOTE — PROGRESS NOTE
Assessment and Plan





ESRD - Next HD Tuesday





HTN - F/u on meds





CP - W/u & Mx per Cardiology








Subjective


Date of service: 05/01/22


Interval history: 





Feeling better





Objective





- Vital Signs


Vital signs: 


                               Vital Signs - 12hr











  05/01/22 05/01/22 05/01/22





  00:45 01:00 01:10


 


Temperature   


 


Pulse Rate 103 H 102 H 113 H


 


Respiratory  20 27 H





Rate   


 


Blood Pressure 212/118 195/118 195/118


 


O2 Sat by Pulse  97 





Oximetry   


 


O2 Sat by Pulse   





Oximetry [   





Anterior   





Bilateral   





Throughout]   














  05/01/22 05/01/22 05/01/22





  01:20 01:25 01:30


 


Temperature  97.9 F 


 


Pulse Rate 116 H 102 H 117 H


 


Respiratory 18 22 20





Rate   


 


Blood Pressure 191/110 195/118 191/110


 


O2 Sat by Pulse 79 L  100





Oximetry   


 


O2 Sat by Pulse  95 





Oximetry [   





Anterior   





Bilateral   





Throughout]   














  05/01/22 05/01/22 05/01/22





  01:32 01:40 01:50


 


Temperature   


 


Pulse Rate 108 H 101 H 105 H


 


Respiratory  20 22





Rate   


 


Blood Pressure 166/99 166/99 168/117


 


O2 Sat by Pulse  95 100





Oximetry   


 


O2 Sat by Pulse   





Oximetry [   





Anterior   





Bilateral   





Throughout]   














  05/01/22 05/01/22 05/01/22





  02:00 02:10 02:20


 


Temperature   


 


Pulse Rate 110 H 104 H 75


 


Respiratory 16 18 17





Rate   


 


Blood Pressure 165/104 165/104 156/102


 


O2 Sat by Pulse 95 99 97





Oximetry   


 


O2 Sat by Pulse   





Oximetry [   





Anterior   





Bilateral   





Throughout]   














  05/01/22 05/01/22 05/01/22





  02:30 02:40 02:50


 


Temperature   


 


Pulse Rate 103 H 96 H 70


 


Respiratory 21 18 18





Rate   


 


Blood Pressure 159/106 159/106 157/79


 


O2 Sat by Pulse 98 97 97





Oximetry   


 


O2 Sat by Pulse   





Oximetry [   





Anterior   





Bilateral   





Throughout]   














  05/01/22 05/01/22 05/01/22





  03:00 03:10 03:20


 


Temperature   


 


Pulse Rate 70 70 70


 


Respiratory 19 17 18





Rate   


 


Blood Pressure 138/76 138/76 129/75


 


O2 Sat by Pulse 95 97 98





Oximetry   


 


O2 Sat by Pulse   





Oximetry [   





Anterior   





Bilateral   





Throughout]   














  05/01/22 05/01/22 05/01/22





  03:30 03:40 03:50


 


Temperature   


 


Pulse Rate 70 71 70


 


Respiratory 17 16 15





Rate   


 


Blood Pressure 123/72 123/72 118/74


 


O2 Sat by Pulse 99 97 97





Oximetry   


 


O2 Sat by Pulse   





Oximetry [   





Anterior   





Bilateral   





Throughout]   














  05/01/22 05/01/22 05/01/22





  04:00 04:10 04:20


 


Temperature 98.2 F  


 


Pulse Rate 70 70 71


 


Respiratory 17 15 24





Rate   


 


Blood Pressure 118/71 118/71 119/72


 


O2 Sat by Pulse 97 97 98





Oximetry   


 


O2 Sat by Pulse   





Oximetry [   





Anterior   





Bilateral   





Throughout]   














  05/01/22 05/01/22 05/01/22





  04:30 04:40 04:50


 


Temperature   


 


Pulse Rate 70 70 70


 


Respiratory 19 19 17





Rate   


 


Blood Pressure 109/59 109/59 112/57


 


O2 Sat by Pulse 100 100 100





Oximetry   


 


O2 Sat by Pulse   





Oximetry [   





Anterior   





Bilateral   





Throughout]   














  05/01/22 05/01/22 05/01/22





  05:00 05:10 05:20


 


Temperature   


 


Pulse Rate 70 70 70


 


Respiratory 19 22 20





Rate   


 


Blood Pressure 115/72 115/72 122/74


 


O2 Sat by Pulse 100 90 100





Oximetry   


 


O2 Sat by Pulse   





Oximetry [   





Anterior   





Bilateral   





Throughout]   














  05/01/22 05/01/22 05/01/22





  05:30 05:40 05:50


 


Temperature   


 


Pulse Rate 70 70 71


 


Respiratory 21 22 16





Rate   


 


Blood Pressure 127/78 127/78 134/77


 


O2 Sat by Pulse 99 96 97





Oximetry   


 


O2 Sat by Pulse   





Oximetry [   





Anterior   





Bilateral   





Throughout]   














  05/01/22 05/01/22 05/01/22





  06:00 06:10 06:20


 


Temperature   


 


Pulse Rate 70 77 70


 


Respiratory 22 13 20





Rate   


 


Blood Pressure 122/76 122/76 124/73


 


O2 Sat by Pulse 96 96 96





Oximetry   


 


O2 Sat by Pulse   





Oximetry [   





Anterior   





Bilateral   





Throughout]   














  05/01/22 05/01/22 05/01/22





  06:30 06:40 06:50


 


Temperature   


 


Pulse Rate 70 70 70


 


Respiratory 17 18 17





Rate   


 


Blood Pressure 119/74 119/74 125/74


 


O2 Sat by Pulse 98 97 98





Oximetry   


 


O2 Sat by Pulse   





Oximetry [   





Anterior   





Bilateral   





Throughout]   














  05/01/22 05/01/22 05/01/22





  07:00 07:10 07:13


 


Temperature   99.0 F


 


Pulse Rate 70 70 


 


Respiratory 20 18 





Rate   


 


Blood Pressure 118/68 118/68 


 


O2 Sat by Pulse 99 100 





Oximetry   


 


O2 Sat by Pulse   





Oximetry [   





Anterior   





Bilateral   





Throughout]   














  05/01/22 05/01/22 05/01/22





  07:20 07:30 07:40


 


Temperature   


 


Pulse Rate 70 70 70


 


Respiratory 18 21 22





Rate   


 


Blood Pressure 120/71 120/71 123/71


 


O2 Sat by Pulse 100 99 99





Oximetry   


 


O2 Sat by Pulse   





Oximetry [   





Anterior   





Bilateral   





Throughout]   














  05/01/22 05/01/22 05/01/22





  07:50 08:00 08:10


 


Temperature   


 


Pulse Rate 70 70 70


 


Respiratory 20 21 18





Rate   


 


Blood Pressure 116/74 124/78 124/78


 


O2 Sat by Pulse 99 95 96





Oximetry   


 


O2 Sat by Pulse   





Oximetry [   





Anterior   





Bilateral   





Throughout]   














  05/01/22 05/01/22 05/01/22





  08:15 08:20 08:30


 


Temperature   


 


Pulse Rate  70 70


 


Respiratory  20 19





Rate   


 


Blood Pressure  118/70 126/74


 


O2 Sat by Pulse 98 83 L 99





Oximetry   


 


O2 Sat by Pulse   





Oximetry [   





Anterior   





Bilateral   





Throughout]   














  05/01/22 05/01/22 05/01/22





  08:40 08:50 09:00


 


Temperature   


 


Pulse Rate 70 71 70


 


Respiratory 20 17 14





Rate   


 


Blood Pressure 126/74 125/74 123/75


 


O2 Sat by Pulse 98 100 100





Oximetry   


 


O2 Sat by Pulse   





Oximetry [   





Anterior   





Bilateral   





Throughout]   














  05/01/22 05/01/22 05/01/22





  09:10 09:20 09:30


 


Temperature   


 


Pulse Rate 70 70 70


 


Respiratory 15 21 17





Rate   


 


Blood Pressure 123/75 120/70 129/72


 


O2 Sat by Pulse 98 100 93





Oximetry   


 


O2 Sat by Pulse   





Oximetry [   





Anterior   





Bilateral   





Throughout]   














  05/01/22 05/01/22 05/01/22





  09:40 09:50 10:00


 


Temperature   


 


Pulse Rate 70 70 70


 


Respiratory 24 23 16





Rate   


 


Blood Pressure 129/72 123/73 129/74


 


O2 Sat by Pulse 100 78 L 84





Oximetry   


 


O2 Sat by Pulse   





Oximetry [   





Anterior   





Bilateral   





Throughout]   














  05/01/22 05/01/22 05/01/22





  10:10 10:20 10:30


 


Temperature   


 


Pulse Rate 71 70 70


 


Respiratory 17 20 21





Rate   


 


Blood Pressure 129/74 126/67 129/73


 


O2 Sat by Pulse 95 99 99





Oximetry   


 


O2 Sat by Pulse   





Oximetry [   





Anterior   





Bilateral   





Throughout]   














  05/01/22 05/01/22 05/01/22





  10:33 10:34 10:35


 


Temperature   


 


Pulse Rate 70 70 70


 


Respiratory   





Rate   


 


Blood Pressure 129/73 129/73 129/73


 


O2 Sat by Pulse   





Oximetry   


 


O2 Sat by Pulse   





Oximetry [   





Anterior   





Bilateral   





Throughout]   














  05/01/22 05/01/22 05/01/22





  10:40 10:50 11:00


 


Temperature   


 


Pulse Rate 70 70 70


 


Respiratory 21 18 19





Rate   


 


Blood Pressure 129/73 131/77 126/71


 


O2 Sat by Pulse 100 100 100





Oximetry   


 


O2 Sat by Pulse   





Oximetry [   





Anterior   





Bilateral   





Throughout]   














  05/01/22





  11:36


 


Temperature 98.3 F


 


Pulse Rate 


 


Respiratory 





Rate 


 


Blood Pressure 


 


O2 Sat by Pulse 





Oximetry 


 


O2 Sat by Pulse 





Oximetry [ 





Anterior 





Bilateral 





Throughout] 














- General Appearance


General appearance: other (Awake & alert)


EENT: PERRL


Neck: no JVD, supple


Respiratory: Present: Other (Good air entry)


Cardiology: regular, S1S2, other (No edema)


Gastrointestinal: normal


Neurologic: alert and oriented x3





- Lab





                                 04/30/22 11:19





                                 05/01/22 04:16


                             Most recent lab results











Calcium  9.7 mg/dL (8.4-10.2)   05/01/22  04:16    


 


Phosphorus  4.80 mg/dL (2.5-4.5)  H  04/30/22  11:19    


 


Magnesium  2.00 mg/dL (1.7-2.3)   04/30/22  11:19    














Medications & Allergies





- Medications


Allergies/Adverse Reactions: 


                                    Allergies





apple Adverse Reaction (Verified 04/20/22 09:44)


   Hives


   yellow and green apples. can eat red. 


aspirin Adverse Reaction (Verified 04/20/22 09:44)


   Unknown


shell fish Allergy (Uncoded 04/20/22 09:44)


   Swelling


   allergic to seafood except blue crab 








Home Medications: 


                                Home Medications











 Medication  Instructions  Recorded  Confirmed  Last Taken  Type


 


Hydralazine HCl 50 mg PO TID 03/30/22 05/01/22 04/22/22 22:00 History


 


cloNIDine [Catapres] 0.1 mg PO Q8HR 03/30/22 05/01/22 04/23/22 10:00 History


 


Apixaban [Eliquis] 5 mg PO BID #60 tab 04/21/22 05/01/22 04/23/22 10:00 Rx


 


Gabapentin 100 mg PO BID 04/23/22 05/01/22 Unknown History


 


Losartan [Cozaar] 50 mg PO QDAY 04/23/22 05/01/22 04/22/22 10:00 History


 


Nitroglycerin [Nitro-Bid] 0.1 mg TRANSDERMA Q6HR PRN 04/23/22 05/01/22 Unknown 

History


 


Pantoprazole [Protonix TAB] 40 mg PO QDAY 04/23/22 05/01/22 04/23/22 10:00 

History


 


Diltiazem HCl [Cardizem] 240 mg PO DAILY 05/01/22 05/01/22 Unknown History


 


Isosorbide Mononitrate [Isosorbide 60 mg PO DAILY 05/01/22 05/01/22 Unknown 

History





Mononitrate ER]     


 


Sennosides/Docusate Sodium [Senna 1 cap PO DAILY 05/01/22 05/01/22 Unknown 

History





Plus 8.6-50 mg Softgel]     


 


carvediloL [Coreg] 25 mg PO BID 05/01/22 05/01/22 Unknown History


 


methIMAzole [Methimazole] 10 mg PO TID 05/01/22 05/01/22 Unknown History











Active Medications: 














Generic Name Dose Route Start Last Admin





  Trade Name Freq  PRN Reason Stop Dose Admin


 


Acetaminophen  650 mg  04/30/22 12:42 





  Acetaminophen 325 Mg Tab  PO  





  Q6H PRN  





  Pain MILD(1-3)/Fever >100.5/HA  


 


Albuterol  2.5 mg  04/30/22 12:42 





  Albuterol 2.5 Mg/3 Ml Nebu  IH  





  Q3HRT PRN  





  Shortness Of Breath  


 


Amiodarone HCl  200 mg  04/30/22 13:00  05/01/22 10:34





  Amiodarone 200 Mg Tab  PO   200 mg





  BID DERIK   Administration


 


Apixaban  5 mg  04/30/22 22:00  05/01/22 10:33





  Apixaban 5 Mg Tab  PO   5 mg





  Q12HR DERIK   Administration


 


Aspirin  81 mg  04/30/22 12:00  05/01/22 10:32





  Aspirin Ec 81 Mg Tab  PO   81 mg





  QDAY DERIK   Administration


 


Dextrose  50 ml  05/01/22 08:12 





  Dextrose 50% In Water (25gm) 50 Ml Syringe  IV  





  Q30MIN PRN  





  Hypoglycemia  





  Protocol  


 


Gabapentin  100 mg  04/30/22 22:00  05/01/22 10:33





  Gabapentin 100 Mg Cap  PO   100 mg





  BID DERIK   Administration


 


Heparin Sodium (Porcine)  5,000 unit  04/30/22 13:17 





  Heparin 10,000 Unit/1 Ml Vial  IV  





  VANCE PRN  





  hemodialysis  


 


Hydralazine HCl  50 mg  05/01/22 10:00  05/01/22 10:34





  Hydralazine 25 Mg Tab  PO   50 mg





  Q8HR DERIK   Administration


 


Hydromorphone HCl  0.5 mg  04/30/22 12:42  04/30/22 21:42





  Hydromorphone 1 Mg/1 Ml Inj  IV   0.5 mg





  Q13H PRN   Administration





  Pain , Severe (7-10)  


 


Diltiazem HCl  100 mg in 100 mls @ 5 mls/hr  04/30/22 13:00  05/01/22 08:05





  Cardizem/D5w 100mg/100ml  IV   0 mg/hr





  TITR DERIK   0 mls/hr





    Titration





  Protocol  





  5 MG/HR  


 


Sodium Chloride  100 mls @ 999 mls/hr  04/30/22 13:17 





  Nacl 0.9%  IV  





  VANCE PRN  





  Hypotension  


 


Insulin Human Lispro  0 unit  05/01/22 11:30 





  Insulin Lispro 100 Unit/Ml  SUB-Q  





  [facility] DERIK  





  Protocol  


 


Isosorbide Mononitrate  60 mg  05/01/22 10:00  05/01/22 10:35





  Isosorbide Mononitrate Er 60 Mg Tab  PO   60 mg





  QDAY DERIK   Administration


 


Losartan Potassium  50 mg  04/30/22 12:00  05/01/22 10:33





  Losartan 50 Mg Tab  PO   50 mg





  QDAY DERIK   Administration


 


Metoprolol Tartrate  100 mg  04/30/22 22:00  05/01/22 10:34





  Metoprolol Tartrate 100 Mg Tab  PO   100 mg





  BID DERIK   Administration


 


Oxycodone/Acetaminophen  1 tab  04/30/22 12:42  05/01/22 01:35





  Oxycodone /Acetaminophen 5-325mg Tab  PO   1 tab





  Q6H PRN   Administration





  Pain, Moderate (4-6)  


 


Pantoprazole Sodium  40 mg  05/01/22 10:00  05/01/22 10:36





  Pantoprazole 40 Mg Tab  PO   40 mg





  QDAC DERIK   Administration


 


Senna/Docusate Sodium  1 tab  05/01/22 10:00  05/01/22 10:35





  Sennosides/Docusate Sodium 8.6/50 Mg Tab  PO   1 tab





  DAILY DERIK   Administration


 


Sodium Chloride  10 ml  04/30/22 22:00  05/01/22 10:36





  Sodium Chloride 0.9% 10 Ml Flush Syringe  IV   10 ml





  BID DERIK   Administration


 


Sodium Chloride  10 ml  04/30/22 12:42 





  Sodium Chloride 0.9% 10 Ml Flush Syringe  IV  





  PRN PRN  





  LINE FLUSH

## 2022-05-02 LAB
BUN SERPL-MCNC: 35 MG/DL (ref 7–17)
BUN/CREAT SERPL: 9 %
CALCIUM SERPL-MCNC: 9.6 MG/DL (ref 8.4–10.2)
HEMOLYSIS INDEX: 12

## 2022-05-02 RX ADMIN — MORPHINE SULFATE PRN MG: 2 INJECTION, SOLUTION INTRAMUSCULAR; INTRAVENOUS at 15:45

## 2022-05-02 RX ADMIN — AMIODARONE HYDROCHLORIDE SCH MG: 200 TABLET ORAL at 10:36

## 2022-05-02 RX ADMIN — METOPROLOL TARTRATE SCH MG: 100 TABLET, FILM COATED ORAL at 10:36

## 2022-05-02 RX ADMIN — MORPHINE SULFATE PRN MG: 2 INJECTION, SOLUTION INTRAMUSCULAR; INTRAVENOUS at 21:19

## 2022-05-02 RX ADMIN — INSULIN LISPRO SCH: 100 INJECTION, SOLUTION INTRAVENOUS; SUBCUTANEOUS at 07:30

## 2022-05-02 RX ADMIN — HYDROMORPHONE HYDROCHLORIDE PRN MG: 1 INJECTION, SOLUTION INTRAMUSCULAR; INTRAVENOUS; SUBCUTANEOUS at 07:09

## 2022-05-02 RX ADMIN — GABAPENTIN SCH MG: 100 CAPSULE ORAL at 21:21

## 2022-05-02 RX ADMIN — INSULIN LISPRO SCH: 100 INJECTION, SOLUTION INTRAVENOUS; SUBCUTANEOUS at 12:24

## 2022-05-02 RX ADMIN — SENNOSIDES AND DOCUSATE SODIUM SCH TAB: 50; 8.6 TABLET ORAL at 10:36

## 2022-05-02 RX ADMIN — PANTOPRAZOLE SODIUM SCH MG: 40 TABLET, DELAYED RELEASE ORAL at 07:10

## 2022-05-02 RX ADMIN — INSULIN LISPRO SCH: 100 INJECTION, SOLUTION INTRAVENOUS; SUBCUTANEOUS at 21:23

## 2022-05-02 RX ADMIN — ASPIRIN SCH MG: 81 TABLET, COATED ORAL at 10:37

## 2022-05-02 RX ADMIN — AMIODARONE HYDROCHLORIDE SCH MG: 200 TABLET ORAL at 21:21

## 2022-05-02 RX ADMIN — APIXABAN SCH MG: 5 TABLET, FILM COATED ORAL at 21:22

## 2022-05-02 RX ADMIN — Medication SCH ML: at 12:27

## 2022-05-02 RX ADMIN — GABAPENTIN SCH MG: 100 CAPSULE ORAL at 10:37

## 2022-05-02 RX ADMIN — LOSARTAN POTASSIUM SCH MG: 50 TABLET, FILM COATED ORAL at 10:37

## 2022-05-02 RX ADMIN — METOPROLOL TARTRATE SCH MG: 100 TABLET, FILM COATED ORAL at 21:21

## 2022-05-02 RX ADMIN — Medication SCH ML: at 21:22

## 2022-05-02 RX ADMIN — APIXABAN SCH MG: 5 TABLET, FILM COATED ORAL at 10:36

## 2022-05-02 NOTE — PROGRESS NOTE
Assessment and Plan





1. ESRD:


Patient is on maintenance hemodialysis, TTS schedule.


Hemodialysis: 4/30.





2. FEN:


UF with HD as tolerated.


Monitor lytes and volume status.





3. Paroxysmal A.flutter with RVR:


Amiodarone, Metoprolol and Eliquis.


Monitor.





4. Chest pain //  H/o CAD s/p PCI:


CP associated with A.flutter with RVR.


Recent CTA negative for PE.


Monitor.





5. Chronic HFpEF:


Volume control thru HD.


Fluid restriction, monitor I/O.





6. H/o COPD:


Not in exacerbation.


Nebs and O2 as needed.





7. Hypertension:


Volume control with HD.


Continue home BP meds.


Monitor BP.











Subjective:


Patient was seen and examined at the bedside.











Examination:


General appearance: well-developed, appears stated age, no distress


HEENT: atraumatic, IVETTE


Neck: trachea midline


Respiratory: ctab


Heart: S1S2, appears regular, no murmur


Abdomen: soft, bowel sounds heard, NT


Integumentary: no obvious rash


Neurologic: AO, able to move extremities


Ext: no edema


Hemodialysis access: R IJ tunnel catheter, R arm AVG








Subjective


Date of service: 05/02/22


Principal diagnosis: A-Fib with RVR; AE-CHF; AHRF; ESRD on dialysis; HTN; CAD; 

DM II





Objective





- Vital Signs


Vital signs: 


                               Vital Signs - 12hr











  05/02/22 05/02/22 05/02/22





  01:00 02:00 03:00


 


Temperature   


 


Pulse Rate 70 70 70


 


Pulse Rate [   





From Monitor]   


 


Pulse Rate [   





Left Radial]   


 


Pulse Rate [   





Right Radial]   


 


Respiratory 16 15 17





Rate   


 


Blood Pressure 119/62 120/71 126/72


 


O2 Sat by Pulse 97 97 97





Oximetry   














  05/02/22 05/02/22 05/02/22





  03:40 04:00 05:00


 


Temperature 97.2 F L  


 


Pulse Rate  70 71


 


Pulse Rate [   





From Monitor]   


 


Pulse Rate [   





Left Radial]   


 


Pulse Rate [   





Right Radial]   


 


Respiratory  16 12





Rate   


 


Blood Pressure  120/66 119/86


 


O2 Sat by Pulse  98 98





Oximetry   














  05/02/22 05/02/22 05/02/22





  05:32 06:00 07:00


 


Temperature   


 


Pulse Rate 70 72 70


 


Pulse Rate [   





From Monitor]   


 


Pulse Rate [   





Left Radial]   


 


Pulse Rate [   





Right Radial]   


 


Respiratory  14 13





Rate   


 


Blood Pressure 126/78 135/82 137/82


 


O2 Sat by Pulse  100 100





Oximetry   














  05/02/22 05/02/22 05/02/22





  07:09 07:39 08:00


 


Temperature   98.1 F


 


Pulse Rate   70


 


Pulse Rate [   70





From Monitor]   


 


Pulse Rate [   70





Left Radial]   


 


Pulse Rate [   70





Right Radial]   


 


Respiratory 17 12 17





Rate   


 


Blood Pressure   


 


O2 Sat by Pulse   97





Oximetry   














  05/02/22 05/02/22 05/02/22





  08:01 09:00 10:00


 


Temperature   


 


Pulse Rate 70 70 70


 


Pulse Rate [   





From Monitor]   


 


Pulse Rate [   





Left Radial]   


 


Pulse Rate [   





Right Radial]   


 


Respiratory 13 15 13





Rate   


 


Blood Pressure 136/77 138/84 154/79


 


O2 Sat by Pulse 76 L 100 94





Oximetry   














  05/02/22 05/02/22 05/02/22





  10:13 10:36 10:37


 


Temperature   


 


Pulse Rate  71 71


 


Pulse Rate [   





From Monitor]   


 


Pulse Rate [   





Left Radial]   


 


Pulse Rate [   





Right Radial]   


 


Respiratory   





Rate   


 


Blood Pressure  140/77 140/77


 


O2 Sat by Pulse 100  





Oximetry   














  05/02/22





  11:00


 


Temperature 


 


Pulse Rate 70


 


Pulse Rate [ 





From Monitor] 


 


Pulse Rate [ 





Left Radial] 


 


Pulse Rate [ 





Right Radial] 


 


Respiratory 13





Rate 


 


Blood Pressure 133/77


 


O2 Sat by Pulse 96





Oximetry 














- Lab





                                 04/30/22 11:19





                                 05/02/22 04:26


                             Most recent lab results











Calcium  9.6 mg/dL (8.4-10.2)   05/02/22  04:26    


 


Phosphorus  4.00 mg/dL (2.5-4.5)   05/02/22  04:26    


 


Magnesium  1.90 mg/dL (1.7-2.3)   05/02/22  04:26    














Medications & Allergies





- Medications


Allergies/Adverse Reactions: 


                                    Allergies





apple Adverse Reaction (Verified 04/20/22 09:44)


   Hives


   yellow and green apples. can eat red. 


aspirin Adverse Reaction (Verified 04/20/22 09:44)


   Unknown


shell fish Allergy (Uncoded 04/20/22 09:44)


   Swelling


   allergic to seafood except blue crab 








Home Medications: 


                                Home Medications











 Medication  Instructions  Recorded  Confirmed  Last Taken  Type


 


Hydralazine HCl 50 mg PO TID 03/30/22 05/01/22 04/22/22 22:00 History


 


cloNIDine [Catapres] 0.1 mg PO Q8HR 03/30/22 05/01/22 04/23/22 10:00 History


 


Apixaban [Eliquis] 5 mg PO BID #60 tab 04/21/22 05/01/22 04/23/22 10:00 Rx


 


Gabapentin 100 mg PO BID 04/23/22 05/01/22 Unknown History


 


Losartan [Cozaar] 50 mg PO QDAY 04/23/22 05/01/22 04/22/22 10:00 History


 


Nitroglycerin [Nitro-Bid] 0.1 mg TRANSDERMA Q6HR PRN 04/23/22 05/01/22 Unknown 

History


 


Pantoprazole [Protonix TAB] 40 mg PO QDAY 04/23/22 05/01/22 04/23/22 10:00 

History


 


Diltiazem HCl [Cardizem] 240 mg PO DAILY 05/01/22 05/01/22 Unknown History


 


Isosorbide Mononitrate [Isosorbide 60 mg PO DAILY 05/01/22 05/01/22 Unknown 

History





Mononitrate ER]     


 


Sennosides/Docusate Sodium [Senna 1 cap PO DAILY 05/01/22 05/01/22 Unknown 

History





Plus 8.6-50 mg Softgel]     


 


carvediloL [Coreg] 25 mg PO BID 05/01/22 05/01/22 Unknown History


 


methIMAzole [Methimazole] 10 mg PO TID 05/01/22 05/01/22 Unknown History











Active Medications: 














Generic Name Dose Route Start Last Admin





  Trade Name Freq  PRN Reason Stop Dose Admin


 


Acetaminophen  650 mg  04/30/22 12:42 





  Acetaminophen 325 Mg Tab  PO  





  Q6H PRN  





  Pain MILD(1-3)/Fever >100.5/HA  


 


Albuterol  2.5 mg  04/30/22 12:42 





  Albuterol 2.5 Mg/3 Ml Nebu  IH  





  Q3HRT PRN  





  Shortness Of Breath  


 


Amiodarone HCl  200 mg  04/30/22 13:00  05/02/22 10:36





  Amiodarone 200 Mg Tab  PO   200 mg





  BID DERIK   Administration


 


Apixaban  5 mg  04/30/22 22:00  05/02/22 10:36





  Apixaban 5 Mg Tab  PO   5 mg





  Q12HR DERIK   Administration


 


Aspirin  81 mg  04/30/22 12:00  05/02/22 10:37





  Aspirin Ec 81 Mg Tab  PO   81 mg





  QDAY DERIK   Administration


 


Atorvastatin Calcium  20 mg  05/01/22 22:00  05/01/22 21:25





  Atorvastatin 20 Mg Tab  PO   20 mg





  QHS DERIK   Administration


 


Dextrose  50 ml  05/01/22 08:12 





  Dextrose 50% In Water (25gm) 50 Ml Syringe  IV  





  Q30MIN PRN  





  Hypoglycemia  





  Protocol  


 


Gabapentin  100 mg  04/30/22 22:00  05/02/22 10:37





  Gabapentin 100 Mg Cap  PO   100 mg





  BID DERIK   Administration


 


Heparin Sodium (Porcine)  5,000 unit  04/30/22 13:17 





  Heparin 10,000 Unit/1 Ml Vial  IV  





  VANCE PRN  





  hemodialysis  


 


Hydralazine HCl  50 mg  05/01/22 10:00  05/02/22 05:32





  Hydralazine 25 Mg Tab  PO   50 mg





  Q8HR DERIK   Administration


 


Hydromorphone HCl  0.5 mg  04/30/22 12:42  05/02/22 07:09





  Hydromorphone 1 Mg/1 Ml Inj  IV   0.5 mg





  Q13H PRN   Administration





  Pain , Severe (7-10)  


 


Sodium Chloride  100 mls @ 999 mls/hr  04/30/22 13:17 





  Nacl 0.9%  IV  





  VANCE PRN  





  Hypotension  


 


Insulin Human Lispro  0 unit  05/01/22 11:30  05/02/22 12:24





  Insulin Lispro 100 Unit/Ml  SUB-Q   Not Given





  [facility]  





  Protocol  


 


Isosorbide Mononitrate  60 mg  05/01/22 10:00  05/02/22 10:36





  Isosorbide Mononitrate Er 60 Mg Tab  PO   60 mg





  QDAY [facility]   Administration


 


Losartan Potassium  50 mg  04/30/22 12:00  05/02/22 10:37





  Losartan 50 Mg Tab  PO   50 mg





  QDAY [facility]   Administration


 


Metoprolol Tartrate  100 mg  04/30/22 22:00  05/02/22 10:36





  Metoprolol Tartrate 100 Mg Tab  PO   100 mg





  BID [facility]   Administration


 


Morphine Sulfate  2 mg  05/02/22 12:30 





  Morphine 2 Mg/1 Ml Inj  IV  05/02/22 15:30 





  ONCE@1230 [facility]  


 


Nitroglycerin  0.4 mg  05/02/22 12:20 





  Nitroglycerin 0.4 Mg Tab Subl  SL  





  .Q5MIN PRN  





  Chest Pain  


 


Oxycodone/Acetaminophen  1 tab  04/30/22 12:42  05/01/22 01:35





  Oxycodone /Acetaminophen 5-325mg Tab  PO   1 tab





  Q6H PRN   Administration





  Pain, Moderate (4-6)  


 


Pantoprazole Sodium  40 mg  05/01/22 10:00  05/02/22 07:10





  Pantoprazole 40 Mg Tab  PO   40 mg





  QDAC DERIK   Administration


 


Senna/Docusate Sodium  1 tab  05/01/22 10:00  05/02/22 10:36





  Sennosides/Docusate Sodium 8.6/50 Mg Tab  PO   1 tab





  DAILY DERIK   Administration


 


Sodium Chloride  10 ml  04/30/22 22:00  05/02/22 12:27





  Sodium Chloride 0.9% 10 Ml Flush Syringe  IV   10 ml





  BID DERIK   Administration


 


Sodium Chloride  10 ml  04/30/22 12:42 





  Sodium Chloride 0.9% 10 Ml Flush Syringe  IV  





  PRN PRN  





  LINE FLUSH

## 2022-05-02 NOTE — ELECTROCARDIOGRAPH REPORT
[facility]

                                       

Test Date:    2022               Test Time:    08:33:25

Pat Name:     LINDA GARCIA              Department:   

Patient ID:   [id number]          Room:         A261

Gender:       F                        Technician:   GABBI

:          1955               Requested By: SANDI GONZALEZ

Order Number: [id number]              Reading MD:   Keagan Bacon

                                 Measurements

Intervals                              Axis          

Rate:         108                      P:            

LA:                                    QRS:          -15

QRSD:         90                       T:            89

QT:           374                                    

QTc:          503                                    

                           Interpretive Statements

Atrial fibrillation

Consider anteroseptal infarct

Nonspecific T abnormalities, lateral leads

Prolonged QT interval

Compared to ECG 2022 14:10:11

Atrial fibrillation has replaced atrial flutter

Otherwise no significant change

Electronically Signed On 2022 13:22:37 EDT by Keagan Bacon

## 2022-05-02 NOTE — PROGRESS NOTE
Assessment and Plan


Assessment and plan: 


This is a 66-year-old female with known past medical history of ESRD on HD 

(T,R,Sat), HTN, CVA, MI, CAD s/p stent placement, CHF, PPM, history of PE, 

Atrial fibrillation on Eliquis at home, DM, COPD, HLD, bipolar disorder, and 

nicotine dependence admitted for CHF exacerbation and AFIB with RVR s/p Cardizem

gtt.





Hospital Course to Date:


5/1: Off cardizem gtt off this am, Apacing on the monitor HR in the 70s, VSS. 

Patient tolerated HD overnight, 2L removed. Patient is stable on RA this am, in 

no acute distress. Patient home medications reconciled. Cardiology consulted.





5/2: Remains paced at 70 on the monitor, VSS. Cardialogy consult pending. Now on

O2 supplementation at 3L NC SPO2, low SPO2 this am. Probably due to fluid 

overload, continue HD per Nephro. Transfer to Telemetry once a bed is available.








Assessment and Plan


#Atrial Fibrillation with Rapid Ventricular Response


#CHF (Congestive Heart Failure) Exacerbation


#Hypertension


#Hyperlipidemia


#Possible Noncompliance with meds


- Frequent Flyer, this is X4 visit this month with same complaints


- Presented with CP, found in afib with RVR s/p Cardizem gtt


- Patient is A-pacing on the monitor, HR in the 70s


- BNP 75200, most likely due to fluid overload- HD per Nephro


- On PO Amio, BB, and Statin


- Home antihypertensive resumed


- Cardiology consulted


- Continue blood pressure monitor per protocol


- Maintain SBP less than 160


- Home Eliquis resumed


- Strict I's/O- monitor urine output every shift


- Daily weight





#End Stage Renal Disease(ESRD) on HD


- HD days (T,R,Sa)


- Nephrology on consult, appreciated recommendation


- Tolerated HD on 4/30, 2L removed


- Continue HD per Nephro


- Strict intake and output


- Avoid nephrotoxic medications; Renally dose medications 


- Monitor and replace electrolytes as needed





#History of PE


- Home Eliquis resumed





#Type 2 Diabetes Mellitus 


- Consistent carbohydrate diet


- Accu-Chek and SSI ACHS


- Avoid Hypoglycemia





#Tobacco Dependence


- Smoking Cessation provided. Patient verbalized understanding of the info pr

ovided


- Patient denied any urges at this time, offer Nicotine patch if needed





#GI/DVT Prophylaxis


- PPI- Protonix


- Home Eliquis resumed


- SCD to bilateral lower extremities while in bed





#Advance Care Planning


- Disease education, care plan, diagnoses, and prognosis discussed with patient.

Smoking cessation and medication compliance was also discussed. Patient 

acknowledges understanding of educations provided and agreement with current 

care plan.











The high probability of a clinically significant, sudden or life threatening det

erioration of the [multiple] system(s) required my full and direct attention, 

intervention and personal management. The aggregate critical care time was [60] 

minutes. This time is in addition to time spent performing reported procedures 

but includes the following: 





[x] Data Review and interpretation 





[x] Patient assessment and monitoring of vital signs 





[x] Documentation 





[x] Medication orders and management





Disposition Plan: ICU


Total Time Spent with Patient (Minutes): 60





History


Interval history: 


Patient seen and examined at the beside. Fully AAO, now on 3L NC SPO2 SPO2 at 

100%. Per RN patient desated this am, SPO2 in the 70s. Patient denied any SOB 

nor any discomfort, no acute distress noted. Apacing at 70 on the monitor, VSS 





Hospitalist Physical





- Constitutional


Vitals: 


                                        











Temp Pulse Resp BP Pulse Ox


 


 98.1 F   70   15   138/84   100 


 


 05/02/22 08:00  05/02/22 09:00  05/02/22 09:00  05/02/22 09:00  05/02/22 10:13











General appearance: Present: no acute distress, cachectic





- EENT


Eyes: Present: PERRL, EOM intact


ENT: hearing intact





- Neck


Neck: Present: normal ROM





- Respiratory


Respiratory effort: normal


Respiratory: bilateral: diminished





- Cardiovascular


Rhythm: regular (Apaced)


Heart Sounds: Present: S1 & S2





- Extremities


Extremities: no ischemia, pulses intact, pulses symmetrical


Peripheral Pulses: within normal limits





- Abdominal


General gastrointestinal: soft, non-distended, normal bowel sounds





- Integumentary


Integumentary: Present: clear, warm, dry





- Psychiatric


Psychiatric: appropriate mood/affect, cooperative





- Neurologic


Neurologic: CNII-XII intact, moves all extremities





- Allied Health


Allied health notes reviewed: nursing, case management





HEART Score





- HEART Score


Troponin: 


                                        











Troponin T  < 0.010 ng/mL (0.00-0.029)   04/30/22  20:22    














Results





- Labs


CBC & Chem 7: 


                                 04/30/22 11:19





                                 05/02/22 04:26


Labs: 


                             Laboratory Last Values











WBC  8.6 K/mm3 (4.5-11.0)   04/30/22  11:19    


 


RBC  3.77 M/mm3 (3.65-5.03)   04/30/22  11:19    


 


Hgb  11.8 gm/dl (10.1-14.3)   04/30/22  11:19    


 


Hct  35.9 % (30.3-42.9)   04/30/22  11:19    


 


MCV  95 fl (79-97)   04/30/22  11:19    


 


MCH  31 pg (28-32)   04/30/22  11:19    


 


MCHC  33 % (30-34)   04/30/22  11:19    


 


RDW  18.0 % (13.2-15.2)  H  04/30/22  11:19    


 


Plt Count  131 K/mm3 (140-440)  L  04/30/22  11:19    


 


Lymph % (Auto)  16.8 % (13.4-35.0)   04/30/22  11:19    


 


Mono % (Auto)  7.7 % (0.0-7.3)  H  04/30/22  11:19    


 


Eos % (Auto)  3.4 % (0.0-4.3)   04/30/22  11:19    


 


Baso % (Auto)  0.8 % (0.0-1.8)   04/30/22  11:19    


 


Lymph # (Auto)  1.4 K/mm3 (1.2-5.4)   04/30/22  11:19    


 


Mono # (Auto)  0.7 K/mm3 (0.0-0.8)   04/30/22  11:19    


 


Eos # (Auto)  0.3 K/mm3 (0.0-0.4)   04/30/22  11:19    


 


Baso # (Auto)  0.1 K/mm3 (0.0-0.1)   04/30/22  11:19    


 


Seg Neutrophils %  71.3 % (40.0-70.0)  H  04/30/22  11:19    


 


Seg Neutrophils #  6.1 K/mm3 (1.8-7.7)   04/30/22  11:19    


 


Sodium  140 mmol/L (137-145)   05/02/22  04:26    


 


Potassium  5.0 mmol/L (3.6-5.0)   05/02/22  04:26    


 


Chloride  100.3 mmol/L ()   05/02/22  04:26    


 


Carbon Dioxide  26 mmol/L (22-30)   05/02/22  04:26    


 


Anion Gap  19 mmol/L  05/02/22  04:26    


 


BUN  35 mg/dL (7-17)  H  05/02/22  04:26    


 


Creatinine  4.0 mg/dL (0.6-1.2)  H D 05/02/22  04:26    


 


Estimated GFR  14 ml/min  05/02/22  04:26    


 


BUN/Creatinine Ratio  9 %  05/02/22  04:26    


 


Glucose  93 mg/dL ()   05/02/22  04:26    


 


POC Glucose  84 mg/dL ()   05/02/22  07:16    


 


Hemoglobin A1c  4.9 % (4-6)   05/02/22  04:26    


 


Calcium  9.6 mg/dL (8.4-10.2)   05/02/22  04:26    


 


Phosphorus  4.00 mg/dL (2.5-4.5)   05/02/22  04:26    


 


Magnesium  1.90 mg/dL (1.7-2.3)   05/02/22  04:26    


 


Total Bilirubin  0.50 mg/dL (0.1-1.2)   04/30/22  11:19    


 


AST  13 units/L (5-40)   04/30/22  11:19    


 


ALT  7 units/L (7-56)   04/30/22  11:19    


 


Alkaline Phosphatase  73 units/L ()   04/30/22  11:19    


 


Troponin T  < 0.010 ng/mL (0.00-0.029)   04/30/22  20:22    


 


NT-Pro-B Natriuret Pep  75736 pg/mL (0-900)  H  04/30/22  11:19    


 


Total Protein  6.1 g/dL (6.3-8.2)  L  04/30/22  11:19    


 


Albumin  4.3 g/dL (3.9-5)   04/30/22  11:19    


 


Albumin/Globulin Ratio  2.4 %  04/30/22  11:19    











[place]/IV: 


                                        





Voiding Method                   External Female Catheter











Active Medications





- Current Medications


Current Medications: 














Generic Name Dose Route Start Last Admin





  Trade Name Freq  PRN Reason Stop Dose Admin


 


Acetaminophen  650 mg  04/30/22 12:42 





  Acetaminophen 325 Mg Tab  PO  





  Q6H PRN  





  Pain MILD(1-3)/Fever >100.5/HA  


 


Albuterol  2.5 mg  04/30/22 12:42 





  Albuterol 2.5 Mg/3 Ml Nebu  IH  





  Q3HRT PRN  





  Shortness Of Breath  


 


Amiodarone HCl  200 mg  04/30/22 13:00  05/01/22 21:25





  Amiodarone 200 Mg Tab  PO   200 mg





  BID DERIK   Administration


 


Apixaban  5 mg  04/30/22 22:00  05/01/22 21:25





  Apixaban 5 Mg Tab  PO   5 mg





  Q12HR DERIK   Administration


 


Aspirin  81 mg  04/30/22 12:00  05/01/22 10:32





  Aspirin Ec 81 Mg Tab  PO   81 mg





  QDAY DERIK   Administration


 


Atorvastatin Calcium  20 mg  05/01/22 22:00  05/01/22 21:25





  Atorvastatin 20 Mg Tab  PO   20 mg





  [facility]HS DERIK   Administration


 


Dextrose  50 ml  05/01/22 08:12 





  Dextrose 50% In Water (25gm) 50 Ml Syringe  IV  





  Q30MIN PRN  





  Hypoglycemia  





  Protocol  


 


Gabapentin  100 mg  04/30/22 22:00  05/01/22 21:25





  Gabapentin 100 Mg Cap  PO   100 mg





  BID DERIK   Administration


 


Heparin Sodium (Porcine)  5,000 unit  04/30/22 13:17 





  Heparin 10,000 Unit/1 Ml Vial  IV  





  VANCE PRN  





  hemodialysis  


 


Hydralazine HCl  50 mg  05/01/22 10:00  05/02/22 05:32





  Hydralazine 25 Mg Tab  PO   50 mg





  Q8HR DERIK   Administration


 


Hydromorphone HCl  0.5 mg  04/30/22 12:42  05/02/22 07:09





  Hydromorphone 1 Mg/1 Ml Inj  IV   0.5 mg





  Q13H PRN   Administration





  Pain , Severe (7-10)  


 


Sodium Chloride  100 mls @ 999 mls/hr  04/30/22 13:17 





  Nacl 0.9%  IV  





  VANCE PRN  





  Hypotension  


 


Insulin Human Lispro  0 unit  05/01/22 11:30  05/01/22 22:00





  Insulin Lispro 100 Unit/Ml  SUB-Q   Not Given





  [facility]  





  Protocol  


 


Isosorbide Mononitrate  60 mg  05/01/22 10:00  05/01/22 10:35





  Isosorbide Mononitrate Er 60 Mg Tab  PO   60 mg





  QDAY DERIK   Administration


 


Losartan Potassium  50 mg  04/30/22 12:00  05/01/22 10:33





  Losartan 50 Mg Tab  PO   50 mg





  QDAY DERIK   Administration


 


Metoprolol Tartrate  100 mg  04/30/22 22:00  05/01/22 21:24





  Metoprolol Tartrate 100 Mg Tab  PO   100 mg





  BID DERIK   Administration


 


Oxycodone/Acetaminophen  1 tab  04/30/22 12:42  05/01/22 01:35





  Oxycodone /Acetaminophen 5-325mg Tab  PO   1 tab





  Q6H PRN   Administration





  Pain, Moderate (4-6)  


 


Pantoprazole Sodium  40 mg  05/01/22 10:00  05/02/22 07:10





  Pantoprazole 40 Mg Tab  PO   40 mg





  QDAC DERIK   Administration


 


Senna/Docusate Sodium  1 tab  05/01/22 10:00  05/01/22 10:35





  Sennosides/Docusate Sodium 8.6/50 Mg Tab  PO   1 tab





  DAILY DERIK   Administration


 


Sodium Chloride  10 ml  04/30/22 22:00  05/01/22 21:27





  Sodium Chloride 0.9% 10 Ml Flush Syringe  IV   10 ml





  BID DERIK   Administration


 


Sodium Chloride  10 ml  04/30/22 12:42 





  Sodium Chloride 0.9% 10 Ml Flush Syringe  IV  





  PRN PRN  





  LINE FLUSH

## 2022-05-02 NOTE — PROGRESS NOTE
Assessment and Plan








Atrial fibrillation with RVR


Acute congestive heart failure exacerbation


Acute hypoxemic respiratory failure


End-stage renal disease on dialysis


Hypertension


Coronary artery disease


Diabetes type II


Hyperlipidemia


Tobacco use disorder


H/O a remote cerebrovascular accident





- prn supplemental oxygen to keep O2 sats > 90%


- prn bronchodilators (KAREN) with pulm hygiene per RT


- rate & rhythm control per cardiology (Amiodarone, Metoprolol)


- avoid nephrotoxins, renally dose all medications


- mobility protocols to prevent pressure ulcers


- PT/OT as tolerated


- Wound care per RN/WCT


- continue accuchecks with glycemic control per SSI for target blood glucose < 

180 mg/dL 


- tobacco abstinence strongly counseled at the bedside  


- home oxygen evaluation at discharge


- prn analgesia per pain score


- GI & VTE prophylaxis


- Flu & pneumovax per protocol


- Pulmonary out patient follow up for PFTs and optimization of respiratory 

status


- continue other care per attending / other consultants





... re-evaluate in am & prn





Subjective


Date of service: 05/02/22


Principal diagnosis: A-Fib with RVR; AE-CHF; AHRF; ESRD on dialysis; HTN; CAD; 

DM II


Interval history: 





Patient is seen today for: A-Fib with RVR; AE-CHF; Acute hypoxemic respiratory 

failure; ESRD on dialysis; HTN; CAD; DM II





Seen and examined at bedside; 24hour events reviewed; nursing and respiratory 

care staff consulted; no adverse overnight events reported to me; restring 

peacefully in bed; feels better; A-fib rate controlled; denies N/V/F/C





Objective


                               Vital Signs - 12hr











  05/02/22 05/02/22 05/02/22





  00:00 00:01 01:00


 


Temperature 98.2 F  


 


Pulse Rate  70 70


 


Pulse Rate [   





From Monitor]   


 


Pulse Rate [   





Left Radial]   


 


Pulse Rate [   





Right Radial]   


 


Respiratory  18 16





Rate   


 


Blood Pressure  128/76 119/62


 


O2 Sat by Pulse  99 97





Oximetry   














  05/02/22 05/02/22 05/02/22





  02:00 03:00 03:40


 


Temperature   97.2 F L


 


Pulse Rate 70 70 


 


Pulse Rate [   





From Monitor]   


 


Pulse Rate [   





Left Radial]   


 


Pulse Rate [   





Right Radial]   


 


Respiratory 15 17 





Rate   


 


Blood Pressure 120/71 126/72 


 


O2 Sat by Pulse 97 97 





Oximetry   














  05/02/22 05/02/22 05/02/22





  04:00 05:00 05:32


 


Temperature   


 


Pulse Rate 70 71 70


 


Pulse Rate [   





From Monitor]   


 


Pulse Rate [   





Left Radial]   


 


Pulse Rate [   





Right Radial]   


 


Respiratory 16 12 





Rate   


 


Blood Pressure 120/66 119/86 126/78


 


O2 Sat by Pulse 98 98 





Oximetry   














  05/02/22 05/02/22 05/02/22





  06:00 07:00 07:09


 


Temperature   


 


Pulse Rate 72 70 


 


Pulse Rate [   





From Monitor]   


 


Pulse Rate [   





Left Radial]   


 


Pulse Rate [   





Right Radial]   


 


Respiratory 14 13 17





Rate   


 


Blood Pressure 135/82 137/82 


 


O2 Sat by Pulse 100 100 





Oximetry   














  05/02/22 05/02/22 05/02/22





  07:39 08:00 08:01


 


Temperature  98.1 F 


 


Pulse Rate  70 70


 


Pulse Rate [  70 





From Monitor]   


 


Pulse Rate [  70 





Left Radial]   


 


Pulse Rate [  70 





Right Radial]   


 


Respiratory 12 17 13





Rate   


 


Blood Pressure   136/77


 


O2 Sat by Pulse  97 76 L





Oximetry   














  05/02/22 05/02/22 05/02/22





  09:00 10:00 10:13


 


Temperature   


 


Pulse Rate 70 70 


 


Pulse Rate [   





From Monitor]   


 


Pulse Rate [   





Left Radial]   


 


Pulse Rate [   





Right Radial]   


 


Respiratory 15 13 





Rate   


 


Blood Pressure 138/84 154/79 


 


O2 Sat by Pulse 100 94 100





Oximetry   














  05/02/22 05/02/22 05/02/22





  10:36 10:37 11:00


 


Temperature   


 


Pulse Rate 71 71 70


 


Pulse Rate [   





From Monitor]   


 


Pulse Rate [   





Left Radial]   


 


Pulse Rate [   





Right Radial]   


 


Respiratory   13





Rate   


 


Blood Pressure 140/77 140/77 133/77


 


O2 Sat by Pulse   96





Oximetry   











Constitutional: no acute distress


Eyes: non-icteric


ENT: oropharynx moist


Neck: supple, no lymphadenopathy, no JVD


Effort: normal


Ascultation: Bilateral: diminished breath sounds


Percussion: Bilateral: not dull


Cardiovascular: irregular rhythm


Gastrointestinal: normoactive bowel sounds, soft, non-tender, non-distended


Integumentary: normal


Extremities: no cyanosis, no edema, pulses normal, no ischemia or petechiae


Neurologic: non-focal exam, pupils equal and round, CN II-XII normal, motor 

strength normal and


Psychiatric: mood appropriate, affect normal


CBC and BMP: 


                                 04/30/22 11:19





                                 05/02/22 04:26


Abnormal lab findings: 


                                  Abnormal Labs











  04/30/22 04/30/22 04/30/22





  11:19 11:19 11:19


 


RDW  18.0 H  


 


Plt Count  131 L  


 


Mono % (Auto)  7.7 H  


 


Seg Neutrophils %  71.3 H  


 


BUN   30 H 


 


Creatinine   4.1 H 


 


Glucose   


 


POC Glucose   


 


Phosphorus    4.80 H


 


NT-Pro-B Natriuret Pep    60678 H


 


Total Protein   6.1 L 














  04/30/22 05/01/22 05/01/22





  23:50 04:16 11:03


 


RDW   


 


Plt Count   


 


Mono % (Auto)   


 


Seg Neutrophils %   


 


BUN   


 


Creatinine   2.5 H 


 


Glucose   153 H 


 


POC Glucose  183 H   113 H


 


Phosphorus   


 


NT-Pro-B Natriuret Pep   


 


Total Protein   














  05/01/22 05/01/22 05/02/22





  16:19 21:17 04:26


 


RDW   


 


Plt Count   


 


Mono % (Auto)   


 


Seg Neutrophils %   


 


BUN    35 H


 


Creatinine    4.0 H D


 


Glucose   


 


POC Glucose  119 H  108 H 


 


Phosphorus   


 


NT-Pro-B Natriuret Pep   


 


Total Protein   











Allied health notes reviewed: nursing

## 2022-05-02 NOTE — CONSULTATION
DATE OF CONSULTATION: 04/30/2022



PULMONARY CRITICAL CARE CONSULT NOTE



CONSULTING PHYSICIAN:  Dr. Douglass.



REASON FOR CONSULTATION:  Atrial fibrillation with rapid ventricular response.



CHIEF COMPLAINT AND HISTORY OF PRESENT ILLNESS:  As follows.  The patient is a 

66-year-old female with past medical history significant amongst other things 

for a diagnosis of end-stage renal disease, on dialysis, but also 

cardiomyopathy, status post implanted loop cardiac device, who came into the 

Emergency Room complaining of her chest hurting and palpitations.  She admits to

having prior episodes of atrial fibrillation with rapid ventricular response.  

She states she has been compliant with her medications and there has been no 

recent change in her medication dose.  She does have a 10+ pack year tobacco 

smoking history, is trying to smoke, but still smokes 1-2 cigarettes a day.  

Yesterday, she experienced a sudden onset of chest pain.  It was present when 

she woke up from sleep, it was 8/10, it was constant, it was associated with 

shortness of breath, it was not worsened with exertion.  EMS were called.  On 

arrival, they found the patient to be in atrial fibrillation with a rapid 

ventricular response, but also evidence of a CHF exacerbation with pulmonary 

edema.  She was evaluated further in the Emergency Room.  Nephrology was 

consulted for urgent dialysis.  We are asked to assist with management.  When I 

stopped by to see her, she was still a little bit short of breath.  The chest 

pain was better.  Palpitations were persistent.  She denied nausea, vomiting, or

overt aspiration.  She denied fevers or chills.  She denies any open wounds or 

sores on her body.  Denied any new-onset rash.  Denies any dysuria or any 

suggestion of a urinary tract infection.  This really is as much of the history 

of presentation as I have.



PAST MEDICAL HISTORY:  Coronary artery disease, atrial fibrillation, chronic 

obstructive lung disease, end-stage renal disease, on dialysis, hypertension, 

hyperlipidemia, a prior cerebrovascular accident.



PAST SURGICAL HISTORY:  She has had a total knee replacement.  She has had a 

coronary artery stenting in 2015 and she has a right upper extremity AV fistula.



MEDICATIONS:  She was on at the time I stopped by to see her according to the 

medication administration record included the following:  She has been on 

Tylenol 650 mg p.o. q. 6 hours p.r.n. mild pain or fevers, albuterol 2.5 mg 

nebulized q. 3 hours p.r.n. shortness of breath, amiodarone 200 mg p.o. b.i.d., 

Eliquis 5 mg p.o. q. 12 hours, aspirin 81 mg p.o. daily, Cardizem drip at 5 mg 

per hour, Neurontin 100 mg p.o. b.i.d., Dilaudid 0.5 mg IV q. 13 hours p.r.n. 

severe pain, hydralazine 50 mg p.o. q. 8 hours, insulin via sliding scale, Imdur

60 mg p.o. daily, Cozaar 50 mg p.o. daily, metoprolol 100 mg p.o. b.i.d., 

Percocet 5/325 one tablet p.o. q. 6 hours p.r.n. moderate pain, Protonix 40 mg 

p.o. daily, senna/docusate 1 tablet p.o. daily.



ALLERGIES:  ASPIRIN AND SHELLFISH.  Nature of this allergy is unknown.



DIET:  Thin lady, denies acute weight loss or gain in the preceding few weeks to

months.



FAMILY AND SOCIAL HISTORY:  Lives in the community.  She is .  She has a 

10+ pack year tobacco smoking history, continues to smoke.  Denies alcohol or 

illicit drug use or abuse.



FAMILY HISTORY:  Otherwise significant for diabetes and hypertension.



REVIEW OF SYSTEMS:  No overt loss of consciousness.  No new-onset seizures.  No 

new onset focal weakness.  Denies gross hematochezia or melena.  Denied gross 

hematuria or dysuria.  She had the palpitations.  She denied polydipsia or 

polyuria.  She denied any new-onset seizures.  She denies heat or cold 

intolerance.  Complete 13-system review of system was obtained.  Pertinent 

positives and/or negatives as in body of history above, otherwise they are 

noncontributory.



PHYSICAL EXAMINATION:

VITAL SIGNS:  On presentation in the Emergency Room, she was afebrile, 

temperature 98.3 degrees Fahrenheit, pulse of 92, respiratory rate of 18, blood 

pressure 184/106, O2 sats were 99%, inspired oxygen concentration at that time 

was not recorded.  When I stopped by to see her, O2 sats were 98% on 2 liters 

nasal cannula.

GENERAL:  She is an elderly, thin lady.  Normocephalic, atraumatic.  Talking 

with mildly increased respiratory effort at rest.

HEAD, EYES, EARS, NOSE AND THROAT:  Anicteric.  No conjunctival erythema.  

Oropharynx was moist.

NECK:  No gross jugular venous distention, no thyromegaly.  Grossly, there were 

no palpable lymph nodes in the supraclavicular or submandibular lymph node 

chains.

LUNGS:  Auscultation of both lung fields significant for bilateral rales, no 

active wheezing.

HEART:  Sounds 1 and 2 are heard.  Irregularly irregular at the time of my 

evaluation without overt rubs or murmurs.

ABDOMEN:  Soft, flat, bowel sounds are positive, nontender, no palpable 

hepatosplenomegaly.

EXTREMITIES:  Without overt digital clubbing or cyanosis, no pedal edema.  Pedal

pulses were about 2+ bilaterally.

NEUROLOGIC:  Pupils are equal, round, about 4 mm, reactive to light.  

Extraocular muscle movements were intact.  She moves all 4 extremities 

spontaneously.

SKIN:  Normal turgor in the areas I examined without overt cellulitis or rash.  

She has an implanted cardiac device in the left upper anterior chest wall and I 

believe a Vas-Cath in the right upper anterior chest wall.  Please see the wound

care nurse's notes and registered nurse's notes for full description of her 

skin.

PSYCHIATRIC:  Mood was normal.  Affect was appropriate.  She had intact judgment

and insight.



LABORATORY DATA:  From my review are as follows:  Admission white cell count 

8600, hemoglobin 11.8, hematocrit 35.9, platelet count 131.  No manual 

differential.  Serum sodium 143, potassium 4.0, chloride 102, serum bicarbonate 

26, BUN 30, creatinine 4.1, glucose was 81.  Liver function tests within normal 

limits.  BNP was elevated at 17,845.  No microbiology studies for my review.  

Chest x-ray does show gross cardiomegaly, implanted cardiac device, right 

Vas-Cath with the tip in the distal SVC/right atrial junction.  No overt pleural

effusions.  No overt pulmonary edema.  No acute process.



ASSESSMENT:

1.  Atrial fibrillation with a rapid ventricular response.

2.  Acute congestive heart failure exacerbation.

3.  Acute hypoxemic respiratory failure, likely secondary to the above.

4.  End-stage renal disease, on dialysis.

5.  History of hypertension.

6.  Coronary artery disease.

7.  Diabetes.

8.  Hyperlipidemia.

9.  Tobacco use disorder.

10.  History of a remote cerebrovascular accident.



PLAN:  She is appropriately fully anticoagulated.  Heart rate is under better 

control now, she will be weaned off the Cardizem drip.  Oral rate control 

medications will be deferred to Cardiology for now.  She is on metoprolol as 

well as amiodarone.  Absolute tobacco abstinence has been strongly counseled at 

bedside, also continued medication and clinician compliance has been 

recommended.  Oxygen will be weaned to keep sats greater than or equal to about 

90%.  Aspiration precautions will be maintained.  She is fully anticoagulated.  

She is appropriately on GI prophylaxis with Protonix.  Electrolytes will be 

followed and corrected as necessary.  We will defer to the nephrologist for 

hemodialysis and ultrafiltration to help with toxin and volume clearance.  Flu 

and pneumonia vaccination will be addressed per protocol.



Thank you very much for the consult.  We will follow along and make further 

recommendations as picture progresses/becomes clearer.







DD: 05/01/2022 02:10 PM

DT: 05/02/2022 02:53 AM

TID: [id number] RECEIPT: [id number]

FRANCA/EMMANUEL/LATANYA

## 2022-05-02 NOTE — CONSULTATION
History of Present Illness


Consult date: 05/02/22


Consult reason: atrial fibrillation, chest pain


History of present illness: 





The patient is a 66-year-old woman with end-stage renal disease on hemodialysis,

who previously lived in this area, relocated to [place] but has come back to 

live here in the past 2 months.  She has sick sinus syndrome and paroxysmal 

atrial flutter and fibrillation.  She has an indwelling dual-chamber pacemaker. 

She is on rhythm control therapy for her atrial tachyarrhythmias, and on oral 

anticoagulation with Eliquis.  She has coronary artery disease with previous 

stents in the mid and distal LAD.  The stents where patent on the repeat cardiac

catheterization 4 years ago.  Serial left ventricular function assessments have 

recorded normal ventricular ejection fraction 50 to 55%.





She presents to this hospital at this time complaining of chest pressure and 

palpitations.  It to be noted that on presentation, there was atrial flutter and

fibrillation with rapid ventricular response.  Her atrial fibrillation and 

flutter have since resolved, and the patient feels much better.  EKG shows an 

atrial paced rhythm, with native QRS complexes.  No ischemic changes on EKG.  

She states that the present time that she feels well and wants to go home.





Chest x-ray is borderline cardiomegaly with clear lungs.





Past History


Past Medical History: acute MI, atrial fib, COPD, ESRD, hypertension, hyperlipid

emia, stroke, other (See HPI)


Past Surgical History: total knee replacement, PTCA, Other ((stent placement x 2

in 2015, knee surgery 01/2018, right upper extremity fistula))


Social history: .  denies: smoking, alcohol abuse, prescription drug 

abuse


Family history: diabetes, hypertension





Medications and Allergies


                                    Allergies











Allergy/AdvReac Type Severity Reaction Status Date / Time


 


apple AdvReac  Hives Verified 04/20/22 09:44


 


aspirin AdvReac  Unknown Verified 04/20/22 09:44


 


shell fish Allergy  Swelling Uncoded 04/20/22 09:44











                                Home Medications











 Medication  Instructions  Recorded  Confirmed  Last Taken  Type


 


Hydralazine HCl 50 mg PO TID 03/30/22 05/01/22 04/22/22 22:00 History


 


cloNIDine [Catapres] 0.1 mg PO Q8HR 03/30/22 05/01/22 04/23/22 10:00 History


 


Apixaban [Eliquis] 5 mg PO BID #60 tab 04/21/22 05/01/22 04/23/22 10:00 Rx


 


Gabapentin 100 mg PO BID 04/23/22 05/01/22 Unknown History


 


Losartan [Cozaar] 50 mg PO QDAY 04/23/22 05/01/22 04/22/22 10:00 History


 


Nitroglycerin [Nitro-Bid] 0.1 mg TRANSDERMA Q6HR PRN 04/23/22 05/01/22 Unknown 

History


 


Pantoprazole [Protonix TAB] 40 mg PO QDAY 04/23/22 05/01/22 04/23/22 10:00 

History


 


Diltiazem HCl [Cardizem] 240 mg PO DAILY 05/01/22 05/01/22 Unknown History


 


Isosorbide Mononitrate [Isosorbide 60 mg PO DAILY 05/01/22 05/01/22 Unknown 

History





Mononitrate ER]     


 


Sennosides/Docusate Sodium [Senna 1 cap PO DAILY 05/01/22 05/01/22 Unknown 

History





Plus 8.6-50 mg Softgel]     


 


carvediloL [Coreg] 25 mg PO BID 05/01/22 05/01/22 Unknown History


 


methIMAzole [Methimazole] 10 mg PO TID 05/01/22 05/01/22 Unknown History











Active Meds: 


Active Medications





Acetaminophen (Acetaminophen 325 Mg Tab)  650 mg PO Q6H PRN


   PRN Reason: Pain MILD(1-3)/Fever >100.5/HA


Albuterol (Albuterol 2.5 Mg/3 Ml Nebu)  2.5 mg IH Q3HRT PRN


   PRN Reason: Shortness Of Breath


Amiodarone HCl (Amiodarone 200 Mg Tab)  200 mg PO BID [facility]


   Last Admin: 05/02/22 10:36 Dose:  200 mg


   


Apixaban (Apixaban 5 Mg Tab)  5 mg PO Q12HR [facility]


   Last Admin: 05/02/22 10:36 Dose:  5 mg


   


Aspirin (Aspirin Ec 81 Mg Tab)  81 mg PO QDAY [facility]


   Last Admin: 05/02/22 10:37 Dose:  81 mg


   


Atorvastatin Calcium (Atorvastatin 20 Mg Tab)  20 mg PO QHS [facility]


   Last Admin: 05/01/22 21:25 Dose:  20 mg


   


Dextrose (Dextrose 50% In Water (25gm) 50 Ml Syringe)  50 ml IV Q30MIN PRN; 

Protocol


   PRN Reason: Hypoglycemia


Gabapentin (Gabapentin 100 Mg Cap)  100 mg PO BID [facility]


   Last Admin: 05/02/22 10:37 Dose:  100 mg


   


Heparin Sodium (Porcine) (Heparin 10,000 Unit/1 Ml Vial)  5,000 unit IV VANCE PRN


   PRN Reason: hemodialysis


Hydralazine HCl (Hydralazine 25 Mg Tab)  50 mg PO Q8HR [facility]


   Last Admin: 05/02/22 05:32 Dose:  50 mg


   


Hydromorphone HCl (Hydromorphone 1 Mg/1 Ml Inj)  0.5 mg IV Q13H PRN


   PRN Reason: Pain , Severe (7-10)


   Last Admin: 05/02/22 07:09 Dose:  0.5 mg


   


Sodium Chloride (Nacl 0.9%)  100 mls @ 999 mls/hr IV VANCE PRN


   PRN Reason: Hypotension


Insulin Human Lispro (Insulin Lispro 100 Unit/Ml)  0 unit SUB-Q ACHS [facility]; 

Protocol


   Last Admin: 05/02/22 12:24 Dose:  Not Given


   


Isosorbide Mononitrate (Isosorbide Mononitrate Er 60 Mg Tab)  60 mg PO QDAY [facility]


   Last Admin: 05/02/22 10:36 Dose:  60 mg


   


Losartan Potassium (Losartan 50 Mg Tab)  50 mg PO QDAY [facility]


   Last Admin: 05/02/22 10:37 Dose:  50 mg


   


Metoprolol Tartrate (Metoprolol Tartrate 100 Mg Tab)  100 mg PO BID [facility]


   Last Admin: 05/02/22 10:36 Dose:  100 mg


   


Morphine Sulfate (Morphine 2 Mg/1 Ml Inj)  2 mg IV ONCE@1230 [facility]


   Stop: 05/02/22 15:30


Nitroglycerin (Nitroglycerin 0.4 Mg Tab Subl)  0.4 mg SL .Q5MIN PRN


   PRN Reason: Chest Pain


Oxycodone/Acetaminophen (Oxycodone /Acetaminophen 5-325mg Tab)  1 tab PO Q6H PRN


   PRN Reason: Pain, Moderate (4-6)


   Last Admin: 05/01/22 01:35 Dose:  1 tab


   


Pantoprazole Sodium (Pantoprazole 40 Mg Tab)  40 mg PO QDAC [facility]


   Last Admin: 05/02/22 07:10 Dose:  40 mg


   


Senna/Docusate Sodium (Sennosides/Docusate Sodium 8.6/50 Mg Tab)  1 tab PO DAILY

[facility]


   Last Admin: 05/02/22 10:36 Dose:  1 tab


   


Sodium Chloride (Sodium Chloride 0.9% 10 Ml Flush Syringe)  10 ml IV BID [facility]


   Last Admin: 05/02/22 12:27 Dose:  10 ml


   


Sodium Chloride (Sodium Chloride 0.9% 10 Ml Flush Syringe)  10 ml IV PRN PRN


   PRN Reason: LINE FLUSH











Review of Systems


Cardiovascular: chest pain, palpitations, rapid/irregular heart beat, shortness 

of breath, no orthopnea, no edema, no syncope, no lightheadedness





Physical Examination


                                   Vital Signs











Temp Pulse Resp BP Pulse Ox


 


 98.3 F   92 H  18   184/106   99 


 


 04/30/22 08:23  04/30/22 08:23  04/30/22 08:23  04/30/22 08:23  04/30/22 08:23











General appearance: no acute distress


HEENT: Positive: PERRL


Neck: Positive: neck supple


Cardiac: Positive: Irregularly Regular


Lungs: Positive: Decreased Breath Sounds


Neuro: Positive: Grossly Intact


Abdomen: Positive: Soft


Female genitourinary: deferred


Skin: Positive: Clear


Extremities: Absent: edema





Results





                                 04/30/22 11:19





                                 05/02/22 04:26


                          Comprehensive Metabolic Panel











  05/02/22 Range/Units





  04:26 


 


Sodium  140  (137-145)  mmol/L


 


Potassium  5.0  (3.6-5.0)  mmol/L


 


Chloride  100.3  ()  mmol/L


 


Carbon Dioxide  26  (22-30)  mmol/L


 


BUN  35 H  (7-17)  mg/dL


 


Creatinine  4.0 H D  (0.6-1.2)  mg/dL


 


Glucose  93  ()  mg/dL


 


Calcium  9.6  (8.4-10.2)  mg/dL














EKG interpretations


Pacemaker: atrial pacing w/capture





Assessment and Plan





- Patient Problems


(1) Paroxysmal atrial flutter


Current Visit: Yes   Status: Acute   


Plan to address problem: 


Patient has a longstanding history of paroxysmal atrial flutter on rhythm 

control and chronic Eliquis.  Current symptoms of chest pressure and 

palpitations appear associated with a recurrence of her atrial flutter, she is 

asymptomatic after resolution of the atrial tachyarrhythmia.  We will increase 

amiodarone therapy for suppression of atrial flutter.








(2) Coronary artery disease


Current Visit: Yes   Status: Acute   


Plan to address problem: 


Patient has longstanding coronary artery disease with LAD stents which were 

widely patent on a follow-up cardiac catheterization just 4 years ago.  There 

are no symptoms of recurrent ischemia at this time, her current symptoms were 

associated with a recurrence of her paroxysmal atrial flutter.  She will 

continue her outpatient follow-up in 5 to 7 days after discharge, since she is 

now relocated back to this area.  Any further ischemic work-up will be done in 

the outpatient setting.

## 2022-05-03 VITALS — DIASTOLIC BLOOD PRESSURE: 88 MMHG | SYSTOLIC BLOOD PRESSURE: 184 MMHG

## 2022-05-03 LAB
BUN SERPL-MCNC: 49 MG/DL (ref 7–17)
BUN/CREAT SERPL: 11 %
CALCIUM SERPL-MCNC: 8.5 MG/DL (ref 8.4–10.2)
HEMOLYSIS INDEX: 7

## 2022-05-03 PROCEDURE — 5A1D70Z PERFORMANCE OF URINARY FILTRATION, INTERMITTENT, LESS THAN 6 HOURS PER DAY: ICD-10-PCS | Performed by: INTERNAL MEDICINE

## 2022-05-03 RX ADMIN — GABAPENTIN SCH MG: 100 CAPSULE ORAL at 09:04

## 2022-05-03 RX ADMIN — SENNOSIDES AND DOCUSATE SODIUM SCH TAB: 50; 8.6 TABLET ORAL at 09:04

## 2022-05-03 RX ADMIN — AMIODARONE HYDROCHLORIDE SCH MG: 200 TABLET ORAL at 09:03

## 2022-05-03 RX ADMIN — APIXABAN SCH MG: 5 TABLET, FILM COATED ORAL at 09:04

## 2022-05-03 RX ADMIN — Medication SCH ML: at 10:00

## 2022-05-03 RX ADMIN — MORPHINE SULFATE PRN MG: 2 INJECTION, SOLUTION INTRAMUSCULAR; INTRAVENOUS at 11:14

## 2022-05-03 RX ADMIN — HYDROMORPHONE HYDROCHLORIDE PRN MG: 1 INJECTION, SOLUTION INTRAMUSCULAR; INTRAVENOUS; SUBCUTANEOUS at 07:14

## 2022-05-03 RX ADMIN — INSULIN LISPRO SCH: 100 INJECTION, SOLUTION INTRAVENOUS; SUBCUTANEOUS at 17:15

## 2022-05-03 RX ADMIN — PANTOPRAZOLE SODIUM SCH MG: 40 TABLET, DELAYED RELEASE ORAL at 07:14

## 2022-05-03 RX ADMIN — LOSARTAN POTASSIUM SCH MG: 50 TABLET, FILM COATED ORAL at 09:04

## 2022-05-03 RX ADMIN — METOPROLOL TARTRATE SCH MG: 100 TABLET, FILM COATED ORAL at 09:04

## 2022-05-03 RX ADMIN — ASPIRIN SCH MG: 81 TABLET, COATED ORAL at 09:05

## 2022-05-03 NOTE — PROGRESS NOTE
Assessment and Plan





1. ESRD:


Patient is on maintenance hemodialysis, TTS schedule.


Hemodialysis: 4/30.


HD today.





2. FEN:


UF with HD as tolerated.


Monitor lytes and volume status.





3. Paroxysmal A.flutter with RVR:


Amiodarone, Metoprolol and Eliquis.


Monitor.





4. Chest pain //  H/o CAD s/p PCI:


CP associated with A.flutter with RVR.


Recent CTA negative for PE.


Monitor.





5. Chronic HFpEF:


Volume control thru HD.


Fluid restriction, monitor I/O.





6. H/o COPD:


Not in exacerbation.


Nebs and O2 as needed.





7. Hypertension:


Volume control with HD.


Continue home BP meds.


Monitor BP.











Subjective:


Patient was seen and examined at the bedside.











Examination:


General appearance: well-developed, appears stated age, no distress


HEENT: atraumatic, IVETTE


Neck: trachea midline


Respiratory: ctab


Heart: S1S2, no murmur


Abdomen: soft, bowel sounds heard, NT


Integumentary: no obvious rash


Neurologic: AO, able to move extremities


Ext: no edema


Hemodialysis access: R IJ tunnel catheter, R arm AVG








Subjective


Date of service: 05/03/22


Principal diagnosis: A-Fib with RVR; AE-CHF; AHRF; ESRD on dialysis; HTN; CAD; 

DM II





Objective





- Vital Signs


Vital signs: 


                               Vital Signs - 12hr











  05/02/22 05/02/22 05/02/22





  21:15 21:21 22:00


 


Temperature   


 


Pulse Rate  81 90


 


Pulse Rate [   





From Monitor]   


 


Pulse Rate [   





Left Radial]   


 


Pulse Rate [   





Right Radial]   


 


Respiratory   14





Rate   


 


Blood Pressure  122/75 121/69


 


O2 Sat by Pulse 97  97





Oximetry   














  05/02/22 05/03/22 05/03/22





  23:00 00:00 01:00


 


Temperature  98.4 F 


 


Pulse Rate 81 72 71


 


Pulse Rate [  81 





From Monitor]   


 


Pulse Rate [  81 





Left Radial]   


 


Pulse Rate [  81 





Right Radial]   


 


Respiratory 14 16 17





Rate   


 


Blood Pressure 128/75 141/83 140/71


 


O2 Sat by Pulse 96 98 97





Oximetry   














  05/03/22 05/03/22 05/03/22





  02:00 03:00 04:00


 


Temperature   97.8 F


 


Pulse Rate 85 84 73


 


Pulse Rate [   72





From Monitor]   


 


Pulse Rate [   70





Left Radial]   


 


Pulse Rate [   70





Right Radial]   


 


Respiratory 15 17 14





Rate   


 


Blood Pressure 122/74 106/60 130/76


 


O2 Sat by Pulse 95 95 98





Oximetry   














  05/03/22 05/03/22 05/03/22





  05:00 06:00 07:00


 


Temperature   


 


Pulse Rate 73 70 71


 


Pulse Rate [   





From Monitor]   


 


Pulse Rate [   





Left Radial]   


 


Pulse Rate [   





Right Radial]   


 


Respiratory 16 14 14





Rate   


 


Blood Pressure 137/76 135/77 136/77


 


O2 Sat by Pulse 95 97 97





Oximetry   














  05/03/22 05/03/22 05/03/22





  07:14 07:16 09:03


 


Temperature   


 


Pulse Rate  70 70


 


Pulse Rate [   





From Monitor]   


 


Pulse Rate [   





Left Radial]   


 


Pulse Rate [   





Right Radial]   


 


Respiratory 13  





Rate   


 


Blood Pressure  140/79 138/76


 


O2 Sat by Pulse   





Oximetry   














  05/03/22





  09:04


 


Temperature 


 


Pulse Rate 70


 


Pulse Rate [ 





From Monitor] 


 


Pulse Rate [ 





Left Radial] 


 


Pulse Rate [ 





Right Radial] 


 


Respiratory 





Rate 


 


Blood Pressure 138/76


 


O2 Sat by Pulse 





Oximetry 














- Lab





                                 04/30/22 11:19





                                 05/03/22 04:52


                             Most recent lab results











Calcium  8.5 mg/dL (8.4-10.2)   05/03/22  04:52    


 


Phosphorus  4.00 mg/dL (2.5-4.5)   05/02/22  04:26    


 


Magnesium  1.90 mg/dL (1.7-2.3)   05/02/22  04:26    














Medications & Allergies





- Medications


Allergies/Adverse Reactions: 


                                    Allergies





apple Adverse Reaction (Verified 04/20/22 09:44)


   Hives


   yellow and green apples. can eat red. 


aspirin Adverse Reaction (Verified 04/20/22 09:44)


   Unknown


shell fish Allergy (Uncoded 04/20/22 09:44)


   Swelling


   allergic to seafood except blue crab 








Home Medications: 


                                Home Medications











 Medication  Instructions  Recorded  Confirmed  Last Taken  Type


 


Gabapentin 100 mg PO BID 04/23/22 05/01/22 Unknown History


 


Nitroglycerin [Nitro-Bid] 0.1 mg TRANSDERMA Q6HR PRN 04/23/22 05/01/22 Unknown 

History


 


Pantoprazole [Protonix TAB] 40 mg PO QDAY 04/23/22 05/01/22 04/23/22 10:00 

History


 


Sennosides/Docusate Sodium [Senna 1 cap PO DAILY 05/01/22 05/01/22 Unknown 

History





Plus 8.6-50 mg Softgel]     


 


methIMAzole [Methimazole] 10 mg PO TID 05/01/22 05/01/22 Unknown History


 


Acetaminophen [Acetaminophen TAB] 650 mg PO Q6H PRN  tablet 05/03/22  Unknown Rx


 


Amiodarone [Cordarone 200 MG TAB] 200 mg PO BID #60 tablet 05/03/22  Unknown Rx


 


Apixaban [Eliquis] 5 mg PO BID #60 tab 05/03/22  Unknown Rx


 


AtorvaSTATin [Lipitor] 20 mg PO QHS #30 tablet 05/03/22  Unknown Rx


 


ISOSORBIDE MONOnitrate [Imdur ER] 60 mg PO QDAY #30 tablet 05/03/22  Unknown Rx


 


Losartan [Cozaar] 50 mg PO QDAY #30 tab 05/03/22  Unknown Rx


 


Magnesium Hydroxide [Milk of 30 ml PO Q4H PRN  oral.liqd 05/03/22  Unknown Rx





Magnesia]     


 


Metoprolol [Lopressor TAB] 100 mg PO BID #60 tablet 05/03/22  Unknown Rx


 


Nitroglycerin [Nitrostat] 0.4 mg SL .Q5MIN PRN #15 tablet 05/03/22  Unknown Rx


 


Sennosides/Docusate [Senokot S] 1 tab PO DAILY #30 tablet 05/03/22  Unknown Rx


 


hydrALAZINE [Apresoline TAB] 50 mg PO Q8HR #120 tablet 05/03/22  Unknown Rx


 


polyethylene glycoL 3350 [Miralax 17 gm PO QDAY #15 powd.pack 05/03/22  Unknown 

Rx





3350]     


 


traMADoL [Ultram 50 MG tab] 50 mg PO Q6H PRN #5 tablet 05/03/22  Unknown Rx











Active Medications: 














Generic Name Dose Route Start Last Admin





  Trade Name Freq  PRN Reason Stop Dose Admin


 


Acetaminophen  650 mg  04/30/22 12:42 





  Acetaminophen 325 Mg Tab  PO  





  Q6H PRN  





  Pain MILD(1-3)/Fever >100.5/HA  


 


Albuterol  2.5 mg  04/30/22 12:42 





  Albuterol 2.5 Mg/3 Ml Nebu  IH  





  Q3HRT PRN  





  Shortness Of Breath  


 


Amiodarone HCl  200 mg  04/30/22 13:00  05/03/22 09:03





  Amiodarone 200 Mg Tab  PO   200 mg





  BID DERIK   Administration


 


Apixaban  5 mg  04/30/22 22:00  05/03/22 09:04





  Apixaban 5 Mg Tab  PO   5 mg





  Q12HR DERIK   Administration


 


Aspirin  81 mg  04/30/22 12:00  05/03/22 09:05





  Aspirin Ec 81 Mg Tab  PO   81 mg





  QDAY DERIK   Administration


 


Atorvastatin Calcium  20 mg  05/01/22 22:00  05/02/22 21:21





  Atorvastatin 20 Mg Tab  PO   20 mg





  QHS DERIK   Administration


 


Dextrose  50 ml  05/01/22 08:12 





  Dextrose 50% In Water (25gm) 50 Ml Syringe  IV  





  Q30MIN PRN  





  Hypoglycemia  





  Protocol  


 


Gabapentin  100 mg  04/30/22 22:00  05/03/22 09:04





  Gabapentin 100 Mg Cap  PO   100 mg





  BID DERIK   Administration


 


Heparin Sodium (Porcine)  5,000 unit  04/30/22 13:17 





  Heparin 10,000 Unit/1 Ml Vial  IV  





  VANCE PRN  





  hemodialysis  


 


Hydralazine HCl  50 mg  05/01/22 10:00  05/03/22 07:16





  Hydralazine 25 Mg Tab  PO   50 mg





  Q8HR DERIK   Administration


 


Hydromorphone HCl  0.5 mg  04/30/22 12:42  05/03/22 07:14





  Hydromorphone 1 Mg/1 Ml Inj  IV   0.5 mg





  Q13H PRN   Administration





  Pain , Severe (7-10)  


 


Sodium Chloride  100 mls @ 999 mls/hr  04/30/22 13:17 





  Nacl 0.9%  IV  





  VANCE PRN  





  Hypotension  


 


Insulin Human Lispro  0 unit  05/01/22 11:30  05/02/22 21:23





  Insulin Lispro 100 Unit/Ml  SUB-Q   Not Given





  [facility]  





  Protocol  


 


Isosorbide Mononitrate  60 mg  05/01/22 10:00  05/03/22 09:03





  Isosorbide Mononitrate Er 60 Mg Tab  PO   60 mg





  QDAY DERIK   Administration


 


Losartan Potassium  50 mg  04/30/22 12:00  05/03/22 09:04





  Losartan 50 Mg Tab  PO   50 mg





  QDAY DERIK   Administration


 


Magnesium Hydroxide  30 ml  05/03/22 08:22  05/03/22 09:03





  Magnesium Hydroxide (Mom) Oral Liqd Udc  PO   30 ml





  Q4H PRN   Administration





  Constipation  


 


Metoprolol Tartrate  100 mg  04/30/22 22:00  05/03/22 09:04





  Metoprolol Tartrate 100 Mg Tab  PO   100 mg





  BID DERIK   Administration


 


Morphine Sulfate  2 mg  05/02/22 15:26  05/02/22 21:19





  Morphine 2 Mg/1 Ml Inj  IV   2 mg





  Q4H PRN   Administration





  Pain, Moderate (4-6)  


 


Nitroglycerin  0.4 mg  05/02/22 12:20 





  Nitroglycerin 0.4 Mg Tab Subl  SL  





  .Q5MIN PRN  





  Chest Pain  


 


Oxycodone/Acetaminophen  1 tab  04/30/22 12:42  05/01/22 01:35





  Oxycodone /Acetaminophen 5-325mg Tab  PO   1 tab





  Q6H PRN   Administration





  Pain, Moderate (4-6)  


 


Pantoprazole Sodium  40 mg  05/01/22 10:00  05/03/22 07:14





  Pantoprazole 40 Mg Tab  PO   40 mg





  QDAC DERIK   Administration


 


Senna/Docusate Sodium  1 tab  05/01/22 10:00  05/03/22 09:04





  Sennosides/Docusate Sodium 8.6/50 Mg Tab  PO   1 tab





  DAILY DERIK   Administration


 


Sodium Chloride  10 ml  04/30/22 22:00  05/02/22 21:22





  Sodium Chloride 0.9% 10 Ml Flush Syringe  IV   10 ml





  BID DERIK   Administration


 


Sodium Chloride  10 ml  04/30/22 12:42 





  Sodium Chloride 0.9% 10 Ml Flush Syringe  IV  





  PRN PRN  





  LINE FLUSH

## 2022-05-03 NOTE — PROGRESS NOTE
Assessment and Plan








Atrial fibrillation with RVR


Acute congestive heart failure exacerbation


Acute hypoxemic respiratory failure


End-stage renal disease on dialysis


Hypertension


Coronary artery disease


Diabetes type II


Hyperlipidemia


Tobacco use disorder


H/O a remote cerebrovascular accident





- discharge planning ok cardiac & pulmonary-wise


- HD/UF scheduled for today


- continue care as below for now otherwise;





- prn supplemental oxygen to keep O2 sats > 90%


- prn bronchodilators (KAREN) with pulm hygiene per RT


- rate & rhythm control per cardiology (Amiodarone, Metoprolol)


- avoid nephrotoxins, renally dose all medications


- mobility protocols to prevent pressure ulcers


- PT/OT as tolerated


- Wound care per RN/WCT


- continue accuchecks with glycemic control per SSI for target blood glucose < 

180 mg/dL 


- tobacco abstinence strongly counseled at the bedside  


- home oxygen evaluation at discharge


- prn analgesia per pain score


- GI & VTE prophylaxis


- Flu & pneumovax per protocol


- Pulmonary out patient follow up for PFTs and optimization of respiratory 

status


- continue other care per attending / other consultants





... re-evaluate in am & prn








Subjective


Date of service: 05/03/22


Principal diagnosis: A-Fib with RVR; AE-CHF; AHRF; ESRD on dialysis; HTN; CAD; 

DM II


Interval history: 





Patient is seen today for: A-Fib with RVR; AE-CHF; Acute hypoxemic respiratory 

failure; ESRD on dialysis; HTN; CAD; DM II





Seen and examined at bedside; 24hour events reviewed; nursing and respiratory 

care staff consulted; no adverse overnight events reported to me; restring 

peacefully in bed; denies acute chest pains or palpitations; No N/V/F/C





Objective


                               Vital Signs - 12hr











  05/03/22 05/03/22 05/03/22





  01:00 02:00 03:00


 


Temperature   


 


Pulse Rate 71 85 84


 


Pulse Rate [   





From Monitor]   


 


Pulse Rate [   





Left Radial]   


 


Pulse Rate [   





Right Radial]   


 


Respiratory 17 15 17





Rate   


 


Respiratory   





Rate [Bilateral   





Back]   


 


Respiratory   





Rate [Chest]   


 


Blood Pressure 140/71 122/74 106/60


 


O2 Sat by Pulse 97 95 95





Oximetry   














  05/03/22 05/03/22 05/03/22





  04:00 05:00 06:00


 


Temperature 97.8 F  


 


Pulse Rate 73 73 70


 


Pulse Rate [ 72  





From Monitor]   


 


Pulse Rate [ 70  





Left Radial]   


 


Pulse Rate [ 70  





Right Radial]   


 


Respiratory 14 16 14





Rate   


 


Respiratory   





Rate [Bilateral   





Back]   


 


Respiratory   





Rate [Chest]   


 


Blood Pressure 130/76 137/76 135/77


 


O2 Sat by Pulse 98 95 97





Oximetry   














  05/03/22 05/03/22 05/03/22





  07:00 07:14 07:16


 


Temperature   


 


Pulse Rate 71  70


 


Pulse Rate [   





From Monitor]   


 


Pulse Rate [   





Left Radial]   


 


Pulse Rate [   





Right Radial]   


 


Respiratory 14 13 





Rate   


 


Respiratory   





Rate [Bilateral   





Back]   


 


Respiratory   





Rate [Chest]   


 


Blood Pressure 136/77  140/79


 


O2 Sat by Pulse 97  





Oximetry   














  05/03/22 05/03/22 05/03/22





  08:00 09:00 09:03


 


Temperature 97.1 F L  


 


Pulse Rate 70 70 70


 


Pulse Rate [ 70  





From Monitor]   


 


Pulse Rate [ 70  





Left Radial]   


 


Pulse Rate [ 70  





Right Radial]   


 


Respiratory 14 17 





Rate   


 


Respiratory 13  





Rate [Bilateral   





Back]   


 


Respiratory   





Rate [Chest]   


 


Blood Pressure 151/84 138/76 138/76


 


O2 Sat by Pulse 100 98 





Oximetry   














  05/03/22 05/03/22 05/03/22





  09:04 10:00 11:08


 


Temperature   


 


Pulse Rate 70 70 


 


Pulse Rate [   





From Monitor]   


 


Pulse Rate [   





Left Radial]   


 


Pulse Rate [   





Right Radial]   


 


Respiratory  15 





Rate   


 


Respiratory   15





Rate [Bilateral   





Back]   


 


Respiratory   15





Rate [Chest]   


 


Blood Pressure 138/76 139/77 


 


O2 Sat by Pulse  97 





Oximetry   














  05/03/22





  11:14


 


Temperature 


 


Pulse Rate 


 


Pulse Rate [ 





From Monitor] 


 


Pulse Rate [ 





Left Radial] 


 


Pulse Rate [ 





Right Radial] 


 


Respiratory 15





Rate 


 


Respiratory 





Rate [Bilateral 





Back] 


 


Respiratory 





Rate [Chest] 


 


Blood Pressure 


 


O2 Sat by Pulse 





Oximetry 











Constitutional: no acute distress


Eyes: non-icteric


ENT: oropharynx moist


Neck: supple, no lymphadenopathy, no JVD


Effort: normal


Ascultation: Bilateral: diminished breath sounds


Percussion: Bilateral: not dull


Cardiovascular: irregular rhythm


Gastrointestinal: normoactive bowel sounds, soft, non-tender, non-distended


Integumentary: normal


Extremities: no cyanosis, no edema, pulses normal, no ischemia or petechiae


Neurologic: non-focal exam, pupils equal and round, CN II-XII normal, motor 

strength normal and


Psychiatric: mood appropriate, affect normal


CBC and BMP: 


                                 04/30/22 11:19





                                 05/03/22 04:52


Abnormal lab findings: 


                                  Abnormal Labs











  04/30/22 04/30/22 04/30/22





  11:19 11:19 11:19


 


RDW  18.0 H  


 


Plt Count  131 L  


 


Mono % (Auto)  7.7 H  


 


Seg Neutrophils %  71.3 H  


 


Sodium   


 


Potassium   


 


BUN   30 H 


 


Creatinine   4.1 H 


 


Glucose   


 


POC Glucose   


 


Phosphorus    4.80 H


 


Total Creatine Kinase   


 


CK-MB (CK-2) Rel Index   


 


NT-Pro-B Natriuret Pep    54542 H


 


Total Protein   6.1 L 














  04/30/22 05/01/22 05/01/22





  23:50 04:16 11:03


 


RDW   


 


Plt Count   


 


Mono % (Auto)   


 


Seg Neutrophils %   


 


Sodium   


 


Potassium   


 


BUN   


 


Creatinine   2.5 H 


 


Glucose   153 H 


 


POC Glucose  183 H   113 H


 


Phosphorus   


 


Total Creatine Kinase   


 


CK-MB (CK-2) Rel Index   


 


NT-Pro-B Natriuret Pep   


 


Total Protein   














  05/01/22 05/01/22 05/02/22





  16:19 21:17 04:26


 


RDW   


 


Plt Count   


 


Mono % (Auto)   


 


Seg Neutrophils %   


 


Sodium   


 


Potassium   


 


BUN    35 H


 


Creatinine    4.0 H D


 


Glucose   


 


POC Glucose  119 H  108 H 


 


Phosphorus   


 


Total Creatine Kinase   


 


CK-MB (CK-2) Rel Index   


 


NT-Pro-B Natriuret Pep   


 


Total Protein   














  05/02/22 05/02/22 05/02/22





  12:26 17:10 20:54


 


RDW   


 


Plt Count   


 


Mono % (Auto)   


 


Seg Neutrophils %   


 


Sodium   


 


Potassium   


 


BUN   


 


Creatinine   


 


Glucose   


 


POC Glucose    112 H


 


Phosphorus   


 


Total Creatine Kinase  29 L  28 L 


 


CK-MB (CK-2) Rel Index  4.4 H  4.6 H 


 


NT-Pro-B Natriuret Pep   


 


Total Protein   














  05/03/22 05/03/22





  00:50 04:52


 


RDW  


 


Plt Count  


 


Mono % (Auto)  


 


Seg Neutrophils %  


 


Sodium   136 L


 


Potassium   5.2 H


 


BUN   49 H


 


Creatinine   4.5 H


 


Glucose  


 


POC Glucose  


 


Phosphorus  


 


Total Creatine Kinase  23 L 


 


CK-MB (CK-2) Rel Index  4.7 H 


 


NT-Pro-B Natriuret Pep  


 


Total Protein  











Allied health notes reviewed: nursing

## 2022-05-03 NOTE — DISCHARGE SUMMARY
Providers





- Providers


Date of Admission: 


04/30/22 14:00





Date of discharge: 05/03/22


Attending physician: 


NATALI SARABIA MD





                                        





04/30/22 12:36


Consult to Physician [CONS] Urgent 


   Comment: 


   Consulting Provider: EMELINA CH


   Physician Instructions: 


   Reason For Exam: esrd





04/30/22 12:40


Consult to Physician [CONS] Urgent 


   Comment: 


   Consulting Provider: LIVIA HAGER


   Physician Instructions: 


   Reason For Exam: afib rvr





05/01/22 08:13


Consult to Dietitian/Nutrition [CONS] Routine 


   Physician Instructions: 


   Reason For Exam: 


   Reason for Consult: Diet education





05/01/22 09:12


Consult to Physician [CONS] Routine 


   Comment: called answ. servTyrone verma


   Consulting Provider: AUSTIN CORONEL


   Physician Instructions: 


   Reason For Exam: AFIB with RVR, CHF exacerbation





05/02/22 16:34


Occupational Therapy Evaluate and Treat [CONS] Routine 


   Comment: 


   Reason For Exam: Debility


Physical Therapy Evaluation and Treat [CONS] Routine 


   Comment: 


   Reason For Exam: Debility with unsteady gait











Primary care physician: 


SWATI DIETZ DO








Hospitalization


Condition: Stable


Hospital course: 


This is a a 66-year-old female with ESRD on HD ([facility]), HTN, CVA, MI, CAD s/p 

stent placement, CHF,s/p PPM, atrial fibrillation on home Eliquis, remote 

history of DM, COPD, hyperlipidemia, bipolar disorder and nicotine dependence 

who presented to the emergency department with complaints of " my chest is 

hurting and my heart is beating fast", shortness of breath, sudden onset of 

chest pain rated 8/10 which is constant nature and associated with shortness of 

breath which is not relieved with rest or worsened with exertion, chest 

palpitations, decreased exercise tolerance, dyspnea on exertion, dyspnea at rest

 subjective weight gain over the past week via EMS.  In the emergency department

 patient had a ECG which revealed atrial fibrillation and clinical symptoms 

consistent with CHF compensation.  Patient was admitted to the ICU on a Cardizem

 drip with consults to nephrology and cardiology.  On 5/1 patient was titrated 

off Cardizem drip and had hemodialysis overnight with removal of 2 L.  On 5/2 

patient remained stable and was given orders to transfer to telemetry.  This 

morning patient complains of intermittent chest pain rated at 10/10 and has been

 receiving chronic chronic narcotics.  Patient also complains of constipation.  

Given medical magnesia and MiraLAX.  Patient will be discharged home with an 

outpatient follow up with cardiology where she will be evaluated for atrial 

flutter ablation and further ischemic work-up. She will need to follow-up with 

[facility], Dr. Coronel in 7 days.





Assessment and plan:





Neuro: h/o CVA, bipolar disorder, nicotine dependence, neuropathy


-Follow-up with primary care physician and resume home antipsychotic medications


-Maintain sleep-wake cycle


-Smoking cessation counseling provided


-Continue Lipitor, aspirin and gabapentin





Cardiac: Afib with RVR, h/o CAP s/p stents to mid and distal LAD


-Cardiology consulted, appreciate recommendations


   -per cardiology: The stents where patent on the repeat cardiac 

catheterization 4 years ago.  Serial left ventricular function    assessments 

have recorded normal ventricular ejection fraction 50 to 55%.


-Blood pressure monitoring per primary care physician instructions


-Patient will be discharged as she is deemed stable from cardiology standpoint


-EP evaluation outpatient


-Continue as needed nitroglycerin, scheduled metoprolol, Cozaar, Imdur, hydral

azine, Lipitor, Eliquis, amiodarone





Respiratory: Nicotine abuse


-Tobacco cessation counseling provided





GI: Protein calorie malnutrition


-24 hours + 450 mL


-PPI


-Continue cardiac diet


-Continue nutrition supplementation


-BR: Senokot, miralax





: ESRD on HD ([facility]), Slight hyponatremia, hyperkalemia


-Nephrology consulted, appreciate recommendations


-Continue outpatient hemodialysis





Endo: Remote history DM


-Per patient patient is no longer on medications for diabetes mellitus


-Continue CC cardiac diet


-Follow-up with primary care physician

















Disposition: 30 STILL A PATIENT


Final Discharge Diagnosis (Prints w/discharge instructions): Afib with RVR, 

nicotine abuse, Slight hyponatremia, hyperkalemia, Protein calorie malnutrition.

  h/o CVA, bipolar disorder, nicotine dependence, neuropathy, Afib with RVR, h/o

 CAD s/p stents to mid and distal LAD,  ESRD on HD ([facility]), Remote history DM


Time spent for discharge: 60





Core Measure Documentation





- Palliative Care


Palliative Care/ Comfort Measures: Not Applicable





- Core Measures


Any of the following diagnoses?: history only





Exam





- Constitutional


Vitals: 


                                        











Temp Pulse Resp BP Pulse Ox


 


 97.1 F L  70   15   139/77   97 


 


 05/03/22 08:00  05/03/22 10:00  05/03/22 11:14  05/03/22 10:00  05/03/22 10:00











General appearance: Present: no acute distress, mild distress





- EENT


Eyes: Present: PERRL, EOM intact


ENT: hearing intact, clear oral mucosa, dentition normal





- Neck


Neck: Present: supple, normal ROM





- Respiratory


Respiratory effort: normal


Respiratory: bilateral: CTA





- Cardiovascular


Rhythm: regular


Heart Sounds: Present: S1 & S2.  Absent: systolic murmur, diastolic murmur





- Extremities


Extremities: no ischemia, pulses intact, pulses symmetrical, No edema, normal 

temperature, normal color, Full ROM


Peripheral Pulses: within normal limits





- Abdominal


General gastrointestinal: Present: soft, non-tender, non-distended, normal bowel

 sounds





- Integumentary


Integumentary: Present: clear, warm, dry





- Musculoskeletal


Musculoskeletal: left sided weakness





- Psychiatric


Psychiatric: appropriate mood/affect, cooperative





- Neurologic


Neurologic: CNII-XII intact, no focal deficits, moves all extremities





- Allied Health


Allied health notes reviewed: nursing





Plan


Activity: advance as tolerated


Diet: low cholesterol, low salt, diabetic, low carbohydrate


Special Instructions: record daily BP diary, smoking cessation


Follow up with: 


SWATI CRUZ DO [Primary Care Provider] - 3-5 Days


EMELINA CH MD [Staff Physician] - 3-5 Days


AUSTIN CORONEL MD [Staff Physician] - 7 Days


CHAPIS MEANS MD [Staff Physician] - 7 Days


Prescriptions: 


AtorvaSTATin [Lipitor] 20 mg PO QHS #30 tablet


hydrALAZINE [Apresoline TAB] 50 mg PO Q8HR #120 tablet


Amiodarone [Cordarone 200 MG TAB] 200 mg PO BID #60 tablet


Losartan [Cozaar] 50 mg PO QDAY #30 tab


Apixaban [Eliquis] 5 mg PO BID #60 tab


ISOSORBIDE MONOnitrate [Imdur ER] 60 mg PO QDAY #30 tablet


Metoprolol [Lopressor TAB] 100 mg PO BID #60 tablet


polyethylene glycoL 3350 [Miralax 3350] 17 gm PO QDAY #15 powd.pack


Nitroglycerin [Nitrostat] 0.4 mg SL .Q5MIN PRN #15 tablet


 PRN Reason: Chest Pain


Sennosides/Docusate [Senokot S] 1 tab PO DAILY #30 tablet


traMADoL [Ultram 50 MG tab] 50 mg PO Q6H PRN #5 tablet


 PRN Reason: Pain, Moderate (4-6)

## 2022-05-03 NOTE — PROGRESS NOTE
Assessment and Plan





- Patient Problems


(1) Paroxysmal atrial flutter


Current Visit: Yes   Status: Acute   


Plan to address problem: 


Patient has a longstanding history of paroxysmal atrial flutter on rhythm 

control and chronic Eliquis.  Current symptoms of chest pressure and 

palpitations appear associated with a recurrence of her atrial flutter, she is 

asymptomatic after resolution of the atrial tachyarrhythmia.  We will increase 

amiodarone therapy for suppression of atrial flutter.





As outpatient, we will evaluate the patient for atrial flutter ablation, she 

will follow-up in our office in 7 days.








(2) Coronary artery disease


Current Visit: Yes   Status: Acute   


Plan to address problem: 


Patient has longstanding coronary artery disease with LAD stents which were 

widely patent on a follow-up cardiac catheterization just 4 years ago.  There 

are no symptoms of recurrent ischemia at this time, her current symptoms were 

associated with a recurrence of her paroxysmal atrial flutter.  





She will continue her outpatient follow-up in 5 to 7 days after discharge, since

she is now relocated back to this area.  Any further ischemic work-up will be 

done in the outpatient setting.








Subjective


Date of service: 05/03/22


Principal diagnosis: A-Fib with RVR; AE-CHF; AHRF; ESRD on dialysis; HTN; CAD; 

DM II


Interval history: 





Patient is comfortable, no acute distress, she currently is in a dual paced 

rhythm, and asymptomatic.  





Objective


                                   Vital Signs











  Temp Pulse Pulse Pulse Pulse Resp Resp


 


 05/03/22 11:14       15 


 


 05/03/22 11:08        15


 


 05/03/22 10:00   70     15 


 


 05/03/22 09:04   70     


 


 05/03/22 09:03   70     


 


 05/03/22 09:00   70     17 


 


 05/03/22 08:00  97.1 F L  70  70  70  70  14  13


 


 05/03/22 07:16   70     


 


 05/03/22 07:14       13 


 


 05/03/22 07:00   71     14 


 


 05/03/22 06:00   70     14 


 


 05/03/22 05:00   73     16 


 


 05/03/22 04:00  97.8 F  73  72  70  70  14 


 


 05/03/22 03:00   84     17 


 


 05/03/22 02:00   85     15 


 


 05/03/22 01:00   71     17 


 


 05/03/22 00:00  98.4 F  72  81  81  81  16 


 


 05/02/22 23:00   81     14 


 


 05/02/22 22:00   90     14 


 


 05/02/22 21:21   81     


 


 05/02/22 21:15       


 


 05/02/22 21:00   86     13 


 


 05/02/22 20:12   79     16 


 


 05/02/22 20:00  98.3 F  85  70  70  70  23 


 


 05/02/22 19:00   82     19 


 


 05/02/22 18:00   106 H     15 


 


 05/02/22 17:00   71     14 


 


 05/02/22 16:01   84     14 


 


 05/02/22 16:00  98.2 F   81  81  81  16 


 


 05/02/22 15:46   74     


 


 05/02/22 15:01   85     13 


 


 05/02/22 14:00   74     13 














  Resp BP Pulse Ox


 


 05/03/22 11:14   


 


 05/03/22 11:08  15  


 


 05/03/22 10:00   139/77  97


 


 05/03/22 09:04   138/76 


 


 05/03/22 09:03   138/76 


 


 05/03/22 09:00   138/76  98


 


 05/03/22 08:00   151/84  100


 


 05/03/22 07:16   140/79 


 


 05/03/22 07:14   


 


 05/03/22 07:00   136/77  97


 


 05/03/22 06:00   135/77  97


 


 05/03/22 05:00   137/76  95


 


 05/03/22 04:00   130/76  98


 


 05/03/22 03:00   106/60  95


 


 05/03/22 02:00   122/74  95


 


 05/03/22 01:00   140/71  97


 


 05/03/22 00:00   141/83  98


 


 05/02/22 23:00   128/75  96


 


 05/02/22 22:00   121/69  97


 


 05/02/22 21:21   122/75 


 


 05/02/22 21:15    97


 


 05/02/22 21:00   134/83  97


 


 05/02/22 20:12   118/63  96


 


 05/02/22 20:00   118/63  95


 


 05/02/22 19:00   112/79  96


 


 05/02/22 18:00   126/81  100


 


 05/02/22 17:00   135/81  91


 


 05/02/22 16:01   121/84  99


 


 05/02/22 16:00    98


 


 05/02/22 15:46   137/77 


 


 05/02/22 15:01   107/73  97


 


 05/02/22 14:00   115/73  96














- Physical Examination


General: No Apparent Distress


HEENT: Positive: PERRL


Neck: Positive: neck supple


Cardiac: Positive: Reg Rate and Rhythm


Lungs: Positive: Decreased Breath Sounds


Neuro: Positive: Grossly Intact


Abdomen: Positive: Soft


Skin: Positive: Clear


Extremities: Absent: edema





- Labs and Meds


                                 Cardiac Enzymes











  05/02/22 05/03/22 Range/Units





  17:10 00:50 


 


CK-MB (CK-2)  1.3  1.1  (0.0-4.0)  ng/mL








                          Comprehensive Metabolic Panel











  05/03/22 Range/Units





  04:52 


 


Sodium  136 L  (137-145)  mmol/L


 


Potassium  5.2 H  (3.6-5.0)  mmol/L


 


Chloride  98.4  ()  mmol/L


 


Carbon Dioxide  25  (22-30)  mmol/L


 


BUN  49 H  (7-17)  mg/dL


 


Creatinine  4.5 H  (0.6-1.2)  mg/dL


 


Glucose  91  ()  mg/dL


 


Calcium  8.5  (8.4-10.2)  mg/dL











Pacemaker: atrial pacing w/capture





- Allied health notes


Allied health notes reviewed: nursing

## 2022-05-05 ENCOUNTER — HOSPITAL ENCOUNTER (EMERGENCY)
Dept: HOSPITAL 5 - ED | Age: 67
LOS: 1 days | Discharge: LEFT BEFORE DISCHARGE | End: 2022-05-06
Payer: MEDICARE

## 2022-05-05 VITALS — DIASTOLIC BLOOD PRESSURE: 64 MMHG | SYSTOLIC BLOOD PRESSURE: 99 MMHG

## 2022-05-05 DIAGNOSIS — Z53.21: ICD-10-CM

## 2022-05-05 DIAGNOSIS — R07.9: Primary | ICD-10-CM

## 2022-05-05 PROCEDURE — 93005 ELECTROCARDIOGRAM TRACING: CPT

## 2022-05-06 NOTE — ELECTROCARDIOGRAPH REPORT
[facility]

                                       

Test Date:    2022               Test Time:    18:28:38

Pat Name:     LINDA GARCIA              Department:   

Patient ID:   [id number]          Room:          

Gender:       F                        Technician:   AF

:          1955               Requested By: BRITTANY ESCOBEDO

Order Number: [id number]              Reading MD:   John Vaca

                                 Measurements

Intervals                              Axis          

Rate:         80                       P:            

CT:                                    QRS:          1

QRSD:         90                       T:            62

QT:           448                                    

QTc:          518                                    

                           Interpretive Statements

Afib/flut and V-paced complexes

Left ventricular hypertrophy

ST elevation, consider inferior injury

Prolonged QT interval

Compared to ECG 2022 12:18:38

ST (T wave) deviation now present

Myocardial infarct finding now present

Prolonged QT interval now present

Electronically Signed On 2022 10:14:12 EDT by John Vaca

## 2022-05-10 ENCOUNTER — HOSPITAL ENCOUNTER (EMERGENCY)
Dept: HOSPITAL 5 - ED | Age: 67
Discharge: LEFT BEFORE DISCHARGE | End: 2022-05-10
Payer: MEDICARE

## 2022-05-10 VITALS — SYSTOLIC BLOOD PRESSURE: 152 MMHG | DIASTOLIC BLOOD PRESSURE: 80 MMHG

## 2022-05-10 DIAGNOSIS — Z53.21: ICD-10-CM

## 2022-05-10 DIAGNOSIS — R07.9: Primary | ICD-10-CM

## 2022-05-10 PROCEDURE — 93005 ELECTROCARDIOGRAM TRACING: CPT

## 2022-05-21 ENCOUNTER — HOSPITAL ENCOUNTER (EMERGENCY)
Dept: HOSPITAL 5 - ED | Age: 67
LOS: 1 days | Discharge: LEFT BEFORE DISCHARGE | End: 2022-05-22
Payer: MEDICARE

## 2022-05-21 VITALS — DIASTOLIC BLOOD PRESSURE: 89 MMHG | SYSTOLIC BLOOD PRESSURE: 156 MMHG

## 2022-05-21 DIAGNOSIS — R07.9: Primary | ICD-10-CM

## 2022-05-21 DIAGNOSIS — Z53.21: ICD-10-CM

## 2022-05-21 LAB
ALBUMIN SERPL-MCNC: 4.7 G/DL (ref 3.9–5)
ALT SERPL-CCNC: < 5 UNITS/L (ref 7–56)
BASOPHILS # (AUTO): 0.1 K/MM3 (ref 0–0.1)
BASOPHILS NFR BLD AUTO: 0.6 % (ref 0–1.8)
BUN SERPL-MCNC: 39 MG/DL (ref 7–17)
BUN/CREAT SERPL: 8 %
CALCIUM SERPL-MCNC: 9.6 MG/DL (ref 8.4–10.2)
EOSINOPHIL # BLD AUTO: 0.4 K/MM3 (ref 0–0.4)
EOSINOPHIL NFR BLD AUTO: 3.7 % (ref 0–4.3)
HCT VFR BLD CALC: 40.2 % (ref 30.3–42.9)
HEMOLYSIS INDEX: 20
HGB BLD-MCNC: 13.1 GM/DL (ref 10.1–14.3)
LYMPHOCYTES # BLD AUTO: 2 K/MM3 (ref 1.2–5.4)
LYMPHOCYTES NFR BLD AUTO: 20.1 % (ref 13.4–35)
MCHC RBC AUTO-ENTMCNC: 33 % (ref 30–34)
MCV RBC AUTO: 95 FL (ref 79–97)
MONOCYTES # (AUTO): 0.6 K/MM3 (ref 0–0.8)
MONOCYTES % (AUTO): 6.1 % (ref 0–7.3)
PLATELET # BLD: 154 K/MM3 (ref 140–440)
RBC # BLD AUTO: 4.24 M/MM3 (ref 3.65–5.03)

## 2022-05-21 PROCEDURE — 84484 ASSAY OF TROPONIN QUANT: CPT

## 2022-05-21 PROCEDURE — 85025 COMPLETE CBC W/AUTO DIFF WBC: CPT

## 2022-05-21 PROCEDURE — 36415 COLL VENOUS BLD VENIPUNCTURE: CPT

## 2022-05-21 PROCEDURE — 93005 ELECTROCARDIOGRAM TRACING: CPT

## 2022-05-21 PROCEDURE — 71046 X-RAY EXAM CHEST 2 VIEWS: CPT

## 2022-05-21 PROCEDURE — 80053 COMPREHEN METABOLIC PANEL: CPT

## 2022-05-21 NOTE — XRAY REPORT
CHEST 2 VIEWS 



INDICATION / CLINICAL INFORMATION:

chest pain.



COMPARISON: 

Prior chest CT and chest radiograph 5/12/2022



FINDINGS:



SUPPORT DEVICES: PermCath and cardiac pacemaker present, stable in position.



HEART / MEDIASTINUM: No significant abnormality. 



LUNGS / PLEURA: No significant pulmonary or pleural abnormality. No pneumothorax. Previously noted mi
ld interstitial pulmonary edema on recent prior chest radiograph has resolved. No pleural effusion.



ADDITIONAL FINDINGS: No significant additional findings.



IMPRESSION:

1. No acute findings.



Signer Name: Jazz Gastelum MD 

Signed: 5/21/2022 9:58 AM

Workstation Name: [id number]

## 2022-05-22 NOTE — ELECTROCARDIOGRAPH REPORT
[facility]

                                       

Test Date:    2022               Test Time:    08:41:44

Pat Name:     LINDA GARCIA              Department:   

Patient ID:   [id number]          Room:          

Gender:       F                        Technician:   JOHN

:          1955               Requested By: ED DOC

Order Number: [id number]              Reading MD:   Segundo Denise

                                 Measurements

Intervals                              Axis          

Rate:         111                      P:            

ND:                                    QRS:          0

QRSD:         86                       T:            81

QT:           360                                    

QTc:          490                                    

                           Interpretive Statements

SINUS TACHYCARDIA WITH IRREGULAR RATE

Consider anteroseptal infarct

ST elevation, consider inferior injury

Compared to ECG 2022 09:41:39

Myocardial infarct finding now present

ST (T wave) deviation now present

Atrial-paced complex(es) or rhythm no longer present

Prolonged QT interval no longer present

Electronically Signed On 2022 18:15:36 EDT by Segundo Denise

## 2022-05-23 ENCOUNTER — HOSPITAL ENCOUNTER (OUTPATIENT)
Dept: HOSPITAL 5 - ED | Age: 67
Setting detail: OBSERVATION
LOS: 1 days | Discharge: HOME | End: 2022-05-24
Attending: STUDENT IN AN ORGANIZED HEALTH CARE EDUCATION/TRAINING PROGRAM | Admitting: INTERNAL MEDICINE
Payer: MEDICARE

## 2022-05-23 DIAGNOSIS — F31.9: ICD-10-CM

## 2022-05-23 DIAGNOSIS — E11.22: ICD-10-CM

## 2022-05-23 DIAGNOSIS — F17.213: ICD-10-CM

## 2022-05-23 DIAGNOSIS — D64.9: ICD-10-CM

## 2022-05-23 DIAGNOSIS — Z86.73: ICD-10-CM

## 2022-05-23 DIAGNOSIS — J44.9: ICD-10-CM

## 2022-05-23 DIAGNOSIS — F20.9: ICD-10-CM

## 2022-05-23 DIAGNOSIS — N18.6: ICD-10-CM

## 2022-05-23 DIAGNOSIS — I25.2: ICD-10-CM

## 2022-05-23 DIAGNOSIS — Z95.1: ICD-10-CM

## 2022-05-23 DIAGNOSIS — Z79.899: ICD-10-CM

## 2022-05-23 DIAGNOSIS — I50.32: ICD-10-CM

## 2022-05-23 DIAGNOSIS — I48.92: ICD-10-CM

## 2022-05-23 DIAGNOSIS — R07.89: Primary | ICD-10-CM

## 2022-05-23 DIAGNOSIS — Z98.890: ICD-10-CM

## 2022-05-23 DIAGNOSIS — Z99.2: ICD-10-CM

## 2022-05-23 DIAGNOSIS — Z79.82: ICD-10-CM

## 2022-05-23 DIAGNOSIS — I13.2: ICD-10-CM

## 2022-05-23 DIAGNOSIS — E78.2: ICD-10-CM

## 2022-05-23 LAB
ALBUMIN SERPL-MCNC: 4.3 G/DL (ref 3.9–5)
ALT SERPL-CCNC: 39 UNITS/L (ref 7–56)
BASOPHILS # (AUTO): 0.1 K/MM3 (ref 0–0.1)
BASOPHILS NFR BLD AUTO: 0.7 % (ref 0–1.8)
BUN SERPL-MCNC: 64 MG/DL (ref 7–17)
BUN/CREAT SERPL: 13 %
CALCIUM SERPL-MCNC: 9 MG/DL (ref 8.4–10.2)
EOSINOPHIL # BLD AUTO: 0.2 K/MM3 (ref 0–0.4)
EOSINOPHIL NFR BLD AUTO: 1.5 % (ref 0–4.3)
HCT VFR BLD CALC: 34.4 % (ref 30.3–42.9)
HEMOLYSIS INDEX: 11
HGB BLD-MCNC: 11.1 GM/DL (ref 10.1–14.3)
LYMPHOCYTES # BLD AUTO: 1.4 K/MM3 (ref 1.2–5.4)
LYMPHOCYTES NFR BLD AUTO: 13.8 % (ref 13.4–35)
MCHC RBC AUTO-ENTMCNC: 32 % (ref 30–34)
MCV RBC AUTO: 95 FL (ref 79–97)
MONOCYTES # (AUTO): 0.7 K/MM3 (ref 0–0.8)
MONOCYTES % (AUTO): 6.9 % (ref 0–7.3)
PLATELET # BLD: 153 K/MM3 (ref 140–440)
RBC # BLD AUTO: 3.63 M/MM3 (ref 3.65–5.03)

## 2022-05-23 PROCEDURE — 80048 BASIC METABOLIC PNL TOTAL CA: CPT

## 2022-05-23 PROCEDURE — G0378 HOSPITAL OBSERVATION PER HR: HCPCS

## 2022-05-23 PROCEDURE — 84484 ASSAY OF TROPONIN QUANT: CPT

## 2022-05-23 PROCEDURE — 96375 TX/PRO/DX INJ NEW DRUG ADDON: CPT

## 2022-05-23 PROCEDURE — 96376 TX/PRO/DX INJ SAME DRUG ADON: CPT

## 2022-05-23 PROCEDURE — 99285 EMERGENCY DEPT VISIT HI MDM: CPT

## 2022-05-23 PROCEDURE — 80053 COMPREHEN METABOLIC PANEL: CPT

## 2022-05-23 PROCEDURE — 36415 COLL VENOUS BLD VENIPUNCTURE: CPT

## 2022-05-23 PROCEDURE — 71045 X-RAY EXAM CHEST 1 VIEW: CPT

## 2022-05-23 PROCEDURE — 94644 CONT INHLJ TX 1ST HOUR: CPT

## 2022-05-23 PROCEDURE — 99406 BEHAV CHNG SMOKING 3-10 MIN: CPT

## 2022-05-23 PROCEDURE — 85025 COMPLETE CBC W/AUTO DIFF WBC: CPT

## 2022-05-23 PROCEDURE — 93005 ELECTROCARDIOGRAM TRACING: CPT

## 2022-05-23 PROCEDURE — 96374 THER/PROPH/DIAG INJ IV PUSH: CPT

## 2022-05-23 PROCEDURE — 82962 GLUCOSE BLOOD TEST: CPT

## 2022-05-23 NOTE — XRAY REPORT
CHEST 1 VIEW 5/23/2022 6:35 PM



INDICATION / CLINICAL INFORMATION: Chest Pain.



COMPARISON: 5/21/2022



FINDINGS:



SUPPORT DEVICES: Permacath appears unchanged. Pacemaker appears unchanged.

HEART / MEDIASTINUM: Unchanged 

LUNGS / PLEURA: There is mild venous congestion. No focal infiltrate is seen. No pneumothorax. 



ADDITIONAL FINDINGS: No significant additional findings.



IMPRESSION:

1. There is mild venous congestion.



Signer Name: Omid Corado MD 

Signed: 5/23/2022 6:51 PM

Workstation Name: [id number]

## 2022-05-23 NOTE — EMERGENCY DEPARTMENT REPORT
ED General Adult HPI





- General


Chief complaint: Chest Pain


Stated complaint: CHEST PAIN


PUI?: No


Time Seen by Provider: 05/23/22 20:18


Source: patient, EMS


Mode of arrival: Stretcher


Limitations: No Limitations





- History of Present Illness


Initial comments: 





This is a 67-year-old female with multiple medical comorbidities who presents 

for evaluation of multiple complaints including dry cough, wheezing, chest pain,

shortness of breath.  Patient states she did not missed her last hemodialysis 

session 2 days ago and last went to dialysis on Thursday, which was 5 days ago. 

She states that "every time anxious to go to see a cardiologist or anybody I end

up here in the hospital for chest pain."  Patient pain has been constant 

pressure-like nonradiating.  No aggravators no alleviators.  She states she is 

taking Advil at home without improvement in her symptoms.  Pain currently 10 out

of 10





Patient states she smokes tobacco daily.  She denies any illicit drug usage.  

Pain currently 10 out of 10.





Patient states she is not taking any other medication for management of her 

pain.


-: days(s)


Location: chest


Radiation: non-radiation


Severity scale (0 -10): 8


Quality: aching, constant


Consistency: constant


Improves with: none


Worsens with: none


Associated Symptoms: cough, malaise, shortness of breath, other (Wheezing)





- Related Data


                                Home Medications











 Medication  Instructions  Recorded  Confirmed  Last Taken


 


Gabapentin 100 mg PO BID 04/23/22 05/12/22 Unknown


 


Nitroglycerin [Nitro-Bid] 0.1 mg TRANSDERMA Q6HR PRN 04/23/22 05/12/22 Unknown


 


Pantoprazole [Protonix TAB] 40 mg PO QDAY 04/23/22 05/12/22 04/23/22 10:00


 


Sennosides/Docusate Sodium [Senna 1 cap PO DAILY 05/01/22 05/12/22 Unknown





Plus 8.6-50 mg Softgel]    


 


methIMAzole [Methimazole] 10 mg PO TID 05/01/22 05/12/22 Unknown








                                  Previous Rx's











 Medication  Instructions  Recorded  Last Taken  Type


 


Acetaminophen [Acetaminophen TAB] 650 mg PO Q6H PRN  tablet 05/03/22 Unknown Rx


 


AtorvaSTATin [Lipitor] 20 mg PO QHS #30 tablet 05/03/22 Unknown Rx


 


ISOSORBIDE MONOnitrate [Imdur ER] 60 mg PO QDAY #30 tablet 05/03/22 Unknown Rx


 


Losartan [Cozaar] 50 mg PO QDAY #30 tab 05/03/22 Unknown Rx


 


Magnesium Hydroxide [Milk of 30 ml PO Q4H PRN  oral.liqd 05/03/22 Unknown Rx





Magnesia]    


 


Metoprolol [Lopressor TAB] 100 mg PO BID #60 tablet 05/03/22 Unknown Rx


 


Nitroglycerin [Nitrostat] 0.4 mg SL .Q5MIN PRN #15 tablet 05/03/22 Unknown Rx


 


Sennosides/Docusate [Senokot S] 1 tab PO DAILY #30 tablet 05/03/22 Unknown Rx


 


hydrALAZINE [Apresoline TAB] 50 mg PO Q8HR #120 tablet 05/03/22 Unknown Rx


 


polyethylene glycoL 3350 [Miralax 17 gm PO QDAY #15 powd.pack 05/03/22 Unknown 

Rx





3350]    


 


traMADoL [Ultram 50 MG tab] 50 mg PO Q6H PRN #5 tablet 05/03/22 Unknown Rx


 


Amiodarone [Cordarone 200 MG TAB] 200 mg PO .AS DIRECTED #60 tablet 05/14/22 

Unknown Rx


 


Apixaban [Eliquis] 2.5 mg PO BID #60 tab 05/14/22 Unknown Rx


 


Prednisone [predniSONE 5 mg (6-Day 5 mg PO .TAPER #1 05/14/22 Unknown Rx





Pack, 21 Tabs)]    











                                    Allergies











Allergy/AdvReac Type Severity Reaction Status Date / Time


 


Iodinated Contrast Media AdvReac Intermediate Swelling Verified 05/12/22 07:58


 


apple AdvReac  Hives Verified 05/12/22 07:58


 


shell fish Allergy  Swelling Uncoded 05/12/22 07:58














ED Review of Systems


ROS: 


Stated complaint: CHEST PAIN


Other details as noted in HPI





Comment: All other systems reviewed and negative


Constitutional: see HPI


Eyes: as per HPI


ENT: as per HPI


Respiratory: cough, shortness of breath, wheezing


Cardiovascular: chest pain.  denies: palpitations, dyspnea on exertion, 

orthopnea, edema, syncope, paroxysmal nocturnal dyspnea


Endocrine: no symptoms reported


Gastrointestinal: as per HPI.  denies: abdominal pain, nausea, vomiting, 

diarrhea, constipation, hematemesis, melena, hematochezia


Genitourinary: denies: urgency, dysuria, frequency, hematuria, discharge, 

abnormal menses, dyspareunia


Musculoskeletal: denies: back pain, joint swelling, arthralgia, myalgia, other


Skin: denies: rash, lesions, change in color, change in hair/nails, pruritus


Neurological: denies: headache, weakness, numbness, paresthesias, confusion, 

abnormal gait, vertigo, other


Psychiatric: denies: as per HPI


Hematological/Lymphatic: denies: as per HPI





ED Past Medical Hx





- Past Medical History


Hx Hypertension: Yes


Hx CVA: Yes (right sided weakness)


Hx Heart Attack/AMI: Yes (1 stent)


Hx Congestive Heart Failure: Yes


Hx Diabetes: Yes


Hx Renal Disease: Yes (RENAL INSUFFICIENCY- stent left kidney)


Hx Sickle Cell Disease: No


Hx Kidney Stones: No


Hx Psychiatric Treatment: Yes (BIPOLAR/SCHIZOPHRENIA)


Hx COPD: Yes


Hx HIV: No


Additional medical history: HIGH CHOLESTEROL





- Surgical History


Hx Coronary Stent: Yes


Hx Pacemaker: Yes


Additional Surgical History: stent placement x 2 in 2015, knee surgery 01/2018





- Social History


Smoking Status: Current Every Day Smoker





- Medications


Home Medications: 


                                Home Medications











 Medication  Instructions  Recorded  Confirmed  Last Taken  Type


 


Gabapentin 100 mg PO BID 04/23/22 05/12/22 Unknown History


 


Nitroglycerin [Nitro-Bid] 0.1 mg TRANSDERMA Q6HR PRN 04/23/22 05/12/22 Unknown 

History


 


Pantoprazole [Protonix TAB] 40 mg PO QDAY 04/23/22 05/12/22 04/23/22 10:00 

History


 


Sennosides/Docusate Sodium [Senna 1 cap PO DAILY 05/01/22 05/12/22 Unknown 

History





Plus 8.6-50 mg Softgel]     


 


methIMAzole [Methimazole] 10 mg PO TID 05/01/22 05/12/22 Unknown History


 


Acetaminophen [Acetaminophen TAB] 650 mg PO Q6H PRN  tablet 05/03/22 05/12/22 

Unknown Rx


 


AtorvaSTATin [Lipitor] 20 mg PO QHS #30 tablet 05/03/22 05/12/22 Unknown Rx


 


ISOSORBIDE MONOnitrate [Imdur ER] 60 mg PO QDAY #30 tablet 05/03/22 05/12/22 

Unknown Rx


 


Losartan [Cozaar] 50 mg PO QDAY #30 tab 05/03/22 05/12/22 Unknown Rx


 


Magnesium Hydroxide [Milk of 30 ml PO Q4H PRN  oral.liqd 05/03/22 05/12/22 

Unknown Rx





Magnesia]     


 


Metoprolol [Lopressor TAB] 100 mg PO BID #60 tablet 05/03/22 05/12/22 Unknown Rx


 


Nitroglycerin [Nitrostat] 0.4 mg SL .Q5MIN PRN #15 tablet 05/03/22 05/12/22 

Unknown Rx


 


Sennosides/Docusate [Senokot S] 1 tab PO DAILY #30 tablet 05/03/22 05/12/22 

Unknown Rx


 


hydrALAZINE [Apresoline TAB] 50 mg PO Q8HR #120 tablet 05/03/22 05/12/22 Unknown

Rx


 


polyethylene glycoL 3350 [Miralax 17 gm PO QDAY #15 powd.pack 05/03/22 05/12/22 

Unknown Rx





3350]     


 


traMADoL [Ultram 50 MG tab] 50 mg PO Q6H PRN #5 tablet 05/03/22 05/12/22 Unknown

Rx


 


Amiodarone [Cordarone 200 MG TAB] 200 mg PO .AS DIRECTED #60 tablet 05/14/22  

Unknown Rx


 


Apixaban [Eliquis] 2.5 mg PO BID #60 tab 05/14/22  Unknown Rx


 


Prednisone [predniSONE 5 mg (6-Day 5 mg PO .TAPER #1 05/14/22  Unknown Rx





Pack, 21 Tabs)]     














ED Physical Exam





- General


Limitations: No Limitations


General appearance: alert, in no apparent distress





- Head


Head exam: Present: atraumatic, normocephalic, normal inspection





- Eye


Eye exam: Present: normal appearance, PERRL, EOMI, other (Sclera anicteric)


Pupils: Present: normal accommodation





- ENT


ENT exam: Present: normal exam, normal orophraynx





- Neck


Neck exam: Present: normal inspection, full ROM.  Absent: tenderness, meningis

mus, lymphadenopathy, thyromegaly, other





- Respiratory


Respiratory exam: Present: wheezes, prolonged expiratory.  Absent: respiratory 

distress, rales, rhonchi, stridor, chest wall tenderness, accessory muscle use, 

decreased breath sounds





- Cardiovascular


Cardiovascular Exam: Present: normal rhythm, tachycardia.  Absent: regular rate,

bradycardia, irregular rhythm, normal heart sounds, systolic murmur, diastolic 

murmur, rubs, gallop





- GI/Abdominal


GI/Abdominal exam: Present: soft, normal bowel sounds.  Absent: distended, 

tenderness, guarding, rebound, rigid, diminished bowel sounds, hyperactive bowel

sounds, hypoactive bowel sounds, organomegaly, mass, bruit, pulsatile mass, 

hernia





- Rectal


Rectal exam: Present: deferred





- Extremities Exam


Extremities exam: Present: normal inspection, full ROM, normal capillary refill.

 Absent: pedal edema, joint swelling, calf tenderness





- Back Exam


Back exam: Present: normal inspection, full ROM.  Absent: tenderness, CVA 

tenderness (R), CVA tenderness (L), muscle spasm, paraspinal tenderness





- Neurological Exam


Neurological exam: Present: alert, oriented X3, CN II-XII intact, normal gait, 

reflexes normal.  Absent: abnormal gait, motor sensory deficit





- Psychiatric


Psychiatric exam: Present: normal affect, normal mood.  Absent: depressed, 

agitated, anxious, flat affect, manic, homicidal ideation, suicidal ideation





- Skin


Skin exam: Present: warm, dry, intact, normal color.  Absent: rash, cyanosis, 

diaphoretic, erythema, urticaria, vesicles, petechiae, pallor, abrasion, 

ecchymosis





ED Course


                                   Vital Signs











  05/23/22 05/23/22 05/23/22





  17:54 18:40 19:35


 


Temperature 98.3 F  98.5 F


 


Pulse Rate 100 H  113 H


 


Pulse Rate [   





Anterior   





Bilateral]   


 


Respiratory 20  21





Rate   


 


Respiratory   





Rate [Anterior   





Bilateral]   


 


Blood Pressure   


 


Blood Pressure 176/89  177/99





[Left]   


 


O2 Sat by Pulse 100 96 100





Oximetry   














  05/23/22 05/23/22 05/23/22





  19:46 20:00 20:16


 


Temperature   


 


Pulse Rate 115 H 106 H 116 H


 


Pulse Rate [   





Anterior   





Bilateral]   


 


Respiratory 25 H 22 19





Rate   


 


Respiratory   





Rate [Anterior   





Bilateral]   


 


Blood Pressure 177/99 178/107 179/96


 


Blood Pressure   





[Left]   


 


O2 Sat by Pulse 100 100 100





Oximetry   














  05/23/22 05/23/22 05/23/22





  20:30 20:44 21:00


 


Temperature   


 


Pulse Rate 108 H 100 H 118 H


 


Pulse Rate [   





Anterior   





Bilateral]   


 


Respiratory 22  23





Rate   


 


Respiratory   





Rate [Anterior   





Bilateral]   


 


Blood Pressure 164/138 180/108 173/112


 


Blood Pressure   





[Left]   


 


O2 Sat by Pulse 100  100





Oximetry   














  05/23/22 05/23/22 05/23/22





  21:16 21:21 21:30


 


Temperature   


 


Pulse Rate 93 H  103 H


 


Pulse Rate [  109 H 





Anterior   





Bilateral]   


 


Respiratory 23  20





Rate   


 


Respiratory  22 





Rate [Anterior   





Bilateral]   


 


Blood Pressure 164/102  182/96


 


Blood Pressure   





[Left]   


 


O2 Sat by Pulse 100  100





Oximetry   














  05/23/22 05/23/22 05/23/22





  21:46 22:00 22:16


 


Temperature   


 


Pulse Rate 111 H 118 H 111 H


 


Pulse Rate [   





Anterior   





Bilateral]   


 


Respiratory 21 20 20





Rate   


 


Respiratory   





Rate [Anterior   





Bilateral]   


 


Blood Pressure 186/94 166/121 165/93


 


Blood Pressure   





[Left]   


 


O2 Sat by Pulse 100 100 100





Oximetry   














  05/23/22 05/23/22 05/23/22





  22:30 22:46 23:00


 


Temperature   


 


Pulse Rate 100 H 101 H 108 H


 


Pulse Rate [   





Anterior   





Bilateral]   


 


Respiratory 21 20 17





Rate   


 


Respiratory   





Rate [Anterior   





Bilateral]   


 


Blood Pressure 170/95 181/100 180/99


 


Blood Pressure   





[Left]   


 


O2 Sat by Pulse 100 100 100





Oximetry   














- Reevaluation(s)


Reevaluation #1: 





05/23/22 23:15


pt continues to c/o chest pain. States Nitroglycerin x 2 did not help. Will give

 order additional analgesics. 





ED Medical Decision Making





- Lab Data


Result diagrams: 


                                 05/23/22 18:36





                                 05/23/22 18:36





- EKG Data


EKG shows normal: sinus rhythm


Rate: tachycardia





- EKG Data


When compared to previous EKG there are: no significant change


Interpretation: no acute changes





- Radiology Data


Radiology results: report reviewed





- Medical Decision Making


67-year-old female with multiple medical comorbidities presents for evaluation 

of chest pain.  Vitals reviewed.  Troponin x2 negative.  Remainder of labs 

unremarkable as well except for patient's creatinine which is consistent with 

her end-stage renal disease.  Chest x-ray unremarkable.  Patient given 

nitroglycerin x2 but reports her pain has not improved.  Heart score is 3. 








Case reviewed with nocturnist, Dr. Grove. Per my recommendation, pt will need 

overnight admission for observation, r/o ACS. Care of this pateint has been 

transferred over to him.


Critical care attestation.: 


If time is entered above; I have spent that time in minutes in the direct care 

of this critically ill patient, excluding procedure time.








ED Disposition


Clinical Impression: 


Chest pain


Qualifiers:


 Chest pain type: unspecified Qualified Code(s): R07.9 - Chest pain, unspecified





Disposition: 09 ADMITTED AS INPATIENT


Is pt being admited?: Yes


Does the pt Need Aspirin: No


Condition: Stable


Instructions:  Nonspecific Chest Pain, Adult

## 2022-05-24 VITALS — DIASTOLIC BLOOD PRESSURE: 98 MMHG | SYSTOLIC BLOOD PRESSURE: 168 MMHG

## 2022-05-24 LAB
BUN SERPL-MCNC: 66 MG/DL (ref 7–17)
BUN/CREAT SERPL: 12 %
CALCIUM SERPL-MCNC: 9.4 MG/DL (ref 8.4–10.2)
HEMOLYSIS INDEX: 4

## 2022-05-24 RX ADMIN — INSULIN LISPRO SCH UNIT: 100 INJECTION, SOLUTION INTRAVENOUS; SUBCUTANEOUS at 13:09

## 2022-05-24 RX ADMIN — MORPHINE SULFATE PRN MG: 4 INJECTION, SOLUTION INTRAMUSCULAR; INTRAVENOUS at 13:16

## 2022-05-24 RX ADMIN — INSULIN LISPRO SCH: 100 INJECTION, SOLUTION INTRAVENOUS; SUBCUTANEOUS at 17:40

## 2022-05-24 RX ADMIN — MORPHINE SULFATE PRN MG: 4 INJECTION, SOLUTION INTRAMUSCULAR; INTRAVENOUS at 03:01

## 2022-05-24 RX ADMIN — MORPHINE SULFATE PRN MG: 4 INJECTION, SOLUTION INTRAMUSCULAR; INTRAVENOUS at 02:19

## 2022-05-24 RX ADMIN — INSULIN LISPRO SCH: 100 INJECTION, SOLUTION INTRAVENOUS; SUBCUTANEOUS at 08:18

## 2022-05-24 NOTE — DISCHARGE SUMMARY
Providers





- Providers


Date of Admission: 


05/24/22 01:02





Date of discharge: 05/24/22


Attending physician: 


ARMANI ORTIZ MD





                                        





05/24/22


Consult to Cardiac Rehabilitation [CONS] Routine 


   Reason For Exam: Phase I





05/24/22 01:02


Consult to Cardiology [CONS] Routine 


   Consulting Provider: SANAZ LIZAMA


   Reason For Exam: CHEST PAIN


Consult to Dietitian/Nutrition [CONS] Routine 


   Physician Instructions: 


   Reason For Exam: 


   Reason for Consult: Diet education











Primary care physician: 


RA BALTAZAR








Hospitalization


Reason for admission: chest pain, ACS r/o


Condition: Stable


Hospital course: 





67-year-old female with history of ESRD CVA and hyperlipidemia who presents to 

the ED with complaints of chest pain and shortness of breath.  Patient has been 

seen in the hospital multiple times with similar complaints.  She reports having

 anxiety and developed chest pain whenever she has any upcoming doctor 

appointments.  She had a recent stress test in March 2022 which did not reveal 

any acute abnormality.  Troponin was collected and negative x4.  Nephrology was 

consulted for hemodialysis.  Patient remained chest pain-free and was discharged

 home after HD and cardiology evaluation.


Disposition: 01 HOME / SELF CARE / HOMELESS


Final Discharge Diagnosis (Prints w/discharge instructions): Noncardiac chest 

pain.  Coronary disease status post PCI.  Ischemic cardiomyopathy.  Type 2 

diabetes.  Hyperlipidemia.  Hypertension.  ESRD requiring hemodialysis.  History

 of CVA


Time spent for discharge: 25 minutes





Core Measure Documentation





- Palliative Care


Palliative Care/ Comfort Measures: Not Applicable





- Core Measures


Any of the following diagnoses?: history only





Exam





- Physical Exam


Narrative exam: 





GENERAL: Well-developed well-nourished. Sitting on the side of the bed in no 

acute distress.


HEENT: Normocephalic. Atraumatic. 


NECK: Supple.  


CHEST/LUNGS: Right chest permacath.  CTAB on room air


HEART/CARDIOVASCULAR: RRR. No murmur, rubs or gallops appreciated.


ABDOMEN: +BS. NT/ND.


SKIN: No rashes noted.


NEURO:  No focal motor deficit.  Follows all commands and is ambulatory.


MUSCULOSKELETAL: No joint effusion 


EXTREMITIES: No cyanosis, clubbing or edema.


PSYCH: Cooperative.





- Constitutional


Vitals: 


                                        











Temp Pulse Resp BP Pulse Ox


 


 97.7 F   70   20   154/87   97 


 


 05/24/22 07:29  05/24/22 10:47  05/24/22 07:29  05/24/22 07:29  05/24/22 07:29














Plan


Care Plan Goals: 


These follow-up with your outpatient cardiologist and primary care provider.


Resume dialysis at your current Tuesday, Thursday and Saturday schedule.


Follow up with: 


RA BALTAZAR MD [Primary Care Provider] - 7 Days


Prescriptions: 


Aspirin EC [Ecotrin] 325 mg PO QDAY 30 Days #30 tablet


Nitroglycerin [Nitrostat] 0.4 mg SL .Q5MIN PRN 30 Days #30 tablet


 PRN Reason: Chest Pain

## 2022-05-24 NOTE — CONSULTATION
History of Present Illness


Consult date: 05/24/22


Consult reason: chest pain


History of present illness: 





64-year-old female with a history of end-stage renal disease and multiple other 

medical problems presenting with shortness of breath vague chest pain at rest 

and a dry cough she denies any fever chills.  Recent stress test done in March 2022 was negative for any ischemic ischemic coronary artery disease





Past History


Past Medical History: acute MI, COPD, diabetes, dialysis, ESRD, heart failure, 

hypertension, hyperlipidemia, stroke (With right-sided weakness), other (Bipolar

disorder, schizophrenia)


Past Surgical History: PTCA, Other (Pacemaker placement, knee surgery in January 2018)


Social history: smoking (Current daily smoker)


Family history: no significant family history





Medications and Allergies


                                    Allergies











Allergy/AdvReac Type Severity Reaction Status Date / Time


 


Iodinated Contrast Media AdvReac Intermediate Swelling Verified 05/12/22 07:58


 


apple AdvReac  Hives Verified 05/12/22 07:58


 


shell fish Allergy  Swelling Uncoded 05/12/22 07:58











                                Home Medications











 Medication  Instructions  Recorded  Confirmed  Last Taken  Type


 


Gabapentin 100 mg PO BID 04/23/22 05/12/22 Unknown History


 


Nitroglycerin [Nitro-Bid] 0.1 mg TRANSDERMA Q6HR PRN 04/23/22 05/12/22 Unknown 

History


 


Pantoprazole [Protonix TAB] 40 mg PO QDAY 04/23/22 05/12/22 04/23/22 10:00 

History


 


Sennosides/Docusate Sodium [Senna 1 cap PO DAILY 05/01/22 05/12/22 Unknown 

History





Plus 8.6-50 mg Softgel]     


 


methIMAzole [Methimazole] 10 mg PO TID 05/01/22 05/12/22 Unknown History


 


Acetaminophen [Acetaminophen TAB] 650 mg PO Q6H PRN  tablet 05/03/22 05/12/22 

Unknown Rx


 


AtorvaSTATin [Lipitor] 20 mg PO QHS #30 tablet 05/03/22 05/12/22 Unknown Rx


 


ISOSORBIDE MONOnitrate [Imdur ER] 60 mg PO QDAY #30 tablet 05/03/22 05/12/22 

Unknown Rx


 


Losartan [Cozaar] 50 mg PO QDAY #30 tab 05/03/22 05/12/22 Unknown Rx


 


Magnesium Hydroxide [Milk of 30 ml PO Q4H PRN  oral.liqd 05/03/22 05/12/22 

Unknown Rx





Magnesia]     


 


Metoprolol [Lopressor TAB] 100 mg PO BID #60 tablet 05/03/22 05/12/22 Unknown Rx


 


Nitroglycerin [Nitrostat] 0.4 mg SL .Q5MIN PRN #15 tablet 05/03/22 05/12/22 

Unknown Rx


 


Sennosides/Docusate [Senokot S] 1 tab PO DAILY #30 tablet 05/03/22 05/12/22 

Unknown Rx


 


hydrALAZINE [Apresoline TAB] 50 mg PO Q8HR #120 tablet 05/03/22 05/12/22 Unknown

Rx


 


polyethylene glycoL 3350 [Miralax 17 gm PO QDAY #15 powd.pack 05/03/22 05/12/22 

Unknown Rx





3350]     


 


traMADoL [Ultram 50 MG tab] 50 mg PO Q6H PRN #5 tablet 05/03/22 05/12/22 Unknown

Rx


 


Amiodarone [Cordarone 200 MG TAB] 200 mg PO .AS DIRECTED #60 tablet 05/14/22  

Unknown Rx


 


Apixaban [Eliquis] 2.5 mg PO BID #60 tab 05/14/22  Unknown Rx


 


Prednisone [predniSONE 5 mg (6-Day 5 mg PO .TAPER #1 05/14/22  Unknown Rx





Pack, 21 Tabs)]     











Active Meds: 


Active Medications





Acetaminophen (Acetaminophen 325 Mg Tab)  650 mg PO Q4H PRN


   PRN Reason: Pain MILD(1-3)/Fever >100.5/HA


Amiodarone HCl (Amiodarone 200 Mg Tab)  200 mg PO BID [facility]


   Stop: 06/06/22 22:01


   Last Admin: 05/24/22 10:40 Dose:  200 mg


   


Amiodarone HCl (Amiodarone 200 Mg Tab)  200 mg PO DAILY [facility]


Apixaban (Apixaban 2.5 Mg Tab)  2.5 mg PO Q12HR [facility]; Protocol


   Last Admin: 05/24/22 10:40 Dose:  2.5 mg


   


Aspirin (Aspirin Ec 325 Mg Tab)  325 mg PO QDAY [facility]


Atorvastatin Calcium (Atorvastatin 20 Mg Tab)  20 mg PO QHS [facility]


Dextrose (Dextrose 50% In Water (25gm) 50 Ml Syringe)  50 ml IV Q30MIN PRN; 

Protocol


   PRN Reason: Hypoglycemia


Heparin Sodium (Porcine) (Heparin 10,000 Units/10 Ml Vial)  3,000 unit IV VANCE 

PRN


   PRN Reason: hemodialysis


Hydralazine HCl (Hydralazine 20 Mg/1 Ml Inj)  10 mg IV Q4HR PRN


   PRN Reason: Blood Pressure


Sodium Chloride (Nacl 0.9%)  100 mls @ 999 mls/hr IV VANCE PRN


   PRN Reason: Hypotension


Insulin Human Lispro (Insulin Lispro 100 Unit/Ml)  0 unit SUB-Q ACHS [facility]; 

Protocol


   Last Admin: 05/24/22 08:18 Dose:  Not Given


   


Losartan Potassium (Losartan 50 Mg Tab)  50 mg PO QDAY [facility]


   Last Admin: 05/24/22 10:40 Dose:  50 mg


   


Magnesium Hydroxide (Magnesium Hydroxide (Mom) Oral Liqd Udc)  30 ml PO Q4H PRN


   PRN Reason: Constipation


Methocarbamol (Methocarbamol 750 Mg Tab)  750 mg PO BID [facility]


   Last Admin: 05/24/22 10:40 Dose:  750 mg


   


Morphine Sulfate (Morphine 2 Mg/1 Ml Inj)  2 mg IV Q4H PRN


   PRN Reason: Pain, Moderate (4-6)


Morphine Sulfate (Morphine 4 Mg/1 Ml Inj)  4 mg IV Q4H PRN


   PRN Reason: Pain , Severe (7-10)


   Last Admin: 05/24/22 03:01 Dose:  4 mg


   


Morphine Sulfate (Morphine 4 Mg/1 Ml Inj)  2 mg IV Q5MIN PRN


   PRN Reason: Chest Pain unrelieved by NTG


Ondansetron HCl (Ondansetron 4 Mg/2 Ml Inj)  4 mg IV Q8H PRN


   PRN Reason: Nausea And Vomiting


Pantoprazole Sodium (Pantoprazole 40 Mg Tab)  40 mg PO QDAY [facility]


   Last Admin: 05/24/22 10:40 Dose:  40 mg


   


Sodium Chloride (Sodium Chloride 0.9% 10 Ml Flush Syringe)  10 ml IV BID [facility]


   Last Admin: 05/24/22 10:40 Dose:  10 ml


   


Sodium Chloride (Sodium Chloride 0.9% 10 Ml Flush Syringe)  10 ml IV PRN PRN


   PRN Reason: LINE FLUSH


Tramadol HCl (Tramadol 50 Mg Tab)  50 mg PO Q6H PRN


   PRN Reason: Pain, Moderate (4-6)











Physical Examination


                                   Vital Signs











Temp Pulse Resp BP Pulse Ox


 


 98.3 F   100 H  20   176/89   100 


 


 05/23/22 17:54  05/23/22 17:54  05/23/22 17:54  05/23/22 17:54  05/23/22 17:54














Results





                                 05/23/22 18:36





                                 05/24/22 01:35


                                 Cardiac Enzymes











  05/23/22 Range/Units





  18:36 


 


AST  35  (5-40)  units/L








                                       CBC











  05/23/22 Range/Units





  18:36 


 


WBC  10.3  (4.5-11.0)  K/mm3


 


RBC  3.63 L  (3.65-5.03)  M/mm3


 


Hgb  11.1  (10.1-14.3)  gm/dl


 


Hct  34.4  (30.3-42.9)  %


 


Plt Count  153  (140-440)  K/mm3


 


Lymph # (Auto)  1.4  (1.2-5.4)  K/mm3


 


Mono # (Auto)  0.7  (0.0-0.8)  K/mm3


 


Eos # (Auto)  0.2  (0.0-0.4)  K/mm3


 


Baso # (Auto)  0.1  (0.0-0.1)  K/mm3








                          Comprehensive Metabolic Panel











  05/23/22 05/24/22 Range/Units





  18:36 01:35 


 


Sodium  140  139  (137-145)  mmol/L


 


Potassium  4.3  4.9  (3.6-5.0)  mmol/L


 


Chloride  101.6  99.1  ()  mmol/L


 


Carbon Dioxide  22  20 L  (22-30)  mmol/L


 


BUN  64 H  66 H  (7-17)  mg/dL


 


Creatinine  5.0 H  5.4 H  (0.6-1.2)  mg/dL


 


Glucose  95  101 H  ()  mg/dL


 


Calcium  9.0  9.4  (8.4-10.2)  mg/dL


 


AST  35   (5-40)  units/L


 


ALT  39   (7-56)  units/L


 


Alkaline Phosphatase  69   ()  units/L


 


Total Protein  6.0 L   (6.3-8.2)  g/dL


 


Albumin  4.3   (3.9-5)  g/dL














Assessment and Plan





1.  Chest pain


2.  Coronary artery disease status post PCI


3.  Ischemic cardiomyopathy


4.  Type 2 diabetes mellitus


5.  Hyperlipidemia


6.  Essential hypertension


7.  End-stage renal disease on hemodialysis


8.  Status post permanent pacemaker insertion


9.  History of CVA





Plan.


Patient is currently stable in patient's chart there is a documentation of a 

negative stress MPI in March 2022 we shall obtain this resolved patient's chest 

pain appears atypical and noncardiac in origin.  We will recommend conservative 

management at this time.

## 2022-05-24 NOTE — CONSULTATION
History of Present Illness





- Reason for Consult


Consult date: 05/24/22


end stage renal disease





- History of Present Illness


The patient is a 68 YO female known to our service with history of DM-2, HTN, 

HLD, Bipolar Disorder, CAD s/p Stent Placement, Paroxysmal atrial fibrillation, 

HFpEF, COPD, Anemia and ESRD on hemodialysis (TTS) who presented to [facility] ED 

5/23/22 with c/o chest pain.  Patient reports sharp, 10/10, midsternal chest 

pain, not radiating and started yesterday AM.  She has had several admissions 

and ER visits with similar presentation and had extensive workup.  Patient 

denies any N, V, D, abd pain, dizziness, cough, weakness, fever, chills, rash, 

blurry vision, hematuria or hemoptysis.  Patient was admitted for further 

evaluation.  Labs and imaging noted.  Nephrology was consulted for ESRD 

management.





Past History


Past Medical History: acute MI, COPD, diabetes, dialysis, ESRD, heart failure, 

hypertension, hyperlipidemia, stroke (With right-sided weakness), other (Bipolar

disorder, schizophrenia)


Past Surgical History: PTCA, Other (Pacemaker placement, knee surgery in January 2018)


Social history: smoking (Current daily smoker)


Family history: no significant family history





Medications and Allergies


                                    Allergies











Allergy/AdvReac Type Severity Reaction Status Date / Time


 


Iodinated Contrast Media AdvReac Intermediate Swelling Verified 05/12/22 07:58


 


apple AdvReac  Hives Verified 05/12/22 07:58


 


shell fish Allergy  Swelling Uncoded 05/12/22 07:58











                                Home Medications











 Medication  Instructions  Recorded  Confirmed  Last Taken  Type


 


Gabapentin 100 mg PO BID 04/23/22 05/12/22 Unknown History


 


Nitroglycerin [Nitro-Bid] 0.1 mg TRANSDERMA Q6HR PRN 04/23/22 05/12/22 Unknown H

istory


 


Pantoprazole [Protonix TAB] 40 mg PO QDAY 04/23/22 05/12/22 04/23/22 10:00 

History


 


Sennosides/Docusate Sodium [Senna 1 cap PO DAILY 05/01/22 05/12/22 Unknown 

History





Plus 8.6-50 mg Softgel]     


 


methIMAzole [Methimazole] 10 mg PO TID 05/01/22 05/12/22 Unknown History


 


Acetaminophen [Acetaminophen TAB] 650 mg PO Q6H PRN  tablet 05/03/22 05/12/22 

Unknown Rx


 


AtorvaSTATin [Lipitor] 20 mg PO QHS #30 tablet 05/03/22 05/12/22 Unknown Rx


 


ISOSORBIDE MONOnitrate [Imdur ER] 60 mg PO QDAY #30 tablet 05/03/22 05/12/22 

Unknown Rx


 


Losartan [Cozaar] 50 mg PO QDAY #30 tab 05/03/22 05/12/22 Unknown Rx


 


Magnesium Hydroxide [Milk of 30 ml PO Q4H PRN  oral.liqd 05/03/22 05/12/22 

Unknown Rx





Magnesia]     


 


Metoprolol [Lopressor TAB] 100 mg PO BID #60 tablet 05/03/22 05/12/22 Unknown Rx


 


Nitroglycerin [Nitrostat] 0.4 mg SL .Q5MIN PRN #15 tablet 05/03/22 05/12/22 

Unknown Rx


 


Sennosides/Docusate [Senokot S] 1 tab PO DAILY #30 tablet 05/03/22 05/12/22 

Unknown Rx


 


hydrALAZINE [Apresoline TAB] 50 mg PO Q8HR #120 tablet 05/03/22 05/12/22 Unknown

Rx


 


polyethylene glycoL 3350 [Miralax 17 gm PO QDAY #15 powd.pack 05/03/22 05/12/22 

Unknown Rx





3350]     


 


traMADoL [Ultram 50 MG tab] 50 mg PO Q6H PRN #5 tablet 05/03/22 05/12/22 Unknown

Rx


 


Amiodarone [Cordarone 200 MG TAB] 200 mg PO .AS DIRECTED #60 tablet 05/14/22  

Unknown Rx


 


Apixaban [Eliquis] 2.5 mg PO BID #60 tab 05/14/22  Unknown Rx


 


Prednisone [predniSONE 5 mg (6-Day 5 mg PO .TAPER #1 05/14/22  Unknown Rx





Pack, 21 Tabs)]     











Active Meds: 


Active Medications





Acetaminophen (Acetaminophen 325 Mg Tab)  650 mg PO Q4H PRN


   PRN Reason: Pain MILD(1-3)/Fever >100.5/HA


Amiodarone HCl (Amiodarone 200 Mg Tab)  200 mg PO BID [facility]ERIK


   Stop: 06/06/22 22:01


Amiodarone HCl (Amiodarone 200 Mg Tab)  200 mg PO DAILY [facility]


Apixaban (Apixaban 2.5 Mg Tab)  2.5 mg PO Q12HR [facility]; Protocol


Aspirin (Aspirin Ec 325 Mg Tab)  325 mg PO QDAY [facility]


Atorvastatin Calcium (Atorvastatin 20 Mg Tab)  20 mg PO QHS [facility]


Dextrose (Dextrose 50% In Water (25gm) 50 Ml Syringe)  50 ml IV Q30MIN PRN; 

Protocol


   PRN Reason: Hypoglycemia


Hydralazine HCl (Hydralazine 20 Mg/1 Ml Inj)  10 mg IV Q4HR PRN


   PRN Reason: Blood Pressure


Insulin Human Lispro (Insulin Lispro 100 Unit/Ml)  0 unit SUB-Q ACHS [facility]; 

Protocol


   Last Admin: 05/24/22 08:18 Dose:  Not Given


   


Losartan Potassium (Losartan 50 Mg Tab)  50 mg PO QDAY [facility]


Magnesium Hydroxide (Magnesium Hydroxide (Mom) Oral Liqd Udc)  30 ml PO Q4H PRN


   PRN Reason: Constipation


Methocarbamol (Methocarbamol 750 Mg Tab)  750 mg PO BID [facility]


   Last Admin: 05/23/22 23:28 Dose:  750 mg


   


Morphine Sulfate (Morphine 2 Mg/1 Ml Inj)  2 mg IV Q4H PRN


   PRN Reason: Pain, Moderate (4-6)


Morphine Sulfate (Morphine 4 Mg/1 Ml Inj)  4 mg IV Q4H PRN


   PRN Reason: Pain , Severe (7-10)


   Last Admin: 05/24/22 03:01 Dose:  4 mg


   


Morphine Sulfate (Morphine 4 Mg/1 Ml Inj)  2 mg IV Q5MIN PRN


   PRN Reason: Chest Pain unrelieved by NTG


Ondansetron HCl (Ondansetron 4 Mg/2 Ml Inj)  4 mg IV Q8H PRN


   PRN Reason: Nausea And Vomiting


Pantoprazole Sodium (Pantoprazole 40 Mg Tab)  40 mg PO QDAY [facility]


Sodium Chloride (Sodium Chloride 0.9% 10 Ml Flush Syringe)  10 ml IV BID [facility]


Sodium Chloride (Sodium Chloride 0.9% 10 Ml Flush Syringe)  10 ml IV PRN PRN


   PRN Reason: LINE FLUSH


Tramadol HCl (Tramadol 50 Mg Tab)  50 mg PO Q6H PRN


   PRN Reason: Pain, Moderate (4-6)











Review of Systems


All systems: negative





Exam





- Vital Signs


Vital signs: 


                                   Vital Signs











Temp Pulse Resp BP Pulse Ox


 


 98.3 F   100 H  20   176/89   100 


 


 05/23/22 17:54  05/23/22 17:54  05/23/22 17:54  05/23/22 17:54  05/23/22 17:54














Results





- Lab Results





                                 05/23/22 18:36





                                 05/24/22 01:35


                             Most recent lab results











Calcium  9.4 mg/dL (8.4-10.2)   05/24/22  01:35    














Assessment and Plan





1. ESRD:


Patient is on maintenance hemodialysis, TTS schedule.


Hemodialysis: today.





2. FEN:


UF with HD as tolerated.


Monitor lytes and volume status.





3. Chest pain //  H/o CAD s/p PCI:


Cards consulted.


Monitor.





4. Paroxysmal A.flutter with RVR:


Amiodarone and Eliquis.


Monitor.





5. Chronic HFpEF:


Volume control thru HD.


Fluid restriction, monitor I/O.





6. H/o COPD:


Nebs and O2 as needed.





7. Hypertension:


Volume control with HD.


Adjust BP meds as needed.


Monitor BP.





8. Normochromic anemia, POA:


Chronic.


Likely 2/2 ESRD.


Epogen as needed.











Subjective:


Patient was seen and examined at the bedside.











Examination:


General appearance: well-developed, appears stated age, no distress


HEENT: atraumatic, IVETTE


Neck: trachea midline


Respiratory: ctab


Heart: S1S2, regular, no murmur


Abdomen: soft, bowel sounds heard, NT


Integumentary: no obvious rash


Neurologic: AO, able to move extremities


Ext: no edema


Hemodialysis access: R IJ tunnel catheter, R arm AVG

## 2022-05-24 NOTE — HISTORY AND PHYSICAL REPORT
History of Present Illness


Date of examination: 05/24/22


Date of admission: 


05/24/2022


Chief complaint: 





Chest pain


History of present illness: 





67-year-old female with known history of end-stage renal disease, history of 

CVA, hyperlipidemia, presenting to the emergency room today complaining of chest

pain, shortness of breath with some dry cough.  She states that chest pain is 

left-sided.  No known relieving or exacerbating factor.  No radiation.





Patient has been seen here multiple occasions for similar complaints.





Upon arrival in the emergency room patient was found to be quite tachycardic and

also had elevated blood pressure.


Work-up in the emergency room today, chest x-ray reveals mild venous congestion.


Troponins were essentially negative.  BUN of 64 and creatinine of 5.0.





Patient had a stress test sometime in March 2022 which did not reveal any acute 

abnormality.





Past History


Past Medical History: acute MI, COPD, diabetes, dialysis, ESRD, heart failure, 

hypertension, hyperlipidemia, stroke (With right-sided weakness), other (Bipolar

disorder, schizophrenia)


Past Surgical History: PTCA, Other (Pacemaker placement, knee surgery in January 2018)


Social history: smoking (Current daily smoker)


Family history: no significant family history





Medications and Allergies


                                    Allergies











Allergy/AdvReac Type Severity Reaction Status Date / Time


 


Iodinated Contrast Media AdvReac Intermediate Swelling Verified 05/12/22 07:58


 


apple AdvReac  Hives Verified 05/12/22 07:58


 


shell fish Allergy  Swelling Uncoded 05/12/22 07:58











                                Home Medications











 Medication  Instructions  Recorded  Confirmed  Last Taken  Type


 


Gabapentin 100 mg PO BID 04/23/22 05/12/22 Unknown History


 


Nitroglycerin [Nitro-Bid] 0.1 mg TRANSDERMA Q6HR PRN 04/23/22 05/12/22 Unknown 

History


 


Pantoprazole [Protonix TAB] 40 mg PO QDAY 04/23/22 05/12/22 04/23/22 10:00 

History


 


Sennosides/Docusate Sodium [Senna 1 cap PO DAILY 05/01/22 05/12/22 Unknown 

History





Plus 8.6-50 mg Softgel]     


 


methIMAzole [Methimazole] 10 mg PO TID 05/01/22 05/12/22 Unknown History


 


Acetaminophen [Acetaminophen TAB] 650 mg PO Q6H PRN  tablet 05/03/22 05/12/22 

Unknown Rx


 


AtorvaSTATin [Lipitor] 20 mg PO QHS #30 tablet 05/03/22 05/12/22 Unknown Rx


 


ISOSORBIDE MONOnitrate [Imdur ER] 60 mg PO QDAY #30 tablet 05/03/22 05/12/22 

Unknown Rx


 


Losartan [Cozaar] 50 mg PO QDAY #30 tab 05/03/22 05/12/22 Unknown Rx


 


Magnesium Hydroxide [Milk of 30 ml PO Q4H PRN  oral.liqd 05/03/22 05/12/22 

Unknown Rx





Magnesia]     


 


Metoprolol [Lopressor TAB] 100 mg PO BID #60 tablet 05/03/22 05/12/22 Unknown Rx


 


Nitroglycerin [Nitrostat] 0.4 mg SL .Q5MIN PRN #15 tablet 05/03/22 05/12/22 

Unknown Rx


 


Sennosides/Docusate [Senokot S] 1 tab PO DAILY #30 tablet 05/03/22 05/12/22 

Unknown Rx


 


hydrALAZINE [Apresoline TAB] 50 mg PO Q8HR #120 tablet 05/03/22 05/12/22 Unknown

Rx


 


polyethylene glycoL 3350 [Miralax 17 gm PO QDAY #15 powd.pack 05/03/22 05/12/22 

Unknown Rx





3350]     


 


traMADoL [Ultram 50 MG tab] 50 mg PO Q6H PRN #5 tablet 05/03/22 05/12/22 Unknown

Rx


 


Amiodarone [Cordarone 200 MG TAB] 200 mg PO .AS DIRECTED #60 tablet 05/14/22  

Unknown Rx


 


Apixaban [Eliquis] 2.5 mg PO BID #60 tab 05/14/22  Unknown Rx


 


Prednisone [predniSONE 5 mg (6-Day 5 mg PO .TAPER #1 05/14/22  Unknown Rx





Pack, 21 Tabs)]     











Active Meds: 


Active Medications





Methocarbamol (Methocarbamol 750 Mg Tab)  750 mg PO BID DERIK


   Last Admin: 05/23/22 23:28 Dose:  750 mg


   











Review of Systems


Constitutional: no fever, no chills


Ears, nose, mouth and throat: no nasal congestion, no sore throat


Cardiovascular: chest pain, palpitations


Respiratory: shortness of breath, no cough, no wheezing


Gastrointestinal: no abdominal pain, no nausea, no vomiting, no diarrhea


Genitourinary Female: no pelvic pain, no flank pain, no dysuria, no hematuria


Musculoskeletal: no neck pain, no low back pain


Integumentary: no rash, no pruritis


Neurological: no headaches, no confusion


Psychiatric: no anxiety, no depression


Endocrine: no polydipsia, no polyuria, no nocturia





Exam





- Constitutional


Vitals: 


                                        











Temp Pulse Resp BP Pulse Ox


 


 98.5 F   111 H  19   148/86   100 


 


 05/23/22 19:35  05/24/22 00:16  05/24/22 00:16  05/24/22 00:16  05/24/22 00:16











General appearance: Present: no acute distress, well-nourished





- EENT


Eyes: Present: PERRL, EOM intact.  Absent: scleral icterus


ENT: hearing intact, clear oral mucosa, dentition normal





- Neck


Neck: Present: supple, normal ROM





- Respiratory


Respiratory effort: normal


Respiratory: bilateral: CTA





- Cardiovascular


Rhythm: regular


Heart Sounds: Present: S1 & S2.  Absent: gallop, systolic murmur, diastolic 

murmur, rub, click





- Extremities


Extremities: no ischemia, pulses intact, pulses symmetrical, No edema, normal te

mperature, normal color, Full ROM


Peripheral Pulses: within normal limits





- Abdominal


General gastrointestinal: Present: soft, non-tender, non-distended, normal bowel

sounds.  Absent: mass





- Integumentary


Integumentary: Present: clear, warm, dry, normal turgor.  Absent: rash





- Musculoskeletal


Musculoskeletal: strength equal bilaterally





- Psychiatric


Psychiatric: appropriate mood/affect, intact judgment & insight, memory intact, 

cooperative





- Neurologic


Neurologic: CNII-XII intact, no focal deficits, moves all extremities





HEART Score





- HEART Score


Troponin: 


                                        











Troponin T  0.016 ng/mL (0.00-0.029)   05/24/22  00:08    














Results





- Labs


CBC & Chem 7: 


                                 05/23/22 18:36





                                 05/24/22 01:35


Labs: 


                              Abnormal lab results











  05/23/22 05/23/22 Range/Units





  18:36 18:36 


 


RBC  3.63 L   (3.65-5.03)  M/mm3


 


RDW  18.3 H   (13.2-15.2)  %


 


Seg Neutrophils %  77.1 H   (40.0-70.0)  %


 


Seg Neutrophils #  7.9 H   (1.8-7.7)  K/mm3


 


BUN   64 H  (7-17)  mg/dL


 


Creatinine   5.0 H  (0.6-1.2)  mg/dL


 


Total Protein   6.0 L  (6.3-8.2)  g/dL














Assessment and Plan





- Patient Problems


(1) Chest pain


Current Visit: Yes   Status: Acute   


Qualifiers: 


   Chest pain type: unspecified   Qualified Code(s): R07.9 - Chest pain, 

unspecified   


Plan to address problem: 


Patient admitted and placed on telemetry.  Cardiac enzymes so far has been 

negative.


Will continue patient on daily aspirin, sublingual nitroglycerin and IV morphine

as needed for chest pain.


Consult placed to cardiology for further evaluation and recommendations.








(2) Bipolar disorder


Current Visit: No   Status: Acute   


Plan to address problem: 


We will resume routine home medications.








(3) End-stage renal disease on hemodialysis


Current Visit: No   Status: Acute   


Plan to address problem: 


Consult placed to nephrology for possible dialysis.








(4) Malignant hypertension


Current Visit: No   Status: Acute   


Plan to address problem: 


We will resume routine home medications and monitor vital signs closely.


Patient will also be placed on IV hydralazine as needed for pressure control.








(5) Hyperlipidemia


Current Visit: No   Status: Acute   


Qualifiers: 


   Hyperlipidemia type: mixed hyperlipidemia   Qualified Code(s): E78.2 - Mixed 

hyperlipidemia   





(6) Diabetes mellitus


Current Visit: No   Status: Chronic   


Plan to address problem: 


Patient placed on sliding scale insulin.  We will monitor Accu-Cheks.








(7) Nicotine dependence


Current Visit: No   Status: Acute   


Qualifiers: 


   Nicotine product type: cigarettes   Substance use status: in withdrawal   

Qualified Code(s): F17.213 - Nicotine dependence, cigarettes, with withdrawal   


Plan to address problem: 


Patient counseled on quitting tobacco abuse.


We offer nicotine patch as needed.








(8) DVT prophylaxis


Current Visit: No   Status: Acute   


Plan to address problem: 


Patient placed on subcutaneous heparin.

## 2022-05-25 NOTE — ELECTROCARDIOGRAPH REPORT
[facility]

                                       

Test Date:    2022               Test Time:    07:20:38

Pat Name:     LINDA GARCIA              Department:   

Patient ID:   [id number]          Room:         A468

Gender:       F                        Technician:   AURORA

:          1955               Requested By: SHEREE BEVERLY

Order Number: [id number]              Reading MD:   Mc Davis

                                 Measurements

Intervals                              Axis          

Rate:         70                       P:            

WV:           141                      QRS:          -5

QRSD:         87                       T:            53

QT:           445                                    

QTc:          480                                    

                           Interpretive Statements

Atrial-paced complexes

Compared to ECG 2022 18:14:24

Atrial fibrillation no longer present

Left ventricular hypertrophy no longer present

Q waves no longer present

Electronically Signed On 2022 13:54:20 EDT by Mc Davis

## 2022-05-25 NOTE — ELECTROCARDIOGRAPH REPORT
[facility]

                                       

Test Date:    2022               Test Time:    18:14:24

Pat Name:     LINDA GARCIA              Department:   

Patient ID:   [id number]          Room:         A468

Gender:       F                        Technician:   [email or web address]

:          1955               Requested By: BRITTANY ESCOBEDO

Order Number: [id number]              Reading MD:   Mc Davis

                                 Measurements

Intervals                              Axis          

Rate:         100                      P:            

NV:                                    QRS:          -20

QRSD:         89                       T:            64

QT:           361                                    

QTc:          466                                    

                           Interpretive Statements

Atrial fibrillation

Consider left ventricular hypertrophy

Anterior Q waves, possibly due to LVH

Compared to ECG 2022 08:41:44

Left ventricular hypertrophy now present

Q waves now present

Sinus tachycardia no longer present

Myocardial infarct finding no longer present

ST (T wave) deviation no longer present

Electronically Signed On 2022 12:09:12 EDT by Mc Davis

## 2022-05-31 ENCOUNTER — HOSPITAL ENCOUNTER (EMERGENCY)
Dept: HOSPITAL 5 - ED | Age: 67
Discharge: LEFT BEFORE DISCHARGE | End: 2022-05-31
Payer: MEDICARE

## 2022-05-31 VITALS — SYSTOLIC BLOOD PRESSURE: 160 MMHG | DIASTOLIC BLOOD PRESSURE: 90 MMHG

## 2022-05-31 DIAGNOSIS — R07.9: Primary | ICD-10-CM

## 2022-05-31 DIAGNOSIS — Z53.21: ICD-10-CM

## 2022-05-31 LAB
ALBUMIN SERPL-MCNC: 4.7 G/DL (ref 3.9–5)
ALT SERPL-CCNC: 19 UNITS/L (ref 7–56)
BASOPHILS # (AUTO): 0 K/MM3 (ref 0–0.1)
BASOPHILS NFR BLD AUTO: 0.5 % (ref 0–1.8)
BUN SERPL-MCNC: 27 MG/DL (ref 7–17)
BUN/CREAT SERPL: 10 %
CALCIUM SERPL-MCNC: 9.4 MG/DL (ref 8.4–10.2)
EOSINOPHIL # BLD AUTO: 0.2 K/MM3 (ref 0–0.4)
EOSINOPHIL NFR BLD AUTO: 2.6 % (ref 0–4.3)
HCT VFR BLD CALC: 38.2 % (ref 30.3–42.9)
HEMOLYSIS INDEX: 58
HGB BLD-MCNC: 12.4 GM/DL (ref 10.1–14.3)
LYMPHOCYTES # BLD AUTO: 1.7 K/MM3 (ref 1.2–5.4)
LYMPHOCYTES NFR BLD AUTO: 20 % (ref 13.4–35)
MCHC RBC AUTO-ENTMCNC: 33 % (ref 30–34)
MCV RBC AUTO: 95 FL (ref 79–97)
MONOCYTES # (AUTO): 0.7 K/MM3 (ref 0–0.8)
MONOCYTES % (AUTO): 7.8 % (ref 0–7.3)
PLATELET # BLD: 150 K/MM3 (ref 140–440)
RBC # BLD AUTO: 4 M/MM3 (ref 3.65–5.03)

## 2022-05-31 PROCEDURE — 93005 ELECTROCARDIOGRAM TRACING: CPT

## 2022-05-31 PROCEDURE — 84484 ASSAY OF TROPONIN QUANT: CPT

## 2022-05-31 PROCEDURE — 85025 COMPLETE CBC W/AUTO DIFF WBC: CPT

## 2022-05-31 PROCEDURE — 36415 COLL VENOUS BLD VENIPUNCTURE: CPT

## 2022-05-31 PROCEDURE — 71046 X-RAY EXAM CHEST 2 VIEWS: CPT

## 2022-05-31 PROCEDURE — 80053 COMPREHEN METABOLIC PANEL: CPT

## 2022-05-31 NOTE — XRAY REPORT
CHEST 2 VIEWS 



INDICATION / CLINICAL INFORMATION: Chest pain for 4 hours.



COMPARISON: 05/20/22.



FINDINGS:



SUPPORT DEVICES: The positions of the right jugular CVL and dual chamber left subclavian transvenous 
pacemaker have not changed.

HEART / MEDIASTINUM: Mild cardiomegaly with a left ventricular configuration is again noted. Pulmonar
y vascular congestion has resolved. The aorta is normal in caliber. There is a coronary artery stent.
 

LUNGS / PLEURA: No significant pulmonary or pleural abnormality. No pneumothorax. 



ADDITIONAL FINDINGS: No significant additional findings.



IMPRESSION: No acute findings.



Signer Name: João Braxton MD 

Signed: 5/31/2022 4:00 PM

Workstation Name: [id number]

## 2022-06-02 ENCOUNTER — HOSPITAL ENCOUNTER (EMERGENCY)
Dept: HOSPITAL 5 - ED | Age: 67
Discharge: HOME | End: 2022-06-02
Payer: MEDICARE

## 2022-06-02 VITALS — DIASTOLIC BLOOD PRESSURE: 97 MMHG | SYSTOLIC BLOOD PRESSURE: 153 MMHG

## 2022-06-02 DIAGNOSIS — Z91.013: ICD-10-CM

## 2022-06-02 DIAGNOSIS — F17.200: ICD-10-CM

## 2022-06-02 DIAGNOSIS — N18.6: ICD-10-CM

## 2022-06-02 DIAGNOSIS — R07.89: Primary | ICD-10-CM

## 2022-06-02 DIAGNOSIS — F31.9: ICD-10-CM

## 2022-06-02 DIAGNOSIS — Z79.899: ICD-10-CM

## 2022-06-02 DIAGNOSIS — J44.1: ICD-10-CM

## 2022-06-02 DIAGNOSIS — Z99.2: ICD-10-CM

## 2022-06-02 DIAGNOSIS — I50.9: ICD-10-CM

## 2022-06-02 DIAGNOSIS — Z91.09: ICD-10-CM

## 2022-06-02 DIAGNOSIS — Z86.73: ICD-10-CM

## 2022-06-02 DIAGNOSIS — Z98.890: ICD-10-CM

## 2022-06-02 DIAGNOSIS — E11.22: ICD-10-CM

## 2022-06-02 DIAGNOSIS — I13.2: ICD-10-CM

## 2022-06-02 DIAGNOSIS — Z91.02: ICD-10-CM

## 2022-06-02 LAB
ALBUMIN SERPL-MCNC: 4 G/DL (ref 3.9–5)
ALT SERPL-CCNC: 16 UNITS/L (ref 7–56)
BASOPHILS # (AUTO): 0 K/MM3 (ref 0–0.1)
BASOPHILS NFR BLD AUTO: 0.5 % (ref 0–1.8)
BUN SERPL-MCNC: 49 MG/DL (ref 7–17)
BUN/CREAT SERPL: 10 %
CALCIUM SERPL-MCNC: 8.8 MG/DL (ref 8.4–10.2)
EOSINOPHIL # BLD AUTO: 0.2 K/MM3 (ref 0–0.4)
EOSINOPHIL NFR BLD AUTO: 2.3 % (ref 0–4.3)
HCT VFR BLD CALC: 36.4 % (ref 30.3–42.9)
HEMOLYSIS INDEX: 8
HGB BLD-MCNC: 11.5 GM/DL (ref 10.1–14.3)
LYMPHOCYTES # BLD AUTO: 1.4 K/MM3 (ref 1.2–5.4)
LYMPHOCYTES NFR BLD AUTO: 17.6 % (ref 13.4–35)
MCHC RBC AUTO-ENTMCNC: 32 % (ref 30–34)
MCV RBC AUTO: 96 FL (ref 79–97)
MONOCYTES # (AUTO): 0.6 K/MM3 (ref 0–0.8)
MONOCYTES % (AUTO): 7.5 % (ref 0–7.3)
PLATELET # BLD: 127 K/MM3 (ref 140–440)
RBC # BLD AUTO: 3.79 M/MM3 (ref 3.65–5.03)

## 2022-06-02 PROCEDURE — 93005 ELECTROCARDIOGRAM TRACING: CPT

## 2022-06-02 PROCEDURE — 36415 COLL VENOUS BLD VENIPUNCTURE: CPT

## 2022-06-02 PROCEDURE — 71046 X-RAY EXAM CHEST 2 VIEWS: CPT

## 2022-06-02 PROCEDURE — 85025 COMPLETE CBC W/AUTO DIFF WBC: CPT

## 2022-06-02 PROCEDURE — 99284 EMERGENCY DEPT VISIT MOD MDM: CPT

## 2022-06-02 PROCEDURE — 84484 ASSAY OF TROPONIN QUANT: CPT

## 2022-06-02 PROCEDURE — 80053 COMPREHEN METABOLIC PANEL: CPT

## 2022-06-02 NOTE — XRAY REPORT
XR chest routine 2V



INDICATION / CLINICAL INFORMATION:

chest pain



COMPARISON: 

May 31, 2022



FINDINGS:



SUPPORT DEVICES: RIJ CVC terminates in the cavoatrial junction. Pacemaker unchanged



HEART / MEDIASTINUM: No significant abnormality. 



LUNGS / PLEURA: Lungs are clear.  Costophrenic sulci are sharp. No pneumothorax.



ADDITIONAL FINDINGS: No significant additional findings.



IMPRESSION:

1. No acute findings.



Signer Name: Raheem Ornelas MD 

Signed: 6/2/2022 9:15 AM

Workstation Name: [id number]

## 2022-06-02 NOTE — EMERGENCY DEPARTMENT REPORT
ED Chest Pain HPI





- General


Chief Complaint: Chest Pain


Stated Complaint: CHEST PAIN


Time Seen by Provider: 06/02/22 09:51


Source: patient


Mode of arrival: Ambulatory


Limitations: No Limitations





- History of Present Illness


Initial Comments: 





Ms. Shoshana Hudson is a well-known 67-year-old female with a history of end-stage 

renal disease currently on hemodialysis that presents this morning with chest 

pain that started last night.  Patient is supposed to go for dialysis today with

last dialysis about 3 days ago on Tuesday.  Patient denies any fever or chills. 

Patient describes chest pain as pressure with no radiation.  No shortness of 

breath or palpitation reported.  No other modifying or associated factors 

reported.





- Related Data


                                Home Medications











 Medication  Instructions  Recorded  Confirmed  Last Taken


 


Gabapentin 100 mg PO BID 04/23/22 05/28/22 Unknown


 


Pantoprazole [Protonix TAB] 40 mg PO QDAY 04/23/22 05/28/22 04/23/22 10:00


 


methIMAzole [Methimazole] 10 mg PO TID 05/01/22 05/28/22 Unknown








                                  Previous Rx's











 Medication  Instructions  Recorded  Last Taken  Type


 


AtorvaSTATin [Lipitor] 20 mg PO QHS #30 tablet 05/03/22 Unknown Rx


 


ISOSORBIDE MONOnitrate [Imdur ER] 60 mg PO QDAY #30 tablet 05/03/22 Unknown Rx


 


Losartan [Cozaar] 50 mg PO QDAY #30 tab 05/03/22 Unknown Rx


 


Metoprolol [Lopressor TAB] 100 mg PO BID #60 tablet 05/03/22 Unknown Rx


 


Sennosides/Docusate [Senokot S] 1 tab PO DAILY #30 tablet 05/03/22 Unknown Rx


 


hydrALAZINE [Apresoline TAB] 50 mg PO Q8HR #120 tablet 05/03/22 Unknown Rx


 


Amiodarone [Cordarone 200 MG TAB] 200 mg PO .AS DIRECTED #60 tablet 05/14/22 

Unknown Rx


 


Apixaban [Eliquis] 2.5 mg PO BID #60 tab 05/14/22 Unknown Rx


 


Aspirin EC [Ecotrin] 325 mg PO QDAY 30 Days #30 tablet 05/24/22 Unknown Rx


 


Nitroglycerin [Nitrostat] 0.4 mg SL .Q5MIN PRN 30 Days #30 05/24/22 Unknown Rx





 tablet   











                                    Allergies











Allergy/AdvReac Type Severity Reaction Status Date / Time


 


Iodinated Contrast Media AdvReac Intermediate Swelling Verified 06/02/22 09:45


 


apple AdvReac  Hives Verified 06/02/22 09:45


 


shell fish Allergy  Swelling Uncoded 06/02/22 09:45














Heart Score





- HEART Score


History: Moderately suspicious


EKG: Normal


Age: > 65


Risk factors: > 3 risk factors or hx of atherosclerotic disease


Troponin: < normal limit


HEART Score: 5





- EKG Read Time


Time EKG Completed: 08:23


EKG Read Time: 08:28





- Critical Actions


Critical Actions: 4-6 pts:12-16.6% risk of adverse cardiac event. Should be 

admitted





ED Review of Systems


ROS: 


Stated complaint: CHEST PAIN


Other details as noted in HPI





Comment: All other systems reviewed and negative


Cardiovascular: chest pain





ED Past Medical Hx





- Past Medical History


Hx Hypertension: Yes


Hx CVA: Yes (right sided weakness)


Hx Heart Attack/AMI: Yes (1 stent)


Hx Congestive Heart Failure: Yes


Hx Diabetes: Yes


Hx Renal Disease: Yes (RENAL INSUFFICIENCY- stent left kidney)


Hx Sickle Cell Disease: No


Hx Kidney Stones: No


Hx Psychiatric Treatment: Yes (BIPOLAR/SCHIZOPHRENIA)


Hx COPD: Yes


Hx HIV: No


Additional medical history: HIGH CHOLESTEROL





- Surgical History


Hx Coronary Stent: Yes


Hx Pacemaker: Yes


Additional Surgical History: stent placement x 2 in 2015, knee surgery 01/2018





- Social History


Smoking Status: Current Every Day Smoker





- Medications


Home Medications: 


                                Home Medications











 Medication  Instructions  Recorded  Confirmed  Last Taken  Type


 


Gabapentin 100 mg PO BID 04/23/22 05/28/22 Unknown History


 


Pantoprazole [Protonix TAB] 40 mg PO QDAY 04/23/22 05/28/22 04/23/22 10:00 

History


 


methIMAzole [Methimazole] 10 mg PO TID 05/01/22 05/28/22 Unknown History


 


AtorvaSTATin [Lipitor] 20 mg PO QHS #30 tablet 05/03/22 05/28/22 Unknown Rx


 


ISOSORBIDE MONOnitrate [Imdur ER] 60 mg PO QDAY #30 tablet 05/03/22 05/28/22 

Unknown Rx


 


Losartan [Cozaar] 50 mg PO QDAY #30 tab 05/03/22 05/28/22 Unknown Rx


 


Metoprolol [Lopressor TAB] 100 mg PO BID #60 tablet 05/03/22 05/28/22 Unknown Rx


 


Sennosides/Docusate [Senokot S] 1 tab PO DAILY #30 tablet 05/03/22 05/28/22 

Unknown Rx


 


hydrALAZINE [Apresoline TAB] 50 mg PO Q8HR #120 tablet 05/03/22 05/28/22 Unknown

Rx


 


Amiodarone [Cordarone 200 MG TAB] 200 mg PO .AS DIRECTED #60 tablet 05/14/22 05/28/22 Unknown Rx


 


Apixaban [Eliquis] 2.5 mg PO BID #60 tab 05/14/22 05/28/22 Unknown Rx


 


Aspirin EC [Ecotrin] 325 mg PO QDAY 30 Days #30 tablet 05/24/22 05/28/22 Unknown

 Rx


 


Nitroglycerin [Nitrostat] 0.4 mg SL .Q5MIN PRN 30 Days #30 05/24/22 05/28/22 

Unknown Rx





 tablet    














ED Physical Exam





- General


Limitations: No Limitations


General appearance: alert, in no apparent distress





- Head


Head exam: Present: normal inspection





- Eye


Eye exam: Present: normal appearance


Pupils: Present: normal accommodation





- ENT


ENT exam: Present: normal exam, normal orophraynx, mucous membranes moist





- Neck


Neck exam: Present: normal inspection.  Absent: tenderness





- Respiratory


Respiratory exam: Present: normal lung sounds bilaterally.  Absent: respiratory 

distress, accessory muscle use





- Cardiovascular


Cardiovascular Exam: Present: regular rate, normal rhythm, normal heart sounds





- GI/Abdominal


GI/Abdominal exam: Present: soft, normal bowel sounds.  Absent: distended, 

tenderness





- Extremities Exam


Extremities exam: Present: normal inspection, full ROM, normal capillary refill.

  Absent: tenderness, pedal edema





- Back Exam


Back exam: Absent: tenderness





- Neurological Exam


Neurological exam: Present: alert, oriented X3





- Psychiatric


Psychiatric exam: Present: normal affect, normal mood





- Skin


Skin exam: Present: warm, normal color





ED Course


                                   Vital Signs











  06/02/22 06/02/22 06/02/22





  08:17 08:47 09:00


 


Temperature 98.2 F  


 


Pulse Rate 69  107 H


 


Respiratory 16  22





Rate   


 


Blood Pressure   180/111


 


Blood Pressure 134/87  





[Left]   


 


O2 Sat by Pulse 97 81 L 99





Oximetry   














  06/02/22 06/02/22 06/02/22





  09:16 09:30 09:46


 


Temperature   


 


Pulse Rate 94 H 95 H 116 H


 


Respiratory 21 21 12





Rate   


 


Blood Pressure 98/61 169/113 169/113


 


Blood Pressure   





[Left]   


 


O2 Sat by Pulse 100 98 100





Oximetry   














  06/02/22 06/02/22 06/02/22





  10:00 10:16 10:30


 


Temperature   


 


Pulse Rate 118 H 116 H 79


 


Respiratory 16 22 14





Rate   


 


Blood Pressure 176/106 166/108 173/99


 


Blood Pressure   





[Left]   


 


O2 Sat by Pulse 100 99 97





Oximetry   














  06/02/22 06/02/22 06/02/22





  10:46 11:00 11:16


 


Temperature   


 


Pulse Rate 114 H 116 H 107 H


 


Respiratory 21 19 21





Rate   


 


Blood Pressure 173/99 169/109 169/109


 


Blood Pressure   





[Left]   


 


O2 Sat by Pulse 98 98 99





Oximetry   














  06/02/22 06/02/22 06/02/22





  11:30 11:46 12:00


 


Temperature   


 


Pulse Rate 121 H 117 H 116 H


 


Respiratory 23 22 10 L





Rate   


 


Blood Pressure 169/109 169/109 


 


Blood Pressure   





[Left]   


 


O2 Sat by Pulse 98 97 98





Oximetry   














  06/02/22 06/02/22 06/02/22





  12:16 12:30 12:46


 


Temperature   


 


Pulse Rate 121 H 118 H 112 H


 


Respiratory 30 H 25 H 22





Rate   


 


Blood Pressure 168/106 163/108 163/108


 


Blood Pressure   





[Left]   


 


O2 Sat by Pulse 98 98 99





Oximetry   














  06/02/22





  13:00


 


Temperature 


 


Pulse Rate 113 H


 


Respiratory 16





Rate 


 


Blood Pressure 153/97


 


Blood Pressure 





[Left] 


 


O2 Sat by Pulse 98





Oximetry 














- Reevaluation(s)


Reevaluation #1: 





06/02/22 10:05


Came in with chest pain with history of end-stage renal disease and 

COPD--considering this patient's age this is concerning for and not limited to 

myocardial infarction, COPD exacerbation, pulmonary embolism, pneumonia, and any

 other systemic infection.  In order to rule those out we will go ahead and 

order routine cardiac work-up that include troponin, EKG, chest x-ray, CBC, CMP 

and urinalysis.  Due to this patient's symptoms might be as a result of needing 

dialysis with elevated BUN and creatinine and likely potassium but considering 

her age the other dreadful cause needed to be ruled out.


Reevaluation #2: 





06/02/22 14:21


Lab reviewed and noted to be within normal limits except slightly elevated BUN 

and creatinine at 49/4.9 but with normal potassium.  This is likely noncardiac 

considering reassuring EKG and normal troponin initial and repeated 6 hours 

later.  Patient reassured to be sure to keep her dialysis. 





PANCHO score





- Pancho Score


Age > 65: (1) Yes


Aspirin use within the Past 7 Days: (0) No


3 or more CAD Risk Factors: (1) Yes


2 or more Angina events in past 24 hrs: (1) Yes


Known CAD with more than 50% Stenosis: (1) Yes


Elevated Cardiac Markers: (0) No


ST Deviation Greater than 0.5mm: (0) No


PANCHO Score: 4





ED Medical Decision Making





- Lab Data


Result diagrams: 


                                 06/02/22 08:56





                                 06/02/22 08:56





- EKG Data


-: EKG Interpreted by Me


EKG shows normal: sinus rhythm


Rate: normal





- EKG Data


Interpretation: no acute changes





06/02/22 14:21


Noted with normal sinus rhythm at the rate of 70 bpm, which no ST elevation or 

significant depression noted on this abnormal ECG.


Critical care attestation.: 


If time is entered above; I have spent that time in minutes in the direct care 

of this critically ill patient, excluding procedure time.








ED Disposition


Clinical Impression: 


 CKD (chronic kidney disease) requiring chronic dialysis, Non-cardiac chest pain





Disposition: 01 HOME / SELF CARE / HOMELESS


Is pt being admited?: No


Does the pt Need Aspirin: No


Condition: Stable


Instructions:  Nonspecific Chest Pain, Adult


Additional Instructions: 


Please be sure to call and keep your dialysis as scheduled





Your chest pain today is likely noncardiac based on the work-up given to you 

during your visit





It is okay to call and schedule follow-up with your primary doctor in the next 2

 to 3 days for progress





Please do not hesitate to call or return to emergency room if your symptoms 

worsen


Referrals: 


PRIMARY MD PRADEEP [Primary Care Provider] - 3-5 Days


Time of Disposition: 14:25

## 2022-06-02 NOTE — ELECTROCARDIOGRAPH REPORT
[facility]

                                       

Test Date:    2022               Test Time:    08:23:14

Pat Name:     LINDA GARCIA              Department:   

Patient ID:   [id number]          Room:          

Gender:       F                        Technician:   BREEZY

:          1955               Requested By: ED DOC

Order Number: [id number]              Reading MD:   Sarbjit Del Rosario

                                 Measurements

Intervals                              Axis          

Rate:         70                       P:            103

HI:           117                      QRS:          -7

QRSD:         107                      T:            75

QT:           473                                    

QTc:          510                                    

                           Interpretive Statements

Sinus rhythm

nonspecific st-t

Prolonged QT interval

Compared to ECG 2022 18:47:44

ST (T wave) deviation now present

Prolonged QT interval now present

Electronically Signed On 2022 10:17:30 EDT by Sarbjit Del Rosario

## 2022-06-16 ENCOUNTER — HOSPITAL ENCOUNTER (EMERGENCY)
Dept: HOSPITAL 5 - ED | Age: 67
Discharge: HOME | End: 2022-06-16
Payer: MEDICARE

## 2022-06-16 VITALS — DIASTOLIC BLOOD PRESSURE: 80 MMHG | SYSTOLIC BLOOD PRESSURE: 163 MMHG

## 2022-06-16 DIAGNOSIS — I50.9: Primary | ICD-10-CM

## 2022-06-16 DIAGNOSIS — E11.9: ICD-10-CM

## 2022-06-16 DIAGNOSIS — Z91.041: ICD-10-CM

## 2022-06-16 DIAGNOSIS — R07.9: ICD-10-CM

## 2022-06-16 DIAGNOSIS — Z91.013: ICD-10-CM

## 2022-06-16 DIAGNOSIS — Z91.018: ICD-10-CM

## 2022-06-16 LAB
ALBUMIN SERPL-MCNC: 3.8 G/DL (ref 3.9–5)
ALT SERPL-CCNC: 23 UNITS/L (ref 7–56)
BASOPHILS # (AUTO): 0 K/MM3 (ref 0–0.1)
BASOPHILS NFR BLD AUTO: 0.3 % (ref 0–1.8)
BUN SERPL-MCNC: 36 MG/DL (ref 7–17)
BUN/CREAT SERPL: 9 %
CALCIUM SERPL-MCNC: 9.1 MG/DL (ref 8.4–10.2)
EOSINOPHIL # BLD AUTO: 0.1 K/MM3 (ref 0–0.4)
EOSINOPHIL NFR BLD AUTO: 1.3 % (ref 0–4.3)
HCT VFR BLD CALC: 32.7 % (ref 30.3–42.9)
HEMOLYSIS INDEX: 24
HGB BLD-MCNC: 10.8 GM/DL (ref 10.1–14.3)
INR PPP: 1.03 (ref 0.87–1.13)
LYMPHOCYTES # BLD AUTO: 1.4 K/MM3 (ref 1.2–5.4)
LYMPHOCYTES NFR BLD AUTO: 13.4 % (ref 13.4–35)
MCHC RBC AUTO-ENTMCNC: 33 % (ref 30–34)
MCV RBC AUTO: 95 FL (ref 79–97)
MONOCYTES # (AUTO): 0.7 K/MM3 (ref 0–0.8)
MONOCYTES % (AUTO): 7.1 % (ref 0–7.3)
PLATELET # BLD: 156 K/MM3 (ref 140–440)
RBC # BLD AUTO: 3.43 M/MM3 (ref 3.65–5.03)

## 2022-06-16 PROCEDURE — 84484 ASSAY OF TROPONIN QUANT: CPT

## 2022-06-16 PROCEDURE — 83880 ASSAY OF NATRIURETIC PEPTIDE: CPT

## 2022-06-16 PROCEDURE — 71046 X-RAY EXAM CHEST 2 VIEWS: CPT

## 2022-06-16 PROCEDURE — 96374 THER/PROPH/DIAG INJ IV PUSH: CPT

## 2022-06-16 PROCEDURE — 80053 COMPREHEN METABOLIC PANEL: CPT

## 2022-06-16 PROCEDURE — 36415 COLL VENOUS BLD VENIPUNCTURE: CPT

## 2022-06-16 PROCEDURE — 85025 COMPLETE CBC W/AUTO DIFF WBC: CPT

## 2022-06-16 PROCEDURE — 82550 ASSAY OF CK (CPK): CPT

## 2022-06-16 PROCEDURE — 93005 ELECTROCARDIOGRAM TRACING: CPT

## 2022-06-16 PROCEDURE — 99284 EMERGENCY DEPT VISIT MOD MDM: CPT

## 2022-06-16 PROCEDURE — 85610 PROTHROMBIN TIME: CPT

## 2022-06-16 PROCEDURE — 83690 ASSAY OF LIPASE: CPT

## 2022-06-16 PROCEDURE — 96375 TX/PRO/DX INJ NEW DRUG ADDON: CPT

## 2022-06-16 NOTE — XRAY REPORT
CHEST 2 VIEWS 



INDICATION / CLINICAL INFORMATION: Chest Pain.



COMPARISON: 6/2/2022



FINDINGS:



SUPPORT DEVICES: Stable, satisfactory device positioning.

HEART / MEDIASTINUM: No significant abnormality. 

LUNGS / PLEURA: No significant pulmonary or pleural abnormality. No pneumothorax. 



ADDITIONAL FINDINGS: No significant additional findings.



IMPRESSION:

1. No acute findings.



Signer Name: Marcos Ferrari MD 

Signed: 6/16/2022 12:10 PM

Workstation Name: [id number]

## 2022-06-16 NOTE — EMERGENCY DEPARTMENT REPORT
ED Chest Pain HPI





- General


Chief Complaint: Chest Pain


Stated Complaint: CHEST PAIN


Time Seen by Provider: 06/16/22 11:29


Source: patient, EMS


Mode of arrival: Stretcher


Limitations: No Limitations





- History of Present Illness


Initial Comments: 





pt is 67 years old with DM and afib and CHF and CKD, here for chest pain started

this am pot is well known to our service with frequent visits for the same 


-: Gradual, hour(s)


Onset: during rest


Pain Location: left chest


Pain Radiation: none


Severity scale (0 -10): 10


Consistency: intermittent


Improves With: nothing


Worsens With: nothing





- Related Data


                                Home Medications











 Medication  Instructions  Recorded  Confirmed  Last Taken


 


Gabapentin 100 mg PO BID 04/23/22 05/28/22 Unknown


 


Pantoprazole [Protonix TAB] 40 mg PO QDAY 04/23/22 05/28/22 04/23/22 10:00


 


methIMAzole [Methimazole] 10 mg PO TID 05/01/22 05/28/22 Unknown








                                  Previous Rx's











 Medication  Instructions  Recorded  Last Taken  Type


 


AtorvaSTATin [Lipitor] 20 mg PO QHS #30 tablet 05/03/22 Unknown Rx


 


ISOSORBIDE MONOnitrate [Imdur ER] 60 mg PO QDAY #30 tablet 05/03/22 Unknown Rx


 


Losartan [Cozaar] 50 mg PO QDAY #30 tab 05/03/22 Unknown Rx


 


Metoprolol [Lopressor TAB] 100 mg PO BID #60 tablet 05/03/22 Unknown Rx


 


Sennosides/Docusate [Senokot S] 1 tab PO DAILY #30 tablet 05/03/22 Unknown Rx


 


hydrALAZINE [Apresoline TAB] 50 mg PO Q8HR #120 tablet 05/03/22 Unknown Rx


 


Amiodarone [Cordarone 200 MG TAB] 200 mg PO .AS DIRECTED #60 tablet 05/14/22 

Unknown Rx


 


Apixaban [Eliquis] 2.5 mg PO BID #60 tab 05/14/22 Unknown Rx


 


Aspirin EC [Ecotrin] 325 mg PO QDAY 30 Days #30 tablet 05/24/22 Unknown Rx


 


Nitroglycerin [Nitrostat] 0.4 mg SL .Q5MIN PRN 30 Days #30 05/24/22 Unknown Rx





 tablet   











                                    Allergies











Allergy/AdvReac Type Severity Reaction Status Date / Time


 


Iodinated Contrast Media AdvReac Intermediate Swelling Verified 06/02/22 09:45


 


apple AdvReac  Hives Verified 06/02/22 09:45


 


shell fish Allergy  Swelling Uncoded 06/02/22 09:45














Heart Score





- HEART Score


History: Slightly suspicious


EKG: Normal


Age: > 65


Risk factors: 1-2 risk factors


Troponin: < normal limit


HEART Score: 3





- EKG Read Time


Time EKG Completed: 12:48


EKG Read Time: 12:48





ED Review of Systems


ROS: 


Stated complaint: CHEST PAIN


Other details as noted in HPI








ED Past Medical Hx





- Past Medical History


Hx Hypertension: Yes


Hx CVA: Yes (right sided weakness)


Hx Heart Attack/AMI: Yes (1 stent)


Hx Congestive Heart Failure: Yes


Hx Diabetes: Yes


Hx Renal Disease: Yes (RENAL INSUFFICIENCY- stent left kidney)


Hx Sickle Cell Disease: No


Hx Kidney Stones: No


Hx Psychiatric Treatment: Yes (BIPOLAR/SCHIZOPHRENIA)


Hx COPD: Yes


Hx HIV: No


Additional medical history: HIGH CHOLESTEROL





- Surgical History


Hx Coronary Stent: Yes


Hx Pacemaker: Yes


Additional Surgical History: stent placement x 2 in 2015, knee surgery 01/2018





- Social History


Smoking Status: Unknown if ever smoked





- Medications


Home Medications: 


                                Home Medications











 Medication  Instructions  Recorded  Confirmed  Last Taken  Type


 


Gabapentin 100 mg PO BID 04/23/22 05/28/22 Unknown History


 


Pantoprazole [Protonix TAB] 40 mg PO QDAY 04/23/22 05/28/22 04/23/22 10:00 

History


 


methIMAzole [Methimazole] 10 mg PO TID 05/01/22 05/28/22 Unknown History


 


AtorvaSTATin [Lipitor] 20 mg PO QHS #30 tablet 05/03/22 05/28/22 Unknown Rx


 


ISOSORBIDE MONOnitrate [Imdur ER] 60 mg PO QDAY #30 tablet 05/03/22 05/28/22 

Unknown Rx


 


Losartan [Cozaar] 50 mg PO QDAY #30 tab 05/03/22 05/28/22 Unknown Rx


 


Metoprolol [Lopressor TAB] 100 mg PO BID #60 tablet 05/03/22 05/28/22 Unknown Rx


 


Sennosides/Docusate [Senokot S] 1 tab PO DAILY #30 tablet 05/03/22 05/28/22 

Unknown Rx


 


hydrALAZINE [Apresoline TAB] 50 mg PO Q8HR #120 tablet 05/03/22 05/28/22 Unknown

Rx


 


Amiodarone [Cordarone 200 MG TAB] 200 mg PO .AS DIRECTED #60 tablet 05/14/22 05/28/22 Unknown Rx


 


Apixaban [Eliquis] 2.5 mg PO BID #60 tab 05/14/22 05/28/22 Unknown Rx


 


Aspirin EC [Ecotrin] 325 mg PO QDAY 30 Days #30 tablet 05/24/22 05/28/22 Unknown

 Rx


 


Nitroglycerin [Nitrostat] 0.4 mg SL .Q5MIN PRN 30 Days #30 05/24/22 05/28/22 

Unknown Rx





 tablet    














ED Physical Exam





- General


Limitations: No Limitations


General appearance: alert, in no apparent distress





- Head


Head exam: Present: atraumatic, normocephalic





- Eye


Eye exam: Present: normal appearance





- ENT


ENT exam: Present: mucous membranes moist





- Neck


Neck exam: Present: normal inspection





- Respiratory


Respiratory exam: Present: normal lung sounds bilaterally.  Absent: respiratory 

distress





- Cardiovascular


Cardiovascular Exam: Present: regular rate, normal rhythm.  Absent: systolic 

murmur, diastolic murmur, rubs, gallop





- GI/Abdominal


GI/Abdominal exam: Present: soft, normal bowel sounds





- Extremities Exam


Extremities exam: Present: normal inspection





- Back Exam


Back exam: Present: normal inspection





- Neurological Exam


Neurological exam: Present: alert, oriented X3





- Psychiatric


Psychiatric exam: Present: normal affect, normal mood





- Skin


Skin exam: Present: warm, dry, intact, normal color.  Absent: rash





ED Course





                                   Vital Signs











  06/16/22 06/16/22 06/16/22





  11:07 11:38 13:07


 


Temperature 98.3 F  


 


Pulse Rate 102 H  109 H


 


Respiratory 16  





Rate   


 


Blood Pressure   


 


Blood Pressure 159/103  





[Left]   


 


O2 Sat by Pulse 98 100 





Oximetry   














  06/16/22





  13:58


 


Temperature 


 


Pulse Rate 101 H


 


Respiratory 





Rate 


 


Blood Pressure 141/118


 


Blood Pressure 





[Left] 


 


O2 Sat by Pulse 





Oximetry 














PANCHO score





- Pancho Score


Age > 65: (1) Yes


Aspirin use within the Past 7 Days: (0) No


3 or more CAD Risk Factors: (1) Yes


2 or more Angina events in past 24 hrs: (1) Yes


Known CAD with more than 50% Stenosis: (1) Yes


Elevated Cardiac Markers: (0) No


ST Deviation Greater than 0.5mm: (0) No


PANCHO Score: 4





ED Medical Decision Making





- Lab Data


Result diagrams: 


                                 06/16/22 12:33





                                 06/16/22 12:33





- EKG Data


-: EKG Interpreted by Me





- EKG Data


Interpretation: other (afib rvr with pvc)





- Radiology Data


Radiology results: report reviewed, image reviewed





- Medical Decision Making





work up unchaged from before, HR controlled ith lopressor ain meds given ntro is

 given 


Critical care attestation.: 


If time is entered above; I have spent that time in minutes in the direct care 

of this critically ill patient, excluding procedure time.








ED Disposition


Clinical Impression: 


 Afib, Diabetes mellitus, Chest pain, CHF (congestive heart failure)





Disposition: 01 HOME / SELF CARE / HOMELESS


Is pt being admited?: No


Does the pt Need Aspirin: No


Condition: Stable


Instructions:  Diabetes Mellitus Type 2 in Adults (ED), Nonspecific Chest Pain, 

Adult


Referrals: 


PRIMARY CARE,MD [Primary Care Provider] - 3-5 Days

## 2022-06-17 NOTE — ELECTROCARDIOGRAPH REPORT
[facility]

                                       

Test Date:    2022               Test Time:    12:48:58

Pat Name:     LINDA GARCIA              Department:   

Patient ID:   [id number]          Room:          

Gender:       F                        Technician:   TrevorJESUS MANUELMERCEDEZ

:          1955               Requested By: UCHE TILLMAN

Order Number: [id number]              Reading MD:   John Vaca

                                 Measurements

Intervals                              Axis          

Rate:         106                      P:            

MS:                                    QRS:          5

QRSD:         92                       T:            74

QT:           358                                    

QTc:          477                                    

                           Interpretive Statements

Atrial fibrillation

Ventricular premature complex

ST elevation, consider inferior injury

Compared to ECG 2022 08:23:14

Ventricular premature complex(es) now present

ST (T wave) deviation now present

Myocardial infarct finding now present

Sinus rhythm no longer present

Prolonged QT interval no longer present

Electronically Signed On 2022 11:26:46 EDT by John Vaca

## 2022-07-12 ENCOUNTER — HOSPITAL ENCOUNTER (INPATIENT)
Dept: HOSPITAL 5 - ED | Age: 67
LOS: 2 days | Discharge: HOME | DRG: 308 | End: 2022-07-14
Attending: INTERNAL MEDICINE | Admitting: INTERNAL MEDICINE
Payer: MEDICARE

## 2022-07-12 DIAGNOSIS — Z79.899: ICD-10-CM

## 2022-07-12 DIAGNOSIS — N18.6: ICD-10-CM

## 2022-07-12 DIAGNOSIS — Z91.018: ICD-10-CM

## 2022-07-12 DIAGNOSIS — I13.2: ICD-10-CM

## 2022-07-12 DIAGNOSIS — E78.2: ICD-10-CM

## 2022-07-12 DIAGNOSIS — Z79.82: ICD-10-CM

## 2022-07-12 DIAGNOSIS — I50.9: ICD-10-CM

## 2022-07-12 DIAGNOSIS — Z91.013: ICD-10-CM

## 2022-07-12 DIAGNOSIS — I25.10: ICD-10-CM

## 2022-07-12 DIAGNOSIS — E78.00: ICD-10-CM

## 2022-07-12 DIAGNOSIS — F31.9: ICD-10-CM

## 2022-07-12 DIAGNOSIS — E11.22: ICD-10-CM

## 2022-07-12 DIAGNOSIS — I48.0: Primary | ICD-10-CM

## 2022-07-12 DIAGNOSIS — Z91.041: ICD-10-CM

## 2022-07-12 DIAGNOSIS — F20.9: ICD-10-CM

## 2022-07-12 DIAGNOSIS — J44.9: ICD-10-CM

## 2022-07-12 DIAGNOSIS — Z99.2: ICD-10-CM

## 2022-07-12 DIAGNOSIS — Z82.49: ICD-10-CM

## 2022-07-12 DIAGNOSIS — I69.351: ICD-10-CM

## 2022-07-12 DIAGNOSIS — Z95.0: ICD-10-CM

## 2022-07-12 DIAGNOSIS — I25.2: ICD-10-CM

## 2022-07-12 LAB
ALBUMIN SERPL-MCNC: 3.5 G/DL (ref 3.9–5)
ALT SERPL-CCNC: 8 UNITS/L (ref 7–56)
APTT BLD: 36.9 SEC. (ref 24.2–36.6)
BASOPHILS # (AUTO): 0.1 K/MM3 (ref 0–0.1)
BASOPHILS NFR BLD AUTO: 0.7 % (ref 0–1.8)
BUN SERPL-MCNC: 31 MG/DL (ref 7–17)
BUN/CREAT SERPL: 6 %
CALCIUM SERPL-MCNC: 9.2 MG/DL (ref 8.4–10.2)
EOSINOPHIL # BLD AUTO: 0.3 K/MM3 (ref 0–0.4)
EOSINOPHIL NFR BLD AUTO: 3.8 % (ref 0–4.3)
HCT VFR BLD CALC: 27.3 % (ref 30.3–42.9)
HCT VFR BLD CALC: 28.6 % (ref 30.3–42.9)
HEMOLYSIS INDEX: 2
HGB BLD-MCNC: 8.8 GM/DL (ref 10.1–14.3)
HGB BLD-MCNC: 9.2 GM/DL (ref 10.1–14.3)
INR PPP: 1.04 (ref 0.87–1.13)
LYMPHOCYTES # BLD AUTO: 1.2 K/MM3 (ref 1.2–5.4)
LYMPHOCYTES NFR BLD AUTO: 16.2 % (ref 13.4–35)
MCHC RBC AUTO-ENTMCNC: 32 % (ref 30–34)
MCHC RBC AUTO-ENTMCNC: 32 % (ref 30–34)
MCV RBC AUTO: 97 FL (ref 79–97)
MCV RBC AUTO: 98 FL (ref 79–97)
MONOCYTES # (AUTO): 0.5 K/MM3 (ref 0–0.8)
MONOCYTES % (AUTO): 6.8 % (ref 0–7.3)
PLATELET # BLD: 250 K/MM3 (ref 140–440)
PLATELET # BLD: 265 K/MM3 (ref 140–440)
RBC # BLD AUTO: 2.81 M/MM3 (ref 3.65–5.03)
RBC # BLD AUTO: 2.93 M/MM3 (ref 3.65–5.03)

## 2022-07-12 PROCEDURE — 99406 BEHAV CHNG SMOKING 3-10 MIN: CPT

## 2022-07-12 PROCEDURE — 85027 COMPLETE CBC AUTOMATED: CPT

## 2022-07-12 PROCEDURE — 82565 ASSAY OF CREATININE: CPT

## 2022-07-12 PROCEDURE — 93306 TTE W/DOPPLER COMPLETE: CPT

## 2022-07-12 PROCEDURE — C8929 TTE W OR WO FOL WCON,DOPPLER: HCPCS

## 2022-07-12 PROCEDURE — 80074 ACUTE HEPATITIS PANEL: CPT

## 2022-07-12 PROCEDURE — 84439 ASSAY OF FREE THYROXINE: CPT

## 2022-07-12 PROCEDURE — 80048 BASIC METABOLIC PNL TOTAL CA: CPT

## 2022-07-12 PROCEDURE — 80053 COMPREHEN METABOLIC PANEL: CPT

## 2022-07-12 PROCEDURE — 85730 THROMBOPLASTIN TIME PARTIAL: CPT

## 2022-07-12 PROCEDURE — 85025 COMPLETE CBC W/AUTO DIFF WBC: CPT

## 2022-07-12 PROCEDURE — 94760 N-INVAS EAR/PLS OXIMETRY 1: CPT

## 2022-07-12 PROCEDURE — 84484 ASSAY OF TROPONIN QUANT: CPT

## 2022-07-12 PROCEDURE — 93005 ELECTROCARDIOGRAM TRACING: CPT

## 2022-07-12 PROCEDURE — 36415 COLL VENOUS BLD VENIPUNCTURE: CPT

## 2022-07-12 PROCEDURE — 84100 ASSAY OF PHOSPHORUS: CPT

## 2022-07-12 PROCEDURE — 84443 ASSAY THYROID STIM HORMONE: CPT

## 2022-07-12 PROCEDURE — 83735 ASSAY OF MAGNESIUM: CPT

## 2022-07-12 PROCEDURE — 85610 PROTHROMBIN TIME: CPT

## 2022-07-12 RX ADMIN — MORPHINE SULFATE PRN MG: 4 INJECTION, SOLUTION INTRAMUSCULAR; INTRAVENOUS at 20:40

## 2022-07-12 RX ADMIN — MORPHINE SULFATE PRN MG: 4 INJECTION, SOLUTION INTRAMUSCULAR; INTRAVENOUS at 17:22

## 2022-07-12 RX ADMIN — AMIODARONE HYDROCHLORIDE SCH MLS/HR: 50 INJECTION, SOLUTION INTRAVENOUS at 14:20

## 2022-07-12 RX ADMIN — GABAPENTIN SCH MG: 100 CAPSULE ORAL at 22:13

## 2022-07-12 RX ADMIN — METOPROLOL TARTRATE SCH MG: 100 TABLET, FILM COATED ORAL at 22:13

## 2022-07-12 RX ADMIN — OXYCODONE AND ACETAMINOPHEN PRN TAB: 5; 325 TABLET ORAL at 22:14

## 2022-07-12 RX ADMIN — APIXABAN SCH MG: 2.5 TABLET, FILM COATED ORAL at 22:15

## 2022-07-12 NOTE — EMERGENCY DEPARTMENT REPORT
ED Palpitations HPI





- General


Stated Complaint: CHEST PAIN


Time Seen by Provider: 07/12/22 12:55





- History of Present Illness


Initial Comments: 





Patient is a 67-year-old female with history of chronic kidney disease on 

dialysis, A. fib on Eliquis brought in by EMS from dialysis for evaluation of 

palpitations and chest pain that began just prior to initiation of dialysis.  On

arrival she is tachycardic between 130s and 150s, likely A. fib with RVR.  Given

aspirin and sublingual nitroglycerin by EMS in route.





- Related Data


                                Home Medications











 Medication  Instructions  Recorded  Confirmed  Last Taken


 


Gabapentin 100 mg PO BID 04/23/22 05/28/22 Unknown


 


Pantoprazole [Protonix TAB] 40 mg PO QDAY 04/23/22 05/28/22 04/23/22 10:00


 


methIMAzole [Methimazole] 10 mg PO TID 05/01/22 05/28/22 Unknown








                                  Previous Rx's











 Medication  Instructions  Recorded  Last Taken  Type


 


AtorvaSTATin [Lipitor] 20 mg PO QHS #30 tablet 05/03/22 Unknown Rx


 


ISOSORBIDE MONOnitrate [Imdur ER] 60 mg PO QDAY #30 tablet 05/03/22 Unknown Rx


 


Losartan [Cozaar] 50 mg PO QDAY #30 tab 05/03/22 Unknown Rx


 


Metoprolol [Lopressor TAB] 100 mg PO BID #60 tablet 05/03/22 Unknown Rx


 


Sennosides/Docusate [Senokot S] 1 tab PO DAILY #30 tablet 05/03/22 Unknown Rx


 


hydrALAZINE [Apresoline TAB] 50 mg PO Q8HR #120 tablet 05/03/22 Unknown Rx


 


Amiodarone [Cordarone 200 MG TAB] 200 mg PO .AS DIRECTED #60 tablet 05/14/22 

Unknown Rx


 


Apixaban [Eliquis] 2.5 mg PO BID #60 tab 05/14/22 Unknown Rx


 


Aspirin EC [Ecotrin] 325 mg PO QDAY 30 Days #30 tablet 05/24/22 Unknown Rx


 


Nitroglycerin [Nitrostat] 0.4 mg SL .Q5MIN PRN 30 Days #30 05/24/22 Unknown Rx





 tablet   











                                    Allergies











Allergy/AdvReac Type Severity Reaction Status Date / Time


 


Iodinated Contrast Media AdvReac Intermediate Swelling Verified 06/02/22 09:45


 


apple AdvReac  Hives Verified 06/02/22 09:45


 


shell fish Allergy  Swelling Uncoded 06/02/22 09:45














ED Review of Systems


ROS: 


Stated complaint: CHEST PAIN


Other details as noted in HPI





Constitutional: see HPI


Respiratory: denies: cough, shortness of breath, wheezing


Cardiovascular: chest pain, palpitations


Gastrointestinal: denies: abdominal pain, nausea, vomiting


Genitourinary: denies: urgency, dysuria, discharge


Musculoskeletal: denies: back pain, joint swelling, arthralgia


Skin: denies: rash, lesions


Neurological: denies: headache, weakness


Psychiatric: denies: anxiety, depression





ED Past Medical Hx





- Past Medical History


Hx Hypertension: Yes


Hx CVA: Yes (right sided weakness)


Hx Heart Attack/AMI: Yes (1 stent)


Hx Congestive Heart Failure: Yes


Hx Diabetes: Yes


Hx Renal Disease: Yes (RENAL INSUFFICIENCY- stent left kidney)


Hx Sickle Cell Disease: No


Hx Kidney Stones: No


Hx Psychiatric Treatment: Yes (BIPOLAR/SCHIZOPHRENIA)


Hx COPD: Yes


Hx HIV: No


Additional medical history: HIGH CHOLESTEROL





- Surgical History


Hx Coronary Stent: Yes


Hx Pacemaker: Yes


Additional Surgical History: stent placement x 2 in 2015, knee surgery 01/2018





- Social History


Smoking Status: Current Every Day Smoker





- Medications


Home Medications: 


                                Home Medications











 Medication  Instructions  Recorded  Confirmed  Last Taken  Type


 


Gabapentin 100 mg PO BID 04/23/22 05/28/22 Unknown History


 


Pantoprazole [Protonix TAB] 40 mg PO QDAY 04/23/22 05/28/22 04/23/22 10:00 

History


 


methIMAzole [Methimazole] 10 mg PO TID 05/01/22 05/28/22 Unknown History


 


AtorvaSTATin [Lipitor] 20 mg PO QHS #30 tablet 05/03/22 05/28/22 Unknown Rx


 


ISOSORBIDE MONOnitrate [Imdur ER] 60 mg PO QDAY #30 tablet 05/03/22 05/28/22 

Unknown Rx


 


Losartan [Cozaar] 50 mg PO QDAY #30 tab 05/03/22 05/28/22 Unknown Rx


 


Metoprolol [Lopressor TAB] 100 mg PO BID #60 tablet 05/03/22 05/28/22 Unknown Rx


 


Sennosides/Docusate [Senokot S] 1 tab PO DAILY #30 tablet 05/03/22 05/28/22 

Unknown Rx


 


hydrALAZINE [Apresoline TAB] 50 mg PO Q8HR #120 tablet 05/03/22 05/28/22 Unknown

Rx


 


Amiodarone [Cordarone 200 MG TAB] 200 mg PO .AS DIRECTED #60 tablet 05/14/22 05/28/22 Unknown Rx


 


Apixaban [Eliquis] 2.5 mg PO BID #60 tab 05/14/22 05/28/22 Unknown Rx


 


Aspirin EC [Ecotrin] 325 mg PO QDAY 30 Days #30 tablet 05/24/22 05/28/22 Unknown

 Rx


 


Nitroglycerin [Nitrostat] 0.4 mg SL .Q5MIN PRN 30 Days #30 05/24/22 05/28/22 

Unknown Rx





 tablet    














ED Physical Exam





- General


General appearance: alert, in no apparent distress





- Head


Head exam: Present: atraumatic, normocephalic





- Respiratory


Respiratory exam: Present: normal lung sounds bilaterally.  Absent: respiratory 

distress





- Cardiovascular


Cardiovascular Exam: Present: tachycardia, irregular rhythm, normal heart sounds





- GI/Abdominal


GI/Abdominal exam: Present: soft, tenderness (Mild periumbilical tenderness).  

Absent: distended, guarding, rebound, rigid





- Rectal


Rectal exam: Present: deferred





- Neurological Exam


Neurological exam: Present: alert, oriented X3





- Psychiatric


Psychiatric exam: Present: normal affect, normal mood





- Skin


Skin exam: Present: warm, dry, intact, normal color





ED Course


                                   Vital Signs











  07/12/22 07/12/22 07/12/22





  12:48 13:01 13:02


 


Temperature   99.6 F


 


Pulse Rate 125 H 182 H 137 H


 


Respiratory 25 H 22 20





Rate   


 


Blood Pressure   155/96


 


O2 Sat by Pulse  100 100





Oximetry   














  07/12/22 07/12/22 07/12/22





  13:09 13:10 13:15


 


Temperature   


 


Pulse Rate 137 H  141 H


 


Respiratory  22 33 H





Rate   


 


Blood Pressure 155/96  155/96


 


O2 Sat by Pulse  100 100





Oximetry   














  07/12/22 07/12/22 07/12/22





  13:31 13:45 14:01


 


Temperature   


 


Pulse Rate 120 H 158 H 114 H


 


Respiratory 23 23 25 H





Rate   


 


Blood Pressure 146/99 142/102 147/102


 


O2 Sat by Pulse 100 100 100





Oximetry   














  07/12/22 07/12/22 07/12/22





  14:15 14:31 14:45


 


Temperature   


 


Pulse Rate 176 H 154 H 131 H


 


Respiratory 20 23 26 H





Rate   


 


Blood Pressure 147/102 141/106 141/106


 


O2 Sat by Pulse 100 100 100





Oximetry   














  07/12/22 07/12/22 07/12/22





  15:01 15:15 15:31


 


Temperature   


 


Pulse Rate 116 H 150 H 157 H


 


Respiratory 23 23 24





Rate   


 


Blood Pressure 145/100 158/101 170/77


 


O2 Sat by Pulse 100 100 100





Oximetry   














  07/12/22 07/12/22 07/12/22





  15:45 16:01 16:15


 


Temperature   


 


Pulse Rate 157 H 81 84


 


Respiratory 32 H 22 22





Rate   


 


Blood Pressure 170/77 161/96 156/80


 


O2 Sat by Pulse 100 100 100





Oximetry   














  07/12/22 07/12/22 07/12/22





  16:31 16:45 17:01


 


Temperature   


 


Pulse Rate 138 H 135 H 154 H


 


Respiratory 22 28 H 26 H





Rate   


 


Blood Pressure 150/96 150/96 160/103


 


O2 Sat by Pulse 100 100 100





Oximetry   














  07/12/22 07/12/22 07/12/22





  17:02 17:15 17:31


 


Temperature   


 


Pulse Rate  135 H 98 H


 


Respiratory  20 18





Rate   


 


Blood Pressure 160/103 150/83 143/88


 


O2 Sat by Pulse  100 98





Oximetry   














  07/12/22 07/12/22





  17:45 18:00


 


Temperature  


 


Pulse Rate 147 H 114 H


 


Respiratory 23 





Rate  


 


Blood Pressure 143/88 


 


O2 Sat by Pulse 100 





Oximetry  














ED Medical Decision Making





- Lab Data


Result diagrams: 


                                 07/12/22 17:12





                                 07/12/22 17:12





- Medical Decision Making





EKG shows SVT however on the monitor patient has an irregular rhythm.  Likely A.

 fib with RVR.  Creatinine 4.9.  This is near patient's baseline.  Aside from 

kidney function chemistry is grossly unremarkable.  Troponin is within normal 

limits.  CBC grossly unremarkable.  Patient given 10 mg of IV diltiazem with no 

response.  This was followed by initiation of an amiodarone drip.  Patient was 

also given morphine and sublingual nitroglycerin for chest pain/pressure.  Heart

 rate currently between 110 and 125.  Will admit to hospitalist.


Critical care attestation.: 


If time is entered above; I have spent that time in minutes in the direct care 

of this critically ill patient, excluding procedure time.








ED Disposition


Clinical Impression: 


 Atrial fibrillation with rapid ventricular response, Left-sided chest pain, 

Chronic kidney disease





Disposition: 09 ADMITTED AS INPATIENT


Is pt being admited?: Yes


Condition: Stable

## 2022-07-12 NOTE — HISTORY AND PHYSICAL REPORT
History of Present Illness


Date of examination: 07/12/22


Date of admission: 


7/12/2022


Chief complaint: 


Palpitations since a.m.





History of present illness: 


67-year-old female with a history of chronic kidney disease on hemodialysis, 

hypertension and atrial fibrillation comes in for palpitations since a.m.  

Patient was undergoing hemodialysis.  Patient sent in because of her heart rate 

being a 150s.  Patient has a history of atrial fibrillation.


ED course in the emergency room EKG showed heart rate of 173/min with rapid A. 

fib with RVR patient was initiated on amiodarone drip.  No chest pain.  No 

orthopnea.














- Past Medical History


--Hypertension: Yes


--CVA: Yes (right sided weakness)


--Heart Attack/AMI: Yes (1 stent)


--Congestive Heart Failure: Yes


--Diabetes: Yes


--Renal Disease: Yes (RENAL INSUFFICIENCY- stent left kidney)


--Psychiatric Treatment: Yes (BIPOLAR/SCHIZOPHRENIA)


--COPD: Yes


--Additional medical history: HIGH CHOLESTEROL





- Surgical History


--Coronary Stent: Yes


--Pacemaker: Yes


--Additional Surgical History: stent placement x 2 in 2015, knee surgery 01/2018





- Social History


Smoking Status: Current Every Day Smoker    





- Family history


--Htn





- Medications


Home Medications: 


                                Home Medications











 Medication  Instructions  Recorded  Confirmed  Last Taken  Type


 


Gabapentin 100 mg PO BID 04/23/22 05/28/22 Unknown History


 


Pantoprazole [Protonix TAB] 40 mg PO QDAY 04/23/22 05/28/22 04/23/22 10:00 

History


 


methIMAzole [Methimazole] 10 mg PO TID 05/01/22 05/28/22 Unknown History


 


AtorvaSTATin [Lipitor] 20 mg PO QHS #30 tablet 05/03/22 05/28/22 Unknown Rx


 


ISOSORBIDE MONOnitrate [Imdur ER] 60 mg PO QDAY #30 tablet 05/03/22 05/28/22 

Unknown Rx


 


Losartan [Cozaar] 50 mg PO QDAY #30 tab 05/03/22 05/28/22 Unknown Rx


 


Metoprolol [Lopressor TAB] 100 mg PO BID #60 tablet 05/03/22 05/28/22 Unknown Rx


 


Sennosides/Docusate [Senokot S] 1 tab PO DAILY #30 tablet 05/03/22 05/28/22 

Unknown Rx


 


hydrALAZINE [Apresoline TAB] 50 mg PO Q8HR #120 tablet 05/03/22 05/28/22 Unknown

Rx


 


Amiodarone [Cordarone 200 MG TAB] 200 mg PO .AS DIRECTED #60 tablet 05/14/22 05/28/22 Unknown Rx


 


Apixaban [Eliquis] 2.5 mg PO BID #60 tab 05/14/22 05/28/22 Unknown Rx


 


Aspirin EC [Ecotrin] 325 mg PO QDAY 30 Days #30 tablet 05/24/22 05/28/22 Unknown

 Rx


 


Nitroglycerin [Nitrostat] 0.4 mg SL .Q5MIN PRN 30 Days #30 05/24/22 05/28/22 

Unknown Rx





 tablet    














 Review of Systems


ROS: 


Stated complaint: CHEST PAIN


Other details as noted in HPI





Constitutional: see HPI


Respiratory: denies: cough, shortness of breath, wheezing


Cardiovascular: chest pain, palpitations


Gastrointestinal: denies: abdominal pain, nausea, vomiting


Genitourinary: denies: urgency, dysuria, discharge


Musculoskeletal: denies: back pain, joint swelling, arthralgia


Skin: denies: rash, lesions


Neurological: denies: headache, weakness


Psychiatric: denies: anxiety, depression























Medications and Allergies


                                    Allergies











Allergy/AdvReac Type Severity Reaction Status Date / Time


 


Iodinated Contrast Media AdvReac Intermediate Swelling Verified 06/02/22 09:45


 


apple AdvReac  Hives Verified 06/02/22 09:45


 


shell fish Allergy  Swelling Uncoded 06/02/22 09:45











                                Home Medications











 Medication  Instructions  Recorded  Confirmed  Last Taken  Type


 


Gabapentin 100 mg PO BID 04/23/22 05/28/22 Unknown History


 


Pantoprazole [Protonix TAB] 40 mg PO QDAY 04/23/22 05/28/22 04/23/22 10:00 Histo

ry


 


methIMAzole [Methimazole] 10 mg PO TID 05/01/22 05/28/22 Unknown History


 


AtorvaSTATin [Lipitor] 20 mg PO QHS #30 tablet 05/03/22 05/28/22 Unknown Rx


 


ISOSORBIDE MONOnitrate [Imdur ER] 60 mg PO QDAY #30 tablet 05/03/22 05/28/22 

Unknown Rx


 


Losartan [Cozaar] 50 mg PO QDAY #30 tab 05/03/22 05/28/22 Unknown Rx


 


Metoprolol [Lopressor TAB] 100 mg PO BID #60 tablet 05/03/22 05/28/22 Unknown Rx


 


Sennosides/Docusate [Senokot S] 1 tab PO DAILY #30 tablet 05/03/22 05/28/22 

Unknown Rx


 


hydrALAZINE [Apresoline TAB] 50 mg PO Q8HR #120 tablet 05/03/22 05/28/22 Unknown

Rx


 


Amiodarone [Cordarone 200 MG TAB] 200 mg PO .AS DIRECTED #60 tablet 05/14/22 05/28/22 Unknown Rx


 


Apixaban [Eliquis] 2.5 mg PO BID #60 tab 05/14/22 05/28/22 Unknown Rx


 


Aspirin EC [Ecotrin] 325 mg PO QDAY 30 Days #30 tablet 05/24/22 05/28/22 Unknown

 Rx


 


Nitroglycerin [Nitrostat] 0.4 mg SL .Q5MIN PRN 30 Days #30 05/24/22 05/28/22 

Unknown Rx





 tablet    











Active Meds: 


Active Medications





Hydralazine HCl (Hydralazine 20 Mg/1 Ml Inj)  10 mg IV Q4HR PRN


   PRN Reason: Hypertension


   Last Admin: 07/12/22 17:02 Dose:  10 mg


   


Amiodarone HCl 900 mg/ (Dextrose)  500 mls @ 33.333 mls/hr IV AS DIRECT DERIK; 

Protocol


   Last Admin: 07/12/22 14:20 Dose:  1 mg/min, 33.333 mls/hr


   


Morphine Sulfate (Morphine 4 Mg/1 Ml Inj)  4 mg IV Q4HR PRN


   PRN Reason: Pain, Chest/Cardiac


   Last Admin: 07/12/22 17:22 Dose:  4 mg


   











Exam





- Constitutional


Vitals: 


                                        











Temp Pulse Resp BP Pulse Ox


 


 99.6 F   114 H  23   143/88   100 


 


 07/12/22 13:02  07/12/22 18:00  07/12/22 17:45  07/12/22 17:45  07/12/22 17:45











General appearance: Present: mild distress





- EENT


Eyes: Present: PERRL


ENT: hearing intact, clear oral mucosa





- Neck


Neck: Present: supple, normal ROM





- Respiratory


Respiratory effort: normal


Respiratory: bilateral: CTA





- Cardiovascular


Heart rate: 150


Rhythm: irregularly irregular


Heart Sounds: Present: S1 & S2.  Absent: rub, click





- Extremities


Extremities: no ischemia, pulses intact, pulses symmetrical, No edema


Peripheral Pulses: within normal limits





- Abdominal


General gastrointestinal: Present: soft, non-tender, non-distended, normal bowel

 sounds


Female genitourinary: Present: normal





- Integumentary


Integumentary: Present: clear, warm, dry





- Musculoskeletal


Musculoskeletal: gait normal, strength equal bilaterally





- Psychiatric


Psychiatric: appropriate mood/affect, intact judgment & insight





- Neurologic


Neurologic: CNII-XII intact, moves all extremities





HEART Score





- HEART Score


History: Moderately suspicious


Age: > 65


Risk factors: > 3 risk factors or hx of atherosclerotic disease


Troponin: 


                                        











Troponin T  0.016 ng/mL (0.00-0.029)   07/12/22  17:12    











Troponin: 1-3x normal limit





- Critical Actions


Critical Actions: 4-6 pts:12-16.6% risk of adverse cardiac event. Should be 

admitted





Results





- Labs


CBC & Chem 7: 


                                 07/12/22 22:43





                                 07/12/22 22:43


Labs: 


                             Laboratory Last Values











WBC  7.5 K/mm3 (4.5-11.0)   07/12/22  17:12    


 


RBC  2.81 M/mm3 (3.65-5.03)  L  07/12/22  17:12    


 


Hgb  8.8 gm/dl (10.1-14.3)  L  07/12/22  17:12    


 


Hct  27.3 % (30.3-42.9)  L  07/12/22  17:12    


 


MCV  97 fl (79-97)   07/12/22  17:12    


 


MCH  31 pg (28-32)   07/12/22  17:12    


 


MCHC  32 % (30-34)   07/12/22  17:12    


 


RDW  17.4 % (13.2-15.2)  H  07/12/22  17:12    


 


Plt Count  265 K/mm3 (140-440)   07/12/22  17:12    


 


Lymph % (Auto)  16.2 % (13.4-35.0)   07/12/22  17:12    


 


Mono % (Auto)  6.8 % (0.0-7.3)   07/12/22  17:12    


 


Eos % (Auto)  3.8 % (0.0-4.3)   07/12/22  17:12    


 


Baso % (Auto)  0.7 % (0.0-1.8)   07/12/22  17:12    


 


Lymph # (Auto)  1.2 K/mm3 (1.2-5.4)   07/12/22  17:12    


 


Mono # (Auto)  0.5 K/mm3 (0.0-0.8)   07/12/22  17:12    


 


Eos # (Auto)  0.3 K/mm3 (0.0-0.4)   07/12/22  17:12    


 


Baso # (Auto)  0.1 K/mm3 (0.0-0.1)   07/12/22  17:12    


 


Seg Neutrophils %  72.5 % (40.0-70.0)  H  07/12/22  17:12    


 


Seg Neutrophils #  5.5 K/mm3 (1.8-7.7)   07/12/22  17:12    


 


Sodium  143 mmol/L (137-145)   07/12/22  17:12    


 


Potassium  3.7 mmol/L (3.6-5.0)   07/12/22  17:12    


 


Chloride  100.9 mmol/L ()   07/12/22  17:12    


 


Carbon Dioxide  28 mmol/L (22-30)   07/12/22  17:12    


 


Anion Gap  18 mmol/L  07/12/22  17:12    


 


BUN  31 mg/dL (7-17)  H  07/12/22  17:12    


 


Creatinine  4.9 mg/dL (0.6-1.2)  H  07/12/22  17:12    


 


Estimated GFR  11 ml/min  07/12/22  17:12    


 


BUN/Creatinine Ratio  6 %  07/12/22  17:12    


 


Glucose  87 mg/dL ()   07/12/22  17:12    


 


Calcium  9.2 mg/dL (8.4-10.2)   07/12/22  17:12    


 


Total Bilirubin  0.50 mg/dL (0.1-1.2)   07/12/22  17:12    


 


AST  13 units/L (5-40)   07/12/22  17:12    


 


ALT  8 units/L (7-56)   07/12/22  17:12    


 


Alkaline Phosphatase  83 units/L ()   07/12/22  17:12    


 


Troponin T  0.016 ng/mL (0.00-0.029)   07/12/22  17:12    


 


Total Protein  5.8 g/dL (6.3-8.2)  L  07/12/22  17:12    


 


Albumin  3.5 g/dL (3.9-5)  L  07/12/22  17:12    


 


Albumin/Globulin Ratio  1.5 %  07/12/22  17:12    














- Imaging and Cardiology


EKG: report reviewed (SVT with heart rate of 173 from, probable LVH)





Assessment and Plan


Advance Directives: Yes (Full code)


VTE prophylaxis?: Chemical


Plan of care discussed with patient/family: Yes





- Patient Problems


(1) Atrial fibrillation with rapid ventricular response


Current Visit: Yes   Status: Acute   


Plan to address problem: 


Patient initiated on amiodarone drip without titration


Patient on Eliquis








(2) Hypertension


Current Visit: Yes   Status: Chronic   


Qualifiers: 


   Hypertension type: primary hypertension   Qualified Code(s): I10 - Essential 

(primary) hypertension   


Plan to address problem: 


Continue antihypertensives and adjust medications as necessary








(3) End-stage renal disease on hemodialysis


Current Visit: No   Status: Chronic   


Plan to address problem: 


Continue hemodialysis as per schedule


Nephrology consult requested








(4) Coronary artery disease


Current Visit: No   Status: Chronic   


Qualifiers: 


   Coronary Disease-Associated Artery/Lesion type: native artery   Native vs. 

transplanted heart: native heart 


Plan to address problem: 


Continue isosorbide mononitrate and aspirin








(5) Hyperlipidemia


Current Visit: Yes   Status: Chronic   


Qualifiers: 


   Hyperlipidemia type: mixed hyperlipidemia   Qualified Code(s): E78.2 - Mixed 

hyperlipidemia   


Plan to address problem: 


Continue statins 








(6) DVT prophylaxis


Current Visit: Yes   Status: Acute   


Plan to address problem: 


On Eliquis and GI prophylaxis








(7) Advance care planning


Current Visit: Yes   Status: Acute   


Plan to address problem: 


Disease education conducted care plan discussed diagnosis discussed prognosis 

discussed.  Patient is full code.  Patient acknowledges understanding and 

agreement with care plan.  +30 minutes.

## 2022-07-13 LAB
BASOPHILS # (AUTO): 0.1 K/MM3 (ref 0–0.1)
BASOPHILS NFR BLD AUTO: 0.8 % (ref 0–1.8)
BUN SERPL-MCNC: 40 MG/DL (ref 7–17)
BUN/CREAT SERPL: 7 %
CALCIUM SERPL-MCNC: 9 MG/DL (ref 8.4–10.2)
EOSINOPHIL # BLD AUTO: 0.1 K/MM3 (ref 0–0.4)
EOSINOPHIL NFR BLD AUTO: 1.4 % (ref 0–4.3)
HCT VFR BLD CALC: 29 % (ref 30.3–42.9)
HEMOLYSIS INDEX: 4
HGB BLD-MCNC: 9.2 GM/DL (ref 10.1–14.3)
LYMPHOCYTES # BLD AUTO: 1.1 K/MM3 (ref 1.2–5.4)
LYMPHOCYTES NFR BLD AUTO: 12.2 % (ref 13.4–35)
MCHC RBC AUTO-ENTMCNC: 32 % (ref 30–34)
MCV RBC AUTO: 99 FL (ref 79–97)
MONOCYTES # (AUTO): 0.8 K/MM3 (ref 0–0.8)
MONOCYTES % (AUTO): 8.7 % (ref 0–7.3)
PLATELET # BLD: 251 K/MM3 (ref 140–440)
RBC # BLD AUTO: 2.93 M/MM3 (ref 3.65–5.03)
T4 FREE SERPL-MCNC: 2.01 NG/DL (ref 0.76–1.46)

## 2022-07-13 RX ADMIN — METOPROLOL TARTRATE SCH MG: 100 TABLET, FILM COATED ORAL at 23:09

## 2022-07-13 RX ADMIN — ASPIRIN SCH MG: 325 TABLET, COATED ORAL at 10:20

## 2022-07-13 RX ADMIN — Medication SCH: at 01:30

## 2022-07-13 RX ADMIN — OXYCODONE AND ACETAMINOPHEN PRN TAB: 5; 325 TABLET ORAL at 23:19

## 2022-07-13 RX ADMIN — OXYCODONE AND ACETAMINOPHEN PRN TAB: 5; 325 TABLET ORAL at 10:15

## 2022-07-13 RX ADMIN — GABAPENTIN SCH MG: 100 CAPSULE ORAL at 10:20

## 2022-07-13 RX ADMIN — METOPROLOL TARTRATE SCH MG: 100 TABLET, FILM COATED ORAL at 10:20

## 2022-07-13 RX ADMIN — SENNOSIDES AND DOCUSATE SODIUM SCH TAB: 50; 8.6 TABLET ORAL at 10:20

## 2022-07-13 RX ADMIN — PANTOPRAZOLE SODIUM SCH MG: 40 TABLET, DELAYED RELEASE ORAL at 10:20

## 2022-07-13 RX ADMIN — Medication SCH ML: at 23:08

## 2022-07-13 RX ADMIN — AMIODARONE HYDROCHLORIDE SCH MLS/HR: 50 INJECTION, SOLUTION INTRAVENOUS at 19:48

## 2022-07-13 RX ADMIN — AMIODARONE HYDROCHLORIDE SCH MG: 200 TABLET ORAL at 10:20

## 2022-07-13 RX ADMIN — GABAPENTIN SCH MG: 100 CAPSULE ORAL at 23:07

## 2022-07-13 RX ADMIN — LOSARTAN POTASSIUM SCH MG: 50 TABLET, FILM COATED ORAL at 10:20

## 2022-07-13 RX ADMIN — APIXABAN SCH MG: 2.5 TABLET, FILM COATED ORAL at 23:08

## 2022-07-13 RX ADMIN — APIXABAN SCH MG: 2.5 TABLET, FILM COATED ORAL at 10:20

## 2022-07-13 NOTE — CONSULTATION
History of Present Illness


Consult date: 07/13/22


Consult reason: atrial fibrillation


History of present illness: 





The patient is a 67-year-old woman with end-stage renal disease on hemodialysis,

paroxysmal atrial fibrillation, and coronary artery disease.  With regards to 

her paroxysmal atrial fibrillation and sick sinus syndrome, she has a dual 

chamber pacemaker in situ, is on rhythm control therapy and oral anticoagulation

with Eliquis.  With regards to her coronary artery disease she has stents 

implanted in the proximal and distal segments of the LAD many years ago, stents 

were widely patent on a cardiac catheterization done in 2017.  Serial left 

ventricular function assessments over the years have demonstrated well-preserved

left ventricular systolic function with ejection fraction 50 to 55%.





Patient presents to the hospital at this time with acute onset palpitations and 

shortness of breath.  There was no dizziness or syncope.  She denies any missed 

dialysis sessions.  Yesterday was her dialysis day when the symptoms began and 

she presented to the emergency room for evaluation.  She was found with rapid 

atrial fibrillation, and with medical therapy, the atrial fibrillation has 

reverted.  At this time, the patient is in atrial paced rhythm with native QRS 

complexes at 70 bpm.  She feels much better after her reversion from atrial 

fibrillation.





Past History


Past Medical History: atrial fib, ESRD, hyperthyroidism, hypertension


Past Surgical History: Other (Rt arm AVG)


Social history: denies: smoking


Family history: hypertension





Medications and Allergies


                                    Allergies











Allergy/AdvReac Type Severity Reaction Status Date / Time


 


Iodinated Contrast Media AdvReac Intermediate Swelling Verified 06/02/22 09:45


 


apple AdvReac  Hives Verified 06/02/22 09:45


 


shell fish Allergy  Swelling Uncoded 06/02/22 09:45











                                Home Medications











 Medication  Instructions  Recorded  Confirmed  Last Taken  Type


 


Gabapentin 100 mg PO BID 04/23/22 05/28/22 Unknown History


 


Pantoprazole [Protonix TAB] 40 mg PO QDAY 04/23/22 05/28/22 04/23/22 10:00 

History


 


methIMAzole [Methimazole] 10 mg PO TID 05/01/22 05/28/22 Unknown History


 


AtorvaSTATin [Lipitor] 20 mg PO QHS #30 tablet 05/03/22 05/28/22 Unknown Rx


 


ISOSORBIDE MONOnitrate [Imdur ER] 60 mg PO QDAY #30 tablet 05/03/22 05/28/22 

Unknown Rx


 


Losartan [Cozaar] 50 mg PO QDAY #30 tab 05/03/22 05/28/22 Unknown Rx


 


Metoprolol [Lopressor TAB] 100 mg PO BID #60 tablet 05/03/22 05/28/22 Unknown Rx


 


Sennosides/Docusate [Senokot S] 1 tab PO DAILY #30 tablet 05/03/22 05/28/22 

Unknown Rx


 


hydrALAZINE [Apresoline TAB] 50 mg PO Q8HR #120 tablet 05/03/22 05/28/22 Unknown

Rx


 


Amiodarone [Cordarone 200 MG TAB] 200 mg PO .AS DIRECTED #60 tablet 05/14/22 05/28/22 Unknown Rx


 


Apixaban [Eliquis] 2.5 mg PO BID #60 tab 05/14/22 05/28/22 Unknown Rx


 


Aspirin EC [Ecotrin] 325 mg PO QDAY 30 Days #30 tablet 05/24/22 05/28/22 Unknown

 Rx


 


Nitroglycerin [Nitrostat] 0.4 mg SL .Q5MIN PRN 30 Days #30 05/24/22 05/28/22 

Unknown Rx





 tablet    











Active Meds: 


Active Medications





Acetaminophen (Acetaminophen 325 Mg Tab)  650 mg PO Q4H PRN


   PRN Reason: Pain MILD(1-3)/Fever >100.5/HA


Amiodarone HCl (Amiodarone 200 Mg Tab)  200 mg PO DAILY [facility]


   Last Admin: 07/13/22 10:20 Dose:  200 mg


   


Apixaban (Apixaban 2.5 Mg Tab)  2.5 mg PO Q12HR [facility]; Protocol


   Last Admin: 07/13/22 10:20 Dose:  2.5 mg


   


Aspirin (Aspirin Ec 325 Mg Tab)  325 mg PO QDAY [facility]


   Last Admin: 07/13/22 10:20 Dose:  325 mg


   


Atorvastatin Calcium (Atorvastatin 20 Mg Tab)  20 mg PO QHS [facility]


   Last Admin: 07/12/22 22:14 Dose:  20 mg


   


Gabapentin (Gabapentin 100 Mg Cap)  100 mg PO BID [facility]


   Last Admin: 07/13/22 10:20 Dose:  100 mg


   


Heparin Sodium (Porcine) (Heparin 10,000 Units/10 Ml Vial)  2,000 unit IV VANCE 

PRN


   PRN Reason: hemodialysis


Heparin Sodium (Porcine) (Heparin 10,000 Unit/1 Ml Vial)  5,000 unit IV VANCE PRN


   PRN Reason: hemodialysis


Hydralazine HCl (Hydralazine 20 Mg/1 Ml Inj)  10 mg IV Q4HR PRN


   PRN Reason: SBP >/=160; DBP >/=100


   Last Admin: 07/12/22 17:02 Dose:  10 mg


   


Hydralazine HCl (Hydralazine 25 Mg Tab)  50 mg PO Q8HR [facility]


   Last Admin: 07/13/22 06:23 Dose:  50 mg


   


Amiodarone HCl 900 mg/ (Dextrose)  500 mls @ 33.333 mls/hr IV AS DIRECT DERIK; 

Protocol


   Last Titration: 07/12/22 20:37 Dose:  0.5 mg/min, 16.667 mls/hr


   


Sodium Chloride (Nacl 0.9%)  100 mls @ 999 mls/hr IV VANCE PRN


   PRN Reason: Hypotension


Isosorbide Mononitrate (Isosorbide Mononitrate Er 60 Mg Tab)  60 mg PO QDAY [facility]


   Last Admin: 07/13/22 10:20 Dose:  60 mg


   


Losartan Potassium (Losartan 50 Mg Tab)  50 mg PO QDAY [facility]


   Last Admin: 07/13/22 10:20 Dose:  50 mg


   


Methimazole (Methimazole 5 Mg Tab)  10 mg PO TID [facility]


   Last Admin: 07/13/22 10:15 Dose:  10 mg


   


Metoclopramide HCl (Metoclopramide 10 Mg/2 Ml Inj)  10 mg IV Q6H PRN


   PRN Reason: Nausea And Vomiting


Metoprolol Tartrate (Metoprolol Tartrate 100 Mg Tab)  100 mg PO BID [facility]


   Last Admin: 07/13/22 10:20 Dose:  100 mg


   


Morphine Sulfate (Morphine 4 Mg/1 Ml Inj)  4 mg IV Q4HR PRN


   PRN Reason: Pain, Chest/Cardiac


   Last Admin: 07/12/22 20:40 Dose:  4 mg


   


Ondansetron HCl (Ondansetron 4 Mg/2 Ml Inj)  4 mg IV Q8H PRN


   PRN Reason: Nausea And Vomiting


Oxycodone/Acetaminophen (Oxycodone /Acetaminophen 5-325mg Tab)  1 tab PO Q6H PRN


   PRN Reason: Pain, Moderate (4-6)


   Last Admin: 07/13/22 10:15 Dose:  1 tab


   


Pantoprazole Sodium (Pantoprazole 40 Mg Tab)  40 mg PO QDAY [facility]


   Last Admin: 07/13/22 10:20 Dose:  40 mg


   


Senna/Docusate Sodium (Sennosides/Docusate Sodium 8.6/50 Mg Tab)  1 tab PO DAILY

 [facility]


   Last Admin: 07/13/22 10:20 Dose:  1 tab


   


Sodium Chloride (Sodium Chloride 0.9% 10 Ml Flush Syringe)  10 ml IV BID [facility]


   Last Admin: 07/13/22 01:30 Dose:  Not Given


   


Sodium Chloride (Sodium Chloride 0.9% 10 Ml Flush Syringe)  10 ml IV PRN PRN


   PRN Reason: LINE FLUSH











Review of Systems


Cardiovascular: palpitations, rapid/irregular heart beat, lightheadedness, 

shortness of breath, no chest pain, no orthopnea, no edema, no syncope





Physical Examination


                                   Vital Signs











Pulse Resp


 


 125 H  25 H


 


 07/12/22 12:48  07/12/22 12:48











General appearance: no acute distress


HEENT: Positive: PERRL


Neck: Positive: neck supple


Cardiac: Positive: Reg Rate and Rhythm


Lungs: Positive: Decreased Breath Sounds


Neuro: Positive: Grossly Intact


Abdomen: Positive: Soft


Female genitourinary: deferred


Skin: Positive: Clear


Extremities: Absent: edema





Results





                                 07/13/22 10:17





                                 07/13/22 10:17


                                 Cardiac Enzymes











  07/12/22 Range/Units





  17:12 


 


AST  13  (5-40)  units/L








                                   Coagulation











  07/12/22 Range/Units





  22:43 


 


PT  14.8  (12.2-14.9)  Sec.


 


INR  1.04  (0.87-1.13)  


 


APTT  36.9 H  (24.2-36.6)  Sec.








                                       CBC











  07/12/22 07/12/22 07/13/22 Range/Units





  17:12 22:43 10:17 


 


WBC  7.5  8.8  9.0  (4.5-11.0)  K/mm3


 


RBC  2.81 L  2.93 L  2.93 L  (3.65-5.03)  M/mm3


 


Hgb  8.8 L  9.2 L  9.2 L  (10.1-14.3)  gm/dl


 


Hct  27.3 L  28.6 L  29.0 L  (30.3-42.9)  %


 


Plt Count  265  250  251  (140-440)  K/mm3


 


Lymph # (Auto)  1.2   1.1 L  (1.2-5.4)  K/mm3


 


Mono # (Auto)  0.5   0.8  (0.0-0.8)  K/mm3


 


Eos # (Auto)  0.3   0.1  (0.0-0.4)  K/mm3


 


Baso # (Auto)  0.1   0.1  (0.0-0.1)  K/mm3








                          Comprehensive Metabolic Panel











  07/12/22 07/12/22 07/13/22 Range/Units





  17:12 22:43 10:17 


 


Sodium  143   141  (137-145)  mmol/L


 


Potassium  3.7   5.3 H D  (3.6-5.0)  mmol/L


 


Chloride  100.9   96.6 L  ()  mmol/L


 


Carbon Dioxide  28   29  (22-30)  mmol/L


 


BUN  31 H   40 H  (7-17)  mg/dL


 


Creatinine  4.9 H  4.9 H  5.8 H  (0.6-1.2)  mg/dL


 


Glucose  87   125 H  ()  mg/dL


 


Calcium  9.2   9.0  (8.4-10.2)  mg/dL


 


AST  13    (5-40)  units/L


 


ALT  8    (7-56)  units/L


 


Alkaline Phosphatase  83    ()  units/L


 


Total Protein  5.8 L    (6.3-8.2)  g/dL


 


Albumin  3.5 L    (3.9-5)  g/dL














EKG interpretations





- Telemetry


EKG Rhythm: Atrial Fibrillation (With rapid ventricular response)





Assessment and Plan





- Patient Problems


(1) Atrial fibrillation with rapid ventricular response


Current Visit: Yes   Status: Acute   


Plan to address problem: 


Patient with a history of paroxysmal atrial fibrillation, presents with 

symptomatic rapid atrial fibrillation.  Currently the atrial fibrillation has 

reverted and she is not an atrial paced rhythm at 70 bpm.  We will continue 

rhythm control management, and oral anticoagulation with Eliquis as tolerated.





We will order a TSH level, as well as an echocardiogram for left ventricular 

function reassessment.








(2) Coronary artery disease


Current Visit: Yes   Status: Acute   


Plan to address problem: 


Patient has a history of coronary artery disease, status post remote proximal 

and distal LAD stents which were widely patent on a repeat cardiac 

catheterization in 2017.  More recently, a thallium stress test done just 3 

months ago in this hospital showed normal coronary perfusion.  Patient has no 

current symptoms of angina, will continue guideline directed medical therapy and

 aggressive risk factor modification.

## 2022-07-13 NOTE — CONSULTATION
History of Present Illness





- Reason for Consult


Consult date: 07/13/22





- History of Present Illness





67yr F with h/o ESRD on HD at [facility], [facility], does 

not know her Nephrologist. Pt was at the Dialysis Clinic awaiting treatment when

she developed CP (Heaviness in her chest), Palpitation & Nausea. CP was 

eventually relieved by NTG per EMS enroute to ED but pt still had A Fib with RVR

in ED. Pt has h/o Chronic A fib on Eliquis. Feels much better now





Past History


Past Medical History: atrial fib, ESRD, hyperthyroidism, hypertension


Past Surgical History: Other (Rt arm AVG)


Social history: denies: smoking


Family history: hypertension





Medications and Allergies


                                    Allergies











Allergy/AdvReac Type Severity Reaction Status Date / Time


 


Iodinated Contrast Media AdvReac Intermediate Swelling Verified 06/02/22 09:45


 


apple AdvReac  Hives Verified 06/02/22 09:45


 


shell fish Allergy  Swelling Uncoded 06/02/22 09:45











                                Home Medications











 Medication  Instructions  Recorded  Confirmed  Last Taken  Type


 


Gabapentin 100 mg PO BID 04/23/22 05/28/22 Unknown History


 


Pantoprazole [Protonix TAB] 40 mg PO QDAY 04/23/22 05/28/22 04/23/22 10:00 

History


 


methIMAzole [Methimazole] 10 mg PO TID 05/01/22 05/28/22 Unknown History


 


AtorvaSTATin [Lipitor] 20 mg PO QHS #30 tablet 05/03/22 05/28/22 Unknown Rx


 


ISOSORBIDE MONOnitrate [Imdur ER] 60 mg PO QDAY #30 tablet 05/03/22 05/28/22 Un

known Rx


 


Losartan [Cozaar] 50 mg PO QDAY #30 tab 05/03/22 05/28/22 Unknown Rx


 


Metoprolol [Lopressor TAB] 100 mg PO BID #60 tablet 05/03/22 05/28/22 Unknown Rx


 


Sennosides/Docusate [Senokot S] 1 tab PO DAILY #30 tablet 05/03/22 05/28/22 

Unknown Rx


 


hydrALAZINE [Apresoline TAB] 50 mg PO Q8HR #120 tablet 05/03/22 05/28/22 Unknown

Rx


 


Amiodarone [Cordarone 200 MG TAB] 200 mg PO .AS DIRECTED #60 tablet 05/14/22 05/28/22 Unknown Rx


 


Apixaban [Eliquis] 2.5 mg PO BID #60 tab 05/14/22 05/28/22 Unknown Rx


 


Aspirin EC [Ecotrin] 325 mg PO QDAY 30 Days #30 tablet 05/24/22 05/28/22 Unknown

 Rx


 


Nitroglycerin [Nitrostat] 0.4 mg SL .Q5MIN PRN 30 Days #30 05/24/22 05/28/22 

Unknown Rx





 tablet    











Active Meds: 


Active Medications





Acetaminophen (Acetaminophen 325 Mg Tab)  650 mg PO Q4H PRN


   PRN Reason: Pain MILD(1-3)/Fever >100.5/HA


Amiodarone HCl (Amiodarone 200 Mg Tab)  200 mg PO DAILY DERIK


Apixaban (Apixaban 2.5 Mg Tab)  2.5 mg PO Q12HR [facility]; Protocol


   Last Admin: 07/12/22 22:15 Dose:  2.5 mg


   


Aspirin (Aspirin Ec 325 Mg Tab)  325 mg PO QDAY DERIK


Atorvastatin Calcium (Atorvastatin 20 Mg Tab)  20 mg PO QHS DERIK


   Last Admin: 07/12/22 22:14 Dose:  20 mg


   


Gabapentin (Gabapentin 100 Mg Cap)  100 mg PO BID [facility]


   Last Admin: 07/12/22 22:13 Dose:  100 mg


   


Hydralazine HCl (Hydralazine 20 Mg/1 Ml Inj)  10 mg IV Q4HR PRN


   PRN Reason: SBP >/=160; DBP >/=100


   Last Admin: 07/12/22 17:02 Dose:  10 mg


   


Hydralazine HCl (Hydralazine 25 Mg Tab)  50 mg PO Q8HR [facility]


   Last Admin: 07/13/22 06:23 Dose:  50 mg


   


Amiodarone HCl 900 mg/ (Dextrose)  500 mls @ 33.333 mls/hr IV AS DIRECT DERIK; 

Protocol


   Last Titration: 07/12/22 20:37 Dose:  0.5 mg/min, 16.667 mls/hr


   


Isosorbide Mononitrate (Isosorbide Mononitrate Er 60 Mg Tab)  60 mg PO QDAY [facility]


Losartan Potassium (Losartan 50 Mg Tab)  50 mg PO QDAY [facility]


Methimazole (Methimazole 5 Mg Tab)  10 mg PO TID [facility]


   Last Admin: 07/12/22 22:13 Dose:  10 mg


   


Metoclopramide HCl (Metoclopramide 10 Mg/2 Ml Inj)  10 mg IV Q6H PRN


   PRN Reason: Nausea And Vomiting


Metoprolol Tartrate (Metoprolol Tartrate 100 Mg Tab)  100 mg PO BID [facility]


   Last Admin: 07/12/22 22:13 Dose:  100 mg


   


Morphine Sulfate (Morphine 4 Mg/1 Ml Inj)  4 mg IV Q4HR PRN


   PRN Reason: Pain, Chest/Cardiac


   Last Admin: 07/12/22 20:40 Dose:  4 mg


   


Ondansetron HCl (Ondansetron 4 Mg/2 Ml Inj)  4 mg IV Q8H PRN


   PRN Reason: Nausea And Vomiting


Oxycodone/Acetaminophen (Oxycodone /Acetaminophen 5-325mg Tab)  1 tab PO Q6H PRN


   PRN Reason: Pain, Moderate (4-6)


   Last Admin: 07/12/22 22:14 Dose:  1 tab


   


Pantoprazole Sodium (Pantoprazole 40 Mg Tab)  40 mg PO QDAY [facility]


Senna/Docusate Sodium (Sennosides/Docusate Sodium 8.6/50 Mg Tab)  1 tab PO DAILY

 [facility]


Sodium Chloride (Sodium Chloride 0.9% 10 Ml Flush Syringe)  10 ml IV BID [facility]


   Last Admin: 07/13/22 01:30 Dose:  Not Given


   


Sodium Chloride (Sodium Chloride 0.9% 10 Ml Flush Syringe)  10 ml IV PRN PRN


   PRN Reason: LINE FLUSH











Review of Systems


Constitutional: no fever, no chills, no fatigue


Cardiovascular: chest pain (Resolved s/p NTG), palpitations, shortness of breath


Respiratory: shortness of breath, no cough, no congestion


Gastrointestinal: nausea, no abdominal pain, no vomiting





Exam





- Vital Signs


Vital signs: 


                                   Vital Signs











Pulse Resp


 


 125 H  25 H


 


 07/12/22 12:48  07/12/22 12:48














- General Appearance


General appearance: other (Awake & alert)


EENT: PERRL, hearing intact, vision intact


Neck: Present: neck supple.  Absent: JVD/HJR


Respiratory: Other (Good air entry)


Heart: irregularly irregular, tachycardia, S1S2


Gastrointestinal: Present: normal


Integumentary: no rash


Neurologic: no focal deficit, alert and oriented x3


Musculoskeletal: Present: other (No edema)


Psychiatric: mood/affect appropriate





Results





- Lab Results





                                 07/12/22 22:43





                                 07/12/22 22:43


                             Most recent lab results











Calcium  9.2 mg/dL (8.4-10.2)   07/12/22  17:12    














Assessment and Plan





ESRD - HD in am





HTN - Caution with meds as BP low normal





CP - W/u & Mx per Cardiology





A Fib - Resolved RVR, on meds including Eliquis. Check TFTs & TSH





Thanks very much, will f/u with you

## 2022-07-13 NOTE — ELECTROCARDIOGRAPH REPORT
[facility]

                                       

Test Date:    2022               Test Time:    12:47:27

Pat Name:     LINDA GARCIA              Department:   

Patient ID:   [id number]          Room:         A480

Gender:       F                        Technician:   ROBERT

:          1955               Requested By: BRITTANY ESCOBEDO

Order Number: [id number]              Reading MD:   Sarbjit Del Rosario

                                 Measurements

Intervals                              Axis          

Rate:         173                      P:            88

NY:           121                      QRS:          -30

QRSD:         78                       T:            81

QT:           298                                    

QTc:          505                                    

                           Interpretive Statements

afib with rvr



Probable LVH with secondary repol abnrm



Compared to ECG 2022 12:48:58



Left ventricular hypertrophy now present

Q waves now present



Ventricular premature complex(es) no longer present

ST (T wave) deviation no longer present

Myocardial infarct finding no longer present

Electronically Signed On 2022 8:59:55 EDT by Sarbjit Del Rosario

## 2022-07-14 ENCOUNTER — HOSPITAL ENCOUNTER (EMERGENCY)
Dept: HOSPITAL 5 - NM | Age: 67
Discharge: HOME | End: 2022-07-14
Payer: MEDICARE

## 2022-07-14 VITALS — DIASTOLIC BLOOD PRESSURE: 65 MMHG | SYSTOLIC BLOOD PRESSURE: 89 MMHG

## 2022-07-14 VITALS — SYSTOLIC BLOOD PRESSURE: 128 MMHG | DIASTOLIC BLOOD PRESSURE: 82 MMHG

## 2022-07-14 DIAGNOSIS — R53.1: Primary | ICD-10-CM

## 2022-07-14 LAB
ALBUMIN SERPL-MCNC: 3.7 G/DL (ref 3.9–5)
ALT SERPL-CCNC: 18 UNITS/L (ref 7–56)
BAND NEUTROPHILS # (MANUAL): 0 K/MM3
BUN SERPL-MCNC: 23 MG/DL (ref 7–17)
BUN SERPL-MCNC: 46 MG/DL (ref 7–17)
BUN/CREAT SERPL: 6 %
BUN/CREAT SERPL: 7 %
CALCIUM SERPL-MCNC: 8.4 MG/DL (ref 8.4–10.2)
CALCIUM SERPL-MCNC: 8.6 MG/DL (ref 8.4–10.2)
HCT VFR BLD CALC: 26.2 % (ref 30.3–42.9)
HCT VFR BLD CALC: 26.9 % (ref 30.3–42.9)
HEMOLYSIS INDEX: 19
HEMOLYSIS INDEX: 2
HGB BLD-MCNC: 8.6 GM/DL (ref 10.1–14.3)
HGB BLD-MCNC: 8.8 GM/DL (ref 10.1–14.3)
MCHC RBC AUTO-ENTMCNC: 33 % (ref 30–34)
MCHC RBC AUTO-ENTMCNC: 33 % (ref 30–34)
MCV RBC AUTO: 97 FL (ref 79–97)
MCV RBC AUTO: 97 FL (ref 79–97)
MYELOCYTES # (MANUAL): 0 K/MM3
PLATELET # BLD: 236 K/MM3 (ref 140–440)
PLATELET # BLD: 244 K/MM3 (ref 140–440)
PROMYELOCYTES # (MANUAL): 0 K/MM3
RBC # BLD AUTO: 2.7 M/MM3 (ref 3.65–5.03)
RBC # BLD AUTO: 2.78 M/MM3 (ref 3.65–5.03)
TOTAL CELLS COUNTED BLD: 100

## 2022-07-14 PROCEDURE — 83735 ASSAY OF MAGNESIUM: CPT

## 2022-07-14 PROCEDURE — 85025 COMPLETE CBC W/AUTO DIFF WBC: CPT

## 2022-07-14 PROCEDURE — 93005 ELECTROCARDIOGRAM TRACING: CPT

## 2022-07-14 PROCEDURE — 5A1D70Z PERFORMANCE OF URINARY FILTRATION, INTERMITTENT, LESS THAN 6 HOURS PER DAY: ICD-10-PCS | Performed by: INTERNAL MEDICINE

## 2022-07-14 PROCEDURE — 80048 BASIC METABOLIC PNL TOTAL CA: CPT

## 2022-07-14 PROCEDURE — 36415 COLL VENOUS BLD VENIPUNCTURE: CPT

## 2022-07-14 PROCEDURE — 85007 BL SMEAR W/DIFF WBC COUNT: CPT

## 2022-07-14 RX ADMIN — PANTOPRAZOLE SODIUM SCH MG: 40 TABLET, DELAYED RELEASE ORAL at 09:23

## 2022-07-14 RX ADMIN — ASPIRIN SCH MG: 325 TABLET, COATED ORAL at 09:24

## 2022-07-14 RX ADMIN — OXYCODONE AND ACETAMINOPHEN PRN TAB: 5; 325 TABLET ORAL at 13:49

## 2022-07-14 RX ADMIN — METOPROLOL TARTRATE SCH MG: 100 TABLET, FILM COATED ORAL at 09:23

## 2022-07-14 RX ADMIN — LOSARTAN POTASSIUM SCH: 50 TABLET, FILM COATED ORAL at 09:23

## 2022-07-14 RX ADMIN — APIXABAN SCH MG: 2.5 TABLET, FILM COATED ORAL at 09:25

## 2022-07-14 RX ADMIN — Medication SCH ML: at 09:24

## 2022-07-14 RX ADMIN — SENNOSIDES AND DOCUSATE SODIUM SCH TAB: 50; 8.6 TABLET ORAL at 09:25

## 2022-07-14 RX ADMIN — AMIODARONE HYDROCHLORIDE SCH MG: 200 TABLET ORAL at 09:23

## 2022-07-14 RX ADMIN — GABAPENTIN SCH MG: 100 CAPSULE ORAL at 09:23

## 2022-07-14 RX ADMIN — Medication SCH: at 09:24

## 2022-07-14 NOTE — EMERGENCY DEPARTMENT REPORT
HPI





- General


Chief Complaint: Dizziness


Time Seen by Provider: 07/14/22 19:38





- HPI


HPI: 





67-year-old -American female, with a history of disease on hemodialysis, 

paroxysmal atrial fibrillation, and coronary artery disease, presents back into 

the emergency department after passing out in triage.  The patient was just 

discharged from this hospital about 1 hour ago after being evaluated for atrial 

fibrillation with RVR, palpitations, chest pain, and she had dialysis done 

today.  However, it appears that she was discharged with her left EJ peripheral 

line still in place.  She came back into the emergency department to get the IV 

taken out, suddenly became dizzy, and then was witnessed passing out or nearly 

passing out.  The patient was caught before she hit the ground and was placed in

a chair.  At the time of my examination she is awake, alert, oriented, AAO x3 

and has no physical complaints.                                                 

                                                                                

                                                                                

                                                                                

               





ED Past Medical Hx





- Past Medical History


Hx Hypertension: Yes


Hx CVA: Yes (right sided weakness)


Hx Heart Attack/AMI: Yes (1 stent)


Hx Congestive Heart Failure: Yes


Hx Diabetes: Yes


Hx Renal Disease: Yes (RENAL INSUFFICIENCY- stent left kidney)


Hx Sickle Cell Disease: No


Hx Kidney Stones: No


Hx Psychiatric Treatment: Yes (BIPOLAR/SCHIZOPHRENIA)


Hx Asthma: No


Hx COPD: Yes


Hx HIV: No


Additional medical history: HIGH CHOLESTEROL





- Surgical History


Hx Coronary Stent: Yes


Hx Pacemaker: Yes


Additional Surgical History: stent placement x 2 in 2015, knee surgery 01/2018





- Social History


Smoking Status: Current Every Day Smoker





- Medications


Home Medications: 


                                Home Medications











 Medication  Instructions  Recorded  Confirmed  Last Taken  Type


 


Gabapentin 100 mg PO BID 04/23/22 05/28/22 Unknown History


 


Pantoprazole [Protonix TAB] 40 mg PO QDAY 04/23/22 05/28/22 04/23/22 10:00 

History


 


methIMAzole [Methimazole] 10 mg PO TID 05/01/22 05/28/22 Unknown History


 


AtorvaSTATin [Lipitor] 20 mg PO QHS #30 tablet 05/03/22 05/28/22 Unknown Rx


 


Sennosides/Docusate [Senokot S] 1 tab PO DAILY #30 tablet 05/03/22 05/28/22 

Unknown Rx


 


Amiodarone [Cordarone 200 MG TAB] 200 mg PO .AS DIRECTED #60 tablet 05/14/22 05/28/22 Unknown Rx


 


Apixaban [Eliquis] 2.5 mg PO BID #60 tab 05/14/22 05/28/22 Unknown Rx


 


Aspirin EC [Ecotrin] 325 mg PO QDAY 30 Days #30 tablet 05/24/22 05/28/22 Unknown

 Rx


 


Nitroglycerin [Nitrostat] 0.4 mg SL .Q5MIN PRN 30 Days #30 05/24/22 05/28/22 

Unknown Rx





 tablet    


 


Apixaban [Eliquis] 2.5 mg PO Q12HR  tablet 07/14/22  Unknown Rx


 


Aspirin EC [Halfprin EC] 81 mg PO QDAY  tablet 07/14/22  Unknown Rx


 


AtorvaSTATin [Lipitor] 20 mg PO QHS  tablet 07/14/22  Unknown Rx


 


Gabapentin 100 mg PO BID  capsule 07/14/22  Unknown Rx


 


ISOSORBIDE MONOnitrate [Imdur ER] 60 mg PO QDAY  tablet 07/14/22  Unknown Rx


 


Losartan [Cozaar] 50 mg PO QDAY  tablet 07/14/22  Unknown Rx


 


Metoprolol [Lopressor TAB] 100 mg PO BID #60 tablet 07/14/22  Unknown Rx


 


hydrALAZINE [Apresoline TAB] 50 mg PO Q8HR  tablet 07/14/22  Unknown Rx


 


oxyCODONE /ACETAMINOPHEN [Percocet 1 tab PO Q6H PRN #14 tablet 07/14/22  Unknown

 Rx





5/325 mg]     














ED Review of Systems


ROS: 


Stated complaint: WEAKNESS


Other details as noted in HPI





Comment: All other systems reviewed and negative


Constitutional: denies: chills, fever


Eyes: denies: eye pain, vision change


ENT: denies: ear pain, throat pain


Respiratory: denies: cough, shortness of breath


Cardiovascular: syncope.  denies: chest pain


Gastrointestinal: denies: abdominal pain, vomiting


Genitourinary: denies: dysuria, discharge


Musculoskeletal: denies: back pain, arthralgia


Skin: denies: rash, lesions


Neurological: denies: headache, weakness





Physical Exam





- Physical Exam


Vital Signs: 


                                   Vital Signs











  07/14/22 07/14/22 07/14/22





  19:17 19:26 19:30


 


Temperature 98 F  


 


Pulse Rate 71  71


 


Respiratory 16  14





Rate   


 


Blood Pressure   112/75


 


Blood Pressure 87/55  





[Left]   


 


O2 Sat by Pulse 92 92 94





Oximetry   











Physical Exam: 





GENERAL: The patient is well-developed well-nourished.


HENT: Normocephalic.  Atraumatic.    Patient has moist mucous membranes.


EYES: Extraocular motions are intact.  


NECK: Supple. Trachea is midline.


CHEST/LUNGS: Clear to auscultation.  There is no respiratory distress noted.


HEART/CARDIOVASCULAR: Regular.  There is no tachycardia.  There is no murmur.


ABDOMEN: Abdomen is soft, nontender.  Patient has normal bowel sounds.  There is

 no abdominal distention.


SKIN: Skin is warm and dry. 


NEURO: The patient is awake, alert, and oriented.  The patient is cooperative.  

The patient has no focal neurologic deficits.  Normal speech.


MUSCULOSKELETAL: There is no tenderness or deformity.  There is no limitation 

range of motion.  





ED Course


                                   Vital Signs











  07/14/22 07/14/22 07/14/22





  19:17 19:26 19:30


 


Temperature 98 F  


 


Pulse Rate 71  71


 


Respiratory 16  14





Rate   


 


Blood Pressure   112/75


 


Blood Pressure 87/55  





[Left]   


 


O2 Sat by Pulse 92 92 94





Oximetry   














ED Medical Decision Making





- Lab Data


Result diagrams: 


                                 07/14/22 20:18





                                 07/14/22 20:18





                                   Lab Results











  07/14/22 07/14/22 Range/Units





  20:18 20:18 


 


WBC  7.4   (4.5-11.0)  K/mm3


 


RBC  2.70 L   (3.65-5.03)  M/mm3


 


Hgb  8.6 L   (10.1-14.3)  gm/dl


 


Hct  26.2 L   (30.3-42.9)  %


 


MCV  97   (79-97)  fl


 


MCH  32   (28-32)  pg


 


MCHC  33   (30-34)  %


 


RDW  18.2 H   (13.2-15.2)  %


 


Plt Count  244   (140-440)  K/mm3


 


Sodium   140  D  (137-145)  mmol/L


 


Potassium   4.2  (3.6-5.0)  mmol/L


 


Chloride   99.7  ()  mmol/L


 


Carbon Dioxide   26  (22-30)  mmol/L


 


Anion Gap   19  mmol/L


 


BUN   23 H  (7-17)  mg/dL


 


Creatinine   4.1 H  (0.6-1.2)  mg/dL


 


Estimated GFR   13  ml/min


 


BUN/Creatinine Ratio   6  %


 


Glucose   119 H  ()  mg/dL


 


Calcium   8.6  (8.4-10.2)  mg/dL


 


Magnesium   1.60 L  (1.7-2.3)  mg/dL














- EKG Data


-: EKG Interpreted by Me


Rate: normal





- EKG Data


When compared to previous EKG there are: changes noted (Previous EKG showed 

atrial fibrillation with RVR)


Interpretation: other (Atrial fibrillation with a rate of 83 bpm, normal axis, 

prolonged QTC, LVH, no ST elevation MI.)





- Medical Decision Making





This patient was just discharged from this hospital this evening after being 

evaluated here for chest pain and getting dialysis.  She was on the way out to 

her car when she realized that she still had her EJ peripheral line still in 

place.  She came into the emergency department to get this removed, felt dizzy, 

and had a syncopal versus near syncopal episode in triage.  Since my initial 

examination the patient is awake, alert, oriented, AAO x3.  She does not have 

any acute focal, motor or sensory deficits.  EKG does not show any morphology 

consistent with ST elevation myocardial infarction.  It does show atrial 

fibrillation but the patient has a history of paroxysmal atrial fibrillation and

 is anticoagulated.  Patient's labs have been mostly unremarkable including CBC,

 metabolic panel except for the renal insufficiency consistent with her end-

stage renal disease on hemodialysis.  She has very mild hypomagnesemia, but this

 can be corrected during her next dialysis session.  Vital signs reassuring 

throughout her ED course including being afebrile.  She was reevaluated multiple

 times over multiple hours and says she is feeling greatly improved and appears 

safe for discharge home.  She will return to the emergency department with any 

worsening of her symptoms or with any acute distress.


Critical Care Time: No


Critical care attestation.: 


If time is entered above; I have spent that time in minutes in the direct care 

of this critically ill patient, excluding procedure time.








ED Disposition


Clinical Impression: 


 Dizziness, ESRD on hemodialysis





Atrial fibrillation


Qualifiers:


 Atrial fibrillation type: paroxysmal Qualified Code(s): I48.0 - Paroxysmal 

atrial fibrillation





Syncope


Qualifiers:


 Syncope type: unspecified Qualified Code(s): R55 - Syncope and collapse





Disposition: 01 HOME / SELF CARE / HOMELESS


Is pt being admited?: No


Condition: Stable


Instructions:  Dizziness, Atrial Fibrillation, Syncope, Syncope (ED)


Additional Instructions: 


Please follow-up with your primary care physician in the next few days.  

Continue with your normal dialysis regimen.  Return to the emergency department 

with any worsening of your symptoms or with any acute distress.


Referrals: 


PCP, Your [Other] - 3-5 Days


Time of Disposition: 21:38

## 2022-07-14 NOTE — PROGRESS NOTE
Assessment and Plan





ESRD - Seen & stable on HD





HTN - Caution with meds, f/u vitals





CP - W/u & Mx per Cardiology





A Fib - Resolved RVR, on meds including Eliquis & Amio











Subjective


Date of service: 07/14/22


Principal diagnosis: Atrial fibrillation


Interval history: 





No new complaint





Objective





- Vital Signs


Vital signs: 


                               Vital Signs - 12hr











  07/14/22 07/14/22 07/14/22





  03:08 03:58 04:00


 


Temperature   97.5 F L


 


Pulse Rate  70 


 


Blood Pressure  127/58 


 


O2 Sat by Pulse 96 98 





Oximetry   














  07/14/22 07/14/22





  07:40 09:23


 


Temperature  


 


Pulse Rate 71 70


 


Blood Pressure  117/65


 


O2 Sat by Pulse  





Oximetry  














- General Appearance


General appearance: other (Awake & responsive)


EENT: PERRL


Neck: no JVD


Respiratory: Present: Other (Good air entry)


Cardiology: regular, S1S2


Gastrointestinal: no tenderness


Integumentary: warm and dry


Neurologic: alert and oriented x3





- Lab





                                 07/14/22 05:31





                                 07/14/22 05:31


                             Most recent lab results











Calcium  8.4 mg/dL (8.4-10.2)   07/14/22  05:31    


 


Phosphorus  5.50 mg/dL (2.5-4.5)  H  07/14/22  05:31    


 


Magnesium  1.90 mg/dL (1.7-2.3)   07/14/22  05:31    














Medications & Allergies





- Medications


Allergies/Adverse Reactions: 


                                    Allergies





Iodinated Contrast Media Adverse Reaction (Intermediate, Verified 06/02/22 

09:45)


   Swelling


apple Adverse Reaction (Verified 06/02/22 09:45)


   Hives


   yellow and green apples. can eat red. 


shell fish Allergy (Uncoded 06/02/22 09:45)


   Swelling


   allergic to seafood except blue crab 








Home Medications: 


                                Home Medications











 Medication  Instructions  Recorded  Confirmed  Last Taken  Type


 


Gabapentin 100 mg PO BID 04/23/22 05/28/22 Unknown History


 


Pantoprazole [Protonix TAB] 40 mg PO QDAY 04/23/22 05/28/22 04/23/22 10:00 

History


 


methIMAzole [Methimazole] 10 mg PO TID 05/01/22 05/28/22 Unknown History


 


AtorvaSTATin [Lipitor] 20 mg PO QHS #30 tablet 05/03/22 05/28/22 Unknown Rx


 


ISOSORBIDE MONOnitrate [Imdur ER] 60 mg PO QDAY #30 tablet 05/03/22 05/28/22 

Unknown Rx


 


Losartan [Cozaar] 50 mg PO QDAY #30 tab 05/03/22 05/28/22 Unknown Rx


 


Metoprolol [Lopressor TAB] 100 mg PO BID #60 tablet 05/03/22 05/28/22 Unknown Rx


 


Sennosides/Docusate [Senokot S] 1 tab PO DAILY #30 tablet 05/03/22 05/28/22 

Unknown Rx


 


hydrALAZINE [Apresoline TAB] 50 mg PO Q8HR #120 tablet 05/03/22 05/28/22 Unknown

Rx


 


Amiodarone [Cordarone 200 MG TAB] 200 mg PO .AS DIRECTED #60 tablet 05/14/22 05/28/22 Unknown Rx


 


Apixaban [Eliquis] 2.5 mg PO BID #60 tab 05/14/22 05/28/22 Unknown Rx


 


Aspirin EC [Ecotrin] 325 mg PO QDAY 30 Days #30 tablet 05/24/22 05/28/22 Unknown

 Rx


 


Nitroglycerin [Nitrostat] 0.4 mg SL .Q5MIN PRN 30 Days #30 05/24/22 05/28/22 

Unknown Rx





 tablet    











Active Medications: 














Generic Name Dose Route Start Last Admin





  Trade Name Freq  PRN Reason Stop Dose Admin


 


Acetaminophen  650 mg  07/12/22 19:19 





  Acetaminophen 325 Mg Tab  PO  





  Q4H PRN  





  Pain MILD(1-3)/Fever >100.5/HA  


 


Amiodarone HCl  200 mg  07/13/22 10:00  07/14/22 09:23





  Amiodarone 200 Mg Tab  PO   200 mg





  DAILY DERIK   Administration


 


Apixaban  2.5 mg  07/12/22 22:00  07/14/22 09:25





  Apixaban 2.5 Mg Tab  PO   2.5 mg





  Q12HR DERIK   Administration





  Protocol  


 


Aspirin  81 mg  07/15/22 10:00 





  Aspirin Ec 81 Mg Tab  PO  





  QDAY DERIK  


 


Atorvastatin Calcium  20 mg  07/12/22 22:00  07/13/22 23:08





  Atorvastatin 20 Mg Tab  PO   20 mg





  QHS DERIK   Administration


 


Gabapentin  100 mg  07/12/22 22:00  07/14/22 09:23





  Gabapentin 100 Mg Cap  PO   100 mg





  BID DERIK   Administration


 


Heparin Sodium (Porcine)  2,000 unit  07/13/22 11:23 





  Heparin 10,000 Units/10 Ml Vial  IV  





  VANCE PRN  





  hemodialysis  


 


Heparin Sodium (Porcine)  5,000 unit  07/13/22 11:23 





  Heparin 10,000 Unit/1 Ml Vial  IV  





  VANCE PRN  





  hemodialysis  


 


Hydralazine HCl  10 mg  07/12/22 16:57  07/12/22 17:02





  Hydralazine 20 Mg/1 Ml Inj  IV   10 mg





  Q4HR PRN   Administration





  SBP >/=160; DBP >/=100  


 


Hydralazine HCl  50 mg  07/12/22 22:00  07/14/22 06:00





  Hydralazine 25 Mg Tab  PO   50 mg





  Q8HR DERIK   Administration


 


Sodium Chloride  100 mls @ 999 mls/hr  07/13/22 11:23 





  Nacl 0.9%  IV  





  VANCE PRN  





  Hypotension  


 


Isosorbide Mononitrate  60 mg  07/13/22 10:00  07/13/22 10:20





  Isosorbide Mononitrate Er 60 Mg Tab  PO   60 mg





  QDAY DERIK   Administration


 


Losartan Potassium  50 mg  07/13/22 10:00  07/14/22 09:23





  Losartan 50 Mg Tab  PO   Not Given





  QDAY [facility]  


 


Methimazole  10 mg  07/12/22 20:00  07/14/22 09:23





  Methimazole 5 Mg Tab  PO   10 mg





  TID DERIK   Administration


 


Metoclopramide HCl  10 mg  07/12/22 19:19 





  Metoclopramide 10 Mg/2 Ml Inj  IV  





  Q6H PRN  





  Nausea And Vomiting  


 


Metoprolol Tartrate  100 mg  07/12/22 22:00  07/14/22 09:23





  Metoprolol Tartrate 100 Mg Tab  PO   100 mg





  BID DERIK   Administration


 


Morphine Sulfate  4 mg  07/12/22 17:04  07/12/22 20:40





  Morphine 4 Mg/1 Ml Inj  IV   4 mg





  Q4HR PRN   Administration





  Pain, Chest/Cardiac  


 


Ondansetron HCl  4 mg  07/12/22 19:19 





  Ondansetron 4 Mg/2 Ml Inj  IV  





  Q8H PRN  





  Nausea And Vomiting  


 


Oxycodone/Acetaminophen  1 tab  07/12/22 19:19  07/13/22 23:19





  Oxycodone /Acetaminophen 5-325mg Tab  PO   1 tab





  Q6H PRN   Administration





  Pain, Moderate (4-6)  


 


Pantoprazole Sodium  40 mg  07/13/22 10:00  07/14/22 09:23





  Pantoprazole 40 Mg Tab  PO   40 mg





  QDAY DERIK   Administration


 


Senna/Docusate Sodium  1 tab  07/13/22 10:00  07/14/22 09:25





  Sennosides/Docusate Sodium 8.6/50 Mg Tab  PO   1 tab





  DAILY DERIK   Administration


 


Sodium Chloride  10 ml  07/12/22 22:00  07/14/22 09:24





  Sodium Chloride 0.9% 10 Ml Flush Syringe  IV   10 ml





  BID DERIK   Administration


 


Sodium Chloride  10 ml  07/12/22 19:19 





  Sodium Chloride 0.9% 10 Ml Flush Syringe  IV  





  PRN PRN  





  LINE FLUSH

## 2022-07-14 NOTE — PROGRESS NOTE
Assessment and Plan





- Patient Problems


(1) Atrial fibrillation with rapid ventricular response


Current Visit: Yes   Status: Acute   


Plan to address problem: 


Patient with a history of paroxysmal atrial fibrillation, presents with 

symptomatic rapid atrial fibrillation.  Currently the atrial fibrillation has 

reverted and she has a stable sinus rhythm.  We will continue rhythm control 

management with amiodarone, and oral anticoagulation with Eliquis as tolerated.





TSH level is normal at 1.17.  Echocardiogram is pending for left ventricular 

function reassessment.








(2) Coronary artery disease


Current Visit: Yes   Status: Acute   


Plan to address problem: 


Patient has a history of coronary artery disease, status post remote proximal 

and distal LAD stents which were widely patent on a repeat cardiac 

catheterization in 2017.  More recently, a thallium stress test done just 3 

months ago in this hospital showed normal coronary perfusion.  Patient has no 

current symptoms of angina, will continue guideline directed medical therapy and

aggressive risk factor modification.








Subjective


Date of service: 07/14/22


Principal diagnosis: Atrial fibrillation


Interval history: 





Patient is comfortable, dialysis planned for today.  On telemetry monitor, there

is a stable sinus rhythm at 77.





Objective


                                   Vital Signs











  Temp Pulse Resp BP Pulse Ox


 


 07/14/22 09:23   70   117/65 


 


 07/14/22 04:00  97.5 F L    


 


 07/14/22 03:58   70   127/58  98


 


 07/14/22 03:08      96


 


 07/13/22 23:19    20  


 


 07/13/22 20:15   70   


 


 07/13/22 19:59  97.3 F L  70  16  117/68  99


 


 07/13/22 16:00   71   


 


 07/13/22 13:00      96


 


 07/13/22 12:49      98














- Physical Examination


General: No Apparent Distress


HEENT: Positive: PERRL


Neck: Positive: neck supple


Cardiac: Positive: Reg Rate and Rhythm


Lungs: Positive: Decreased Breath Sounds


Neuro: Positive: Grossly Intact


Abdomen: Positive: Soft


Skin: Positive: Clear


Extremities: Absent: edema





- Labs and Meds


                                 Cardiac Enzymes











  07/14/22 Range/Units





  05:31 


 


AST  31  (5-40)  units/L








                                       CBC











  07/13/22 07/14/22 Range/Units





  10:17 05:31 


 


WBC  9.0  8.4  (4.5-11.0)  K/mm3


 


RBC  2.93 L  2.78 L  (3.65-5.03)  M/mm3


 


Hgb  9.2 L  8.8 L  (10.1-14.3)  gm/dl


 


Hct  29.0 L  26.9 L  (30.3-42.9)  %


 


Plt Count  251  236  (140-440)  K/mm3


 


Lymph # (Auto)  1.1 L   (1.2-5.4)  K/mm3


 


Mono # (Auto)  0.8   (0.0-0.8)  K/mm3


 


Eos # (Auto)  0.1   (0.0-0.4)  K/mm3


 


Baso # (Auto)  0.1   (0.0-0.1)  K/mm3








                          Comprehensive Metabolic Panel











  07/13/22 07/14/22 Range/Units





  10:17 05:31 


 


Sodium  141  133 L D  (137-145)  mmol/L


 


Potassium  5.3 H D  4.6  (3.6-5.0)  mmol/L


 


Chloride  96.6 L  93.5 L  ()  mmol/L


 


Carbon Dioxide  29  25  (22-30)  mmol/L


 


BUN  40 H  46 H  (7-17)  mg/dL


 


Creatinine  5.8 H  6.2 H  (0.6-1.2)  mg/dL


 


Glucose  125 H  102 H  ()  mg/dL


 


Calcium  9.0  8.4  (8.4-10.2)  mg/dL


 


AST   31  (5-40)  units/L


 


ALT   18  (7-56)  units/L


 


Alkaline Phosphatase   105  ()  units/L


 


Total Protein   5.6 L  (6.3-8.2)  g/dL


 


Albumin   3.7 L  (3.9-5)  g/dL














- Imaging and Cardiology


EKG: report reviewed (SVT with heart rate of 173 from, probable LVH)

## 2022-07-14 NOTE — DISCHARGE SUMMARY
Providers





- Providers


Date of Admission: 


07/12/22 19:19





Date of discharge: 07/14/22


Attending physician: 


GEOFF KAY





                                        





07/12/22 19:19


Consult to Physician [CONS] Routine 


   Comment: 


   Consulting Provider: AUSTIN CORONEL


   Physician Instructions: 


   Reason For Exam: Afib with RVR





07/12/22 19:24


Consult to Physician [CONS] Routine 


   Comment: 


   Consulting Provider: EMELINA CH


   Physician Instructions: 


   Reason For Exam: ESRD











Primary care physician: 


PRIMARY CARE MD








Hospitalization


Condition: Stable


Disposition: 01 HOME / SELF CARE / HOMELESS





- Discharge Diagnoses


(1) Atrial fibrillation with rapid ventricular response


Status: Acute   Comment: A. fib rate controlled again placed on Eliquis for 

anticoagulation.  Currently in normal sinus.   





(2) Hypertension


Status: Chronic   


Qualifiers: 


   Hypertension type: primary hypertension   Qualified Code(s): I10 - Essential 

(primary) hypertension   





(3) End-stage renal disease on hemodialysis


Status: Chronic   





(4) Coronary artery disease


Status: Chronic   


Qualifiers: 


   Coronary Disease-Associated Artery/Lesion type: native artery   Native vs. 

transplanted heart: native heart 





(5) Hyperlipidemia


Status: Chronic   


Qualifiers: 


   Hyperlipidemia type: mixed hyperlipidemia   Qualified Code(s): E78.2 - Mixed 

hyperlipidemia   





(6) DVT prophylaxis


Status: Acute   





(7) Advance care planning


Status: Acute   





Exam





- Constitutional


Vitals: 


                                        











Temp Pulse Resp BP Pulse Ox


 


 97.5 F L  70   20   117/65   98 


 


 07/14/22 04:00  07/14/22 09:23  07/13/22 23:19  07/14/22 09:23  07/14/22 03:58














Plan


Follow up with: 


PRIMARY CARE,MD [Primary Care Provider] - 3-5 Days

## 2022-07-14 NOTE — PROGRESS NOTE
Assessment and Plan





- Patient Problems


(1) Atrial fibrillation with rapid ventricular response


Current Visit: Yes   Status: Acute   


Plan to address problem: 


Patient initiated on amiodarone drip without titration


Patient on Eliquis








(2) Hypertension


Current Visit: Yes   Status: Chronic   


Qualifiers: 


   Hypertension type: primary hypertension   Qualified Code(s): I10 - Essential 

(primary) hypertension   


Plan to address problem: 


Continue antihypertensives and adjust medications as necessary








(3) End-stage renal disease on hemodialysis


Current Visit: No   Status: Chronic   


Plan to address problem: 


Continue hemodialysis as per schedule


Nephrology consult requested








(4) Coronary artery disease


Current Visit: No   Status: Chronic   


Qualifiers: 


   Coronary Disease-Associated Artery/Lesion type: native artery   Native vs. 

transplanted heart: native heart 


Plan to address problem: 


Continue isosorbide mononitrate and aspirin








(5) Hyperlipidemia


Current Visit: Yes   Status: Chronic   


Qualifiers: 


   Hyperlipidemia type: mixed hyperlipidemia   Qualified Code(s): E78.2 - Mixed 

hyperlipidemia   


Plan to address problem: 


Continue statins 








(6) DVT prophylaxis


Current Visit: Yes   Status: Acute   


Plan to address problem: 


On Eliquis and GI prophylaxis








(7) Advance care planning


Current Visit: Yes   Status: Acute   


Plan to address problem: 


Disease education conducted care plan discussed diagnosis discussed prognosis 

discussed.  Patient is full code.  Patient acknowledges understanding and 

agreement with care plan.  +30 minutes.








Subjective


Date of service: 07/13/22


Principal diagnosis: Atrial fibrillation





Objective





- Constitutional


Vitals: 


                               Vital Signs - 12hr











  07/14/22 07/14/22 07/14/22





  03:08 03:58 04:00


 


Temperature   97.5 F L


 


Pulse Rate  70 


 


Blood Pressure  127/58 


 


O2 Sat by Pulse 96 98 





Oximetry   














  07/14/22 07/14/22





  07:40 09:23


 


Temperature  


 


Pulse Rate 71 70


 


Blood Pressure  117/65


 


O2 Sat by Pulse  





Oximetry  











General appearance: Present: no acute distress, well-nourished





- EENT


Eyes: PERRL, EOM intact


ENT: hearing intact, clear oral mucosa


Ears: bilateral: normal





- Neck


Neck: supple, normal ROM





- Respiratory


Respiratory effort: normal


Respiratory: bilateral: CTA





- Breasts


Breasts: normal





- Cardiovascular


Rhythm: regular


Heart Sounds: Present: S1 & S2.  Absent: gallop, rub


Extremities: pulses intact, No edema, normal color, Full ROM





- Gastrointestinal


General gastrointestinal: Present: soft, non-tender, non-distended, normal bowel

sounds





- Genitourinary


Female genitourinary: normal





- Integumentary


Integumentary: clear, warm, dry





- Musculoskeletal


Musculoskeletal: 1, strength equal bilaterally





- Neurologic


Neurologic: moves all extremities





- Psychiatric


Psychiatric: memory intact, appropriate mood/affect, intact judgment & insight





- Labs


CBC & Chem 7: 


                                 07/14/22 05:31





                                 07/14/22 05:31


Labs: 


                              Abnormal lab results











  07/13/22 07/14/22 07/14/22 Range/Units





  10:17 05:31 05:31 


 


RBC   2.78 L   (3.65-5.03)  M/mm3


 


Hgb   8.8 L   (10.1-14.3)  gm/dl


 


Hct   26.9 L   (30.3-42.9)  %


 


RDW   17.9 H   (13.2-15.2)  %


 


Sodium    133 L D  (137-145)  mmol/L


 


Chloride    93.5 L  ()  mmol/L


 


BUN    46 H  (7-17)  mg/dL


 


Creatinine    6.2 H  (0.6-1.2)  mg/dL


 


Glucose    102 H  ()  mg/dL


 


Phosphorus    5.50 H  (2.5-4.5)  mg/dL


 


Total Protein    5.6 L  (6.3-8.2)  g/dL


 


Albumin    3.7 L  (3.9-5)  g/dL


 


Free T4  2.01 H    (0.76-1.46)  ng/dL














HEART Score





- HEART Score


Age: > 65


Risk factors: > 3 risk factors or hx of atherosclerotic disease


Troponin: 


                                        











Troponin T  0.025 ng/mL (0.00-0.029)   07/12/22  22:43    











Troponin: 1-3x normal limit





- Critical Actions


Critical Actions: 4-6 pts:12-16.6% risk of adverse cardiac event. Should be 

admitted

## 2022-07-17 NOTE — ELECTROCARDIOGRAPH REPORT
[facility]

                                       

Test Date:    2022               Test Time:    21:12:38

Pat Name:     LINDA GARCIA              Department:   

Patient ID:   [id number]          Room:          

Gender:       F                        Technician:   JOSSE

:          1955               Requested By: HUEY ROSE

Order Number: [id number]              Reading MD:   Segundo Denise

                                 Measurements

Intervals                              Axis          

Rate:         83                       P:            

GA:                                    QRS:          -11

QRSD:         90                       T:            63

QT:           445                                    

QTc:          522                                    

                           Interpretive Statements

Atrial fibrillation

Probable left ventricular hypertrophy

Prolonged QT interval

Compared to ECG 2022 12:47:27

Prolonged QT interval now present

Electronically Signed On 2022 15:09:03 EDT by Segundo Denise

## 2022-07-24 ENCOUNTER — HOSPITAL ENCOUNTER (OUTPATIENT)
Dept: HOSPITAL 5 - ED | Age: 67
Setting detail: OBSERVATION
LOS: 1 days | Discharge: HOME | End: 2022-07-25
Attending: STUDENT IN AN ORGANIZED HEALTH CARE EDUCATION/TRAINING PROGRAM | Admitting: INTERNAL MEDICINE
Payer: MEDICARE

## 2022-07-24 DIAGNOSIS — Z86.73: ICD-10-CM

## 2022-07-24 DIAGNOSIS — E05.90: ICD-10-CM

## 2022-07-24 DIAGNOSIS — Z79.899: ICD-10-CM

## 2022-07-24 DIAGNOSIS — J44.9: ICD-10-CM

## 2022-07-24 DIAGNOSIS — I16.1: ICD-10-CM

## 2022-07-24 DIAGNOSIS — R07.89: ICD-10-CM

## 2022-07-24 DIAGNOSIS — Z95.1: ICD-10-CM

## 2022-07-24 DIAGNOSIS — Z98.890: ICD-10-CM

## 2022-07-24 DIAGNOSIS — I13.2: ICD-10-CM

## 2022-07-24 DIAGNOSIS — E11.22: ICD-10-CM

## 2022-07-24 DIAGNOSIS — F17.213: ICD-10-CM

## 2022-07-24 DIAGNOSIS — Z95.0: ICD-10-CM

## 2022-07-24 DIAGNOSIS — F31.9: ICD-10-CM

## 2022-07-24 DIAGNOSIS — Z79.82: ICD-10-CM

## 2022-07-24 DIAGNOSIS — E78.00: ICD-10-CM

## 2022-07-24 DIAGNOSIS — I50.32: ICD-10-CM

## 2022-07-24 DIAGNOSIS — I25.2: ICD-10-CM

## 2022-07-24 DIAGNOSIS — N18.6: ICD-10-CM

## 2022-07-24 DIAGNOSIS — I20.8: Primary | ICD-10-CM

## 2022-07-24 DIAGNOSIS — I48.20: ICD-10-CM

## 2022-07-24 DIAGNOSIS — E78.2: ICD-10-CM

## 2022-07-24 LAB
ALBUMIN SERPL-MCNC: 4.1 G/DL (ref 3.9–5)
ALT SERPL-CCNC: 20 UNITS/L (ref 7–56)
APTT BLD: 36.9 SEC. (ref 24.2–36.6)
BASOPHILS # (AUTO): 0.1 K/MM3 (ref 0–0.1)
BASOPHILS NFR BLD AUTO: 1 % (ref 0–1.8)
BUN SERPL-MCNC: 24 MG/DL (ref 7–17)
BUN/CREAT SERPL: 7 %
CALCIUM SERPL-MCNC: 10 MG/DL (ref 8.4–10.2)
EOSINOPHIL # BLD AUTO: 0 K/MM3 (ref 0–0.4)
EOSINOPHIL NFR BLD AUTO: 0.5 % (ref 0–4.3)
HCT VFR BLD CALC: 35.8 % (ref 30.3–42.9)
HDLC SERPL-MCNC: 49 MG/DL (ref 40–59)
HEMOLYSIS INDEX: 8
HGB BLD-MCNC: 11.7 GM/DL (ref 10.1–14.3)
INR PPP: 1.22 (ref 0.87–1.13)
LYMPHOCYTES # BLD AUTO: 1.1 K/MM3 (ref 1.2–5.4)
LYMPHOCYTES NFR BLD AUTO: 12.4 % (ref 13.4–35)
MCHC RBC AUTO-ENTMCNC: 33 % (ref 30–34)
MCV RBC AUTO: 100 FL (ref 79–97)
MONOCYTES # (AUTO): 0.8 K/MM3 (ref 0–0.8)
MONOCYTES % (AUTO): 9.1 % (ref 0–7.3)
PLATELET # BLD: 192 K/MM3 (ref 140–440)
RBC # BLD AUTO: 3.59 M/MM3 (ref 3.65–5.03)
T4 FREE SERPL-MCNC: 2.24 NG/DL (ref 0.76–1.46)

## 2022-07-24 PROCEDURE — 99285 EMERGENCY DEPT VISIT HI MDM: CPT

## 2022-07-24 PROCEDURE — 80053 COMPREHEN METABOLIC PANEL: CPT

## 2022-07-24 PROCEDURE — 96374 THER/PROPH/DIAG INJ IV PUSH: CPT

## 2022-07-24 PROCEDURE — 85730 THROMBOPLASTIN TIME PARTIAL: CPT

## 2022-07-24 PROCEDURE — 85610 PROTHROMBIN TIME: CPT

## 2022-07-24 PROCEDURE — 71045 X-RAY EXAM CHEST 1 VIEW: CPT

## 2022-07-24 PROCEDURE — 83735 ASSAY OF MAGNESIUM: CPT

## 2022-07-24 PROCEDURE — 93005 ELECTROCARDIOGRAM TRACING: CPT

## 2022-07-24 PROCEDURE — 36415 COLL VENOUS BLD VENIPUNCTURE: CPT

## 2022-07-24 PROCEDURE — 84484 ASSAY OF TROPONIN QUANT: CPT

## 2022-07-24 PROCEDURE — 80061 LIPID PANEL: CPT

## 2022-07-24 PROCEDURE — 96375 TX/PRO/DX INJ NEW DRUG ADDON: CPT

## 2022-07-24 PROCEDURE — 82962 GLUCOSE BLOOD TEST: CPT

## 2022-07-24 PROCEDURE — 80048 BASIC METABOLIC PNL TOTAL CA: CPT

## 2022-07-24 PROCEDURE — 85025 COMPLETE CBC W/AUTO DIFF WBC: CPT

## 2022-07-24 PROCEDURE — 84443 ASSAY THYROID STIM HORMONE: CPT

## 2022-07-24 PROCEDURE — 96376 TX/PRO/DX INJ SAME DRUG ADON: CPT

## 2022-07-24 PROCEDURE — G0378 HOSPITAL OBSERVATION PER HR: HCPCS

## 2022-07-24 PROCEDURE — 84439 ASSAY OF FREE THYROXINE: CPT

## 2022-07-24 RX ADMIN — AMIODARONE HYDROCHLORIDE SCH MG: 200 TABLET ORAL at 19:43

## 2022-07-24 RX ADMIN — Medication SCH ML: at 23:15

## 2022-07-24 RX ADMIN — APIXABAN SCH MG: 2.5 TABLET, FILM COATED ORAL at 23:13

## 2022-07-24 RX ADMIN — GABAPENTIN SCH MG: 100 CAPSULE ORAL at 23:13

## 2022-07-24 RX ADMIN — METOPROLOL TARTRATE SCH MG: 100 TABLET, FILM COATED ORAL at 23:13

## 2022-07-24 NOTE — XRAY REPORT
CHEST 1 VIEW 7/24/2022 11:10 AM



INDICATION / CLINICAL INFORMATION: THADDEUS.



COMPARISON: 06/16/22.



FINDINGS:



SUPPORT DEVICES: The position of the dual chamber left subclavian transvenous pacemaker has not bateman
ed.

HEART / MEDIASTINUM: There is mild cardiomegaly with prominence of the central pulmonary vessels. 

LUNGS / PLEURA: Interstitial lung markings are mildly increased in the perihilar regions/central lung
 zones, right greater than left. No significant pleural effusion. No pneumothorax. 



ADDITIONAL FINDINGS: No significant additional findings.



IMPRESSION: Mild congestive heart failure/interstitial edema, right greater than left.



Signer Name: João Braxton MD 

Signed: 7/24/2022 11:31 AM

Workstation Name: [id number]

## 2022-07-24 NOTE — EMERGENCY DEPARTMENT REPORT
HPI





- General


Chief Complaint: Arrhythmia/Palpitations


Time Seen by Provider: 22 10:45





- HPI


HPI: 





Room 17











Patient is a 67-year-old female present with chief complaint of chest pain.  

Patient states her symptoms began last night with across the substernal chest 

pain associated with shortness of breath, diaphoresis and nausea without 

vomiting.  Patient currently gives her chest pain score of 10/10.  Patient was 

recently admitted for A. fib with RVR.  Patient reportedly has history of 

hyperthyroidism but states she does not recall being on methimazole.





ED Past Medical Hx





- Past Medical History


Hx Hypertension: Yes


Hx CVA: Yes (right sided weakness)


Hx Heart Attack/AMI: Yes (1 stent)


Hx Congestive Heart Failure: Yes


Hx Diabetes: Yes


Hx Renal Disease: Yes (RENAL INSUFFICIENCY- stent left kidney)


Hx Psychiatric Treatment: Yes (BIPOLAR/SCHIZOPHRENIA)


Hx COPD: Yes


Additional medical history: HIGH CHOLESTEROL





- Surgical History


Hx Coronary Stent: Yes


Hx Pacemaker: Yes


Additional Surgical History: stent placement x 2 in , knee surgery 2018





- Family History


Family history: no significant





- Social History


Smoking Status: Former Smoker (Stopped smoking yesterday)


Substance Use Type: None (Denies illicit drug use)





- Medications


Home Medications: 


                                Home Medications











 Medication  Instructions  Recorded  Confirmed  Last Taken  Type


 


Gabapentin 100 mg PO BID 22 Unknown History


 


Pantoprazole [Protonix TAB] 40 mg PO QDAY 22 10:00 

History


 


methIMAzole [Methimazole] 10 mg PO TID 22 Unknown History


 


AtorvaSTATin [Lipitor] 20 mg PO QHS #30 tablet 22 Unknown Rx


 


Sennosides/Docusate [Senokot S] 1 tab PO DAILY #30 tablet 22 

Unknown Rx


 


Amiodarone [Cordarone 200 MG TAB] 200 mg PO .AS DIRECTED #60 tablet 22 Unknown Rx


 


Apixaban [Eliquis] 2.5 mg PO BID #60 tab 22 Unknown Rx


 


Aspirin EC [Ecotrin] 325 mg PO QDAY 30 Days #30 tablet 22 Unknown

 Rx


 


Nitroglycerin [Nitrostat] 0.4 mg SL .Q5MIN PRN 30 Days #30 /22 22 

Unknown Rx





 tablet    


 


Apixaban [Eliquis] 2.5 mg PO Q12HR  tablet 22  Unknown Rx


 


Aspirin EC [Halfprin EC] 81 mg PO QDAY  tablet 22  Unknown Rx


 


AtorvaSTATin [Lipitor] 20 mg PO QHS  tablet 22  Unknown Rx


 


Gabapentin 100 mg PO BID  capsule 22  Unknown Rx


 


ISOSORBIDE MONOnitrate [Imdur ER] 60 mg PO QDAY  tablet 22  Unknown Rx


 


Losartan [Cozaar] 50 mg PO QDAY  tablet 22  Unknown Rx


 


Metoprolol [Lopressor TAB] 100 mg PO BID #60 tablet 22  Unknown Rx


 


hydrALAZINE [Apresoline TAB] 50 mg PO Q8HR  tablet 22  Unknown Rx


 


oxyCODONE /ACETAMINOPHEN [Percocet 1 tab PO Q6H PRN #14 tablet 22  Unknown

 Rx





5/325 mg]     














ED Review of Systems


ROS: 


Stated complaint: CP/SOB


Other details as noted in HPI





Constitutional: diaphoresis


Eyes: denies: eye pain


ENT: denies: throat pain


Respiratory: shortness of breath


Cardiovascular: chest pain


Endocrine: no symptoms reported


Gastrointestinal: nausea.  denies: vomiting


Musculoskeletal: denies: back pain


Neurological: denies: headache





Physical Exam





- Physical Exam


Vital Signs: 


                                   Vital Signs











  22





  11:41


 


Pulse Rate 173 H


 


Blood Pressure 191/110











Physical Exam: 





GENERAL: The patient is well-developed well-nourished female lying on stretcher 

appearing to be in moderate discomfort. []


HEENT: Normocephalic.  Atraumatic.  Extraocular motions are intact.  Patient has

 moist mucous membranes.


NECK: Supple.  Trachea midline


CHEST/LUNGS: Clear to auscultation.  There is no respiratory distress noted.


HEART/CARDIOVASCULAR: Regular.  There is tachycardia.  There is no gallop rub or

 murmur.


ABDOMEN: Abdomen is soft, nontender.  Patient has normal bowel sounds.  There is

 no abdominal distention.


SKIN: There is no rash.  There is no edema.  There is no diaphoresis.


NEURO: The patient is awake, alert, and oriented.  The patient is cooperative.  

The patient has no focal neurologic deficits.  The patient has normal speech.  

GCS 15


MUSCULOSKELETAL:  There is no evidence of acute injury.





ED Course


                                   Vital Signs











  22





  11:41


 


Pulse Rate 173 H


 


Blood Pressure 191/110














- Reevaluation(s)


Reevaluation #1: 





22 12:42


Patient's heart rate corrected to the 70s, however she still complains of 

substernal chest pain.  We will repeat EKG





- Consultations


Consultation #1: 





22 14:46


Cardiology paged





22 16:01


No return call from cardiology yet.  I attempted to contact cardiologist Dr. Bacon on his cell phone but there was no answer- voicemail left





ED Medical Decision Making





- Lab Data


Result diagrams: 


                                 22 12:22





                                 22 12:22





- EKG Data


-: EKG Interpreted by Me


EKG shows normal: sinus rhythm


Rate: tachycardia (150 bpm)





- EKG Data


When compared to previous EKG there are: no significant change


Interpretation: nonspecific ST-T wave mathieu





- Radiology Data


Radiology results: report reviewed (Chest x-ray), image reviewed (Chest x-ray)


interpreted by me: 





Chest x-ray-cardiomegaly, no definite focal infiltrates, no pneumothorax





[facility]  


                                     [address] [place] [address]  


 


                                            XRay Report   


                                               Signed  


 


Patient: LINDA GARCIA                                                       

         MR#: [id number]  


[id number]          


: 1955                                                                

Acct:[id number]      


 


Age/Sex: 67 / F                                                                

ADM Date: 22     


 


Loc: ED       


Attending Dr:   


 


 


Ordering Physician: GITA SHORE MD  


Date of Service: 22  


Procedure(s): XR chest 1V ap  


Accession Number(s): [id number]  


 


cc: GITA SHORE MD   


 


Fluoro Time In Minutes:   


 


CHEST 1 VIEW 2022 11:10 AM  


 


 INDICATION / CLINICAL INFORMATION: THADDEUS.  


 


 COMPARISON: 22.  


 


 FINDINGS:  


 


 SUPPORT DEVICES: The position of the dual chamber left subclavian transvenous 

pacemaker has not 


changed.  


 HEART / MEDIASTINUM: There is mild cardiomegaly with prominence of the central 

pulmonary vessels.   


 LUNGS / PLEURA: Interstitial lung markings are mildly increased in the perih

ilar regions/central 


lung zones, right greater than left. No significant pleural effusion. No pneum

othorax.   


 


 ADDITIONAL FINDINGS: No significant additional findings.  


 


 IMPRESSION: Mild congestive heart failure/interstitial edema, right greater 

than left.  


 


 Signer Name: João Braxton MD   


 Signed: 2022 11:31 AM  


 Workstation Name: [id number]   


 


 


Transcribed By: RT  


Dictated By: João Braxton MD  


Electronically Authenticated By: João Braxton MD    


Signed Date/Time: 22 1131                                


 


 


 


DD/DT: 22 1130                                                            

  


TD/TT:


Print Cancel





- Differential Diagnosis


A. fib with RVR, thyrotoxicosis, symptomatic anemia, ACS, pericarditis, JOSEPH


Critical care attestation.: 


If time is entered above; I have spent that time in minutes in the direct care 

of this critically ill patient, excluding procedure time.








ED Disposition


Clinical Impression: 


 Chest pain, Atrial fibrillation with rapid ventricular response





Disposition:  ADMITTED AS INPATIENT


Is pt being admited?: Yes


Does the pt Need Aspirin: Yes


Condition: Fair


Instructions:  Nonspecific Chest Pain, Adult


Referrals: 


PRIMARY CARE,MD [Primary Care Provider] - 3-5 Days


Time of Disposition: 16:08 (Care transferred to hospitalist (Dr. Vuong))





Heart Score





- HEART Score


History: Moderately suspicious


EKG: Non-specific


Age: > 65


Risk factors: > 3 risk factors or hx of atherosclerotic disease


Troponin: < normal limit


HEART Score: 6





- EKG Read Time


Time EKG Completed: 11:30


EKG Read Time: 11:34

## 2022-07-24 NOTE — HISTORY AND PHYSICAL REPORT
History of Present Illness


Chief complaint: 


My chest hurts and I have been feeling weak and tired





History of present illness: 


68 YO Female with ESRD on HD(T,R,Sa), HTN, CVA, MI, CHF, Nicotine Dependence,  

Atrial Fib on Therapeutic Anticoagulation, DM, COPD, HLD, Bipolar Disorder, CAD 

S/P Stent Placement presents to ED for evaluation.  Patient reports " my chest 

hurts and I have been feeling weak and tired".  Patient states that she 

experienced generalized weakness, decreased exercise tolerance, dyspnea on 

exertion over the past week with persistent symptoms over the same timeframe.  

Patient also states that she experienced a sudden onset of chest pain last night

which awaken her from sleep.  Patient states that pain is 10/10, constant, 

substernal, nonradiating, associated with shortness of breath, associated with 

diaphoresis, worsened with exertion, relieved with rest.  Patient acknowledges 

noncompliance with renal diet, and 5 pound weight gain over the past 3 days.  

EMS notified and upon arrival the patient was found to be in distress and 

subsequent transported to [facility] for further care and evaluation of the 

aforementioned symptoms.  Patient was seen and evaluated in the emergency 

department.  All lab and imaging studies reviewed.  Patient found to have a 

blood pressure of 191/110 mmHg which is consistent with hypertensive emergency 

as well as atrial fibrillation with rapid ventricular response due to heart rate

in the 180s, thyrotoxicosis, and end-stage renal disease, as well as clinical 

symptoms consistent with diastolic CHF.  Patient admitted to telemetry floor due

to increased risk of decompensation and for medical stabilization.  Nephrology 

team consulted in ED.  Cardiology team consulted in ED.  Pt denies fever, 

chills, productive cough, unilateral leg swelling, calf pain, NVD, syncope, 

dizziness, or recent ill contacts, syncope, known exposure to COVID-19.  Prior 

admission on 7/12/2022 reviewed.  All medication listed at time of admission has

been reconciled.  Advanced care planning conducted in ED.











Past History


Past Medical History: acute MI, atrial fib, heart failure, hypertension, stroke,

other (See HPI)


Past Surgical History: total knee replacement, Other (Pacemaker placement)


Social history: .  denies: smoking, alcohol abuse, prescription drug 

abuse


Family history: diabetes, hypertension





Medications and Allergies


                                    Allergies











Allergy/AdvReac Type Severity Reaction Status Date / Time


 


Iodinated Contrast Media AdvReac Intermediate Swelling Verified 07/24/22 10:22


 


apple AdvReac  Hives Verified 07/24/22 10:22


 


shell fish Allergy  Swelling Uncoded 06/02/22 09:45











                                Home Medications











 Medication  Instructions  Recorded  Confirmed  Last Taken  Type


 


Gabapentin 100 mg PO BID 04/23/22 05/28/22 Unknown History


 


Pantoprazole [Protonix TAB] 40 mg PO QDAY 04/23/22 05/28/22 04/23/22 10:00 

History


 


methIMAzole [Methimazole] 10 mg PO TID 05/01/22 05/28/22 Unknown History


 


AtorvaSTATin [Lipitor] 20 mg PO QHS #30 tablet 05/03/22 05/28/22 Unknown Rx


 


Sennosides/Docusate [Senokot S] 1 tab PO DAILY #30 tablet 05/03/22 05/28/22 

Unknown Rx


 


Amiodarone [Cordarone 200 MG TAB] 200 mg PO .AS DIRECTED #60 tablet 05/14/22 05/28/22 Unknown Rx


 


Apixaban [Eliquis] 2.5 mg PO BID #60 tab 05/14/22 05/28/22 Unknown Rx


 


Aspirin EC [Ecotrin] 325 mg PO QDAY 30 Days #30 tablet 05/24/22 05/28/22 Unknown

 Rx


 


Nitroglycerin [Nitrostat] 0.4 mg SL .Q5MIN PRN 30 Days #30 05/24/22 05/28/22 

Unknown Rx





 tablet    


 


Apixaban [Eliquis] 2.5 mg PO Q12HR  tablet 07/14/22  Unknown Rx


 


Aspirin EC [Halfprin EC] 81 mg PO QDAY  tablet 07/14/22  Unknown Rx


 


AtorvaSTATin [Lipitor] 20 mg PO QHS  tablet 07/14/22  Unknown Rx


 


Gabapentin 100 mg PO BID  capsule 07/14/22  Unknown Rx


 


ISOSORBIDE MONOnitrate [Imdur ER] 60 mg PO QDAY  tablet 07/14/22  Unknown Rx


 


Losartan [Cozaar] 50 mg PO QDAY  tablet 07/14/22  Unknown Rx


 


Metoprolol [Lopressor TAB] 100 mg PO BID #60 tablet 07/14/22  Unknown Rx


 


hydrALAZINE [Apresoline TAB] 50 mg PO Q8HR  tablet 07/14/22  Unknown Rx


 


oxyCODONE /ACETAMINOPHEN [Percocet 1 tab PO Q6H PRN #14 tablet 07/14/22  Unknown

 Rx





5/325 mg]     














Review of Systems


Constitutional: weight gain, no weight loss, no fever, no chills


Ears, nose, mouth and throat: no ear pain, no ear discharge, no decreased 

hearing, no nose pain, no nasal discharge


Breasts: no change in shape, no swelling, no mass


Cardiovascular: chest pain, shortness of breath, dyspnea on exertion, high blood

pressure, decreased exercise tolerance


Gastrointestinal: no abdominal pain, no nausea, no vomiting, no diarrhea, no 

constipation


Genitourinary Female: no pelvic pain, no flank pain, no dysuria, no urinary 

frequency, no urgency


Rectal: no pain, no incontinence


Musculoskeletal: no neck pain, no shooting arm pain, no low back pain, no 

shooting leg pain, no leg numbness/tingling


Integumentary: no rash, no pruritis, no redness, no wounds


Neurological: no transient paralysis, no numbness, no seizures, no syncope, no 

tremors


Psychiatric: no anxiety, no memory loss, no sleep disturbances, no insomnia, no 

change in appetite


Endocrine: no cold intolerance, no polyphagia, no excessive thirst, no polyuria,

no nocturia, no excessive sweating


Hematologic/Lymphatic: no easy bruising, no easy bleeding, no lymphedema


Allergic/Immunologic: no allergic rhinitis, no wheezing, no anaphylaxis





Exam





- Constitutional


Vitals: 


                                        











Temp Pulse Resp BP Pulse Ox


 


    84   21   177/79   94 


 


    07/24/22 15:45  07/24/22 15:45  07/24/22 15:45  07/24/22 15:45











General appearance: Present: mild distress





- EENT


Eyes: Present: PERRL


ENT: hearing intact, clear oral mucosa





- Neck


Neck: Present: supple, normal ROM





- Respiratory


Respiratory effort: normal


Respiratory: bilateral: diminished





- Cardiovascular


Rhythm: other (Tachycardia)


Heart Sounds: Present: S1 & S2.  Absent: rub, click





- Extremities


Extremities: pulses symmetrical


Extremity abnormal: edema


Peripheral Pulses: within normal limits





- Abdominal


General gastrointestinal: Present: soft, non-tender, non-distended, normal bowel

sounds


Female genitourinary: Present: normal





- Integumentary


Integumentary: Present: clear, warm, dry





- Musculoskeletal


Musculoskeletal: gait normal, strength equal bilaterally





- Psychiatric


Psychiatric: appropriate mood/affect, intact judgment & insight





- Neurologic


Neurologic: CNII-XII intact, moves all extremities





HEART Score





- HEART Score


Troponin: 


                                        











Troponin T  0.025 ng/mL (0.00-0.029)   07/24/22  12:22    














Results





- Labs


CBC & Chem 7: 


                                 07/24/22 12:22





                                 07/24/22 12:22


Labs: 


                              Abnormal lab results











  07/24/22 07/24/22 07/24/22 Range/Units





  12:22 12:22 12:22 


 


RBC  3.59 L    (3.65-5.03)  M/mm3


 


MCV  100 H    (79-97)  fl


 


MCH  33 H    (28-32)  pg


 


RDW  20.2 H    (13.2-15.2)  %


 


Lymph % (Auto)  12.4 L    (13.4-35.0)  %


 


Mono % (Auto)  9.1 H    (0.0-7.3)  %


 


Lymph # (Auto)  1.1 L    (1.2-5.4)  K/mm3


 


Seg Neutrophils %  77.0 H    (40.0-70.0)  %


 


PT   16.8 H   (12.2-14.9)  Sec.


 


INR   1.22 H   (0.87-1.13)  


 


APTT   36.9 H   (24.2-36.6)  Sec.


 


BUN    24 H  (7-17)  mg/dL


 


Creatinine    3.5 H  (0.6-1.2)  mg/dL


 


Glucose    111 H  ()  mg/dL


 


Total Bilirubin    1.40 H  (0.1-1.2)  mg/dL


 


Free T4     (0.76-1.46)  ng/dL














  07/24/22 Range/Units





  12:22 


 


RBC   (3.65-5.03)  M/mm3


 


MCV   (79-97)  fl


 


MCH   (28-32)  pg


 


RDW   (13.2-15.2)  %


 


Lymph % (Auto)   (13.4-35.0)  %


 


Mono % (Auto)   (0.0-7.3)  %


 


Lymph # (Auto)   (1.2-5.4)  K/mm3


 


Seg Neutrophils %   (40.0-70.0)  %


 


PT   (12.2-14.9)  Sec.


 


INR   (0.87-1.13)  


 


APTT   (24.2-36.6)  Sec.


 


BUN   (7-17)  mg/dL


 


Creatinine   (0.6-1.2)  mg/dL


 


Glucose   ()  mg/dL


 


Total Bilirubin   (0.1-1.2)  mg/dL


 


Free T4  2.24 H  (0.76-1.46)  ng/dL














Assessment and Plan





- Patient Problems


(1) Angina at rest


Current Visit: Yes   Status: Acute   


Plan to address problem: 


ACS protocol: Serial cardiac enzymes, EKG, telemetry monitoring, cardiology team

consulted.  Further care and evaluation as per cardiology team.  Echocardiogram 

from 7/14/2022 reviewed.








(2) Diastolic CHF


Current Visit: Yes   Status: Acute   


Qualifiers: 


   Heart failure chronicity: acute on chronic   Qualified Code(s): I50.33 - 

Acute on chronic diastolic (congestive) heart failure   


Plan to address problem: 


Strict I's/O, Q shift, daily weight, afterload reduction, blood pressure 

control, echocardiogram from 7/14/2022 reviewed.








(3) End stage renal disease


Current Visit: Yes   Status: Acute   


Plan to address problem: 


Nephrology team consulted in ED for dialysis.








(4) Thyrotoxicosis


Current Visit: Yes   Status: Acute   


Qualifiers: 


   Thyrotoxicosis type: unspecified thyrotoxicosis type 


Plan to address problem: 


Supportive care, resume prehospital methimazole therapy, monitor vital signs per

routine, supportive care.








(5) Hypertensive emergency


Current Visit: Yes   Status: Acute   


Plan to address problem: 


Monitor blood pressure every shift, IV antihypertensive therapy, monitor blood 

pressure q. nursing care protocol, supportive care.








(6) Atrial fibrillation with rapid ventricular response


Current Visit: Yes   Status: Acute   


Plan to address problem: 


Rate control, resume prehospital amiodarone, echocardiogram reviewed. cardiology

team consulted.  Therapeutic anticoagulation








(7) Hyperlipidemia


Current Visit: No   Status: Acute   


Qualifiers: 


   Hyperlipidemia type: mixed hyperlipidemia   Qualified Code(s): E78.2 - Mixed 

hyperlipidemia   


Plan to address problem: 


Statin therapy, low-cholesterol diet, supportive care.








(8) Bipolar 1 disorder


Current Visit: No   Status: Chronic   


Plan to address problem: 


Continue medical management.  No acute exacerbation at this time.








(9) Diabetes mellitus


Current Visit: No   Status: Chronic   


Plan to address problem: 


Consistent carbohydrate diet, Accu-Chek, insulin protocol, hypoglycemia 

protocol, supportive care.








(10) Nicotine dependence


Current Visit: Yes   Status: Acute   


Qualifiers: 


   Nicotine product type: cigarettes   Substance use status: in withdrawal   

Qualified Code(s): F17.213 - Nicotine dependence, cigarettes, with withdrawal   


Plan to address problem: 


Smoke cessation counseling, supportive care, behavior change counseled, +15 

minutes.








(11) COPD (chronic obstructive pulmonary disease)


Current Visit: Yes   Status: Acute   


Plan to address problem: 


Supplemental oxygen, nebulized therapy, no acute exacerbation at this time.  

Continue medical management.








(12) DVT prophylaxis


Current Visit: No   Status: Acute   


Plan to address problem: 


SCD to bilateral lower extremities while in bed, continue therapeutic 

anticoagulation.








(13) Advance care planning


Current Visit: Yes   Status: Acute   


Plan to address problem: 


Disease education done, care plan discussed, diagnoses discussed, prognosis 

discussed, patient is full code.  Patient acknowledged understanding and agreed 

with care plan, +30 minutes.








(14) Preventative health care


Current Visit: Yes   Status: Acute   


Plan to address problem: 


Patient counseled regarding medication compliance, outpatient follow-up with re

nal diet, outpatient follow-up with primary care physician for all age and risk 

factor appropriate screening test.  +30 minutes.

## 2022-07-25 VITALS — DIASTOLIC BLOOD PRESSURE: 58 MMHG | SYSTOLIC BLOOD PRESSURE: 113 MMHG

## 2022-07-25 LAB
BUN SERPL-MCNC: 33 MG/DL (ref 7–17)
BUN/CREAT SERPL: 8 %
CALCIUM SERPL-MCNC: 9.3 MG/DL (ref 8.4–10.2)
HEMOLYSIS INDEX: 14

## 2022-07-25 RX ADMIN — GABAPENTIN SCH MG: 100 CAPSULE ORAL at 09:39

## 2022-07-25 RX ADMIN — METOPROLOL TARTRATE SCH MG: 100 TABLET, FILM COATED ORAL at 09:42

## 2022-07-25 RX ADMIN — Medication SCH ML: at 09:39

## 2022-07-25 RX ADMIN — AMIODARONE HYDROCHLORIDE SCH MG: 200 TABLET ORAL at 09:39

## 2022-07-25 RX ADMIN — APIXABAN SCH MG: 2.5 TABLET, FILM COATED ORAL at 09:39

## 2022-07-25 NOTE — CONSULTATION
History of Present Illness





- Reason for Consult


Consult date: 07/25/22


end stage renal disease





- History of Present Illness





67yr F with h/o ESRD on HD at [facility], Does not know 

her Nephrologist. Admitted c/o CP & Generalized weakness





Past History


Past Medical History: acute MI, atrial fib, heart failure, hypertension, stroke,

other (See HPI)


Past Surgical History: total knee replacement, Other (Pacemaker placement)


Social history: .  denies: smoking, alcohol abuse, prescription drug 

abuse


Family history: diabetes, hypertension





Medications and Allergies


                                    Allergies











Allergy/AdvReac Type Severity Reaction Status Date / Time


 


Iodinated Contrast Media AdvReac Intermediate Swelling Verified 07/24/22 10:22


 


apple AdvReac  Hives Verified 07/24/22 10:22


 


shell fish Allergy  Swelling Uncoded 06/02/22 09:45











                                Home Medications











 Medication  Instructions  Recorded  Confirmed  Last Taken  Type


 


Gabapentin 100 mg PO BID 04/23/22 05/28/22 Unknown History


 


Pantoprazole [Protonix TAB] 40 mg PO QDAY 04/23/22 05/28/22 04/23/22 10:00 

History


 


methIMAzole [Methimazole] 10 mg PO TID 05/01/22 05/28/22 Unknown History


 


AtorvaSTATin [Lipitor] 20 mg PO QHS #30 tablet 05/03/22 05/28/22 Unknown Rx


 


Sennosides/Docusate [Senokot S] 1 tab PO DAILY #30 tablet 05/03/22 05/28/22 Un

known Rx


 


Amiodarone [Cordarone 200 MG TAB] 200 mg PO .AS DIRECTED #60 tablet 05/14/22 05/28/22 Unknown Rx


 


Apixaban [Eliquis] 2.5 mg PO BID #60 tab 05/14/22 05/28/22 Unknown Rx


 


Aspirin EC [Ecotrin] 325 mg PO QDAY 30 Days #30 tablet 05/24/22 05/28/22 Unknown

Rx


 


Nitroglycerin [Nitrostat] 0.4 mg SL .Q5MIN PRN 30 Days #30 05/24/22 05/28/22 

Unknown Rx





 tablet    


 


Apixaban [Eliquis] 2.5 mg PO Q12HR  tablet 07/14/22  Unknown Rx


 


Aspirin EC [Halfprin EC] 81 mg PO QDAY  tablet 07/14/22  Unknown Rx


 


AtorvaSTATin [Lipitor] 20 mg PO QHS  tablet 07/14/22  Unknown Rx


 


Gabapentin 100 mg PO BID  capsule 07/14/22  Unknown Rx


 


ISOSORBIDE MONOnitrate [Imdur ER] 60 mg PO QDAY  tablet 07/14/22  Unknown Rx


 


Losartan [Cozaar] 50 mg PO QDAY  tablet 07/14/22  Unknown Rx


 


Metoprolol [Lopressor TAB] 100 mg PO BID #60 tablet 07/14/22  Unknown Rx


 


hydrALAZINE [Apresoline TAB] 50 mg PO Q8HR  tablet 07/14/22  Unknown Rx


 


oxyCODONE /ACETAMINOPHEN [Percocet 1 tab PO Q6H PRN #14 tablet 07/14/22  Unknown

 Rx





5/325 mg]     











Active Meds: 


Active Medications





Acetaminophen (Acetaminophen 325 Mg Tab)  650 mg PO Q4H PRN


   PRN Reason: Pain MILD(1-3)/Fever >100.5/HA


   Last Admin: 07/25/22 04:07 Dose:  650 mg


   


Albuterol (Albuterol 2.5 Mg/3 Ml Nebu)  2.5 mg IH Q4HRT PRN


   PRN Reason: Shortness Of Breath


Amiodarone HCl (Amiodarone 200 Mg Tab)  200 mg PO DAILY [facility]


   Last Admin: 07/25/22 09:39 Dose:  200 mg


   


Apixaban (Apixaban 2.5 Mg Tab)  2.5 mg PO BID [facility]


   Last Admin: 07/25/22 09:39 Dose:  2.5 mg


   


Aspirin (Aspirin Ec 325 Mg Tab)  325 mg PO QDAY [facility]


   Last Admin: 07/25/22 09:39 Dose:  325 mg


   


Atorvastatin Calcium (Atorvastatin 20 Mg Tab)  20 mg PO QHS [facility]


   Last Admin: 07/24/22 23:13 Dose:  20 mg


   


Gabapentin (Gabapentin 100 Mg Cap)  100 mg PO BID [facility]


   Last Admin: 07/25/22 09:39 Dose:  100 mg


   


Hydralazine HCl (Hydralazine 25 Mg Tab)  50 mg PO Q8HR [facility]


   Last Admin: 07/25/22 06:18 Dose:  50 mg


   


Amiodarone HCl 900 mg/ (Dextrose)  500 mls @ 33.333 mls/hr IV AS DIRECT DERIK; 

Protocol


Isosorbide Mononitrate (Isosorbide Mononitrate Er 60 Mg Tab)  60 mg PO QDAY [facility]


   Last Admin: 07/25/22 09:39 Dose:  60 mg


   


Losartan Potassium (Losartan 50 Mg Tab)  50 mg PO QDAY [facility]


   Last Admin: 07/25/22 09:39 Dose:  50 mg


   


Methimazole (Methimazole 5 Mg Tab)  10 mg PO TID [facility]


   Last Admin: 07/25/22 09:38 Dose:  10 mg


   


Metoprolol Tartrate (Metoprolol Tartrate 100 Mg Tab)  100 mg PO BID [facility]


   Last Admin: 07/25/22 09:42 Dose:  100 mg


   


Morphine Sulfate (Morphine 4 Mg/1 Ml Inj)  2 mg IV Q8H PRN


   PRN Reason: Pain , Severe (7-10)


   Last Admin: 07/25/22 03:54 Dose:  2 mg


   


Nitroglycerin (Nitroglycerin 0.4 Mg Tab Subl)  0.4 mg SL .Q5MIN PRN


   PRN Reason: Chest Pain


Ondansetron HCl (Ondansetron 4 Mg/2 Ml Inj)  4 mg IV Q8H PRN


   PRN Reason: Nausea And Vomiting


Oxycodone/Acetaminophen (Oxycodone /Acetaminophen 5-325mg Tab)  1 tab PO Q6H PRN


   PRN Reason: Pain, Moderate (4-6)


   Last Admin: 07/24/22 17:16 Dose:  1 tab


   


Pantoprazole Sodium (Pantoprazole 40 Mg Tab)  40 mg PO QDAY [facility]


   Last Admin: 07/25/22 09:39 Dose:  40 mg


   


Senna/Docusate Sodium (Sennosides/Docusate Sodium 8.6/50 Mg Tab)  1 tab PO DAILY

[facility]


   Last Admin: 07/25/22 09:38 Dose:  1 tab


   


Sodium Chloride (Sodium Chloride 0.9% 10 Ml Flush Syringe)  10 ml IV BID [facility]


   Last Admin: 07/25/22 09:39 Dose:  10 ml


   


Sodium Chloride (Sodium Chloride 0.9% 10 Ml Flush Syringe)  10 ml IV PRN PRN


   PRN Reason: LINE FLUSH











Review of Systems


Constitutional: fatigue, weakness, no fever, no chills


Cardiovascular: chest pain, no palpitations, no edema, no leg edema


Respiratory: no cough, no shortness of breath


Gastrointestinal: nausea, no abdominal pain, no vomiting





Exam





- Vital Signs


Vital signs: 


                                   Vital Signs











Pulse Resp Pulse Ox


 


 170 H  31 H  100 


 


 07/24/22 10:50  07/24/22 10:50  07/24/22 10:50














- General Appearance


General appearance: other (Awake & alert)


EENT: PERRL, mucous membranes moist, hearing intact


Neck: Present: neck supple.  Absent: JVD/HJR


Respiratory: Other (Good air entry)


Heart: regular


Gastrointestinal: Present: other (Soft).  Absent: tenderness


Integumentary: no rash, warm and dry


Neurologic: no focal deficit, alert and oriented x3


Additional exam: 





Rt arm AVG with +Bruit





Results





- Lab Results





                                 07/24/22 12:22





                                 07/25/22 00:32


                             Most recent lab results











Calcium  9.3 mg/dL (8.4-10.2)   07/25/22  00:32    


 


Magnesium  2.10 mg/dL (1.7-2.3)   07/24/22  12:22    














Assessment and Plan





ESRD - HD in am & through admission





HTN -F/u on meds





CP - F/u Mx per Cardiology





Thanks, will f/u with you

## 2022-07-25 NOTE — CONSULTATION
History of Present Illness


Consult date: 07/25/22


Consult reason: atrial fibrillation


History of present illness: 





The patient is a 67-year-old woman with coronary artery disease and paroxysmal 

atrial fibrillation.  She has coronary stents in her LAD implanted several years

ago, which have been patent on repeat cardiac catheterization, and a follow-up 

thallium stress test within the past year has also shown normal perfusion.  Her 

past 2-3 admissions have been for acute symptomatic atrial fibrillation.





She presents to the hospital at this time with acute onset palpitations, on 

presentation was found with atrial fibrillation, heart rate in the 140s.  The 

patient is currently on the telemetry floor, having reverted to a stable sinus 

rhythm on medical therapy.  She looks and feels well at this time.  Over the 

past few years, serial left ventricular function assessments have documented 

well-preserved left ventricular systolic ejection fraction.





Past History


Past Medical History: acute MI, atrial fib, CAD, heart failure, hypertension, 

stroke, other (See HPI)


Past Surgical History: total knee replacement, Other (Pacemaker placement)


Social history: .  denies: smoking, alcohol abuse, prescription drug 

abuse


Family history: diabetes, hypertension





Medications and Allergies


                                    Allergies











Allergy/AdvReac Type Severity Reaction Status Date / Time


 


Iodinated Contrast Media AdvReac Intermediate Swelling Verified 07/24/22 10:22


 


apple AdvReac  Hives Verified 07/24/22 10:22


 


shell fish Allergy  Swelling Uncoded 06/02/22 09:45











                                Home Medications











 Medication  Instructions  Recorded  Confirmed  Last Taken  Type


 


Gabapentin 100 mg PO BID 04/23/22 05/28/22 Unknown History


 


Pantoprazole [Protonix TAB] 40 mg PO QDAY 04/23/22 05/28/22 04/23/22 10:00 

History


 


methIMAzole [Methimazole] 10 mg PO TID 05/01/22 05/28/22 Unknown History


 


AtorvaSTATin [Lipitor] 20 mg PO QHS #30 tablet 05/03/22 05/28/22 Unknown Rx


 


Sennosides/Docusate [Senokot S] 1 tab PO DAILY #30 tablet 05/03/22 05/28/22 

Unknown Rx


 


Amiodarone [Cordarone 200 MG TAB] 200 mg PO .AS DIRECTED #60 tablet 05/14/22 05/28/22 Unknown Rx


 


Apixaban [Eliquis] 2.5 mg PO BID #60 tab 05/14/22 05/28/22 Unknown Rx


 


Aspirin EC [Ecotrin] 325 mg PO QDAY 30 Days #30 tablet 05/24/22 05/28/22 Unknown

 Rx


 


Nitroglycerin [Nitrostat] 0.4 mg SL .Q5MIN PRN 30 Days #30 05/24/22 05/28/22 

Unknown Rx





 tablet    


 


Apixaban [Eliquis] 2.5 mg PO Q12HR  tablet 07/14/22  Unknown Rx


 


Aspirin EC [Halfprin EC] 81 mg PO QDAY  tablet 07/14/22  Unknown Rx


 


AtorvaSTATin [Lipitor] 20 mg PO QHS  tablet 07/14/22  Unknown Rx


 


Gabapentin 100 mg PO BID  capsule 07/14/22  Unknown Rx


 


ISOSORBIDE MONOnitrate [Imdur ER] 60 mg PO QDAY  tablet 07/14/22  Unknown Rx


 


Losartan [Cozaar] 50 mg PO QDAY  tablet 07/14/22  Unknown Rx


 


Metoprolol [Lopressor TAB] 100 mg PO BID #60 tablet 07/14/22  Unknown Rx


 


hydrALAZINE [Apresoline TAB] 50 mg PO Q8HR  tablet 07/14/22  Unknown Rx


 


oxyCODONE /ACETAMINOPHEN [Percocet 1 tab PO Q6H PRN #14 tablet 07/14/22  Unknown

 Rx





5/325 mg]     











Active Meds: 


Active Medications





Acetaminophen (Acetaminophen 325 Mg Tab)  650 mg PO Q4H PRN


   PRN Reason: Pain MILD(1-3)/Fever >100.5/HA


   Last Admin: 07/25/22 04:07 Dose:  650 mg


   


Albuterol (Albuterol 2.5 Mg/3 Ml Nebu)  2.5 mg IH Q4HRT PRN


   PRN Reason: Shortness Of Breath


Amiodarone HCl (Amiodarone 200 Mg Tab)  200 mg PO DAILY [facility]


   Last Admin: 07/25/22 09:39 Dose:  200 mg


   


Apixaban (Apixaban 2.5 Mg Tab)  2.5 mg PO BID [facility]


   Last Admin: 07/25/22 09:39 Dose:  2.5 mg


   


Aspirin (Aspirin Ec 325 Mg Tab)  325 mg PO QDAY [facility]


   Last Admin: 07/25/22 09:39 Dose:  325 mg


   


Atorvastatin Calcium (Atorvastatin 20 Mg Tab)  20 mg PO QHS [facility]


   Last Admin: 07/24/22 23:13 Dose:  20 mg


   


Epoetin Teodoro-epbx (Epoetin Teodoro-Epbx 10,000 Unit/1 Ml Vial)  10,000 unit IV VANCE 

PRN


   PRN Reason: hemodialysis


Gabapentin (Gabapentin 100 Mg Cap)  100 mg PO BID [facility]


   Last Admin: 07/25/22 09:39 Dose:  100 mg


   


Hydralazine HCl (Hydralazine 25 Mg Tab)  50 mg PO Q8HR [facility]


   Last Admin: 07/25/22 13:12 Dose:  50 mg


   


Amiodarone HCl 900 mg/ (Dextrose)  500 mls @ 33.333 mls/hr IV AS DIRECT DERIK; 

Protocol


Sodium Chloride (Nacl 0.9%)  100 mls @ 999 mls/hr IV VANCE PRN


   PRN Reason: Hypotension


Isosorbide Mononitrate (Isosorbide Mononitrate Er 60 Mg Tab)  60 mg PO QDAY [facility]


   Last Admin: 07/25/22 09:39 Dose:  60 mg


   


Losartan Potassium (Losartan 50 Mg Tab)  50 mg PO QDAY [facility]


   Last Admin: 07/25/22 09:39 Dose:  50 mg


   


Methimazole (Methimazole 5 Mg Tab)  10 mg PO TID [facility]


   Last Admin: 07/25/22 13:12 Dose:  10 mg


   


Metoprolol Tartrate (Metoprolol Tartrate 100 Mg Tab)  100 mg PO BID [facility]


   Last Admin: 07/25/22 09:42 Dose:  100 mg


   


Morphine Sulfate (Morphine 4 Mg/1 Ml Inj)  2 mg IV Q8H PRN


   PRN Reason: Pain , Severe (7-10)


   Last Admin: 07/25/22 03:54 Dose:  2 mg


   


Nitroglycerin (Nitroglycerin 0.4 Mg Tab Subl)  0.4 mg SL .Q5MIN PRN


   PRN Reason: Chest Pain


Ondansetron HCl (Ondansetron 4 Mg/2 Ml Inj)  4 mg IV Q8H PRN


   PRN Reason: Nausea And Vomiting


Oxycodone/Acetaminophen (Oxycodone /Acetaminophen 5-325mg Tab)  1 tab PO Q6H PRN


   PRN Reason: Pain, Moderate (4-6)


   Last Admin: 07/24/22 17:16 Dose:  1 tab


   


Pantoprazole Sodium (Pantoprazole 40 Mg Tab)  40 mg PO QDAY [facility]


   Last Admin: 07/25/22 09:39 Dose:  40 mg


   


Senna/Docusate Sodium (Sennosides/Docusate Sodium 8.6/50 Mg Tab)  1 tab PO DAILY

[facility]


   Last Admin: 07/25/22 09:38 Dose:  1 tab


   


Sodium Chloride (Sodium Chloride 0.9% 10 Ml Flush Syringe)  10 ml IV BID [facility]


   Last Admin: 07/25/22 09:39 Dose:  10 ml


   


Sodium Chloride (Sodium Chloride 0.9% 10 Ml Flush Syringe)  10 ml IV PRN PRN


   PRN Reason: LINE FLUSH











Review of Systems


Cardiovascular: palpitations, rapid/irregular heart beat, shortness of breath, 

no chest pain, no orthopnea, no edema, no syncope, no lightheadedness





Physical Examination


                                   Vital Signs











Pulse Resp Pulse Ox


 


 170 H  31 H  100 


 


 07/24/22 10:50  07/24/22 10:50  07/24/22 10:50











General appearance: no acute distress


HEENT: Positive: PERRL


Neck: Positive: neck supple


Cardiac: Positive: Reg Rate and Rhythm


Lungs: Positive: clear to auscultation


Neuro: Positive: Grossly Intact


Abdomen: Positive: Soft


Female genitourinary: deferred


Skin: Positive: Clear


Extremities: Absent: edema





Results





                                 07/24/22 12:22





                                 07/25/22 00:32


                                     Lipids











  07/24/22 Range/Units





  19:48 


 


Triglycerides  79  (2-149)  mg/dL


 


Cholesterol  108  ()  mg/dL


 


HDL Cholesterol  49  (40-59)  mg/dL


 


Cholesterol/HDL Ratio  2.20  %








                          Comprehensive Metabolic Panel











  07/25/22 Range/Units





  00:32 


 


Sodium  137  (137-145)  mmol/L


 


Potassium  4.4  (3.6-5.0)  mmol/L


 


Chloride  95.3 L  ()  mmol/L


 


Carbon Dioxide  25  (22-30)  mmol/L


 


BUN  33 H  (7-17)  mg/dL


 


Creatinine  4.3 H  (0.6-1.2)  mg/dL


 


Glucose  133 H  ()  mg/dL


 


Calcium  9.3  (8.4-10.2)  mg/dL














EKG interpretations





- Telemetry


EKG Rhythm: Atrial Fibrillation (With rapid ventricular rate)





Assessment and Plan





- Patient Problems


(1) Atrial fibrillation with rapid ventricular response


Current Visit: Yes   Status: Acute   


Plan to address problem: 


Patient presents with symptomatic, rapid atrial fibrillation.  She has a history

of paroxysmal atrial fibrillation on rhythm control therapy.  She is back in a 

sinus rhythm, currently asymptomatic looks and feels well, we will optimize 

rhythm control therapy.  Patient is otherwise stable for cardiac discharge and 

outpatient follow-up in 7 days.








(2) Coronary artery disease


Current Visit: Yes   Status: Acute   


Plan to address problem: 


Patient has coronary artery disease with proximal mid and distal LAD stents as 

previously documented, stents widely patent on her repeat cardiac catheteriza

tion showing, and a more recent thallium stress test was normal perfusion.  She 

has no symptoms of angina at the current time, continue guideline directed 

medical therapy and outpatient follow-up.

## 2022-07-25 NOTE — ELECTROCARDIOGRAPH REPORT
[facility]

                                       

Test Date:    2022               Test Time:    11:30:32

Pat Name:     LINDA GARCIA              Department:   

Patient ID:   [id number]          Room:         A460

Gender:       F                        Technician:   JEANIE

:          1955               Requested By: GITA SHORE

Order Number: [id number]              Reading MD:   John Vaca

                                 Measurements

Intervals                              Axis          

Rate:         150                      P:            77

MD:           112                      QRS:          -41

QRSD:         75                       T:            71

QT:           309                                    

QTc:          486                                    

                           Interpretive Statements

afib w rvr

Ventricular tachycardia, unsustained

Inferior infarct, old

Anterior infarct, old

Electronically Signed On 2022 10:03:11 EDT by John Vaca

## 2022-07-25 NOTE — ELECTROCARDIOGRAPH REPORT
[facility]

                                       

Test Date:    2022               Test Time:    12:56:30

Pat Name:     LINDA GARCIA              Department:   

Patient ID:   [id number]          Room:         A460

Gender:       F                        Technician:   JEANIE

:          1955               Requested By: GITA SHORE

Order Number: [id number]              Reading MD:   John Vaca

                                 Measurements

Intervals                              Axis          

Rate:         72                       P:            141

MN:           123                      QRS:          -31

QRSD:         102                      T:            95

QT:           475                                    

QTc:          520                                    

                           Interpretive Statements

Sinus or ectopic atrial rhythm

LVH with secondary repolarization abnormality

Prolonged QT interval

Compared to ECG 2022 11:30:32

Ectopic atrial rhythm now present

Left ventricular hypertrophy now present

Early repolarization now present

Prolonged QT interval now present

Sinus tachycardia no longer present

Ventricular tachycardia no longer present

Myocardial infarct finding no longer present

Electronically Signed On 2022 10:03:43 EDT by John Vaca

## 2022-07-25 NOTE — ELECTROCARDIOGRAPH REPORT
[facility]

                                       

Test Date:    2022               Test Time:    10:46:20

Pat Name:     LINDA GARCIA              Department:   

Patient ID:   [id number]          Room:         A460

Gender:       F                        Technician:   JEANIE

:          1955               Requested By: GITA SHORE

Order Number: [id number]              Reading MD:   John Vaca

                                 Measurements

Intervals                              Axis          

Rate:         148                      P:            71

NE:           126                      QRS:          -42

QRSD:         82                       T:            71

QT:           313                                    

QTc:          472                                    

                           Interpretive Statements

afib w rvr

Multiform ventricular premature complexes

Inferior infarct, old

Probable anterior infarct, age indeterminate

Electronically Signed On 2022 10:02:32 EDT by John Vaca

## 2022-07-25 NOTE — PROGRESS NOTE
Assessment and Plan


Assessment and plan: 





#Angina at rest


ACS protocol: Serial cardiac enzymes, EKG, telemetry monitoring, cardiology team

consulted.  Further care and evaluation as per cardiology team.  Echocardiogram 

from 7/14/2022 reviewed.





#Chronic diastolic heart failurestable


Strict I's/O, Q shift, daily weight, afterload reduction, blood pressure 

control, echocardiogram from 7/14/2022 reviewed.





#ESRD on hemodialysis


-Access: Right upper extremity AV fistula


-Outpatient schedule: [facility]


-HD center: Unknown


-Nephrology consulted; appreciate recs. 


-Renally dose medications and avoid nephrotoxic drugs. Renal diet.





#Thyrotoxicosisstable


Supportive care, resume prehospital methimazole therapy, monitor vital signs 

per routine, supportive care.





#Hypertensive emergencyresolved


Monitor blood pressure every shift, IV antihypertensive therapy, monitor blood 

pressure q. nursing care protocol, supportive care.





#Atrial fibrillation with rapid ventricular response


Rate control, resume prehospital amiodarone, echocardiogram reviewed. 

cardiology team consulted.  Therapeutic anticoagulation





#Hyperlipidemia


Statin therapy, low-cholesterol diet, supportive care.





#Bipolar 1 disorder


Continue medical management.  No acute exacerbation at this time.





#Noninsulin-dependent type II diabetes mellitus


Consistent carbohydrate diet, Accu-Chek, insulin protocol, hypoglycemia 

protocol, supportive care.





#COPD (chronic obstructive pulmonary disease)stable


Supplemental oxygen, nebulized therapy, no acute exacerbation at this time.  

Continue medical management.





#Tobacco dependence


#Tobacco/Smoking cessation counseling


- Counseled patient about the importance of smoking cessation and the possible 

sequelae as a result of continued tobacco consumption. The patient expresses 

understanding. 


-Time: +15 mins





#Advanced care planning


-Disease education conducted, care plan discussed, diagnoses discussed, 

prognosis discussed, and patient acknowledges understanding with care plan


-Time: +30 min


Disposition Plan: Continue medical management


Total Time Spent with Patient (Minutes): 45 min 





History


Interval history: 





No acute events overnight. 





Hospitalist Physical





- Constitutional


Vitals: 


                                        











Temp Pulse Resp BP Pulse Ox


 


 98.1 F   70   19   120/68   97 


 


 07/25/22 07:29  07/25/22 07:29  07/25/22 07:29  07/25/22 07:29  07/25/22 11:06











General appearance: Present: no acute distress, well-nourished





- EENT


Eyes: Present: PERRL, EOM intact


ENT: hearing intact, clear oral mucosa, dentition normal





- Neck


Neck: Present: supple, normal ROM





- Respiratory


Respiratory effort: normal


Respiratory: bilateral: CTA





- Cardiovascular


Rhythm: regular


Heart Sounds: Present: S1 & S2





- Extremities


Extremities: no ischemia, pulses intact, pulses symmetrical, No edema, normal 

temperature, normal color, abnormal (RUE AV fistula with palpable thrill)


Peripheral Pulses: within normal limits





- Abdominal


General gastrointestinal: soft, non-tender, non-distended, normal bowel sounds





- Integumentary


Integumentary: Present: clear, warm





- Psychiatric


Psychiatric: appropriate mood/affect, memory intact, cooperative





- Neurologic


Neurologic: CNII-XII intact, moves all extremities





- Allied Health


Allied health notes reviewed: nursing





HEART Score





- HEART Score


EKG: Non-specific


Age: > 65


Risk factors: > 3 risk factors or hx of atherosclerotic disease


Troponin: 


                                        











Troponin T  0.034 ng/mL (0.00-0.029)  H  07/25/22  00:32    











Troponin: < normal limit





Results





- Labs


CBC & Chem 7: 


                                 07/24/22 12:22





                                 07/25/22 00:32


Labs: 


                             Laboratory Last Values











WBC  9.1 K/mm3 (4.5-11.0)   07/24/22  12:22    


 


RBC  3.59 M/mm3 (3.65-5.03)  L  07/24/22  12:22    


 


Hgb  11.7 gm/dl (10.1-14.3)   07/24/22  12:22    


 


Hct  35.8 % (30.3-42.9)   07/24/22  12:22    


 


MCV  100 fl (79-97)  H  07/24/22  12:22    


 


MCH  33 pg (28-32)  H  07/24/22  12:22    


 


MCHC  33 % (30-34)   07/24/22  12:22    


 


RDW  20.2 % (13.2-15.2)  H  07/24/22  12:22    


 


Plt Count  192 K/mm3 (140-440)   07/24/22  12:22    


 


Lymph % (Auto)  12.4 % (13.4-35.0)  L  07/24/22  12:22    


 


Mono % (Auto)  9.1 % (0.0-7.3)  H  07/24/22  12:22    


 


Eos % (Auto)  0.5 % (0.0-4.3)   07/24/22  12:22    


 


Baso % (Auto)  1.0 % (0.0-1.8)   07/24/22  12:22    


 


Lymph # (Auto)  1.1 K/mm3 (1.2-5.4)  L  07/24/22  12:22    


 


Mono # (Auto)  0.8 K/mm3 (0.0-0.8)   07/24/22  12:22    


 


Eos # (Auto)  0.0 K/mm3 (0.0-0.4)   07/24/22  12:22    


 


Baso # (Auto)  0.1 K/mm3 (0.0-0.1)   07/24/22  12:22    


 


Seg Neutrophils %  77.0 % (40.0-70.0)  H  07/24/22  12:22    


 


Seg Neutrophils #  7.0 K/mm3 (1.8-7.7)   07/24/22  12:22    


 


PT  16.8 Sec. (12.2-14.9)  H  07/24/22  12:22    


 


INR  1.22  (0.87-1.13)  H  07/24/22  12:22    


 


APTT  36.9 Sec. (24.2-36.6)  H  07/24/22  12:22    


 


Sodium  137 mmol/L (137-145)   07/25/22  00:32    


 


Potassium  4.4 mmol/L (3.6-5.0)   07/25/22  00:32    


 


Chloride  95.3 mmol/L ()  L  07/25/22  00:32    


 


Carbon Dioxide  25 mmol/L (22-30)   07/25/22  00:32    


 


Anion Gap  21 mmol/L  07/25/22  00:32    


 


BUN  33 mg/dL (7-17)  H  07/25/22  00:32    


 


Creatinine  4.3 mg/dL (0.6-1.2)  H  07/25/22  00:32    


 


Estimated GFR  12 ml/min  07/25/22  00:32    


 


BUN/Creatinine Ratio  8 %  07/25/22  00:32    


 


Glucose  133 mg/dL ()  H  07/25/22  00:32    


 


POC Glucose  110 mg/dL ()  H  07/25/22  11:48    


 


Calcium  9.3 mg/dL (8.4-10.2)   07/25/22  00:32    


 


Magnesium  2.10 mg/dL (1.7-2.3)   07/24/22  12:22    


 


Total Bilirubin  1.40 mg/dL (0.1-1.2)  H  07/24/22  12:22    


 


AST  21 units/L (5-40)   07/24/22  12:22    


 


ALT  20 units/L (7-56)   07/24/22  12:22    


 


Alkaline Phosphatase  96 units/L ()   07/24/22  12:22    


 


Troponin T  0.034 ng/mL (0.00-0.029)  H  07/25/22  00:32    


 


Total Protein  6.9 g/dL (6.3-8.2)   07/24/22  12:22    


 


Albumin  4.1 g/dL (3.9-5)   07/24/22  12:22    


 


Albumin/Globulin Ratio  1.5 %  07/24/22  12:22    


 


Triglycerides  79 mg/dL (2-149)   07/24/22  19:48    


 


Cholesterol  108 mg/dL ()   07/24/22  19:48    


 


LDL Cholesterol Direct  50 mg/dL ()   07/24/22  19:48    


 


HDL Cholesterol  49 mg/dL (40-59)   07/24/22  19:48    


 


Cholesterol/HDL Ratio  2.20 %  07/24/22  19:48    


 


TSH  1.960 mlU/mL (0.270-4.200)   07/24/22  12:22    


 


Free T4  2.24 ng/dL (0.76-1.46)  H  07/24/22  12:22    














Active Medications





- Current Medications


Current Medications: 














Generic Name Dose Route Start Last Admin





  Trade Name Simq  PRN Reason Stop Dose Admin


 


Acetaminophen  650 mg  07/24/22 16:22  07/25/22 04:07





  Acetaminophen 325 Mg Tab  PO   650 mg





  Q4H PRN   Administration





  Pain MILD(1-3)/Fever >100.5/HA  


 


Albuterol  2.5 mg  07/24/22 16:22 





  Albuterol 2.5 Mg/3 Ml Nebu  IH  





  Q4HRT PRN  





  Shortness Of Breath  


 


Amiodarone HCl  200 mg  07/24/22 17:00  07/25/22 09:39





  Amiodarone 200 Mg Tab  PO   200 mg





  DAILY DERIK   Administration


 


Apixaban  2.5 mg  07/24/22 22:00  07/25/22 09:39





  Apixaban 2.5 Mg Tab  PO   2.5 mg





  BID DERIK   Administration


 


Aspirin  325 mg  07/25/22 10:00  07/25/22 09:39





  Aspirin Ec 325 Mg Tab  PO   325 mg





  QDAY DERIK   Administration


 


Atorvastatin Calcium  20 mg  07/24/22 22:00  07/24/22 23:13





  Atorvastatin 20 Mg Tab  PO   20 mg





  QHS DERIK   Administration


 


Epoetin Teodoro-epbx  10,000 unit  07/25/22 12:00 





  Epoetin Teodoro-Epbx 10,000 Unit/1 Ml Vial  IV  





  VANCE PRN  





  hemodialysis  


 


Gabapentin  100 mg  07/24/22 22:00  07/25/22 09:39





  Gabapentin 100 Mg Cap  PO   100 mg





  BID DERIK   Administration


 


Hydralazine HCl  50 mg  07/24/22 22:00  07/25/22 06:18





  Hydralazine 25 Mg Tab  PO   50 mg





  Q8HR DERIK   Administration


 


Amiodarone HCl 900 mg/  500 mls @ 33.333 mls/hr  07/24/22 20:00 





  Dextrose  IV  





  AS DIRECT DERIK  





  Protocol  





  1 MG/MIN  


 


Sodium Chloride  100 mls @ 999 mls/hr  07/25/22 12:00 





  Nacl 0.9%  IV  





  VANCE PRN  





  Hypotension  


 


Isosorbide Mononitrate  60 mg  07/25/22 10:00  07/25/22 09:39





  Isosorbide Mononitrate Er 60 Mg Tab  PO   60 mg





  QDAY DERIK   Administration


 


Losartan Potassium  50 mg  07/25/22 10:00  07/25/22 09:39





  Losartan 50 Mg Tab  PO   50 mg





  QDAY DERIK   Administration


 


Methimazole  10 mg  07/24/22 20:00  07/25/22 09:38





  Methimazole 5 Mg Tab  PO   10 mg





  TID DERIK   Administration


 


Metoprolol Tartrate  100 mg  07/24/22 22:00  07/25/22 09:42





  Metoprolol Tartrate 100 Mg Tab  PO   100 mg





  BID DERIK   Administration


 


Morphine Sulfate  2 mg  07/24/22 16:22  07/25/22 03:54





  Morphine 4 Mg/1 Ml Inj  IV   2 mg





  Q8H PRN   Administration





  Pain , Severe (7-10)  


 


Nitroglycerin  0.4 mg  07/24/22 16:21 





  Nitroglycerin 0.4 Mg Tab Subl  SL  





  .Q5MIN PRN  





  Chest Pain  


 


Ondansetron HCl  4 mg  07/24/22 16:22 





  Ondansetron 4 Mg/2 Ml Inj  IV  





  Q8H PRN  





  Nausea And Vomiting  


 


Oxycodone/Acetaminophen  1 tab  07/24/22 16:22  07/24/22 17:16





  Oxycodone /Acetaminophen 5-325mg Tab  PO   1 tab





  Q6H PRN   Administration





  Pain, Moderate (4-6)  


 


Pantoprazole Sodium  40 mg  07/25/22 10:00  07/25/22 09:39





  Pantoprazole 40 Mg Tab  PO   40 mg





  QDAY DERIK   Administration


 


Senna/Docusate Sodium  1 tab  07/25/22 10:00  07/25/22 09:38





  Sennosides/Docusate Sodium 8.6/50 Mg Tab  PO   1 tab





  DAILY DERIK   Administration


 


Sodium Chloride  10 ml  07/24/22 22:00  07/25/22 09:39





  Sodium Chloride 0.9% 10 Ml Flush Syringe  IV   10 ml





  BID DERIK   Administration


 


Sodium Chloride  10 ml  07/24/22 16:22 





  Sodium Chloride 0.9% 10 Ml Flush Syringe  IV  





  PRN PRN  





  LINE FLUSH

## 2022-09-01 ENCOUNTER — HOSPITAL ENCOUNTER (OUTPATIENT)
Dept: HOSPITAL 5 - ED | Age: 67
Setting detail: OBSERVATION
LOS: 1 days | Discharge: HOME | End: 2022-09-02
Attending: STUDENT IN AN ORGANIZED HEALTH CARE EDUCATION/TRAINING PROGRAM | Admitting: INTERNAL MEDICINE
Payer: MEDICARE

## 2022-09-01 DIAGNOSIS — I48.20: ICD-10-CM

## 2022-09-01 DIAGNOSIS — Z95.1: ICD-10-CM

## 2022-09-01 DIAGNOSIS — I50.9: ICD-10-CM

## 2022-09-01 DIAGNOSIS — I13.2: ICD-10-CM

## 2022-09-01 DIAGNOSIS — Z87.891: ICD-10-CM

## 2022-09-01 DIAGNOSIS — J44.9: ICD-10-CM

## 2022-09-01 DIAGNOSIS — E78.00: ICD-10-CM

## 2022-09-01 DIAGNOSIS — E11.22: ICD-10-CM

## 2022-09-01 DIAGNOSIS — Z20.822: ICD-10-CM

## 2022-09-01 DIAGNOSIS — Z99.2: ICD-10-CM

## 2022-09-01 DIAGNOSIS — K21.9: ICD-10-CM

## 2022-09-01 DIAGNOSIS — I25.2: ICD-10-CM

## 2022-09-01 DIAGNOSIS — R07.89: Primary | ICD-10-CM

## 2022-09-01 DIAGNOSIS — I25.10: ICD-10-CM

## 2022-09-01 DIAGNOSIS — Z79.82: ICD-10-CM

## 2022-09-01 DIAGNOSIS — N18.6: ICD-10-CM

## 2022-09-01 LAB
ALBUMIN SERPL-MCNC: 3.9 G/DL (ref 3.9–5)
ALT SERPL-CCNC: 11 UNITS/L (ref 7–56)
APTT BLD: 34.3 SEC. (ref 24.2–36.6)
BASOPHILS # (AUTO): 0.1 K/MM3 (ref 0–0.1)
BASOPHILS NFR BLD AUTO: 0.7 % (ref 0–1.8)
BUN SERPL-MCNC: 11 MG/DL (ref 7–17)
BUN/CREAT SERPL: 6 %
CALCIUM SERPL-MCNC: 8.9 MG/DL (ref 8.4–10.2)
EOSINOPHIL # BLD AUTO: 0.1 K/MM3 (ref 0–0.4)
EOSINOPHIL NFR BLD AUTO: 1.3 % (ref 0–4.3)
HCT VFR BLD CALC: 27.7 % (ref 30.3–42.9)
HDLC SERPL-MCNC: 66 MG/DL (ref 40–59)
HEMOLYSIS INDEX: 4
HGB BLD-MCNC: 9 GM/DL (ref 10.1–14.3)
INR PPP: 0.99 (ref 0.87–1.13)
LYMPHOCYTES # BLD AUTO: 1.6 K/MM3 (ref 1.2–5.4)
LYMPHOCYTES NFR BLD AUTO: 20 % (ref 13.4–35)
MCHC RBC AUTO-ENTMCNC: 33 % (ref 30–34)
MCV RBC AUTO: 96 FL (ref 79–97)
MONOCYTES # (AUTO): 0.7 K/MM3 (ref 0–0.8)
MONOCYTES % (AUTO): 9.1 % (ref 0–7.3)
PLATELET # BLD: 173 K/MM3 (ref 140–440)
RBC # BLD AUTO: 2.88 M/MM3 (ref 3.65–5.03)

## 2022-09-01 PROCEDURE — 99285 EMERGENCY DEPT VISIT HI MDM: CPT

## 2022-09-01 PROCEDURE — 80061 LIPID PANEL: CPT

## 2022-09-01 PROCEDURE — 36415 COLL VENOUS BLD VENIPUNCTURE: CPT

## 2022-09-01 PROCEDURE — 93005 ELECTROCARDIOGRAM TRACING: CPT

## 2022-09-01 PROCEDURE — 80053 COMPREHEN METABOLIC PANEL: CPT

## 2022-09-01 PROCEDURE — 99406 BEHAV CHNG SMOKING 3-10 MIN: CPT

## 2022-09-01 PROCEDURE — 72100 X-RAY EXAM L-S SPINE 2/3 VWS: CPT

## 2022-09-01 PROCEDURE — U0003 INFECTIOUS AGENT DETECTION BY NUCLEIC ACID (DNA OR RNA); SEVERE ACUTE RESPIRATORY SYNDROME CORONAVIRUS 2 (SARS-COV-2) (CORONAVIRUS DISEASE [COVID-19]), AMPLIFIED PROBE TECHNIQUE, MAKING USE OF HIGH THROUGHPUT TECHNOLOGIES AS DESCRIBED BY CMS-2020-01-R: HCPCS

## 2022-09-01 PROCEDURE — 85025 COMPLETE CBC W/AUTO DIFF WBC: CPT

## 2022-09-01 PROCEDURE — 84484 ASSAY OF TROPONIN QUANT: CPT

## 2022-09-01 PROCEDURE — 96374 THER/PROPH/DIAG INJ IV PUSH: CPT

## 2022-09-01 PROCEDURE — 85610 PROTHROMBIN TIME: CPT

## 2022-09-01 PROCEDURE — G0378 HOSPITAL OBSERVATION PER HR: HCPCS

## 2022-09-01 PROCEDURE — 96376 TX/PRO/DX INJ SAME DRUG ADON: CPT

## 2022-09-01 PROCEDURE — 85730 THROMBOPLASTIN TIME PARTIAL: CPT

## 2022-09-01 PROCEDURE — 71045 X-RAY EXAM CHEST 1 VIEW: CPT

## 2022-09-01 PROCEDURE — 96375 TX/PRO/DX INJ NEW DRUG ADDON: CPT

## 2022-09-01 PROCEDURE — 85027 COMPLETE CBC AUTOMATED: CPT

## 2022-09-01 PROCEDURE — 82565 ASSAY OF CREATININE: CPT

## 2022-09-01 RX ADMIN — NITROGLYCERIN PRN MG: 0.4 TABLET, ORALLY DISINTEGRATING SUBLINGUAL at 20:41

## 2022-09-01 RX ADMIN — NITROGLYCERIN PRN MG: 0.4 TABLET, ORALLY DISINTEGRATING SUBLINGUAL at 20:21

## 2022-09-01 NOTE — XRAY REPORT
CHEST 1 VIEW 9/1/2022 4:47 PM



INDICATION / CLINICAL INFORMATION: Chest Pain. 



COMPARISON: 7/24/2022



FINDINGS:



SUPPORT DEVICES: Stable, satisfactory device positioning.

HEART / MEDIASTINUM: Stable. 

LUNGS / PLEURA: Improved diffuse interstitial thickening. Stable mild left infrahilar scarring or ate
lectasis. No significant pleural effusion is identified. No pneumothorax. 



ADDITIONAL FINDINGS: None



IMPRESSION:

1. Improved interstitial pulmonary edema without other acute chest process. 





Signer Name: João Anne MD 

Signed: 9/1/2022 5:02 PM

Workstation Name: [email or web address]

## 2022-09-01 NOTE — XRAY REPORT
LUMBAR SPINE 2 VIEWS



INDICATION: back pain after fall



COMPARISON: None.



FINDINGS:



No acute, displaced fracture is seen.



Alignment is within normal limits.



Disc space height is maintained. There is mild lower lumbar facet arthropathy. The SI joints are not 
completely included but appear unremarkable.



IMPRESSION:

1.  No acute findings.





Signer Name: Chris Rader MD 

Signed: 9/1/2022 5:03 PM

Workstation Name: [id number]

## 2022-09-01 NOTE — EMERGENCY DEPARTMENT REPORT
ED Chest Pain HPI





- General


Chief Complaint: Chest Pain


Stated Complaint: CHEST PAIN X 3


Time Seen by Provider: 09/01/22 16:34


Source: EMS, old records reviewed


Mode of arrival: Stretcher


Limitations: No Limitations





- History of Present Illness


Initial Comments: 





68 YO Female with ESRD on HD(T,R,Sa), HTN, CVA, MI, CHF, Nicotine Dependence,  

Atrial Fib on Therapeutic Anticoagulation, DM, COPD, HLD, Bipolar Disorder, CAD 

S/P Stent Placement presents to ED for evaluation with complaints of chest pain 

that started prior to dialysis session today.  Patient states that she is having

a moderate chest pressure that was initially intermittent and continued 

throughout her 3-hour dialysis session.  Pain became more persistent and 

therefore she came to the ER immediately after completing her dialysis session. 

Pain is 8/10 intensity, constant, without aggravating or alleviating factors.  

Positive shortness of breath and cough productive of clear sputum reported.  

Patient states she is compliant with all medications.  She denies fever.  She is

not vaccinated for COVID.  Patient also states she has had frequent falls the 

last several months and has been walking with the walker.  She last fell 

backwards on her back yesterday and complains of back pain secondary to fall





I spoke to  /cardiologist note on July 24 to 25 2022 admission She has 

coronary stents in her LAD implanted several years ago, which have been patent 

on repeat cardiac catheterization, and a follow-up thallium stress test within 

the past year has also shown normal perfusion.


Severity scale (0 -10): 8





- Related Data


                                Home Medications











 Medication  Instructions  Recorded  Confirmed  Last Taken


 


Gabapentin 100 mg PO BID 04/23/22 05/28/22 Unknown


 


Pantoprazole [Protonix TAB] 40 mg PO QDAY 04/23/22 05/28/22 04/23/22 10:00


 


methIMAzole [Methimazole] 10 mg PO TID 05/01/22 05/28/22 Unknown








                                  Previous Rx's











 Medication  Instructions  Recorded  Last Taken  Type


 


AtorvaSTATin [Lipitor] 20 mg PO QHS #30 tablet 05/03/22 Unknown Rx


 


Sennosides/Docusate [Senokot S] 1 tab PO DAILY #30 tablet 05/03/22 Unknown Rx


 


Amiodarone [Cordarone 200 MG TAB] 200 mg PO .AS DIRECTED #60 tablet 05/14/22 U

nknown Rx


 


Nitroglycerin [Nitrostat] 0.4 mg SL .Q5MIN PRN 30 Days #30 05/24/22 Unknown Rx





 tablet   


 


Apixaban [Eliquis] 2.5 mg PO Q12HR  tablet 07/14/22 Unknown Rx


 


Aspirin EC [Halfprin EC] 81 mg PO QDAY  tablet 07/14/22 Unknown Rx


 


ISOSORBIDE MONOnitrate [Imdur ER] 60 mg PO QDAY  tablet 07/14/22 Unknown Rx


 


Losartan [Cozaar] 50 mg PO QDAY  tablet 07/14/22 Unknown Rx


 


Metoprolol [Lopressor TAB] 100 mg PO BID #60 tablet 07/14/22 Unknown Rx


 


hydrALAZINE [Apresoline TAB] 50 mg PO Q8HR  tablet 07/14/22 Unknown Rx


 


oxyCODONE /ACETAMINOPHEN [Percocet 1 tab PO Q6H PRN #14 tablet 07/14/22 Unknown 

Rx





5/325 mg]    











                                    Allergies











Allergy/AdvReac Type Severity Reaction Status Date / Time


 


Iodinated Contrast Media AdvReac Intermediate Swelling Verified 09/01/22 16:13


 


apple AdvReac  Hives Verified 09/01/22 16:13


 


shell fish Allergy  Swelling Uncoded 06/02/22 09:45














Heart Score





- HEART Score


History: Moderately suspicious


EKG: Non-specific


Age: > 65


Risk factors: > 3 risk factors or hx of atherosclerotic disease


Troponin: 1-3x normal limit


HEART Score: 7





- EKG Read Time


Time EKG Completed: 16:32


EKG Read Time: 16:40





ED Review of Systems


ROS: 


Stated complaint: CHEST PAIN X 3


Other details as noted in HPI





Comment: All other systems reviewed and negative





ED Past Medical Hx





- Past Medical History


Hx Hypertension: Yes


Hx CVA: Yes (right sided weakness)


Hx Heart Attack/AMI: Yes (1 stent)


Hx Congestive Heart Failure: Yes


Hx Diabetes: Yes


Hx GERD: Yes


Hx Renal Disease: Yes (RENAL INSUFFICIENCY- stent left kidney)


Hx Sickle Cell Disease: No


Hx Kidney Stones: No


Hx Psychiatric Treatment: Yes (BIPOLAR/SCHIZOPHRENIA)


Hx Asthma: No


Hx COPD: Yes


Hx HIV: No


Additional medical history: HIGH CHOLESTEROL





- Surgical History


Hx Coronary Stent: Yes


Hx Pacemaker: Yes


Additional Surgical History: stent placement x 2 in 2015, knee surgery 01/2018





- Social History


Smoking Status: Former Smoker


Substance Use Type: None





- Medications


Home Medications: 


                                Home Medications











 Medication  Instructions  Recorded  Confirmed  Last Taken  Type


 


Gabapentin 100 mg PO BID 04/23/22 05/28/22 Unknown History


 


Pantoprazole [Protonix TAB] 40 mg PO QDAY 04/23/22 05/28/22 04/23/22 10:00 

History


 


methIMAzole [Methimazole] 10 mg PO TID 05/01/22 05/28/22 Unknown History


 


AtorvaSTATin [Lipitor] 20 mg PO QHS #30 tablet 05/03/22 05/28/22 Unknown Rx


 


Sennosides/Docusate [Senokot S] 1 tab PO DAILY #30 tablet 05/03/22 05/28/22 

Unknown Rx


 


Amiodarone [Cordarone 200 MG TAB] 200 mg PO .AS DIRECTED #60 tablet 05/14/22 05/28/22 Unknown Rx


 


Nitroglycerin [Nitrostat] 0.4 mg SL .Q5MIN PRN 30 Days #30 05/24/22 05/28/22 

Unknown Rx





 tablet    


 


Apixaban [Eliquis] 2.5 mg PO Q12HR  tablet 07/14/22  Unknown Rx


 


Aspirin EC [Halfprin EC] 81 mg PO QDAY  tablet 07/14/22  Unknown Rx


 


ISOSORBIDE MONOnitrate [Imdur ER] 60 mg PO QDAY  tablet 07/14/22  Unknown Rx


 


Losartan [Cozaar] 50 mg PO QDAY  tablet 07/14/22  Unknown Rx


 


Metoprolol [Lopressor TAB] 100 mg PO BID #60 tablet 07/14/22  Unknown Rx


 


hydrALAZINE [Apresoline TAB] 50 mg PO Q8HR  tablet 07/14/22  Unknown Rx


 


oxyCODONE /ACETAMINOPHEN [Percocet 1 tab PO Q6H PRN #14 tablet 07/14/22  Unknown

 Rx





5/325 mg]     














ED Physical Exam





- General


Limitations: No Limitations





- Other


Other exam information: 





General: No acute distress


Head: Atraumatic


Eyes: normal appearance


ENT: Moist mucous membranes


Neck: Normal appearance, no midline tenderness


Chest: Clear to auscultation bilaterally, chest wall nontender


CV: Regular rate and rhythm


Abdomen: Soft, normal bowel sounds, nontender, nondistended, no rebound or 

guarding


Back: Normal inspection, no contusion, no midline tenderness.  Mild left flank 

tenderness on palpation


Extremity: Normal inspection, full range of motion


Neuro: Alert O x 3, no facial asymmetry, speech clear, equal handgrip and foot 

dorsiflexion.  3/5 hip extension strength


Psych: Appropriate behavior


Skin: No rash





ED Course


                                   Vital Signs











  09/01/22 09/01/22 09/01/22





  16:13 16:19 20:21


 


Temperature 98.8 F  


 


Pulse Rate 69  72


 


Respiratory 22 22 





Rate   


 


Blood Pressure 178/75  187/85


 


Blood Pressure 178/75  





[Left]   


 


O2 Sat by Pulse 100 100 





Oximetry   














  09/01/22 09/01/22





  20:41 21:07


 


Temperature  


 


Pulse Rate 70 70


 


Respiratory  20





Rate  


 


Blood Pressure 181/80 


 


Blood Pressure  176/90





[Left]  


 


O2 Sat by Pulse  100





Oximetry  














- Consultations


Consultation #1: 





09/01/22 21:58


Case discussed with Dr. Simon with [facility], rec to continue Eliquis and 

observe overnight for cp given cardiac hx.














PANCHO score





- Pancho Score


Age > 65: (1) Yes


Aspirin use within the Past 7 Days: (0) No


3 or more CAD Risk Factors: (1) Yes


2 or more Angina events in past 24 hrs: (1) Yes


Known CAD with more than 50% Stenosis: (1) Yes


Elevated Cardiac Markers: (0) No


ST Deviation Greater than 0.5mm: (0) No


PANCHO Score: 4





ED Medical Decision Making





- Lab Data


Result diagrams: 


                                 09/01/22 16:23





                                 09/01/22 16:23








                                   Lab Results











  09/01/22 09/01/22 09/01/22 Range/Units





  16:23 16:23 16:23 


 


WBC  7.8    (4.5-11.0)  K/mm3


 


RBC  2.88 L    (3.65-5.03)  M/mm3


 


Hgb  9.0 L    (10.1-14.3)  gm/dl


 


Hct  27.7 L    (30.3-42.9)  %


 


MCV  96    (79-97)  fl


 


MCH  31    (28-32)  pg


 


MCHC  33    (30-34)  %


 


RDW  18.3 H    (13.2-15.2)  %


 


Plt Count  173    (140-440)  K/mm3


 


Lymph % (Auto)  20.0    (13.4-35.0)  %


 


Mono % (Auto)  9.1 H    (0.0-7.3)  %


 


Eos % (Auto)  1.3    (0.0-4.3)  %


 


Baso % (Auto)  0.7    (0.0-1.8)  %


 


Lymph # (Auto)  1.6    (1.2-5.4)  K/mm3


 


Mono # (Auto)  0.7    (0.0-0.8)  K/mm3


 


Eos # (Auto)  0.1    (0.0-0.4)  K/mm3


 


Baso # (Auto)  0.1    (0.0-0.1)  K/mm3


 


Seg Neutrophils %  68.9    (40.0-70.0)  %


 


Seg Neutrophils #  5.4    (1.8-7.7)  K/mm3


 


PT   14.2   (12.2-14.9)  Sec.


 


INR   0.99   (0.87-1.13)  


 


APTT   34.3   (24.2-36.6)  Sec.


 


Sodium    140  (137-145)  mmol/L


 


Potassium    3.2 L  (3.6-5.0)  mmol/L


 


Chloride    98.6  ()  mmol/L


 


Carbon Dioxide    29  (22-30)  mmol/L


 


Anion Gap    16  mmol/L


 


BUN    11  (7-17)  mg/dL


 


Creatinine    1.9 H  (0.6-1.2)  mg/dL


 


Estimated GFR    32  ml/min


 


BUN/Creatinine Ratio    6  %


 


Glucose    74  ()  mg/dL


 


Calcium    8.9  (8.4-10.2)  mg/dL


 


Total Bilirubin    0.70  (0.1-1.2)  mg/dL


 


AST    16  (5-40)  units/L


 


ALT    11  (7-56)  units/L


 


Alkaline Phosphatase    82  ()  units/L


 


Troponin T    0.042 H  (0.00-0.029)  ng/mL


 


Total Protein    5.8 L  (6.3-8.2)  g/dL


 


Albumin    3.9  (3.9-5)  g/dL


 


Albumin/Globulin Ratio    2.1  %


 


Triglycerides    88  (2-149)  mg/dL


 


Cholesterol    136  ()  mg/dL


 


LDL Cholesterol Direct    61  ()  mg/dL


 


HDL Cholesterol    66 H  (40-59)  mg/dL


 


Cholesterol/HDL Ratio    2.06  %














  09/01/22 09/01/22 Range/Units





  20:22 22:58 


 


WBC    (4.5-11.0)  K/mm3


 


RBC    (3.65-5.03)  M/mm3


 


Hgb    (10.1-14.3)  gm/dl


 


Hct    (30.3-42.9)  %


 


MCV    (79-97)  fl


 


MCH    (28-32)  pg


 


MCHC    (30-34)  %


 


RDW    (13.2-15.2)  %


 


Plt Count    (140-440)  K/mm3


 


Lymph % (Auto)    (13.4-35.0)  %


 


Mono % (Auto)    (0.0-7.3)  %


 


Eos % (Auto)    (0.0-4.3)  %


 


Baso % (Auto)    (0.0-1.8)  %


 


Lymph # (Auto)    (1.2-5.4)  K/mm3


 


Mono # (Auto)    (0.0-0.8)  K/mm3


 


Eos # (Auto)    (0.0-0.4)  K/mm3


 


Baso # (Auto)    (0.0-0.1)  K/mm3


 


Seg Neutrophils %    (40.0-70.0)  %


 


Seg Neutrophils #    (1.8-7.7)  K/mm3


 


PT    (12.2-14.9)  Sec.


 


INR    (0.87-1.13)  


 


APTT    (24.2-36.6)  Sec.


 


Sodium    (137-145)  mmol/L


 


Potassium    (3.6-5.0)  mmol/L


 


Chloride    ()  mmol/L


 


Carbon Dioxide    (22-30)  mmol/L


 


Anion Gap    mmol/L


 


BUN    (7-17)  mg/dL


 


Creatinine    (0.6-1.2)  mg/dL


 


Estimated GFR    ml/min


 


BUN/Creatinine Ratio    %


 


Glucose    ()  mg/dL


 


Calcium    (8.4-10.2)  mg/dL


 


Total Bilirubin    (0.1-1.2)  mg/dL


 


AST    (5-40)  units/L


 


ALT    (7-56)  units/L


 


Alkaline Phosphatase    ()  units/L


 


Troponin T  0.040 H  0.046 H  (0.00-0.029)  ng/mL


 


Total Protein    (6.3-8.2)  g/dL


 


Albumin    (3.9-5)  g/dL


 


Albumin/Globulin Ratio    %


 


Triglycerides    (2-149)  mg/dL


 


Cholesterol    ()  mg/dL


 


LDL Cholesterol Direct    ()  mg/dL


 


HDL Cholesterol    (40-59)  mg/dL


 


Cholesterol/HDL Ratio    %














- EKG Data


-: EKG Interpreted by Me (DEMARCUS gutierres/flutter, ventricular paced rhythm)


Rate: normal (70)





- EKG Data


When compared to previous EKG there are: no significant change





- Radiology Data


Radiology results: report reviewed





 


LUMBAR SPINE 2 VIEWS  


 


 INDICATION: back pain after fall  


 


 COMPARISON: None.  


 


 FINDINGS:  


 


 No acute, displaced fracture is seen.  


 


 Alignment is within normal limits.  


 


 Disc space height is maintained. There is mild lower lumbar facet arthropathy. 

The SI joints are 


not completely included but appear unremarkable.  


 


 IMPRESSION:  


 1.  No acute findings.  


  


CHEST 1 VIEW 9/1/2022 4:47 PM  


 


 INDICATION / CLINICAL INFORMATION: Chest Pain.   


 


 COMPARISON: 7/24/2022  


 


 FINDINGS:  


 


 SUPPORT DEVICES: Stable, satisfactory device positioning.  


 HEART / MEDIASTINUM: Stable.   


 LUNGS / PLEURA: Improved diffuse interstitial thickening. Stable mild left 

infrahilar scarring or 


atelectasis. No significant pleural effusion is identified. No pneumothorax.   


 


 ADDITIONAL FINDINGS: None  


 


 IMPRESSION:  


 1. Improved interstitial pulmonary edema without other acute chest process.   





- Medical Decision Making





67-year-old female presents to the hospital with chest pain.  He has a frequent 

ER visits and hospitalizations for the same.  Positive history of CAD.  As per 

cardiology note patient had recent unremarkable cardiac work-up.  Case discussed

 with [place] [facility] cardiologist on-call who recommends admission for 

observation and further evaluation.  Troponin stable.  Patient provided morphine

 for pain with nitroglycerin as needed.  No acute EKG findings noted hospitalist

 to admit.


Critical Care Time: No


Critical care attestation.: 


If time is entered above; I have spent that time in minutes in the direct care 

of this critically ill patient, excluding procedure time.








ED Disposition


Clinical Impression: 


 Chest pain, ESRD on dialysis, Chronic atrial fibrillation, Chronic 

anticoagulation





Disposition: 09 ADMITTED AS INPATIENT


Is pt being admited?: Yes


Condition: Stable


Time of Disposition: 23:35

## 2022-09-02 VITALS — SYSTOLIC BLOOD PRESSURE: 129 MMHG | DIASTOLIC BLOOD PRESSURE: 78 MMHG

## 2022-09-02 LAB
HCT VFR BLD CALC: 25.2 % (ref 30.3–42.9)
HGB BLD-MCNC: 8.3 GM/DL (ref 10.1–14.3)
MCHC RBC AUTO-ENTMCNC: 33 % (ref 30–34)
MCV RBC AUTO: 97 FL (ref 79–97)
PLATELET # BLD: 166 K/MM3 (ref 140–440)
RBC # BLD AUTO: 2.6 M/MM3 (ref 3.65–5.03)

## 2022-09-02 RX ADMIN — MORPHINE SULFATE PRN MG: 2 INJECTION, SOLUTION INTRAMUSCULAR; INTRAVENOUS at 04:57

## 2022-09-02 RX ADMIN — MORPHINE SULFATE PRN MG: 2 INJECTION, SOLUTION INTRAMUSCULAR; INTRAVENOUS at 11:01

## 2022-09-02 NOTE — CONSULTATION
History of Present Illness





- Reason for Consult


Consult date: 09/02/22


end stage renal disease





- History of Present Illness


The patient is a 66 YO female known to our service with history of DM-2, HTN, 

HLD, Bipolar Disorder, CAD s/p Stent Placement, Paroxysmal atrial fibrillation, 

HFpEF, COPD, Anemia and ESRD on hemodialysis (TTS) who presented to [facility] ED 

9/2/22 with c/o chest pain.  Patient reports sharp, 10/10, midsternal chest 

pain, not radiating and started yesterday AM.  She has had several admissions 

and ER visits with similar presentation and had extensive workup.  Patient 

denies any N, V, D, abd pain, dizziness, cough, weakness, fever, chills, rash, 

blurry vision, hematuria or hemoptysis.  Patient was admitted for further 

evaluation.  Labs and imaging noted.  Nephrology was consulted for ESRD 

management.








Past History


Past Medical History: atrial fib, CAD, COPD, diabetes, dialysis, ESRD, 

hypertension, hyperlipidemia, stroke, other (Bipolar disorder)


Past Surgical History: PTCA


Social history: smoking (Former smoker), other ( stent placement x 2 in 2015, 

knee surgery 01/2018)


Family history: no significant family history





Medications and Allergies


                                    Allergies











Allergy/AdvReac Type Severity Reaction Status Date / Time


 


Iodinated Contrast Media AdvReac Intermediate Swelling Verified 09/01/22 16:13


 


apple AdvReac  Hives Verified 09/01/22 16:13


 


shell fish Allergy  Swelling Uncoded 06/02/22 09:45











                                Home Medications











 Medication  Instructions  Recorded  Confirmed  Last Taken  Type


 


Gabapentin 100 mg PO BID 04/23/22 05/28/22 Unknown History


 


Pantoprazole [Protonix TAB] 40 mg PO QDAY 04/23/22 05/28/22 04/23/22 10:00 

History


 


methIMAzole [Methimazole] 10 mg PO TID 05/01/22 05/28/22 Unknown History


 


AtorvaSTATin [Lipitor] 20 mg PO QHS #30 tablet 05/03/22 05/28/22 Unknown Rx


 


Sennosides/Docusate [Senokot S] 1 tab PO DAILY #30 tablet 05/03/22 05/28/22 

Unknown Rx


 


Amiodarone [Cordarone 200 MG TAB] 200 mg PO .AS DIRECTED #60 tablet 05/14/22 05/28/22 Unknown Rx


 


Nitroglycerin [Nitrostat] 0.4 mg SL .Q5MIN PRN 30 Days #30 05/24/22 05/28/22 

Unknown Rx





 tablet    


 


Apixaban [Eliquis] 2.5 mg PO Q12HR  tablet 07/14/22  Unknown Rx


 


Aspirin EC [Halfprin EC] 81 mg PO QDAY  tablet 07/14/22  Unknown Rx


 


ISOSORBIDE MONOnitrate [Imdur ER] 60 mg PO QDAY  tablet 07/14/22  Unknown Rx


 


Losartan [Cozaar] 50 mg PO QDAY  tablet 07/14/22  Unknown Rx


 


Metoprolol [Lopressor TAB] 100 mg PO BID #60 tablet 07/14/22  Unknown Rx


 


hydrALAZINE [Apresoline TAB] 50 mg PO Q8HR  tablet 07/14/22  Unknown Rx


 


oxyCODONE /ACETAMINOPHEN [Percocet 1 tab PO Q6H PRN #14 tablet 07/14/22  Unknown

 Rx





5/325 mg]     











Active Meds: 


Active Medications





Acetaminophen (Acetaminophen 325 Mg Tab)  650 mg PO Q4H PRN


   PRN Reason: Pain MILD(1-3)/Fever >100.5/HA


Amiodarone HCl (Amiodarone 200 Mg Tab)  200 mg PO BID [facility]


   Last Admin: 09/02/22 11:04 Dose:  200 mg


   


Apixaban (Apixaban 2.5 Mg Tab)  2.5 mg PO Q12HR [facility]; Protocol


   Last Admin: 09/02/22 10:51 Dose:  2.5 mg


   


Aspirin (Aspirin Ec 81 Mg Tab)  81 mg PO QDAY [facility]


   Last Admin: 09/02/22 10:51 Dose:  81 mg


   


Gabapentin (Gabapentin 100 Mg Cap)  100 mg PO BID [facility]


   Last Admin: 09/02/22 10:53 Dose:  100 mg


   


Hydralazine HCl (Hydralazine 20 Mg/1 Ml Inj)  10 mg IV Q4H PRN


   PRN Reason: Blood Pressure


Hydralazine HCl (Hydralazine 25 Mg Tab)  50 mg PO Q8HR [facility]


Isosorbide Mononitrate (Isosorbide Mononitrate Er 60 Mg Tab)  60 mg PO QDAY [facility]


   Last Admin: 09/02/22 10:52 Dose:  60 mg


   


Losartan Potassium (Losartan 50 Mg Tab)  50 mg PO QDAY [facility]


   Last Admin: 09/02/22 10:52 Dose:  50 mg


   


Magnesium Hydroxide (Magnesium Hydroxide (Mom) Oral Liqd Udc)  30 ml PO Q4H PRN


   PRN Reason: Constipation


Metoprolol Tartrate (Metoprolol Tartrate 100 Mg Tab)  100 mg PO BID [facility]


   Last Admin: 09/02/22 10:51 Dose:  100 mg


   


Morphine Sulfate (Morphine 2 Mg/1 Ml Inj)  2 mg IV Q4H PRN


   PRN Reason: Pain, Moderate (4-6)


   Last Admin: 09/02/22 11:01 Dose:  2 mg


   


Nitroglycerin (Nitroglycerin 0.4 Mg Tab Subl)  0.4 mg SL .Q5MIN PRN


   PRN Reason: Chest Pain


   Last Admin: 09/01/22 20:41 Dose:  0.4 mg


   


Ondansetron HCl (Ondansetron 4 Mg/2 Ml Inj)  4 mg IV Q8H PRN


   PRN Reason: Nausea And Vomiting


Pantoprazole Sodium (Pantoprazole 40 Mg Tab)  40 mg PO QDAC [facility]


Sodium Chloride (Sodium Chloride 0.9% 10 Ml Flush Syringe)  10 ml IV BID [facility]


   Last Admin: 09/02/22 10:53 Dose:  10 ml


   


Sodium Chloride (Sodium Chloride 0.9% 10 Ml Flush Syringe)  10 ml IV PRN PRN


   PRN Reason: LINE FLUSH


Tramadol HCl (Tramadol 50 Mg Tab)  50 mg PO Q6H PRN


   PRN Reason: Pain, Moderate (4-6)











Exam





- Vital Signs


Vital signs: 


                                   Vital Signs











Temp Pulse Resp BP Pulse Ox


 


 98.8 F   69   22   178/75   100 


 


 09/01/22 16:13  09/01/22 16:13  09/01/22 16:13  09/01/22 16:13  09/01/22 16:13














Results





- Lab Results





                                 09/01/22 16:23





                                 09/01/22 16:23


                             Most recent lab results











Calcium  8.9 mg/dL (8.4-10.2)   09/01/22  16:23    














Assessment and Plan





1. ESRD:


Patient is on maintenance hemodialysis, TTS schedule.


Hemodialysis:





2. FEN:


UF with HD as tolerated.


Monitor lytes and volume status.





3. Chest pain //  H/o CAD s/p PCI:


Monitor.





4. Paroxysmal A.flutter with RVR:


Continue home meds.


Monitor.





5. Chronic HFpEF:


Volume control thru HD.


Fluid restriction, monitor I/O.





6. H/o COPD:


Nebs and O2 as needed.





7. Hypertension:


Volume control with HD.


Adjust BP meds as needed.


Monitor BP.





8. Normochromic anemia, POA:


Chronic.


Likely 2/2 ESRD.


Epogen as needed.











Subjective:


Patient was seen and examined at the bedside.











Examination:


General appearance: well-developed, appears stated age, no distress


HEENT: atraumatic, IVETTE


Neck: trachea midline


Respiratory: faint rales heard


Heart: S1S2, irregular, no murmur


Abdomen: soft, bowel sounds heard, NT


Integumentary: no obvious rash


Neurologic: AO, able to move extremities


Ext: no edema


Hemodialysis access: R arm AVG

## 2022-09-02 NOTE — EVENT NOTE
Patient is currently followed by Dr. Dejesus, I have informed him about the 

patient's admission, patient does go to [facility], He will resume her 

care

## 2022-09-02 NOTE — CONSULTATION
History of Present Illness


Consult date: 09/02/22


Consult reason: chest pain


History of present illness: 





Patient is a 67-year-old woman with end-stage renal disease on hemodialysis, 

coronary artery disease and paroxysmal atrial fibrillation and flutter.  With 

regards to her coronary artery disease, she has stents in the proximal mid and 

distal LAD, which was confirmed to remain patent on a follow-up cardiac 

catheterization several years ago.  Following that, Lexiscan thallium stress 

test have also documented no ischemia.  Her left ventricular systolic ejection 

fraction has remained normal, at 50 to 55% on serial measurements, most recently

on echocardiogram just last month.





In recent months, she has had multiple admissions to this hospital, mostly 

related to acute palpitations, representing symptomatic atrial fibrillation.  

She is on rhythm control therapy and Eliquis for oral anticoagulation.  She also

has an in situ, dual-chamber pacemaker.





Yesterday, while at dialysis, she developed acute onset recurrent palpitations, 

which she describes as "hard beats", the dialysis was completed, and she was 

transferred to the emergency room by EMS.  I reviewed the EKGs done by EMS which

showed that at the time of her symptoms, she had atrial flutter with variable AV

conduction, well-controlled ventricular rate.  By the time of arrival in the 

emergency room, the patient was back in a sinus rhythm and feeling better.





Past History


Past Medical History: atrial fib, CAD, COPD, diabetes, dialysis, ESRD, 

hypertension, hyperlipidemia, stroke, other (Bipolar disorder)


Past Surgical History: PTCA


Social history: smoking (Former smoker), other ( stent placement x 2 in 2015, 

knee surgery 01/2018)


Family history: no significant family history





Medications and Allergies


                                    Allergies











Allergy/AdvReac Type Severity Reaction Status Date / Time


 


Iodinated Contrast Media AdvReac Intermediate Swelling Verified 09/01/22 16:13


 


apple AdvReac  Hives Verified 09/01/22 16:13


 


shell fish Allergy  Swelling Uncoded 06/02/22 09:45











                                Home Medications











 Medication  Instructions  Recorded  Confirmed  Last Taken  Type


 


Gabapentin 100 mg PO BID 04/23/22 05/28/22 Unknown History


 


Pantoprazole [Protonix TAB] 40 mg PO QDAY 04/23/22 05/28/22 04/23/22 10:00 Histo

ry


 


methIMAzole [Methimazole] 10 mg PO TID 05/01/22 05/28/22 Unknown History


 


AtorvaSTATin [Lipitor] 20 mg PO QHS #30 tablet 05/03/22 05/28/22 Unknown Rx


 


Sennosides/Docusate [Senokot S] 1 tab PO DAILY #30 tablet 05/03/22 05/28/22 

Unknown Rx


 


Amiodarone [Cordarone 200 MG TAB] 200 mg PO .AS DIRECTED #60 tablet 05/14/22 05/28/22 Unknown Rx


 


Nitroglycerin [Nitrostat] 0.4 mg SL .Q5MIN PRN 30 Days #30 05/24/22 05/28/22 

Unknown Rx





 tablet    


 


Apixaban [Eliquis] 2.5 mg PO Q12HR  tablet 07/14/22  Unknown Rx


 


Aspirin EC [Halfprin EC] 81 mg PO QDAY  tablet 07/14/22  Unknown Rx


 


ISOSORBIDE MONOnitrate [Imdur ER] 60 mg PO QDAY  tablet 07/14/22  Unknown Rx


 


Losartan [Cozaar] 50 mg PO QDAY  tablet 07/14/22  Unknown Rx


 


Metoprolol [Lopressor TAB] 100 mg PO BID #60 tablet 07/14/22  Unknown Rx


 


hydrALAZINE [Apresoline TAB] 50 mg PO Q8HR  tablet 07/14/22  Unknown Rx


 


oxyCODONE /ACETAMINOPHEN [Percocet 1 tab PO Q6H PRN #14 tablet 07/14/22  Unknown

 Rx





5/325 mg]     











Active Meds: 


Active Medications





Acetaminophen (Acetaminophen 325 Mg Tab)  650 mg PO Q4H PRN


   PRN Reason: Pain MILD(1-3)/Fever >100.5/HA


Apixaban (Apixaban 2.5 Mg Tab)  2.5 mg PO Q12HR DERIK; Protocol


Aspirin (Aspirin Ec 81 Mg Tab)  81 mg PO QDAY DERIK


Gabapentin (Gabapentin 100 Mg Cap)  100 mg PO BID [facility]


Hydralazine HCl (Hydralazine 20 Mg/1 Ml Inj)  10 mg IV Q4H PRN


   PRN Reason: Blood Pressure


Hydralazine HCl (Hydralazine 25 Mg Tab)  50 mg PO Q8HR [facility]


Isosorbide Mononitrate (Isosorbide Mononitrate Er 60 Mg Tab)  60 mg PO QDAY [facility]


Losartan Potassium (Losartan 50 Mg Tab)  50 mg PO QDAY [facility]


Magnesium Hydroxide (Magnesium Hydroxide (Mom) Oral Liqd Udc)  30 ml PO Q4H PRN


   PRN Reason: Constipation


Metoprolol Tartrate (Metoprolol Tartrate 100 Mg Tab)  100 mg PO BID DERIK


Morphine Sulfate (Morphine 2 Mg/1 Ml Inj)  2 mg IV Q4H PRN


   PRN Reason: Pain, Moderate (4-6)


   Last Admin: 09/02/22 04:57 Dose:  2 mg


   


Nitroglycerin (Nitroglycerin 0.4 Mg Tab Subl)  0.4 mg SL .Q5MIN PRN


   PRN Reason: Chest Pain


   Last Admin: 09/01/22 20:41 Dose:  0.4 mg


   


Ondansetron HCl (Ondansetron 4 Mg/2 Ml Inj)  4 mg IV Q8H PRN


   PRN Reason: Nausea And Vomiting


Pantoprazole Sodium (Pantoprazole 40 Mg Tab)  40 mg PO QDAC DERIK


Sodium Chloride (Sodium Chloride 0.9% 10 Ml Flush Syringe)  10 ml IV BID DERIK


Sodium Chloride (Sodium Chloride 0.9% 10 Ml Flush Syringe)  10 ml IV PRN PRN


   PRN Reason: LINE FLUSH


Tramadol HCl (Tramadol 50 Mg Tab)  50 mg PO Q6H PRN


   PRN Reason: Pain, Moderate (4-6)











Review of Systems


Cardiovascular: chest pain, palpitations, rapid/irregular heart beat, shortness 

of breath, no orthopnea, no edema, no syncope, no lightheadedness





Physical Examination


                                   Vital Signs











Temp Pulse Resp BP Pulse Ox


 


 98.8 F   69   22   178/75   100 


 


 09/01/22 16:13  09/01/22 16:13  09/01/22 16:13  09/01/22 16:13  09/01/22 16:13











General appearance: no acute distress


HEENT: Positive: PERRL


Neck: Positive: neck supple


Cardiac: Positive: Reg Rate and Rhythm


Lungs: Positive: Decreased Breath Sounds


Neuro: Positive: Grossly Intact


Abdomen: Positive: Soft


Female genitourinary: deferred


Skin: Positive: Clear


Extremities: Absent: edema





Results





                                 09/01/22 16:23





                                 09/01/22 16:23


                                 Cardiac Enzymes











  09/01/22 Range/Units





  16:23 


 


AST  16  (5-40)  units/L








                                   Coagulation











  09/01/22 Range/Units





  16:23 


 


PT  14.2  (12.2-14.9)  Sec.


 


INR  0.99  (0.87-1.13)  


 


APTT  34.3  (24.2-36.6)  Sec.








                                     Lipids











  09/01/22 Range/Units





  16:23 


 


Triglycerides  88  (2-149)  mg/dL


 


Cholesterol  136  ()  mg/dL


 


HDL Cholesterol  66 H  (40-59)  mg/dL


 


Cholesterol/HDL Ratio  2.06  %








                                       CBC











  09/01/22 Range/Units





  16:23 


 


WBC  7.8  (4.5-11.0)  K/mm3


 


RBC  2.88 L  (3.65-5.03)  M/mm3


 


Hgb  9.0 L  (10.1-14.3)  gm/dl


 


Hct  27.7 L  (30.3-42.9)  %


 


Plt Count  173  (140-440)  K/mm3


 


Lymph # (Auto)  1.6  (1.2-5.4)  K/mm3


 


Mono # (Auto)  0.7  (0.0-0.8)  K/mm3


 


Eos # (Auto)  0.1  (0.0-0.4)  K/mm3


 


Baso # (Auto)  0.1  (0.0-0.1)  K/mm3








                          Comprehensive Metabolic Panel











  09/01/22 Range/Units





  16:23 


 


Sodium  140  (137-145)  mmol/L


 


Potassium  3.2 L  (3.6-5.0)  mmol/L


 


Chloride  98.6  ()  mmol/L


 


Carbon Dioxide  29  (22-30)  mmol/L


 


BUN  11  (7-17)  mg/dL


 


Creatinine  1.9 H  (0.6-1.2)  mg/dL


 


Glucose  74  ()  mg/dL


 


Calcium  8.9  (8.4-10.2)  mg/dL


 


AST  16  (5-40)  units/L


 


ALT  11  (7-56)  units/L


 


Alkaline Phosphatase  82  ()  units/L


 


Total Protein  5.8 L  (6.3-8.2)  g/dL


 


Albumin  3.9  (3.9-5)  g/dL














EKG interpretations





- Telemetry


EKG Rhythm: Atrial Flutter (With variable AV conduction noted on initial ECG by 

EMS)





Assessment and Plan





- Patient Problems


(1) Paroxysmal atrial flutter


Current Visit: Yes   Status: Acute   


Plan to address problem: 


Patient presents with acute onset palpitations which occurred during dialysis, 

initial EMS ECG showed that she was in atrial flutter at the time.  This repre

sents another episode of paroxysmal, symptomatic atrial arrhythmia.  She feels 

better, now back in sinus rhythm.  We will treat rhythm control with amiodarone 

200 mg twice daily, continue Eliquis 2.5 mg twice daily for oral 

anticoagulation.  Otherwise, patient is stable for cardiac discharge and out

patient follow-up.








(2) Coronary artery disease


Current Visit: Yes   Status: Acute   


Plan to address problem: 


Coronary artery disease is stable and asymptomatic, continue risk factor 

modification and guideline directed medical management as previously outlined.

## 2022-09-02 NOTE — ELECTROCARDIOGRAPH REPORT
[facility]

                                       

Test Date:    2022               Test Time:    16:32:16

Pat Name:     LINDA GARCIA              Department:   

Patient ID:   [id number]          Room:         A451 1

Gender:       F                        Technician:   BRYANT

:          1955               Requested By: PAKO GARCIA

Order Number: [id number]             Reading MD:   Keagan Bacon

                                 Measurements

Intervals                              Axis          

Rate:         70                       P:            

MN:                                    QRS:          -73

QRSD:         129                      T:            92

QT:           457                                    

QTc:          493                                    

                           Interpretive Statements

Afib/flutter and ventricular-paced rhythm

Compared to ECG 2022 12:56:30

Atrial flutter has replaced sinus rhythm

Ventricular pacing is now evident

Electronically Signed On 2022 13:36:49 EDT by Keagan Bacon

## 2022-09-02 NOTE — DISCHARGE SUMMARY
Providers





- Providers


Date of Admission: 


09/01/22 23:38





Date of discharge: 09/02/22


Attending physician: 


ARMANI ORTIZ MD





                                        





09/01/22 22:15


Consult to Physician [CONS] Urgent 


   Comment: Dr. Rizvi spoke with Dr. Zavaleta @ 0905


   Consulting Provider: SHANTELL ZAVALETA


   Physician Instructions: 


   Reason For Exam: chest pain, hx of cad w/stent, chronic afib





09/02/22


Consult to Cardiac Rehabilitation [CONS] Routine 


   Reason For Exam: Phase I





09/02/22 09:14


Consult to Physician [CONS] Routine 


   Comment: 


   Consulting Provider: EMELINA CH


   Physician Instructions: 


   Reason For Exam: ESRD informed











Primary care physician: 


RA BALTAZAR








Hospitalization


Reason for admission: ACS rule out


Condition: Stable


Hospital course: 





Patient is a 67-year-old female with history of ESRD on HD, hypertension, 

coronary artery disease and atrial fibrillation on anticoagulation who presented

 with chest pain.  Troponins were 0.042 and 0.046 respectively.  Cardiology was 

consulted.  EKG revealed atrial fibrillation with RVR which the patient 

converted from.  The patients chest pain resolved without intervention.  Once 

clinically stable she was discharged home with family.


Disposition: 01 HOME / SELF CARE / HOMELESS


Final Discharge Diagnosis (Prints w/discharge instructions): Acute coronary 

syndrome ruled out.  Atypical chest pain.  paroxysmal atrial fibrillation with 

rapid ventricular response.  Type 2 diabetes mellitus.  Hypertension.  coronary 

artery disease.  End-stage renal disease requiring hemodialysis


Time spent for discharge: 40 minutes





Core Measure Documentation





- Palliative Care


Palliative Care/ Comfort Measures: Not Applicable





- Core Measures


Any of the following diagnoses?: none





Exam





- Physical Exam


Narrative exam: 





GENERAL: Thin elderly female. In no acute distress.


HEENT: Nasal cannula in place at 3 L/min.


NECK: Supple.  


CHEST/LUNGS: CTAB on supplemental oxygen.


HEART/CARDIOVASCULAR: RRR. No murmur, rubs or gallops appreciated.


ABDOMEN: +BS. NT/ND.


SKIN: No rashes noted.


NEURO:  No focal motor deficit.  Follows all commands.


MUSCULOSKELETAL: No joint effusion 


EXTREMITIES: RUE AVF. No cyanosis, clubbing or edema.


PSYCH: Cooperative.





- Constitutional


Vitals: 


                                        











Temp Pulse Resp BP Pulse Ox


 


 98.8 F   76   20   129/78   99 


 


 09/02/22 04:25  09/02/22 13:39  09/02/22 04:25  09/02/22 13:39  09/02/22 04:25














Plan


Care Plan Goals: 


Please followup with your primary care provider and cardiologist.


We reviewed the EKG taken while you were having chest pain and it showed that 

you were in atrial fibrillation at that time. Since this is a chronic issue for 

you, we want you to continue taking the amiodarone and eliquis. 


Please take all your medications as they are prescribed to you.


Make sure you report for dialysis on your usual Tuesday, Thursday, Saturday 

schedule. 


Follow up with: 


RA BALTAZAR MD [Primary Care Provider] - 7 Days


Prescriptions: 


Amiodarone [Cordarone 200 MG TAB] 200 mg PO BID 30 Days #60 tablet

## 2022-09-02 NOTE — HISTORY AND PHYSICAL REPORT
History of Present Illness


Date of examination: 09/02/22


Date of admission: 


09/02/2022


Chief complaint: 





Chest Pain


History of present illness: 





67-year-old female with significant past medical history of end-stage renal 

disease-on dialysis, hypertension, CHF, CAD, CVA, nicotine dependence and atrial

fibrillation on anticoagulation.  She presents to the emergency room today with 

complaints of chest pain started prior to her dialysis session earlier today.  

Chest pain felt like pressure was initially intermittent but became continuous 

during 3 hours of dialysis session.  On a scale of 10 pain was about 8/10 in 

severity.  No known relieving or exacerbating factor.  Patient has had some 

shortness of breath and a slight cough.


She denies any fever or chills, no nausea vomiting, no diarrhea and no abdominal

pain.


Patient has been compliant with her medications and with her dialysis.





Work-up in the emergency room today, troponin x2 were positive at 0.042 and 0

.046 respectively.  





Review of patient's record shows that she had coronary stents in her LAD several

years ago which had remained patent on repeat cardiac cath and follow-up stress 

test done within the last year was within normal limits.





Patient admitted for chest pain evaluation.





Past History


Past Medical History: acute MI, atrial fib, CAD, COPD, diabetes, dialysis, ESRD,

hypertension, hyperlipidemia, stroke, other (Bipolar disorder)


Past Surgical History: PTCA


Social history: smoking (Former smoker), other ( stent placement x 2 in 2015, 

knee surgery 01/2018)


Family history: no significant family history





Medications and Allergies


                                    Allergies











Allergy/AdvReac Type Severity Reaction Status Date / Time


 


Iodinated Contrast Media AdvReac Intermediate Swelling Verified 09/01/22 16:13


 


apple AdvReac  Hives Verified 09/01/22 16:13


 


shell fish Allergy  Swelling Uncoded 06/02/22 09:45











                                Home Medications











 Medication  Instructions  Recorded  Confirmed  Last Taken  Type


 


Gabapentin 100 mg PO BID 04/23/22 05/28/22 Unknown History


 


Pantoprazole [Protonix TAB] 40 mg PO QDAY 04/23/22 05/28/22 04/23/22 10:00 

History


 


methIMAzole [Methimazole] 10 mg PO TID 05/01/22 05/28/22 Unknown History


 


AtorvaSTATin [Lipitor] 20 mg PO QHS #30 tablet 05/03/22 05/28/22 Unknown Rx


 


Sennosides/Docusate [Senokot S] 1 tab PO DAILY #30 tablet 05/03/22 05/28/22 

Unknown Rx


 


Amiodarone [Cordarone 200 MG TAB] 200 mg PO .AS DIRECTED #60 tablet 05/14/22 05/28/22 Unknown Rx


 


Nitroglycerin [Nitrostat] 0.4 mg SL .Q5MIN PRN 30 Days #30 05/24/22 05/28/22 

Unknown Rx





 tablet    


 


Apixaban [Eliquis] 2.5 mg PO Q12HR  tablet 07/14/22  Unknown Rx


 


Aspirin EC [Halfprin EC] 81 mg PO QDAY  tablet 07/14/22  Unknown Rx


 


ISOSORBIDE MONOnitrate [Imdur ER] 60 mg PO QDAY  tablet 07/14/22  Unknown Rx


 


Losartan [Cozaar] 50 mg PO QDAY  tablet 07/14/22  Unknown Rx


 


Metoprolol [Lopressor TAB] 100 mg PO BID #60 tablet 07/14/22  Unknown Rx


 


hydrALAZINE [Apresoline TAB] 50 mg PO Q8HR  tablet 07/14/22  Unknown Rx


 


oxyCODONE /ACETAMINOPHEN [Percocet 1 tab PO Q6H PRN #14 tablet 07/14/22  Unknown

 Rx





5/325 mg]     











Active Meds: 


Active Medications





Acetaminophen (Acetaminophen 325 Mg Tab)  650 mg PO Q4H PRN


   PRN Reason: Pain MILD(1-3)/Fever >100.5/HA


Magnesium Hydroxide (Magnesium Hydroxide (Mom) Oral Liqd Udc)  30 ml PO Q4H PRN


   PRN Reason: Constipation


Morphine Sulfate (Morphine 2 Mg/1 Ml Inj)  2 mg IV Q4H PRN


   PRN Reason: Pain, Moderate (4-6)


Nitroglycerin (Nitroglycerin 0.4 Mg Tab Subl)  0.4 mg SL .Q5MIN PRN


   PRN Reason: Chest Pain


   Last Admin: 09/01/22 20:41 Dose:  0.4 mg


   


Ondansetron HCl (Ondansetron 4 Mg/2 Ml Inj)  4 mg IV Q8H PRN


   PRN Reason: Nausea And Vomiting


Sodium Chloride (Sodium Chloride 0.9% 10 Ml Flush Syringe)  10 ml IV BID DERIK


Sodium Chloride (Sodium Chloride 0.9% 10 Ml Flush Syringe)  10 ml IV PRN PRN


   PRN Reason: LINE FLUSH


Sodium Chloride (Sodium Chloride 0.9% 10 Ml Flush Syringe)  10 ml IV BID DERIK


Sodium Chloride (Sodium Chloride 0.9% 10 Ml Flush Syringe)  10 ml IV PRN PRN


   PRN Reason: LINE FLUSH


Sodium Chloride (Sodium Chloride 0.9% 10 Ml Flush Syringe)  10 ml IV PRN PRN


   PRN Reason: LINE FLUSH


Tramadol HCl (Tramadol 50 Mg Tab)  50 mg PO Q6H PRN


   PRN Reason: Pain, Moderate (4-6)











Review of Systems


Constitutional: no fever, no chills


Ears, nose, mouth and throat: no nasal congestion, no sore throat


Cardiovascular: chest pain, no palpitations


Respiratory: no cough, no shortness of breath


Gastrointestinal: no nausea, no vomiting, no diarrhea


Genitourinary Female: no pelvic pain, no flank pain, no dysuria, no hematuria


Musculoskeletal: no neck pain, no low back pain


Integumentary: no rash, no pruritis


Neurological: no headaches, no confusion


Psychiatric: no anxiety, no depression


Endocrine: no polyphagia, no polydipsia, no polyuria, no nocturia





Exam





- Constitutional


Vitals: 


                                        











Temp Pulse Resp BP Pulse Ox


 


 98.8 F   70   20   176/90   100 


 


 09/01/22 16:13  09/01/22 21:07  09/01/22 21:07  09/01/22 21:07  09/01/22 21:07











General appearance: Present: no acute distress, well-nourished





- EENT


Eyes: Present: PERRL, EOM intact.  Absent: scleral icterus


ENT: hearing intact, clear oral mucosa, dentition normal





- Neck


Neck: Present: supple, normal ROM





- Respiratory


Respiratory effort: normal


Respiratory: bilateral: CTA





- Cardiovascular


Rhythm: regular


Heart Sounds: Present: S1 & S2.  Absent: gallop, systolic murmur, diastolic mu

rmur, rub, click





- Extremities


Extremities: no ischemia, pulses intact, pulses symmetrical, No edema, normal 

temperature, normal color, Full ROM, abnormal (Right upper extremity AV fistula)


Peripheral Pulses: within normal limits





- Abdominal


General gastrointestinal: Present: soft, non-tender, non-distended, normal bowel

sounds.  Absent: mass





- Integumentary


Integumentary: Present: clear, warm, dry, normal turgor.  Absent: rash





- Musculoskeletal


Musculoskeletal: strength equal bilaterally





- Psychiatric


Psychiatric: appropriate mood/affect, intact judgment & insight, memory intact, 

cooperative





- Neurologic


Neurologic: CNII-XII intact, no focal deficits, moves all extremities





HEART Score





- HEART Score


History: Moderately suspicious


EKG: Non-specific


Age: > 65


Risk factors: > 3 risk factors or hx of atherosclerotic disease


Troponin: 


                                        











Troponin T  0.046 ng/mL (0.00-0.029)  H  09/01/22  22:58    











Troponin: 1-3x normal limit


HEART Score: 7





Results





- Labs


CBC & Chem 7: 


                                 09/01/22 16:23





                                 09/01/22 16:23


Labs: 


                              Abnormal lab results











  09/01/22 09/01/22 09/01/22 Range/Units





  16:23 16:23 20:22 


 


RBC  2.88 L    (3.65-5.03)  M/mm3


 


Hgb  9.0 L    (10.1-14.3)  gm/dl


 


Hct  27.7 L    (30.3-42.9)  %


 


RDW  18.3 H    (13.2-15.2)  %


 


Mono % (Auto)  9.1 H    (0.0-7.3)  %


 


Potassium   3.2 L   (3.6-5.0)  mmol/L


 


Creatinine   1.9 H   (0.6-1.2)  mg/dL


 


Troponin T   0.042 H  0.040 H  (0.00-0.029)  ng/mL


 


Total Protein   5.8 L   (6.3-8.2)  g/dL


 


HDL Cholesterol   66 H   (40-59)  mg/dL














  09/01/22 Range/Units





  22:58 


 


RBC   (3.65-5.03)  M/mm3


 


Hgb   (10.1-14.3)  gm/dl


 


Hct   (30.3-42.9)  %


 


RDW   (13.2-15.2)  %


 


Mono % (Auto)   (0.0-7.3)  %


 


Potassium   (3.6-5.0)  mmol/L


 


Creatinine   (0.6-1.2)  mg/dL


 


Troponin T  0.046 H  (0.00-0.029)  ng/mL


 


Total Protein   (6.3-8.2)  g/dL


 


HDL Cholesterol   (40-59)  mg/dL














Assessment and Plan





Assessment:





1.  Chest pain-patient has history of stent placement in the past.  Repeat 

stress test within the last year has been within normal limits.  Echocardiogram 

done in July 2022 also shows EF of 50 to 55%.





2.  End-stage renal disease on dialysis





3.  History of atrial fibrillation and anticoagulation with Eliquis





4.  Diabetes mellitus





5.  Hypertension








Plan:





1.  Patient admitted and placed on telemetry.  We will check serial cardiac 

enzymes





2.  We will place patient on sliding scale insulin.  We will monitor Accu-Cheks.





3.  We will resume routine home medications once reconciled.





4.  Consult placed to cardiology for further evaluation and recommendations.








DVT prophylaxis: Patient currently on anticoagulation with Eliquis





CODE STATUS: Full code

## 2022-09-02 NOTE — ELECTROCARDIOGRAPH REPORT
[facility]

                                       

Test Date:    2022               Test Time:    07:51:50

Pat Name:     LINDA GARCIA              Department:   

Patient ID:   [id number]          Room:         A451 1

Gender:       F                        Technician:   AURORA

:          1955               Requested By: SHEREE BEVERLY

Order Number: [id number]             Reading MD:   Keagan Bacon

                                 Measurements

Intervals                              Axis          

Rate:         73                       P:            13

VT:           220                      QRS:          -65

QRSD:         132                      T:            102

QT:           467                                    

QTc:          514                                    

                           Interpretive Statements

Ventricular-paced rhythm

Underlying sinus rhythm

Compared to ECG 2022 16:32:16

Ventricular pacing is unchanged

Underlying rhythm has changed from atrial flutter to sinus

Electronically Signed On 2022 13:42:27 EDT by Keagan Bacon

## 2022-09-07 ENCOUNTER — HOSPITAL ENCOUNTER (EMERGENCY)
Dept: HOSPITAL 5 - ED | Age: 67
Discharge: LEFT BEFORE DISCHARGE | End: 2022-09-07
Payer: MEDICARE

## 2022-09-07 VITALS — SYSTOLIC BLOOD PRESSURE: 191 MMHG | DIASTOLIC BLOOD PRESSURE: 96 MMHG

## 2022-09-07 DIAGNOSIS — Z53.21: ICD-10-CM

## 2022-09-07 DIAGNOSIS — R07.89: Primary | ICD-10-CM

## 2022-09-07 PROCEDURE — 93005 ELECTROCARDIOGRAM TRACING: CPT

## 2022-09-08 ENCOUNTER — HOSPITAL ENCOUNTER (OUTPATIENT)
Dept: HOSPITAL 5 - ED | Age: 67
Setting detail: OBSERVATION
LOS: 1 days | Discharge: HOME | End: 2022-09-09
Attending: STUDENT IN AN ORGANIZED HEALTH CARE EDUCATION/TRAINING PROGRAM | Admitting: INTERNAL MEDICINE
Payer: MEDICARE

## 2022-09-08 DIAGNOSIS — Z79.82: ICD-10-CM

## 2022-09-08 DIAGNOSIS — Z86.73: ICD-10-CM

## 2022-09-08 DIAGNOSIS — I48.91: ICD-10-CM

## 2022-09-08 DIAGNOSIS — Z95.0: ICD-10-CM

## 2022-09-08 DIAGNOSIS — I13.2: ICD-10-CM

## 2022-09-08 DIAGNOSIS — E78.00: ICD-10-CM

## 2022-09-08 DIAGNOSIS — E05.90: ICD-10-CM

## 2022-09-08 DIAGNOSIS — E87.70: ICD-10-CM

## 2022-09-08 DIAGNOSIS — I25.10: ICD-10-CM

## 2022-09-08 DIAGNOSIS — Z99.2: ICD-10-CM

## 2022-09-08 DIAGNOSIS — F31.9: ICD-10-CM

## 2022-09-08 DIAGNOSIS — N18.6: ICD-10-CM

## 2022-09-08 DIAGNOSIS — E78.2: ICD-10-CM

## 2022-09-08 DIAGNOSIS — R07.89: ICD-10-CM

## 2022-09-08 DIAGNOSIS — E11.22: ICD-10-CM

## 2022-09-08 DIAGNOSIS — I50.9: ICD-10-CM

## 2022-09-08 DIAGNOSIS — J96.01: Primary | ICD-10-CM

## 2022-09-08 DIAGNOSIS — Z98.890: ICD-10-CM

## 2022-09-08 DIAGNOSIS — Z95.1: ICD-10-CM

## 2022-09-08 DIAGNOSIS — K21.9: ICD-10-CM

## 2022-09-08 DIAGNOSIS — J44.9: ICD-10-CM

## 2022-09-08 DIAGNOSIS — Z79.899: ICD-10-CM

## 2022-09-08 LAB
ALBUMIN SERPL-MCNC: 4.2 G/DL (ref 3.9–5)
ALT SERPL-CCNC: 12 UNITS/L (ref 7–56)
APTT BLD: 36.7 SEC. (ref 24.2–36.6)
BASOPHILS # (AUTO): 0 K/MM3 (ref 0–0.1)
BASOPHILS NFR BLD AUTO: 0.4 % (ref 0–1.8)
BUN SERPL-MCNC: 24 MG/DL (ref 7–17)
BUN/CREAT SERPL: 8 %
CALCIUM SERPL-MCNC: 9.1 MG/DL (ref 8.4–10.2)
EOSINOPHIL # BLD AUTO: 0 K/MM3 (ref 0–0.4)
EOSINOPHIL NFR BLD AUTO: 0.2 % (ref 0–4.3)
HCT VFR BLD CALC: 27.7 % (ref 30.3–42.9)
HEMOLYSIS INDEX: 46
HGB BLD-MCNC: 9 GM/DL (ref 10.1–14.3)
INR PPP: 1.13 (ref 0.87–1.13)
LYMPHOCYTES # BLD AUTO: 1.5 K/MM3 (ref 1.2–5.4)
LYMPHOCYTES NFR BLD AUTO: 17.3 % (ref 13.4–35)
MCHC RBC AUTO-ENTMCNC: 33 % (ref 30–34)
MCV RBC AUTO: 96 FL (ref 79–97)
MONOCYTES # (AUTO): 0.9 K/MM3 (ref 0–0.8)
MONOCYTES % (AUTO): 10.2 % (ref 0–7.3)
PLATELET # BLD: 232 K/MM3 (ref 140–440)
RBC # BLD AUTO: 2.87 M/MM3 (ref 3.65–5.03)

## 2022-09-08 PROCEDURE — 96374 THER/PROPH/DIAG INJ IV PUSH: CPT

## 2022-09-08 PROCEDURE — 96375 TX/PRO/DX INJ NEW DRUG ADDON: CPT

## 2022-09-08 PROCEDURE — 80061 LIPID PANEL: CPT

## 2022-09-08 PROCEDURE — 85610 PROTHROMBIN TIME: CPT

## 2022-09-08 PROCEDURE — 99285 EMERGENCY DEPT VISIT HI MDM: CPT

## 2022-09-08 PROCEDURE — 71045 X-RAY EXAM CHEST 1 VIEW: CPT

## 2022-09-08 PROCEDURE — 85025 COMPLETE CBC W/AUTO DIFF WBC: CPT

## 2022-09-08 PROCEDURE — 83880 ASSAY OF NATRIURETIC PEPTIDE: CPT

## 2022-09-08 PROCEDURE — G0378 HOSPITAL OBSERVATION PER HR: HCPCS

## 2022-09-08 PROCEDURE — 80053 COMPREHEN METABOLIC PANEL: CPT

## 2022-09-08 PROCEDURE — 84484 ASSAY OF TROPONIN QUANT: CPT

## 2022-09-08 PROCEDURE — 96376 TX/PRO/DX INJ SAME DRUG ADON: CPT

## 2022-09-08 PROCEDURE — 93005 ELECTROCARDIOGRAM TRACING: CPT

## 2022-09-08 PROCEDURE — 85730 THROMBOPLASTIN TIME PARTIAL: CPT

## 2022-09-08 PROCEDURE — 36415 COLL VENOUS BLD VENIPUNCTURE: CPT

## 2022-09-08 NOTE — XRAY REPORT
CHEST 1 VIEW 9/8/2022 2:51 PM



INDICATION / CLINICAL INFORMATION: chest pain.



COMPARISON: Radiograph 9/1/2022



FINDINGS:



SUPPORT DEVICES: Left chest wall cardiac device with intact leads.

HEART / MEDIASTINUM: Moderate cardiomegaly. Moderate central pulmonary vascular prominence. 

LUNGS / PLEURA: Diffuse interstitial prominence. No focal areas of consolidation. No pneumothorax. 



ADDITIONAL FINDINGS: No significant additional findings.



IMPRESSION:

1. Moderate cardiomegaly and pulmonary edema, which is slightly worsened as compared to prior exam da
ute 9/1/2022.



Signer Name: Louis Mason MD 

Signed: 9/8/2022 3:15 PM

Workstation Name: [id number]

## 2022-09-08 NOTE — EMERGENCY DEPARTMENT REPORT
ED General Adult HPI





- General


Chief complaint: Chest Pain


Stated complaint: CHEST PAIN


Time Seen by Provider: 09/08/22 14:18


Source: patient, EMS


Mode of arrival: Stretcher


Limitations: No Limitations





- History of Present Illness


Initial comments: 


The patient presents to the emergency department with a chief complaint of chest

pain that has been present since last night.  Patient states she came to the 

emergency department for evaluation last night and left due to overcrowding.  

Patient states the chest pain is located on left side of her chest without 

radiation.  She states the chest pain became worse during dialysis today.  

Patient also complains of a cough but denies shortness of breath.





-: Sudden


Location: chest


Severity scale (0 -10): 8


Quality: sharp


Consistency: constant


Improves with: none


Worsens with: none


Associated Symptoms: denies other symptoms


Treatments Prior to Arrival: none





- Related Data


                                Home Medications











 Medication  Instructions  Recorded  Confirmed  Last Taken


 


Gabapentin 100 mg PO BID 04/23/22 09/02/22 Unknown


 


Pantoprazole [Protonix TAB] 40 mg PO QDAY 04/23/22 09/02/22 04/23/22 10:00


 


methIMAzole [Methimazole] 10 mg PO TID 05/01/22 09/02/22 Unknown


 


Levalbuterol Tartrate 2 puff INHALATION Q6HR PRN 09/02/22 09/02/22 Unknown





[Levalbuterol Tartrate Hfa]    


 


amLODIPine 10 mg PO DAILY 09/02/22 09/02/22 Unknown


 


carvediloL [Coreg] 25 mg PO BID 09/02/22 09/02/22 Unknown


 


cloNIDine [Catapres] 0.1 mg PO Q8H 09/02/22 09/02/22 Unknown








                                  Previous Rx's











 Medication  Instructions  Recorded  Last Taken  Type


 


Sennosides/Docusate [Senokot S] 1 tab PO DAILY #30 tablet 05/03/22 Unknown Rx


 


Nitroglycerin [Nitrostat] 0.4 mg SL .Q5MIN PRN 30 Days #30 05/24/22 Unknown Rx





 tablet   


 


Apixaban [Eliquis] 2.5 mg PO Q12HR  tablet 07/14/22 Unknown Rx


 


Aspirin EC [Halfprin EC] 81 mg PO QDAY  tablet 07/14/22 Unknown Rx


 


ISOSORBIDE MONOnitrate [Imdur ER] 60 mg PO QDAY  tablet 07/14/22 Unknown Rx


 


Losartan [Cozaar] 50 mg PO QDAY  tablet 07/14/22 Unknown Rx


 


Metoprolol [Lopressor TAB] 100 mg PO BID #60 tablet 07/14/22 Unknown Rx


 


hydrALAZINE [Apresoline TAB] 50 mg PO Q8HR  tablet 07/14/22 Unknown Rx


 


Amiodarone [Cordarone 200 MG TAB] 200 mg PO BID 30 Days #60 tablet 09/02/22 

Unknown Rx











                                    Allergies











Allergy/AdvReac Type Severity Reaction Status Date / Time


 


Iodinated Contrast Media AdvReac Intermediate Swelling Verified 09/08/22 13:38


 


apple AdvReac  Hives Verified 09/08/22 13:38


 


shell fish Allergy  Swelling Uncoded 09/08/22 13:38














ED Review of Systems


ROS: 


Stated complaint: CHEST PAIN


Other details as noted in HPI





Comment: All other systems reviewed and negative


Constitutional: denies: chills, fever


Eyes: denies: eye pain, eye discharge, vision change


ENT: denies: ear pain, throat pain


Respiratory: denies: cough, shortness of breath, wheezing


Cardiovascular: chest pain.  denies: palpitations


Endocrine: no symptoms reported


Gastrointestinal: denies: abdominal pain, nausea, diarrhea


Genitourinary: denies: urgency, dysuria, discharge


Musculoskeletal: denies: back pain, joint swelling, arthralgia


Skin: denies: rash, lesions


Neurological: denies: headache, weakness, paresthesias


Psychiatric: denies: anxiety, depression


Hematological/Lymphatic: denies: easy bleeding, easy bruising





ED Past Medical Hx





- Past Medical History


Previous Medical History?: Yes


Hx Hypertension: Yes


Hx CVA: Yes (right sided weakness)


Hx Heart Attack/AMI: Yes (1 stent)


Hx Congestive Heart Failure: Yes


Hx Diabetes: Yes


Hx GERD: Yes


Hx Renal Disease: Yes (REQ DIALYSIS TUE THUR SAT)


Hx Sickle Cell Disease: No


Hx Kidney Stones: No


Hx Psychiatric Treatment: Yes (BIPOLAR/SCHIZOPHRENIA)


Hx Asthma: No


Hx COPD: Yes (REQ 3L O2 CONTINOUSLY)


Hx HIV: No


Additional medical history: HIGH CHOLESTEROL





- Surgical History


Past Surgical History?: Yes


Hx Coronary Stent: Yes


Hx Pacemaker: Yes


Additional Surgical History: stent placement x 2 in 2015, knee surgery 01/2018, 

ABD SX 2021





- Social History


Smoking Status: Never Smoker


Substance Use Type: None





- Medications


Home Medications: 


                                Home Medications











 Medication  Instructions  Recorded  Confirmed  Last Taken  Type


 


Gabapentin 100 mg PO BID 04/23/22 09/02/22 Unknown History


 


Pantoprazole [Protonix TAB] 40 mg PO QDAY 04/23/22 09/02/22 04/23/22 10:00 

History


 


methIMAzole [Methimazole] 10 mg PO TID 05/01/22 09/02/22 Unknown History


 


Sennosides/Docusate [Senokot S] 1 tab PO DAILY #30 tablet 05/03/22 09/02/22 

Unknown Rx


 


Nitroglycerin [Nitrostat] 0.4 mg SL .Q5MIN PRN 30 Days #30 05/24/22 09/02/22 

Unknown Rx





 tablet    


 


Apixaban [Eliquis] 2.5 mg PO Q12HR  tablet 07/14/22 09/02/22 Unknown Rx


 


Aspirin EC [Halfprin EC] 81 mg PO QDAY  tablet 07/14/22 09/02/22 Unknown Rx


 


ISOSORBIDE MONOnitrate [Imdur ER] 60 mg PO QDAY  tablet 07/14/22 09/02/22 

Unknown Rx


 


Losartan [Cozaar] 50 mg PO QDAY  tablet 07/14/22 09/02/22 Unknown Rx


 


Metoprolol [Lopressor TAB] 100 mg PO BID #60 tablet 07/14/22 09/02/22 Unknown Rx


 


hydrALAZINE [Apresoline TAB] 50 mg PO Q8HR  tablet 07/14/22 09/02/22 Unknown Rx


 


Amiodarone [Cordarone 200 MG TAB] 200 mg PO BID 30 Days #60 tablet 09/02/22  

Unknown Rx


 


Levalbuterol Tartrate 2 puff INHALATION Q6HR PRN 09/02/22 09/02/22 Unknown 

History





[Levalbuterol Tartrate Hfa]     


 


amLODIPine 10 mg PO DAILY 09/02/22 09/02/22 Unknown History


 


carvediloL [Coreg] 25 mg PO BID 09/02/22 09/02/22 Unknown History


 


cloNIDine [Catapres] 0.1 mg PO Q8H 09/02/22 09/02/22 Unknown History














ED Physical Exam





- General


Limitations: No Limitations


General appearance: alert, in no apparent distress





- Head


Head exam: Present: atraumatic, normocephalic





- Eye


Eye exam: Present: normal appearance, PERRL, EOMI





- ENT


ENT exam: Present: mucous membranes moist





- Neck


Neck exam: Present: normal inspection





- Respiratory


Respiratory exam: Present: normal lung sounds bilaterally.  Absent: respiratory 

distress





- Cardiovascular


Cardiovascular Exam: Present: regular rate, normal rhythm.  Absent: systolic 

murmur, diastolic murmur, rubs, gallop





- GI/Abdominal


GI/Abdominal exam: Present: soft, normal bowel sounds.  Absent: distended, 

tenderness





- Extremities Exam


Extremities exam: Present: normal inspection





- Back Exam


Back exam: Present: normal inspection





- Neurological Exam


Neurological exam: Present: alert, oriented X3, CN II-XII intact.  Absent: motor

 sensory deficit





- Psychiatric


Psychiatric exam: Present: normal affect, normal mood





- Skin


Skin exam: Present: warm, dry, intact, normal color.  Absent: rash





ED Course


                                   Vital Signs











  09/08/22 09/08/22 09/08/22





  13:17 14:17 14:25


 


Temperature 99.1 F  


 


Pulse Rate 70  86


 


Respiratory 16  21





Rate   


 


Blood Pressure  200/110 


 


Blood Pressure 190/110  198/114





[Left]   


 


O2 Sat by Pulse 100 100 100





Oximetry   














  09/08/22 09/08/22 09/08/22





  14:30 14:45 15:00


 


Temperature   


 


Pulse Rate 86 87 86


 


Respiratory 19 21 15





Rate   


 


Blood Pressure 200/110 195/109 195/109


 


Blood Pressure   





[Left]   


 


O2 Sat by Pulse 100 100 100





Oximetry   














  09/08/22 09/08/22 09/08/22





  15:16 15:30 15:46


 


Temperature   


 


Pulse Rate 82 91 H 91 H


 


Respiratory 19 16 24





Rate   


 


Blood Pressure 200/105 213/101 


 


Blood Pressure   





[Left]   


 


O2 Sat by Pulse 100 100 100





Oximetry   














  09/08/22 09/08/22 09/08/22





  16:01 16:15 16:31


 


Temperature   


 


Pulse Rate 80 80 81


 


Respiratory 14 20 16





Rate   


 


Blood Pressure 188/101 190/97 191/99


 


Blood Pressure   





[Left]   


 


O2 Sat by Pulse 100 100 100





Oximetry   














  09/08/22 09/08/22 09/08/22





  16:45 17:01 17:15


 


Temperature   


 


Pulse Rate 93 H 87 82


 


Respiratory 22 22 25 H





Rate   


 


Blood Pressure 183/107 183/91 173/81


 


Blood Pressure   





[Left]   


 


O2 Sat by Pulse 99 100 100





Oximetry   














  09/08/22





  17:31


 


Temperature 


 


Pulse Rate 70


 


Respiratory 28 H





Rate 


 


Blood Pressure 160/79


 


Blood Pressure 





[Left] 


 


O2 Sat by Pulse 100





Oximetry 














ED Medical Decision Making





- Lab Data


Result diagrams: 


                                 09/08/22 14:36





                                 09/08/22 14:36


Critical care attestation.: 


If time is entered above; I have spent that time in minutes in the direct care 

of this critically ill patient, excluding procedure time.








ED Disposition


Condition: Stable

## 2022-09-08 NOTE — HISTORY AND PHYSICAL REPORT
History of Present Illness


Date of examination: 09/08/22


Date of admission: 


09/08/22 18:51





Chief complaint: 


Increasing shortness of for 1 day





History of present illness: 


67-year-old -American female with multiple medical problems including 

atrial fibrillation, end-stage renal disease on hemodialysis, 

hyperparathyroidism, and diabetes and COPD comes in for increasing shortness of 

breath on dialysis appointment this morning.  She stopped dialysis 30 minutes 

and record dialysis.  Patient also complains of retrosternal chest pain.  

Patient has multiple admissions for chest pain in the past.  Shortness of breath

and orthopnea present.  No fever or chills.  Noncompliance with dialysis is a 

exacerbating factor.

















- Past Medical History


Previous Medical History?: Yes


Hx Hypertension: Yes


Hx CVA: Yes (right sided weakness)


Hx Heart Attack/AMI: Yes (1 stent)


Hx Congestive Heart Failure: Yes


Hx Diabetes: Yes


Hx GERD: Yes


Hx Renal Disease: Yes (REQ DIALYSIS TUE THUR SAT)


Hx Sickle Cell Disease: No


Hx Kidney Stones: No


Hx Psychiatric Treatment: Yes (BIPOLAR/SCHIZOPHRENIA)


Hx Asthma: No


Hx COPD: Yes (REQ 3L O2 CONTINOUSLY)


Hx HIV: No


Additional medical history: HIGH CHOLESTEROL





- Surgical History


Past Surgical History?: Yes


Hx Coronary Stent: Yes


Hx Pacemaker: Yes


Additional Surgical History: stent placement x 2 in 2015, knee surgery 01/2018, 

ABD SX 2021





- Social History


Smoking Status: Never Smoker


Substance Use Type: None








 Review of Systems 


ROS: 


Stated complaint: CHEST PAIN


Other details as noted in HPI





Comment: All other systems reviewed and negative


Constitutional: denies: chills, fever


Eyes: denies: eye pain, eye discharge, vision change


ENT: denies: ear pain, throat pain


Respiratory: denies: cough, shortness of breath, wheezing


Cardiovascular: chest pain.  denies: palpitations


Endocrine: no symptoms reported


Gastrointestinal: denies: abdominal pain, nausea, diarrhea


Genitourinary: denies: urgency, dysuria, discharge


Musculoskeletal: denies: back pain, joint swelling, arthralgia


Skin: denies: rash, lesions


Neurological: denies: headache, weakness, paresthesias


Psychiatric: denies: anxiety, depression


Hematological/Lymphatic: denies: easy bleeding, easy bruising

















Medications and Allergies


                                    Allergies











Allergy/AdvReac Type Severity Reaction Status Date / Time


 


Iodinated Contrast Media AdvReac Intermediate Swelling Verified 09/08/22 19:07


 


apple AdvReac  Hives Verified 09/08/22 19:07


 


shell fish Allergy  Swelling Uncoded 09/08/22 13:38











                                Home Medications











 Medication  Instructions  Recorded  Confirmed  Last Taken  Type


 


Gabapentin 100 mg PO BID 04/23/22 09/09/22 Unknown History


 


Pantoprazole [Protonix TAB] 40 mg PO QDAY 04/23/22 09/09/22 04/23/22 10:00 

History


 


methIMAzole [Methimazole] 10 mg PO TID 05/01/22 09/09/22 Unknown History


 


Sennosides/Docusate [Senokot S] 1 tab PO DAILY #30 tablet 05/03/22 09/09/22 

Unknown Rx


 


Nitroglycerin [Nitrostat] 0.4 mg SL .Q5MIN PRN 30 Days #30 05/24/22 09/09/22 

Unknown Rx





 tablet    


 


Apixaban [Eliquis] 2.5 mg PO Q12HR  tablet 07/14/22 09/09/22 Unknown Rx


 


Aspirin EC [Halfprin EC] 81 mg PO QDAY  tablet 07/14/22 09/09/22 Unknown Rx


 


ISOSORBIDE MONOnitrate [Imdur ER] 60 mg PO QDAY  tablet 07/14/22 09/09/22 

Unknown Rx


 


Losartan [Cozaar] 50 mg PO QDAY  tablet 07/14/22 09/09/22 Unknown Rx


 


Metoprolol [Lopressor TAB] 100 mg PO BID #60 tablet 07/14/22 09/09/22 Unknown Rx


 


hydrALAZINE [Apresoline TAB] 50 mg PO Q8HR  tablet 07/14/22 09/09/22 Unknown Rx


 


Amiodarone [Cordarone 200 MG TAB] 200 mg PO BID 30 Days #60 tablet 09/02/22 09/09/22 Unknown Rx


 


Levalbuterol Tartrate 2 puff INHALATION Q6HR PRN 09/02/22 09/09/22 Unknown 

History





[Levalbuterol Tartrate Hfa]     


 


amLODIPine 10 mg PO DAILY 09/02/22 09/09/22 Unknown History


 


carvediloL [Coreg] 25 mg PO BID 09/02/22 09/09/22 Unknown History


 


cloNIDine [Catapres] 0.1 mg PO Q8H 09/02/22 09/09/22 Unknown History











Active Meds: 


Active Medications





Acetaminophen (Acetaminophen 325 Mg Tab)  650 mg PO Q4H PRN


   PRN Reason: Pain MILD(1-3)/Fever >100.5/HA


Morphine Sulfate (Morphine 2 Mg/1 Ml Inj)  2 mg IV Q4H PRN


   PRN Reason: Pain, Moderate (4-6)


Ondansetron HCl (Ondansetron 4 Mg/2 Ml Inj)  4 mg IV Q8H PRN


   PRN Reason: Nausea And Vomiting


Sodium Chloride (Sodium Chloride 0.9% 10 Ml Flush Syringe)  10 ml IV BID DERIK


Sodium Chloride (Sodium Chloride 0.9% 10 Ml Flush Syringe)  10 ml IV PRN PRN


   PRN Reason: LINE FLUSH











Exam





- Constitutional


Vitals: 


                                        











Temp Pulse Resp BP Pulse Ox


 


 99.1 F   70   20   164/72   100 


 


 09/08/22 13:17  09/08/22 19:15  09/08/22 19:15  09/08/22 19:15  09/08/22 19:15











General appearance: Present: no acute distress, mild distress, well-nourished





- EENT


Eyes: Present: PERRL


ENT: hearing intact, clear oral mucosa





- Neck


Neck: Present: supple, normal ROM





- Respiratory


Respiratory effort: normal


Respiratory: bilateral: CTA





- Cardiovascular


Heart rate: 78


Rhythm: irregularly irregular


Heart Sounds: Present: S1 & S2.  Absent: rub, click





- Extremities


Extremities: no ischemia, pulses intact, pulses symmetrical, No edema


Peripheral Pulses: within normal limits





- Abdominal


General gastrointestinal: Present: soft, non-tender, non-distended, normal bowel

 sounds


Female genitourinary: Present: normal





- Integumentary


Integumentary: Present: clear, warm, dry





- Musculoskeletal


Musculoskeletal: gait normal, strength equal bilaterally





- Psychiatric


Psychiatric: appropriate mood/affect, intact judgment & insight





- Neurologic


Neurologic: CNII-XII intact, moves all extremities





- Allied Health


Allied health notes reviewed: nursing, case management





HEART Score





- HEART Score


History: Moderately suspicious


Age: > 65


Risk factors: > 3 risk factors or hx of atherosclerotic disease


Troponin: 


                                        











Troponin T  0.031 ng/mL (0.00-0.029)  H  09/08/22  14:36    











Troponin: 1-3x normal limit





- Critical Actions


Critical Actions: 4-6 pts:12-16.6% risk of adverse cardiac event. Should be 

admitted





Results





- Labs


CBC & Chem 7: 


                                 09/08/22 14:36





                                 09/08/22 14:36


Labs: 


                             Laboratory Last Values











WBC  8.7 K/mm3 (4.5-11.0)   09/08/22  14:36    


 


RBC  2.87 M/mm3 (3.65-5.03)  L  09/08/22  14:36    


 


Hgb  9.0 gm/dl (10.1-14.3)  L  09/08/22  14:36    


 


Hct  27.7 % (30.3-42.9)  L  09/08/22  14:36    


 


MCV  96 fl (79-97)   09/08/22  14:36    


 


MCH  31 pg (28-32)   09/08/22  14:36    


 


MCHC  33 % (30-34)   09/08/22  14:36    


 


RDW  18.9 % (13.2-15.2)  H  09/08/22  14:36    


 


Plt Count  232 K/mm3 (140-440)   09/08/22  14:36    


 


Lymph % (Auto)  17.3 % (13.4-35.0)   09/08/22  14:36    


 


Mono % (Auto)  10.2 % (0.0-7.3)  H  09/08/22  14:36    


 


Eos % (Auto)  0.2 % (0.0-4.3)   09/08/22  14:36    


 


Baso % (Auto)  0.4 % (0.0-1.8)   09/08/22  14:36    


 


Lymph # (Auto)  1.5 K/mm3 (1.2-5.4)   09/08/22  14:36    


 


Mono # (Auto)  0.9 K/mm3 (0.0-0.8)  H  09/08/22  14:36    


 


Eos # (Auto)  0.0 K/mm3 (0.0-0.4)   09/08/22  14:36    


 


Baso # (Auto)  0.0 K/mm3 (0.0-0.1)   09/08/22  14:36    


 


Seg Neutrophils %  71.9 % (40.0-70.0)  H  09/08/22  14:36    


 


Seg Neutrophils #  6.3 K/mm3 (1.8-7.7)   09/08/22  14:36    


 


PT  15.8 Sec. (12.2-14.9)  H  09/08/22  14:36    


 


INR  1.13  (0.87-1.13)   09/08/22  14:36    


 


APTT  36.7 Sec. (24.2-36.6)  H  09/08/22  14:36    


 


Sodium  142 mmol/L (137-145)   09/08/22  14:36    


 


Potassium  3.6 mmol/L (3.6-5.0)   09/08/22  14:36    


 


Chloride  98.8 mmol/L ()   09/08/22  14:36    


 


Carbon Dioxide  29 mmol/L (22-30)   09/08/22  14:36    


 


Anion Gap  18 mmol/L  09/08/22  14:36    


 


BUN  24 mg/dL (7-17)  H  09/08/22  14:36    


 


Creatinine  3.2 mg/dL (0.6-1.2)  H  09/08/22  14:36    


 


Estimated GFR  17 ml/min  09/08/22  14:36    


 


BUN/Creatinine Ratio  8 %  09/08/22  14:36    


 


Glucose  81 mg/dL ()   09/08/22  14:36    


 


Calcium  9.1 mg/dL (8.4-10.2)   09/08/22  14:36    


 


Total Bilirubin  0.80 mg/dL (0.1-1.2)   09/08/22  14:36    


 


AST  17 units/L (5-40)   09/08/22  14:36    


 


ALT  12 units/L (7-56)   09/08/22  14:36    


 


Alkaline Phosphatase  77 units/L ()   09/08/22  14:36    


 


Troponin T  0.031 ng/mL (0.00-0.029)  H  09/08/22  14:36    


 


Total Protein  6.3 g/dL (6.3-8.2)   09/08/22  14:36    


 


Albumin  4.2 g/dL (3.9-5)   09/08/22  14:36    


 


Albumin/Globulin Ratio  2.0 %  09/08/22  14:36    














- Imaging and Cardiology


EKG: report reviewed (Atrial fibrillation no acute ST-T wave changes.)


Imaging and Cardiology: 





Chest x-ray


Moderate evaluated pulmonary edema which is slightly worsened as compared to 

prior exam dated 6/1/2020





Assessment and Plan


Advance Directives: Yes (Full code)


VTE prophylaxis?: Chemical


Plan of care discussed with patient/family: Yes





- Patient Problems


(1) Acute respiratory failure with hypoxia


Current Visit: No   Status: Acute   


Plan to address problem: 


Oxygen supplementation.


Secondary to volume overload.








(2) Volume overload


Current Visit: Yes   Status: Acute   


Plan to address problem: 


Patient is in volume overload


Bilateral rales present


Patient needs emergent hemodialysis for increased ultrafiltration














(3) Atypical chest pain


Current Visit: Yes   Status: Acute   


Plan to address problem: 


Patient has atypical chest pain


Tenderness present.


Patient had extensive work-up with Lexiscan being normal and echocardiogram with

 normal ejection fraction 


Hence no stress test was requested








(4) Coronary artery disease


Current Visit: No   Status: Chronic   


Qualifiers: 


   Coronary Disease-Associated Artery/Lesion type: native artery   Native vs. 

transplanted heart: native heart 


Plan to address problem: 


Continue isosorbide mononitrate and aspirin.








(5) End-stage renal disease on hemodialysis


Current Visit: No   Status: Chronic   


Plan to address problem: 


Nephrology consulted for emergent hemodialysis.








(6) HTN (hypertension)


Current Visit: No   Status: Chronic   


Qualifiers: 


   Hypertension type: primary hypertension   Qualified Code(s): I10 - Essential 

(primary) hypertension   


Plan to address problem: 


Continue antihypertensives and adjust medications as








(7) Hyperlipidemia


Current Visit: No   Status: Chronic   


Qualifiers: 


   Hyperlipidemia type: mixed hyperlipidemia   Qualified Code(s): E78.2 - Mixed 

hyperlipidemia   


Plan to address problem: 


Continue statins.








(8) Atrial fibrillation


Current Visit: Yes   Status: Chronic   


Qualifiers: 


   Atrial fibrillation type: persistent (not longstanding)   Qualified Code(s): 

I48.19 - Other persistent atrial fibrillation; I48.1 - Persistent atrial 

fibrillation   


Plan to address problem: 


On amiodarone and Eliquis.








(9) Hyperthyroidism


Current Visit: Yes   Status: Chronic   


Plan to address problem: 


On methimazole.








(10) DVT prophylaxis


Current Visit: Yes   Status: Acute   


Plan to address problem: 


On heparin and GI prophylaxis.








(11) ACP (advance care planning)


Current Visit: Yes   Status: Acute   


Plan to address problem: 


Disease education conducted, care plan discussed, prognosis discussed.  And 

diagnosis discussed.  Patient acknowledged understanding with care plan.  +30 

minutes.

## 2022-09-08 NOTE — ELECTROCARDIOGRAPH REPORT
[facility]

                                       

Test Date:    2022               Test Time:    19:46:17

Pat Name:     LINDA GARCIA              Department:   

Patient ID:   [id number]          Room:          

Gender:       F                        Technician:   AYESHA

:          1955               Requested By: SHERICE ACOSTA

Order Number: [id number]             Reading MD:   John Vaca

                                 Measurements

Intervals                              Axis          

Rate:         83                       P:            17

ND:           182                      QRS:          -64

QRSD:         133                      T:            158

QT:           428                                    

QTc:          505                                    

                           Interpretive Statements

Ventricular-paced rhythm

Compared to ECG 2022 07:51:50

Sinus rhythm no longer present

Electronically Signed On 2022 10:35:21 EDT by John Vaca

## 2022-09-09 VITALS — SYSTOLIC BLOOD PRESSURE: 141 MMHG | DIASTOLIC BLOOD PRESSURE: 73 MMHG

## 2022-09-09 LAB — HDLC SERPL-MCNC: 80 MG/DL (ref 40–59)

## 2022-09-09 RX ADMIN — PANTOPRAZOLE SODIUM SCH MG: 40 TABLET, DELAYED RELEASE ORAL at 10:50

## 2022-09-09 RX ADMIN — AMIODARONE HYDROCHLORIDE SCH MG: 200 TABLET ORAL at 01:01

## 2022-09-09 RX ADMIN — AMIODARONE HYDROCHLORIDE SCH MG: 200 TABLET ORAL at 10:50

## 2022-09-09 RX ADMIN — ASPIRIN SCH MG: 81 TABLET, COATED ORAL at 01:01

## 2022-09-09 RX ADMIN — APIXABAN SCH MG: 2.5 TABLET, FILM COATED ORAL at 10:51

## 2022-09-09 RX ADMIN — METOPROLOL TARTRATE SCH: 100 TABLET, FILM COATED ORAL at 00:58

## 2022-09-09 RX ADMIN — SENNOSIDES AND DOCUSATE SODIUM SCH TAB: 50; 8.6 TABLET ORAL at 10:49

## 2022-09-09 RX ADMIN — ASPIRIN SCH MG: 81 TABLET, COATED ORAL at 10:50

## 2022-09-09 RX ADMIN — APIXABAN SCH MG: 2.5 TABLET, FILM COATED ORAL at 01:00

## 2022-09-09 RX ADMIN — GABAPENTIN SCH MG: 100 CAPSULE ORAL at 01:00

## 2022-09-09 RX ADMIN — PANTOPRAZOLE SODIUM SCH MG: 40 TABLET, DELAYED RELEASE ORAL at 01:01

## 2022-09-09 RX ADMIN — LOSARTAN POTASSIUM SCH MG: 50 TABLET, FILM COATED ORAL at 01:01

## 2022-09-09 RX ADMIN — GABAPENTIN SCH MG: 100 CAPSULE ORAL at 10:50

## 2022-09-09 RX ADMIN — SENNOSIDES AND DOCUSATE SODIUM SCH TAB: 50; 8.6 TABLET ORAL at 01:01

## 2022-09-09 RX ADMIN — LOSARTAN POTASSIUM SCH MG: 50 TABLET, FILM COATED ORAL at 10:51

## 2022-09-09 RX ADMIN — Medication SCH ML: at 10:52

## 2022-09-09 RX ADMIN — METOPROLOL TARTRATE SCH MG: 100 TABLET, FILM COATED ORAL at 10:50

## 2022-09-09 RX ADMIN — Medication SCH ML: at 01:02

## 2022-09-09 NOTE — ELECTROCARDIOGRAPH REPORT
[facility]

                                       

Test Date:    2022               Test Time:    14:05:59

Pat Name:     LINDA GARCIA              Department:   

Patient ID:   [id number]          Room:         A374 1

Gender:       F                        Technician:   BERE

:          1955               Requested By: SAMARA FRANK

Order Number: [id number]             Reading MD:   John Vaca

                                 Measurements

Intervals                              Axis          

Rate:         89                       P:            40

MA:           221                      QRS:          -69

QRSD:         134                      T:            99

QT:           403                                    

QTc:          492                                    

                           Interpretive Statements

Ventricular-paced rhythm

Compared to ECG 2022 19:46:17

No significant changes

Electronically Signed On 2022 10:54:31 EDT by John Vaca

## 2022-09-09 NOTE — CONSULTATION
History of Present Illness





- Reason for Consult


Consult date: 09/09/22


end stage renal disease





- History of Present Illness


The patient is a 68 YO female known to our service with history of DM-2, HTN, 

HLD, Bipolar Disorder, CAD s/p Stent Placement, Paroxysmal atrial fibrillation, 

HFpEF, COPD, Anemia and ESRD on hemodialysis (TTS) who presented to [facility] ED 

9/8/22 with chest pain and sob while on HD yesterday.  She stopped dialysis 30 

minutes prior.  The pain was sharp, constant and not radiating.  Patient has 

chronic intermittent some shortness of breath and cough.  Patient denies any N, 

V, D, abd pain, dizziness, weakness, fever, chills, rash, blurry vision, 

hematuria or hemoptysis.  She has had several admissions and ER visits with 

similar presentation and had extensive workup.  Patient was admitted for further

evaluation.  Labs and imaging noted.  Nephrology was consulted for ESRD 

management.








Medications and Allergies


                                    Allergies











Allergy/AdvReac Type Severity Reaction Status Date / Time


 


Iodinated Contrast Media AdvReac Intermediate Swelling Verified 09/08/22 19:07


 


apple AdvReac  Hives Verified 09/08/22 19:07


 


shell fish Allergy  Swelling Uncoded 09/08/22 13:38











                                Home Medications











 Medication  Instructions  Recorded  Confirmed  Last Taken  Type


 


Gabapentin 100 mg PO BID 04/23/22 09/09/22 Unknown History


 


Pantoprazole [Protonix TAB] 40 mg PO QDAY 04/23/22 09/09/22 04/23/22 10:00 

History


 


methIMAzole [Methimazole] 10 mg PO TID 05/01/22 09/09/22 Unknown History


 


Sennosides/Docusate [Senokot S] 1 tab PO DAILY #30 tablet 05/03/22 09/09/22 

Unknown Rx


 


Nitroglycerin [Nitrostat] 0.4 mg SL .Q5MIN PRN 30 Days #30 05/24/22 09/09/22 

Unknown Rx





 tablet    


 


Apixaban [Eliquis] 2.5 mg PO Q12HR  tablet 07/14/22 09/09/22 Unknown Rx


 


Aspirin EC [Halfprin EC] 81 mg PO QDAY  tablet 07/14/22 09/09/22 Unknown Rx


 


ISOSORBIDE MONOnitrate [Imdur ER] 60 mg PO QDAY  tablet 07/14/22 09/09/22 

Unknown Rx


 


Losartan [Cozaar] 50 mg PO QDAY  tablet 07/14/22 09/09/22 Unknown Rx


 


Metoprolol [Lopressor TAB] 100 mg PO BID #60 tablet 07/14/22 09/09/22 Unknown Rx


 


hydrALAZINE [Apresoline TAB] 50 mg PO Q8HR  tablet 07/14/22 09/09/22 Unknown Rx


 


Amiodarone [Cordarone 200 MG TAB] 200 mg PO BID 30 Days #60 tablet 09/02/22 09/09/22 Unknown Rx


 


Levalbuterol Tartrate 2 puff INHALATION Q6HR PRN 09/02/22 09/09/22 Unknown 

History





[Levalbuterol Tartrate Hfa]     


 


amLODIPine 10 mg PO DAILY 09/02/22 09/09/22 Unknown History


 


carvediloL [Coreg] 25 mg PO BID 09/02/22 09/09/22 Unknown History


 


cloNIDine [Catapres] 0.1 mg PO Q8H 09/02/22 09/09/22 Unknown History











Active Meds: 


Active Medications





Acetaminophen (Acetaminophen 325 Mg Tab)  650 mg PO Q4H PRN


   PRN Reason: Pain MILD(1-3)/Fever >100.5/HA


Amiodarone HCl (Amiodarone 200 Mg Tab)  200 mg PO BID [facility]


   Last Admin: 09/09/22 01:01 Dose:  200 mg


   


Amlodipine Besylate (Amlodipine 10 Mg Tab)  10 mg PO DAILY [facility]


   Last Admin: 09/09/22 01:01 Dose:  10 mg


   


Apixaban (Apixaban 2.5 Mg Tab)  2.5 mg PO Q12HR [facility]


   Last Admin: 09/09/22 01:00 Dose:  2.5 mg


   


Aspirin (Aspirin Ec 81 Mg Tab)  81 mg PO QDAY [facility]


   Last Admin: 09/09/22 01:01 Dose:  81 mg


   


Carvedilol (Carvedilol 25 Mg Tab)  25 mg PO BID [facility]


   Last Admin: 09/09/22 01:01 Dose:  25 mg


   


Clonidine HCl (Clonidine 0.1 Mg Tab)  0.1 mg PO Q8H [facility]


   Last Admin: 09/09/22 05:27 Dose:  Not Given


   


Gabapentin (Gabapentin 100 Mg Cap)  100 mg PO BID [facility]


   Last Admin: 09/09/22 01:00 Dose:  100 mg


   


Hydralazine HCl (Hydralazine 25 Mg Tab)  50 mg PO Q8HR [facility]


   Last Admin: 09/09/22 05:23 Dose:  50 mg


   


Isosorbide Mononitrate (Isosorbide Mononitrate Er 60 Mg Tab)  60 mg PO QDAY [facility]


   Last Admin: 09/09/22 00:56 Dose:  Not Given


   


Levalbuterol HCl (Levalbuterol 1.25 Mg/3 Ml Neb)  1.25 mg IH Q6HRT PRN


   PRN Reason: wheezing


Losartan Potassium (Losartan 50 Mg Tab)  50 mg PO QDAY [facility]


   Last Admin: 09/09/22 01:01 Dose:  50 mg


   


Methimazole (Methimazole 5 Mg Tab)  10 mg PO TID [facility]


   Last Admin: 09/09/22 08:56 Dose:  10 mg


   


Metoclopramide HCl (Metoclopramide 10 Mg/2 Ml Inj)  5 mg IV Q8H PRN


   PRN Reason: Nausea And Vomiting


Metoprolol Tartrate (Metoprolol Tartrate 100 Mg Tab)  100 mg PO BID [facility]


   Last Admin: 09/09/22 00:58 Dose:  Not Given


   


Morphine Sulfate (Morphine 2 Mg/1 Ml Inj)  2 mg IV Q4H PRN


   PRN Reason: Pain, Moderate (4-6)


   Last Admin: 09/08/22 22:10 Dose:  2 mg


   


Nitroglycerin (Nitroglycerin 0.4 Mg Tab Subl)  0.4 mg SL .Q5MIN PRN


   PRN Reason: Chest Pain


Ondansetron HCl (Ondansetron 4 Mg/2 Ml Inj)  4 mg IV Q8H PRN


   PRN Reason: Nausea And Vomiting


Oxycodone/Acetaminophen (Oxycodone /Acetaminophen 5-325mg Tab)  1 tab PO Q6H PRN


   PRN Reason: Pain, Moderate (4-6)


   Last Admin: 09/09/22 05:22 Dose:  1 tab


   


Pantoprazole Sodium (Pantoprazole 40 Mg Tab)  40 mg PO QDAY [facility]


   Last Admin: 09/09/22 01:01 Dose:  40 mg


   


Senna/Docusate Sodium (Sennosides/Docusate Sodium 8.6/50 Mg Tab)  1 tab PO DAILY

[facility]


   Last Admin: 09/09/22 01:01 Dose:  1 tab


   


Sodium Chloride (Sodium Chloride 0.9% 10 Ml Flush Syringe)  10 ml IV BID [facility]


   Last Admin: 09/09/22 01:02 Dose:  10 ml


   


Sodium Chloride (Sodium Chloride 0.9% 10 Ml Flush Syringe)  10 ml IV PRN PRN


   PRN Reason: LINE FLUSH











Review of Systems


All systems: negative





Exam





- Vital Signs


Vital signs: 


                                   Vital Signs











Temp Pulse Resp BP Pulse Ox


 


 99.1 F   70   16   190/110   100 


 


 09/08/22 13:17  09/08/22 13:17  09/08/22 13:17  09/08/22 13:17  09/08/22 13:17














Results





- Lab Results





                                 09/08/22 14:36





                                 09/08/22 14:36


                             Most recent lab results











Calcium  9.1 mg/dL (8.4-10.2)   09/08/22  14:36    














Assessment and Plan





1. ESRD:


Patient is on maintenance hemodialysis, TTS schedule.


Hemodialysis:





2. FEN:


UF with HD as tolerated.


Monitor lytes and volume status.





3. Chest pain //  H/o CAD s/p PCI:


Seen by Cards.


Monitor.





4. Paroxysmal A.flutter with RVR:


Continue home meds.


Monitor.





5. Chronic HFpEF:


Volume control thru HD.


Fluid restriction, monitor I/O.





6. H/o COPD:


Nebs and O2 as needed.





7. Hypertension:


Volume control with HD.


Adjust BP meds as needed.


Monitor BP.





8. Normochromic anemia, POA:


Chronic.


Likely 2/2 ESRD.


Epogen as needed.











Subjective:


Patient was seen and examined at the bedside.











Examination:


General appearance: well-developed, appears stated age, no distress


HEENT: atraumatic, IVETTE


Neck: trachea midline


Respiratory: slightly coarse breath sounds


Heart: S1S2, no murmur


Abdomen: soft, bowel sounds heard, NT


Integumentary: no obvious rash


Neurologic: AO, able to move extremities


Ext: no edema


Hemodialysis access: R arm AVG

## 2022-09-09 NOTE — DISCHARGE SUMMARY
Providers





- Providers


Date of Admission: 


09/08/22 18:51





Date of discharge: 09/09/22


Attending physician: 


SAMARA FRANK MD





                                        





09/08/22 20:59


Consult to Physician [CONS] Routine 


   Comment: 


   Consulting Provider: EMELINA CH


   Physician Instructions: 


   Reason For Exam: Volume overload,











Primary care physician: 


RA BALTAZAR








Hospitalization


Reason for admission: Volume overload, ESRD on hemodialysis, atypical chest pain


Condition: Stable


Pertinent studies: 





Reviewed.


Procedures: 





None.


Hospital course: 





Patient is a 67-year-old female past medical of ESRD on hemodialysis (TTS), 

hypertension, coronary artery disease, chronic hypoxic respiratory failure (3 L 

nasal cannula at baseline) and atrial fibrillation on anticoagulation (apixaban 

2.5 mg twice daily) who presented to the ED with complaints of left-sided chest 

pain presented the night prior to presentation.  The patient endorsed showing up

 to the ED initially believing due to overcrowding.  He describes her pain as 

moderate without chest radiation.  The patient also complained of coughing but 

denies shortness of breath.  The patient's last hemodialysis session was 

9/6/2022; she missed her session on 9/8/2022 because she was actively being 

admitted to the hospital.  The patient had previously been worked up for chest 

pain earlier this year, and her myocardial perfusion scan was found to be 

unremarkable.  In the ED, patient was found to be hemodynamically stable with an

 elevated blood pressure of 190/110.  Labs were also remarkable for proBNP of 

54,741.  Nephrology was consulted for hemodialysis management.  Patient was 

counseled about the importance of salt restriction (utilizing Mrs. De La Cruz instead)

 and fluid restriction to approximately 2 L/day.  Patient expressed 

understanding.  After hemodialysis, patient will be medically cleared for 

discharge.  Patient expressed understanding.


Disposition: 01 HOME / SELF CARE / HOMELESS


Final Discharge Diagnosis (Prints w/discharge instructions): Volume overload, 

ESRD on hemodialysis, chronic hypoxic respiratory failure, atypical chest pain, 

coronary artery disease, atrial fibrillation on anticoagulation, hypertension, 

hyperlipidemia, hyperthyroidism


Time spent for discharge: 45 min





Core Measure Documentation





- Palliative Care


Palliative Care/ Comfort Measures: Not Applicable





- Core Measures


Any of the following diagnoses?: none





Exam





- Constitutional


Vitals: 


                                        











Temp Pulse Resp BP Pulse Ox


 


 97.3 F L  76   20   148/77   100 


 


 09/09/22 05:17  09/09/22 05:23  09/09/22 05:17  09/09/22 05:23  09/09/22 05:17











General appearance: Present: no acute distress, well-nourished





- EENT


Eyes: Present: PERRL, EOM intact


ENT: hearing intact, clear oral mucosa, dentition normal





- Neck


Neck: Present: supple, normal ROM





- Respiratory


Respiratory effort: normal


Respiratory: bilateral: diminished (On 3 L nasal cannula (currently at 

baseline))





- Cardiovascular


Rhythm: irregularly irregular


Heart Sounds: Present: S1 & S2





- Extremities


Extremities: no ischemia, pulses intact, pulses symmetrical, No edema, normal 

temperature, normal color, Full ROM, abnormal (AV fistula in right upper 

extremity with palpable thrill)


Peripheral Pulses: within normal limits





- Abdominal


General gastrointestinal: Present: soft, non-tender, non-distended, normal bowel

 sounds


Female genitourinary: Present: deferred





- Rectal


Rectal Exam: deferred





- Integumentary


Integumentary: Present: clear, warm, dry





- Musculoskeletal


Musculoskeletal: strength equal bilaterally





- Psychiatric


Psychiatric: appropriate mood/affect, intact judgment & insight, memory intact, 

cooperative





- Neurologic


Neurologic: CNII-XII intact, moves all extremities





- Allied Health


Allied health notes reviewed: nursing





Plan


Activity: no restrictions


Diet: low salt, renal


Special Instructions: restrict fluid intake to (2 L/day)


Additional Instructions: Patient is a 67-year-old female past medical of ESRD on

 hemodialysis (TTS), hypertension, coronary artery disease, chronic hypoxic 

respiratory failure (3 L nasal cannula at baseline) and atrial fibrillation on 

anticoagulation (apixaban 2.5 mg twice daily) who presented to the ED with 

complaints of left-sided chest pain presented the night prior to presentation.  

The patient endorsed showing up to the ED initially believing due to 

overcrowding.  He describes her pain as moderate without chest radiation.  The 

patient also complained of coughing but denies shortness of breath.  The 

patient's last hemodialysis session was 9/6/2022; she missed her session on 

9/8/2022 because she was actively being admitted to the hospital.  The patient 

had previously been worked up for chest pain earlier this year, and her 

myocardial perfusion scan was found to be unremarkable.  In the ED, patient was 

found to be hemodynamically stable with an elevated blood pressure of 190/110.  

Labs were also remarkable for proBNP of 54,741.  Nephrology was consulted for 

hemodialysis management.  Patient was counseled about the importance of salt 

restriction (utilizing Mrs. De La Cruz instead) and fluid restriction to approximately

 2 L/day.  Patient expressed understanding.  After hemodialysis, patient will be

 medically cleared for discharge.  Patient expressed understanding.


Care Plan Goals: 


Patient is medically cleared for discharge.


Assessment: 


Patient is a 67-year-old female past medical of ESRD on hemodialysis (TTS), 

hypertension, coronary artery disease, chronic hypoxic respiratory failure (3 L 

nasal cannula at baseline) and atrial fibrillation on anticoagulation (apixaban 

2.5 mg twice daily) who presented to the ED with complaints of left-sided chest 

pain presented the night prior to presentation.  The patient endorsed showing up

 to the ED initially believing due to overcrowding.  He describes her pain as 

moderate without chest radiation.  The patient also complained of coughing but 

denies shortness of breath.  The patient's last hemodialysis session was 

9/6/2022; she missed her session on 9/8/2022 because she was actively being 

admitted to the hospital.  The patient had previously been worked up for chest 

pain earlier this year, and her myocardial perfusion scan was found to be 

unremarkable.  In the ED, patient was found to be hemodynamically stable with an

 elevated blood pressure of 190/110.  Labs were also remarkable for proBNP of 

54,741.  Nephrology was consulted for hemodialysis management.  Patient was 

counseled about the importance of salt restriction (utilizing Mrs. De La Cruz instead)

 and fluid restriction to approximately 2 L/day.  Patient expressed 

understanding.  After hemodialysis, patient will be medically cleared for 

discharge.  Patient expressed understanding.


Follow up with: 


RA BALTAZAR MD [Primary Care Provider] - 3-5 Days

## 2022-09-15 ENCOUNTER — HOSPITAL ENCOUNTER (EMERGENCY)
Dept: HOSPITAL 5 - ED | Age: 67
Discharge: HOME | End: 2022-09-15
Payer: MEDICARE

## 2022-09-15 VITALS — DIASTOLIC BLOOD PRESSURE: 73 MMHG | SYSTOLIC BLOOD PRESSURE: 166 MMHG

## 2022-09-15 DIAGNOSIS — F17.200: ICD-10-CM

## 2022-09-15 DIAGNOSIS — R07.89: Primary | ICD-10-CM

## 2022-09-15 DIAGNOSIS — I10: ICD-10-CM

## 2022-09-15 DIAGNOSIS — F31.9: ICD-10-CM

## 2022-09-15 LAB
ALBUMIN SERPL-MCNC: 3.7 G/DL (ref 3.9–5)
ALT SERPL-CCNC: 7 UNITS/L (ref 7–56)
BASOPHILS # (AUTO): 0.1 K/MM3 (ref 0–0.1)
BASOPHILS NFR BLD AUTO: 0.7 % (ref 0–1.8)
BUN SERPL-MCNC: 14 MG/DL (ref 7–17)
BUN/CREAT SERPL: 6 %
CALCIUM SERPL-MCNC: 9.2 MG/DL (ref 8.4–10.2)
EOSINOPHIL # BLD AUTO: 0.1 K/MM3 (ref 0–0.4)
EOSINOPHIL NFR BLD AUTO: 0.5 % (ref 0–4.3)
HCT VFR BLD CALC: 27.5 % (ref 30.3–42.9)
HEMOLYSIS INDEX: 16
HGB BLD-MCNC: 9 GM/DL (ref 10.1–14.3)
LYMPHOCYTES # BLD AUTO: 1.9 K/MM3 (ref 1.2–5.4)
LYMPHOCYTES NFR BLD AUTO: 18.4 % (ref 13.4–35)
MCHC RBC AUTO-ENTMCNC: 33 % (ref 30–34)
MCV RBC AUTO: 96 FL (ref 79–97)
MONOCYTES # (AUTO): 1.2 K/MM3 (ref 0–0.8)
MONOCYTES % (AUTO): 11.5 % (ref 0–7.3)
PLATELET # BLD: 223 K/MM3 (ref 140–440)
RBC # BLD AUTO: 2.88 M/MM3 (ref 3.65–5.03)

## 2022-09-15 PROCEDURE — 36415 COLL VENOUS BLD VENIPUNCTURE: CPT

## 2022-09-15 PROCEDURE — 96376 TX/PRO/DX INJ SAME DRUG ADON: CPT

## 2022-09-15 PROCEDURE — 96374 THER/PROPH/DIAG INJ IV PUSH: CPT

## 2022-09-15 PROCEDURE — 36569 INSJ PICC 5 YR+ W/O IMAGING: CPT

## 2022-09-15 PROCEDURE — 82550 ASSAY OF CK (CPK): CPT

## 2022-09-15 PROCEDURE — A9540 TC99M MAA: HCPCS

## 2022-09-15 PROCEDURE — 78580 LUNG PERFUSION IMAGING: CPT

## 2022-09-15 PROCEDURE — 71045 X-RAY EXAM CHEST 1 VIEW: CPT

## 2022-09-15 PROCEDURE — 99285 EMERGENCY DEPT VISIT HI MDM: CPT

## 2022-09-15 PROCEDURE — 82553 CREATINE MB FRACTION: CPT

## 2022-09-15 PROCEDURE — 80053 COMPREHEN METABOLIC PANEL: CPT

## 2022-09-15 PROCEDURE — 84484 ASSAY OF TROPONIN QUANT: CPT

## 2022-09-15 PROCEDURE — 74176 CT ABD & PELVIS W/O CONTRAST: CPT

## 2022-09-15 PROCEDURE — 85025 COMPLETE CBC W/AUTO DIFF WBC: CPT

## 2022-09-15 PROCEDURE — 83690 ASSAY OF LIPASE: CPT

## 2022-09-15 PROCEDURE — 96375 TX/PRO/DX INJ NEW DRUG ADDON: CPT

## 2022-09-15 PROCEDURE — 93005 ELECTROCARDIOGRAM TRACING: CPT

## 2022-09-15 NOTE — EMERGENCY DEPARTMENT REPORT
HPI





- General


Chief Complaint: Chest Pain


Time Seen by Provider: 09/15/22 14:18





- HPI


HPI: 





Room 17











The patient is a 67-year-old female present with a chief complaint of chest, 

back and abdominal pain during hemodialysis.  Patient has a history end-stage 

renal disease and states today while at hemodialysis after being dialyzed for 

approximate 1.5 hours ago pain in her substernal chest, right flank and left 

upper quadrant.  Patient describes the pain as sharp and intermittent in nature.

 Patient is to some nausea with the onset of the pain but denies vomiting or 

diaphoresis.  Patient currently gives her pain a score of 10/10.  Patient was 

admitted to this hospital approximately 1 week ago for chest pain during 

hemodialysis and eventually discharged the following day.  Patient had a 

negative myocardial perfusion scan performed 2022





ED Past Medical Hx





- Past Medical History


Hx Hypertension: Yes


Hx CVA: Yes (right sided weakness)


Hx Heart Attack/AMI: Yes (1 stent)


Hx Congestive Heart Failure: Yes


Hx Diabetes: Yes


Hx GERD: Yes


Hx Renal Disease: Yes (REQ DIALYSIS TUE THUR SAT)


Hx Psychiatric Treatment: Yes (BIPOLAR/SCHIZOPHRENIA)


Hx COPD: Yes (REQ 3L O2 CONTINOUSLY)


Additional medical history: HIGH CHOLESTEROL





- Surgical History


Hx Coronary Stent: Yes


Hx Pacemaker: Yes


Additional Surgical History: stent placement x 2 in , knee surgery 2018, 

ABD SX 





- Family History


Family history: no significant





- Social History


Smoking Status: Current Every Day Smoker


Substance Use Type: None





- Medications


Home Medications: 


                                Home Medications











 Medication  Instructions  Recorded  Confirmed  Last Taken  Type


 


Gabapentin 100 mg PO BID 22 Unknown History


 


Pantoprazole [Protonix TAB] 40 mg PO QDAY 22 10:00 

History


 


methIMAzole [Methimazole] 10 mg PO TID 22 Unknown History


 


Sennosides/Docusate [Senokot S] 1 tab PO DAILY #30 tablet 22 

Unknown Rx


 


Nitroglycerin [Nitrostat] 0.4 mg SL .Q5MIN PRN 30 Days #30 22 

Unknown Rx





 tablet    


 


Apixaban [Eliquis] 2.5 mg PO Q12HR  tablet 22 Unknown Rx


 


Aspirin EC [Halfprin EC] 81 mg PO QDAY  tablet 22 Unknown Rx


 


ISOSORBIDE MONOnitrate [Imdur ER] 60 mg PO QDAY  tablet 22 

Unknown Rx


 


Losartan [Cozaar] 50 mg PO QDAY  tablet 22 Unknown Rx


 


Metoprolol [Lopressor TAB] 100 mg PO BID #60 tablet 22 Unknown Rx


 


hydrALAZINE [Apresoline TAB] 50 mg PO Q8HR  tablet 22 Unknown Rx


 


Amiodarone [Cordarone 200 MG TAB] 200 mg PO BID 30 Days #60 tablet 22 Unknown Rx


 


Levalbuterol Tartrate 2 puff INHALATION Q6HR PRN 22 Unknown 

History





[Levalbuterol Tartrate Hfa]     


 


amLODIPine 10 mg PO DAILY 22 Unknown History


 


carvediloL [Coreg] 25 mg PO BID 22 Unknown History


 


cloNIDine [Catapres] 0.1 mg PO Q8H 22 Unknown History


 


HYDROcodone/APAP 5-325 [[place] 1 - 2 each PO Q6HR PRN #10 tablet 09/15/22  

Unknown Rx





5/325]     














ED Review of Systems


ROS: 


Stated complaint: CHEST PAIN


Other details as noted in HPI





Constitutional: no symptoms reported


Eyes: denies: eye pain


ENT: denies: throat pain


Respiratory: denies: wheezing


Cardiovascular: chest pain


Endocrine: no symptoms reported


Gastrointestinal: abdominal pain, nausea


Musculoskeletal: back pain


Neurological: denies: headache





Physical Exam





- Physical Exam


Vital Signs: 


                                   Vital Signs











  09/15/22





  13:53


 


Pulse Rate 70


 


Respiratory 18





Rate 


 


Blood Pressure 138/81





[Left] 


 


O2 Sat by Pulse 99





Oximetry 











Physical Exam: 





GENERAL: The patient is well-developed well-nourished female lying on stretcher 

not appearing to be in acute distress. []


HEENT: Normocephalic.  Atraumatic.  Extraocular motions are intact.  Patient has

 moist mucous membranes.


NECK: Supple.  Trachea midline


CHEST/LUNGS: Clear to auscultation.  There is no respiratory distress noted.


HEART/CARDIOVASCULAR: Regular.  There is no tachycardia.  There is no gallop rub

 or murmur.


ABDOMEN: Abdomen is soft, nontender.  Patient has normal bowel sounds.  There is

 no abdominal distention.


SKIN: There is no rash.  There is no edema.  There is no diaphoresis.


NEURO: The patient is awake, alert, and oriented.  The patient is cooperative.  

The patient has no focal neurologic deficits.  The patient has normal speech.  

GCS 15


MUSCULOSKELETAL:There is no evidence of acute injury.





ED Course


                                   Vital Signs











  09/15/22





  13:53


 


Pulse Rate 70


 


Respiratory 18





Rate 


 


Blood Pressure 138/81





[Left] 


 


O2 Sat by Pulse 99





Oximetry 














- Consultations


Consultation #1: 





09/15/22 20:16


Case discussed with cardiologist Dr. Lucero- states it does not sound like ACS.  

A VQ scan is negative may have patient follow-up with cardiologist as an 

outpatient





- EJ/Peripheral Line


  ** Neck L


Time Out Performed: Yes


Indications: nurses unable to establis


Skin Cleansed in Sterile Fashion: Yes


Size: 20


Dressing Placed: Tegaderm


Patient Tolerated Procedure: well, no complications





ED Medical Decision Making





- Lab Data


Result diagrams: 


                                 09/15/22 15:00





                                 09/15/22 15:00





                                Laboratory Tests











  09/15/22 09/15/22 09/15/22





  15:00 15:00 18:13


 


WBC  10.2  


 


RBC  2.88 L  


 


Hgb  9.0 L  


 


Hct  27.5 L  


 


MCV  96  


 


MCH  31  


 


MCHC  33  


 


RDW  18.7 H  


 


Plt Count  223  


 


Lymph % (Auto)  18.4  


 


Mono % (Auto)  11.5 H  


 


Eos % (Auto)  0.5  


 


Baso % (Auto)  0.7  


 


Lymph # (Auto)  1.9  


 


Mono # (Auto)  1.2 H  


 


Eos # (Auto)  0.1  


 


Baso # (Auto)  0.1  


 


Seg Neutrophils %  68.9  


 


Seg Neutrophils #  7.0  


 


Sodium   139 


 


Potassium   3.3 L 


 


Chloride   98.3 


 


Carbon Dioxide   28 


 


Anion Gap   16 


 


BUN   14 


 


Creatinine   2.3 H 


 


Estimated GFR   26 


 


BUN/Creatinine Ratio   6 


 


Glucose   76 


 


Calcium   9.2 


 


Total Bilirubin   1.00 


 


AST   15 


 


ALT   7 


 


Alkaline Phosphatase   64 


 


Total Creatine Kinase   39 


 


CK-MB (CK-2)   2.1 


 


CK-MB (CK-2) Rel Index   5.3 H 


 


Troponin T   0.043 H  0.049 H


 


Total Protein   5.7 L 


 


Albumin   3.7 L 


 


Albumin/Globulin Ratio   1.9 


 


Lipase   14 














- EKG Data


-: EKG Interpreted by Me


EKG shows normal: sinus rhythm


Rate: normal





- EKG Data


When compared to previous EKG there are: no significant change


Interpretation: unchanged when compared t (2022)





- Radiology Data


Radiology results: report reviewed (Chest x-ray, CT abdomen pelvis), image 

reviewed (Chest x-ray, CT abdomen pelvis, VQ scan)





[facility]  


                                     [address] [place] [address]  


 


                                          Cat Scan Report   


                                               Signed  


 


Patient: LNIDA GARCIA                                                       

         MR#: [id number]  


[id number]          


: 1955                                                                

Acct:[id number]      


 


Age/Sex: 67 / F                                                                

ADM Date: 09/15/22     


 


Loc: ED       


Attending Dr:   


 


 


Ordering Physician: GITA SHORE MD  


Date of Service: 09/15/22  


Procedure(s): CT abdomen pelvis wo con  


Accession Number(s): [id number]  


 


cc: GITA SHORE MD   


 


 


CT ABDOMEN AND PELVIS WITHOUT CONTRAST  


 


 INDICATION / CLINICAL INFORMATION: LUQ pain, right flank pain.  


 


 TECHNIQUE: Axial CT images were obtained through the abdomen and pelvis without

 IV contrast.  All 


CT scans at this location are performed using CT dose reduction for ALARA by 

means of automated 


exposure control.   


 


 COMPARISON: CT dated 2020  


 


 FINDINGS: Exam is degraded secondary to patient's inability to raise arms.  


 


 LOWER CHEST: Cardiomegaly and small pericardial effusion. Partially imaged 

cardiac device leads. 


Small right pleural effusion. Bibasilar atelectasis.  


 LIVER: No significant abnormality.  


 GALLBLADDER: No significant abnormality.    


 BILE DUCTS: No significant abnormality.  


 PANCREAS: No significant abnormality.  


 SPLEEN: No significant abnormality.  


 ADRENALS: Similar adenomatous hyperplasia of the adrenal glands.  


 RIGHT KIDNEY / URETER: Renal cysts. Additional smaller hypoattenuating lesions 

are poorly evaluated


and indeterminate. A few lesions are intrinsically hyperattenuating, likely 


hemorrhagic/proteinaceous cyst.  


 LEFT KIDNEY / URETER: Renal cysts. Additional smaller hypoattenuating lesions 

are poorly evaluated 


and indeterminate. A few lesions are intrinsically hyperattenuating, likely 


hemorrhagic/proteinaceous cyst. Nonobstructing interpolar calculus.  


 


 STOMACH / SMALL BOWEL: No significant abnormality.   


 COLON: Diverticulosis without acute inflammation.   


 APPENDIX: No significant abnormality.    


 PERITONEUM: Trace free fluid in the pelvis. Mild mesenteric congestion. No free

 air. No fluid 


collection.  


 LYMPH NODES: No significant adenopathy.  


 AORTA / ARTERIES: Moderate atherosclerotic calcification without acute 

abnormality.   


 IVC / VEINS: No significant abnormality.  


 


 URINARY BLADDER: No significant abnormality.  


 REPRODUCTIVE ORGANS: No significant abnormality.  


 


 ADDITIONAL FINDINGS: None.  


 


 SKELETAL SYSTEM: No significant abnormality.  


 


 IMPRESSION:  


 1. No acute abnormality in the abdomen or pelvis.  


 2. Cardiomegaly with small pericardial effusion, small right pleural effusion, 

and mild pulmonary 


edema.  


 


 Signer Name: Clolin Younger MD   


 Signed: 9/15/2022 3:58 PM  


 Workstation Name: [id number]   


 


 


Transcribed By: TENNILLE  


Dictated By: COLLIN YOUNGER MD  


Electronically Authenticated By: COLLIN YOUNGER MD    


Signed Date/Time: 09/15/22 1558                                


 


 


 


DD/DT: 09/15/22 155                                                            

  


TD/TT:





[address] [address]  


 


                                            XRay Report   


                                               Signed  


 


Patient: LINDA GARCIA                                                       

         MR#: [id number]  


[id number]          


: 1955                                                                

Acct:[id number]      


 


Age/Sex: 67 / F                                                                

ADM Date: 09/15/22     


 


Loc: ED       


Attending Dr:   


 


 


Ordering Physician: GITA SHORE MD  


Date of Service: 09/15/22  


Procedure(s): XR chest 1V ap  


Accession Number(s): [id number]  


 


cc: GITA SHORE MD   


 


Fluoro Time In Minutes:   


 


CHEST 1 VIEW 9/15/2022 2:35 PM  


 


 INDICATION / CLINICAL INFORMATION: chest pain.  


 


 COMPARISON: 2022  


 


 FINDINGS:  


 


 SUPPORT DEVICES: Unchanged.  


 HEART / MEDIASTINUM: Stable cardiomegaly.   


 LUNGS / PLEURA: Interstitial prominence and pulmonary basilar congestion have 

improved since the 


prior exam. No pneumothorax.   


 


 ADDITIONAL FINDINGS: No significant additional findings.  


 


 IMPRESSION:  


 1. Improved pulmonary edema without new abnormality.  


 


 Signer Name: Collin Younger MD   


 Signed: 9/15/2022 2:48 PM  


 Workstation Name: [id number]   


 


 


Transcribed By: TENNILLE  


Dictated By: COLLIN YOUNGER MD  


Electronically Authenticated By: COLLIN YOUNGER MD    


Signed Date/Time: 09/15/22 1448                                


 


 


 


DD/DT: 09/15/22 1447                                                            

  


TD/TT:








[address] [address]  


 


                                      Nuclear Medicine Report   


                                               Signed  


 


Patient: LINDA GARCIA                                                       

         MR#: [id number]  


[id number]          


: 1955                                                                

Acct:[id number]      


 


Age/Sex: 67 / F                                                                

ADM Date: 09/15/22     


 


Loc: ED       


Attending Dr:   


 


 


Ordering Physician: GITA SHORE MD  


Date of Service: 09/15/22  


Procedure(s): NM perfusion only lung scan  


Accession Number(s): [id number]  


 


cc: GITA SHORE MD   


 


 


Nuclear medicine pulmonary perfusion scan  


 


 INDICATION: Chest pain with dyspnea  


 


 COMPARISON: Chest radiograph performed 9/15/2022  


 


 TECHNIQUE: A total of 5.5 mCi technetium 99 MAA injected IV per protocol.  


 


 FINDINGS: No large perfusion abnormalities identified within either lung.  


 


 IMPRESSION: No large perfusion abnormality identified. Evidence of mild 

cardiomegaly noted  


 


 Signer Name: Kirit Leonard MD   


 Signed: 9/15/2022 8:56 PM  


 Workstation Name: [id number]   


 


 


Transcribed By: BC  


Dictated By: Kirit Leonard MD  


Electronically Authenticated By: Kirit Leonard MD    


Signed Date/Time: 09/15/22 2056                                


 


 


 


DD/DT: 09/15/22 2053                                                            

  


TD/TT:





- Differential Diagnosis


ACS, gastritis, peptic ulcer disease,


Critical care attestation.: 


If time is entered above; I have spent that time in minutes in the direct care 

of this critically ill patient, excluding procedure time.








ED Disposition


Clinical Impression: 


 Atypical chest pain





Disposition: 01 HOME / SELF CARE / HOMELESS


Is pt being admited?: No


Does the pt Need Aspirin: No


Condition: Stable


Instructions:  Nonspecific Chest Pain, Adult, Pain Without a Known Cause


Additional Instructions: 


Return to the emergency department should you develop worsening symptoms, 

inability to tolerate food or liquids, high fever or any other concerns


Prescriptions: 


HYDROcodone/APAP 5-325 [Norco 5/325] 1 - 2 each PO Q6HR PRN #10 tablet


 PRN Reason: Pain


Referrals: 


AUSTIN CORONEL MD [Staff Physician] - 3-5 Days (Dr Coronel is your cardiologist.  

Please follow-up with him for further evaluation)


Time of Disposition: 21:06





Heart Score





- HEART Score


History: Slightly suspicious


EKG: Normal


Age: > 65


Risk factors: 1-2 risk factors


Troponin: < normal limit (Renal failure)


HEART Score: 3





- EKG Read Time


Time EKG Completed: 20:03


EKG Read Time: 20:17

## 2022-09-15 NOTE — XRAY REPORT
CHEST 1 VIEW 9/15/2022 2:35 PM



INDICATION / CLINICAL INFORMATION: chest pain.



COMPARISON: 9/8/2022



FINDINGS:



SUPPORT DEVICES: Unchanged.

HEART / MEDIASTINUM: Stable cardiomegaly. 

LUNGS / PLEURA: Interstitial prominence and pulmonary basilar congestion have improved since the prio
r exam. No pneumothorax. 



ADDITIONAL FINDINGS: No significant additional findings.



IMPRESSION:

1. Improved pulmonary edema without new abnormality.



Signer Name: Mariano De Guzman MD 

Signed: 9/15/2022 2:48 PM

Workstation Name: [id number]

## 2022-09-15 NOTE — CAT SCAN REPORT
CT ABDOMEN AND PELVIS WITHOUT CONTRAST



INDICATION / CLINICAL INFORMATION: LUQ pain, right flank pain.



TECHNIQUE: Axial CT images were obtained through the abdomen and pelvis without IV contrast.  All CT 
scans at this location are performed using CT dose reduction for ALARA by means of automated exposure
 control. 



COMPARISON: CT dated 6/19/2020



FINDINGS: Exam is degraded secondary to patient's inability to raise arms.



LOWER CHEST: Cardiomegaly and small pericardial effusion. Partially imaged cardiac device leads. Smal
l right pleural effusion. Bibasilar atelectasis.

LIVER: No significant abnormality.

GALLBLADDER: No significant abnormality.  

BILE DUCTS: No significant abnormality.

PANCREAS: No significant abnormality.

SPLEEN: No significant abnormality.

ADRENALS: Similar adenomatous hyperplasia of the adrenal glands.

RIGHT KIDNEY / URETER: Renal cysts. Additional smaller hypoattenuating lesions are poorly evaluated a
nd indeterminate. A few lesions are intrinsically hyperattenuating, likely hemorrhagic/proteinaceous 
cyst.

LEFT KIDNEY / URETER: Renal cysts. Additional smaller hypoattenuating lesions are poorly evaluated an
d indeterminate. A few lesions are intrinsically hyperattenuating, likely hemorrhagic/proteinaceous c
yst. Nonobstructing interpolar calculus.



STOMACH / SMALL BOWEL: No significant abnormality. 

COLON: Diverticulosis without acute inflammation. 

APPENDIX: No significant abnormality.  

PERITONEUM: Trace free fluid in the pelvis. Mild mesenteric congestion. No free air. No fluid collect
ion.

LYMPH NODES: No significant adenopathy.

AORTA / ARTERIES: Moderate atherosclerotic calcification without acute abnormality. 

IVC / VEINS: No significant abnormality.



URINARY BLADDER: No significant abnormality.

REPRODUCTIVE ORGANS: No significant abnormality.



ADDITIONAL FINDINGS: None.



SKELETAL SYSTEM: No significant abnormality.



IMPRESSION:

1. No acute abnormality in the abdomen or pelvis.

2. Cardiomegaly with small pericardial effusion, small right pleural effusion, and mild pulmonary skip
ma.



Signer Name: Mariano De Guzman MD 

Signed: 9/15/2022 3:58 PM

Workstation Name: [id number]

## 2022-09-15 NOTE — NUCLEAR MEDICINE REPORT
Nuclear medicine pulmonary perfusion scan



INDICATION: Chest pain with dyspnea



COMPARISON: Chest radiograph performed 9/15/2022



TECHNIQUE: A total of 5.5 mCi technetium 99 MAA injected IV per protocol.



FINDINGS: No large perfusion abnormalities identified within either lung.



IMPRESSION: No large perfusion abnormality identified. Evidence of mild cardiomegaly noted



Signer Name: Kirit Leonard MD 

Signed: 9/15/2022 8:56 PM

Workstation Name: [id number]

## 2022-09-19 ENCOUNTER — HOSPITAL ENCOUNTER (INPATIENT)
Dept: HOSPITAL 5 - ED | Age: 67
LOS: 4 days | Discharge: HOME HEALTH | DRG: 291 | End: 2022-09-23
Attending: INTERNAL MEDICINE | Admitting: HOSPITALIST
Payer: MEDICARE

## 2022-09-19 DIAGNOSIS — I25.2: ICD-10-CM

## 2022-09-19 DIAGNOSIS — E05.90: ICD-10-CM

## 2022-09-19 DIAGNOSIS — Z91.018: ICD-10-CM

## 2022-09-19 DIAGNOSIS — Z79.899: ICD-10-CM

## 2022-09-19 DIAGNOSIS — Z82.49: ICD-10-CM

## 2022-09-19 DIAGNOSIS — I24.9: ICD-10-CM

## 2022-09-19 DIAGNOSIS — E78.00: ICD-10-CM

## 2022-09-19 DIAGNOSIS — Z99.2: ICD-10-CM

## 2022-09-19 DIAGNOSIS — I13.2: Primary | ICD-10-CM

## 2022-09-19 DIAGNOSIS — R77.8: ICD-10-CM

## 2022-09-19 DIAGNOSIS — I48.92: ICD-10-CM

## 2022-09-19 DIAGNOSIS — Z91.041: ICD-10-CM

## 2022-09-19 DIAGNOSIS — F20.9: ICD-10-CM

## 2022-09-19 DIAGNOSIS — Z83.3: ICD-10-CM

## 2022-09-19 DIAGNOSIS — I69.351: ICD-10-CM

## 2022-09-19 DIAGNOSIS — Z79.82: ICD-10-CM

## 2022-09-19 DIAGNOSIS — K21.9: ICD-10-CM

## 2022-09-19 DIAGNOSIS — Z91.013: ICD-10-CM

## 2022-09-19 DIAGNOSIS — J44.9: ICD-10-CM

## 2022-09-19 DIAGNOSIS — I50.33: ICD-10-CM

## 2022-09-19 DIAGNOSIS — I48.0: ICD-10-CM

## 2022-09-19 DIAGNOSIS — N18.6: ICD-10-CM

## 2022-09-19 DIAGNOSIS — Z95.0: ICD-10-CM

## 2022-09-19 DIAGNOSIS — F31.9: ICD-10-CM

## 2022-09-19 DIAGNOSIS — F17.200: ICD-10-CM

## 2022-09-19 LAB
ALBUMIN SERPL-MCNC: 4.4 G/DL (ref 3.9–5)
ALT SERPL-CCNC: 9 UNITS/L (ref 7–56)
BASOPHILS # (AUTO): 0.1 K/MM3 (ref 0–0.1)
BASOPHILS NFR BLD AUTO: 0.8 % (ref 0–1.8)
BUN SERPL-MCNC: 30 MG/DL (ref 7–17)
BUN/CREAT SERPL: 8 %
CALCIUM SERPL-MCNC: 9.8 MG/DL (ref 8.4–10.2)
EOSINOPHIL # BLD AUTO: 0 K/MM3 (ref 0–0.4)
EOSINOPHIL NFR BLD AUTO: 0.2 % (ref 0–4.3)
HCT VFR BLD CALC: 31.5 % (ref 30.3–42.9)
HEMOLYSIS INDEX: 9
HGB BLD-MCNC: 9.9 GM/DL (ref 10.1–14.3)
LYMPHOCYTES # BLD AUTO: 2.1 K/MM3 (ref 1.2–5.4)
LYMPHOCYTES NFR BLD AUTO: 13.7 % (ref 13.4–35)
MCHC RBC AUTO-ENTMCNC: 32 % (ref 30–34)
MCV RBC AUTO: 98 FL (ref 79–97)
MONOCYTES # (AUTO): 1.3 K/MM3 (ref 0–0.8)
MONOCYTES % (AUTO): 8.6 % (ref 0–7.3)
PLATELET # BLD: 237 K/MM3 (ref 140–440)
RBC # BLD AUTO: 3.23 M/MM3 (ref 3.65–5.03)

## 2022-09-19 PROCEDURE — 99285 EMERGENCY DEPT VISIT HI MDM: CPT

## 2022-09-19 PROCEDURE — 80074 ACUTE HEPATITIS PANEL: CPT

## 2022-09-19 PROCEDURE — 96374 THER/PROPH/DIAG INJ IV PUSH: CPT

## 2022-09-19 PROCEDURE — 80053 COMPREHEN METABOLIC PANEL: CPT

## 2022-09-19 PROCEDURE — 80048 BASIC METABOLIC PNL TOTAL CA: CPT

## 2022-09-19 PROCEDURE — 94760 N-INVAS EAR/PLS OXIMETRY 1: CPT

## 2022-09-19 PROCEDURE — 85025 COMPLETE CBC W/AUTO DIFF WBC: CPT

## 2022-09-19 PROCEDURE — 96376 TX/PRO/DX INJ SAME DRUG ADON: CPT

## 2022-09-19 PROCEDURE — 80061 LIPID PANEL: CPT

## 2022-09-19 PROCEDURE — 93005 ELECTROCARDIOGRAM TRACING: CPT

## 2022-09-19 PROCEDURE — 36415 COLL VENOUS BLD VENIPUNCTURE: CPT

## 2022-09-19 PROCEDURE — 71045 X-RAY EXAM CHEST 1 VIEW: CPT

## 2022-09-19 PROCEDURE — 84484 ASSAY OF TROPONIN QUANT: CPT

## 2022-09-19 PROCEDURE — 82962 GLUCOSE BLOOD TEST: CPT

## 2022-09-19 PROCEDURE — 96372 THER/PROPH/DIAG INJ SC/IM: CPT

## 2022-09-19 NOTE — HISTORY AND PHYSICAL REPORT
History of Present Illness


Date of examination: 09/19/22


Date of admission: 


09/19/22


Chief complaint: 





Chest pain


History of present illness: 





67-year-old female with history of atrial fibrillation, end-stage renal disease 

on dialysis, COPD and bipolar disorder presents to the emergency department with

severe substernal chest pressure which started just prior to arrival.  Chest 

pain is pressure-like 4/10.  Patient denies shortness of breath, nausea, 

dizziness, or diaphoresis.  Patient denies any other symptoms.  Patient has been

seen here several times for the same complaints, the last time being 9/15/2022. 

Patient was discharged from the emergency department on that day, but was 

admitted on 9/8/2022.  Denies alleviating factor.





In the emergency room initial cardiac enzyme is elevated, Troponin is 0.092 .














Past History


Past Medical History: atrial fib, COPD, diabetes, ESRD, GERD, heart failure, 

hypertension, hyperlipidemia, renal failure, stroke, other (Bipolar 

schizophrenia)


Past Surgical History: Other (Coronary stent, pacemaker, stent placement x 2 in 

2015, knee surgery 01/2018, ABD SX 2021)


Social history: smoking


Family history: diabetes, hypertension





Medications and Allergies


                                    Allergies











Allergy/AdvReac Type Severity Reaction Status Date / Time


 


Iodinated Contrast Media AdvReac Intermediate Swelling Verified 09/19/22 16:56


 


apple AdvReac  Hives Verified 09/19/22 16:56


 


shell fish Allergy  Swelling Uncoded 09/19/22 16:56











                                Home Medications











 Medication  Instructions  Recorded  Confirmed  Last Taken  Type


 


Gabapentin 100 mg PO BID 04/23/22 09/09/22 Unknown History


 


Pantoprazole [Protonix TAB] 40 mg PO QDAY 04/23/22 09/09/22 04/23/22 10:00 His

tory


 


methIMAzole [Methimazole] 10 mg PO TID 05/01/22 09/09/22 Unknown History


 


Sennosides/Docusate [Senokot S] 1 tab PO DAILY #30 tablet 05/03/22 09/09/22 

Unknown Rx


 


Nitroglycerin [Nitrostat] 0.4 mg SL .Q5MIN PRN 30 Days #30 05/24/22 09/09/22 

Unknown Rx





 tablet    


 


Apixaban [Eliquis] 2.5 mg PO Q12HR  tablet 07/14/22 09/09/22 Unknown Rx


 


Aspirin EC [Halfprin EC] 81 mg PO QDAY  tablet 07/14/22 09/09/22 Unknown Rx


 


ISOSORBIDE MONOnitrate [Imdur ER] 60 mg PO QDAY  tablet 07/14/22 09/09/22 

Unknown Rx


 


Losartan [Cozaar] 50 mg PO QDAY  tablet 07/14/22 09/09/22 Unknown Rx


 


Metoprolol [Lopressor TAB] 100 mg PO BID #60 tablet 07/14/22 09/09/22 Unknown Rx


 


hydrALAZINE [Apresoline TAB] 50 mg PO Q8HR  tablet 07/14/22 09/09/22 Unknown Rx


 


Amiodarone [Cordarone 200 MG TAB] 200 mg PO BID 30 Days #60 tablet 09/02/22 09/09/22 Unknown Rx


 


Levalbuterol Tartrate 2 puff INHALATION Q6HR PRN 09/02/22 09/09/22 Unknown 

History





[Levalbuterol Tartrate Hfa]     


 


amLODIPine 10 mg PO DAILY 09/02/22 09/09/22 Unknown History


 


carvediloL [Coreg] 25 mg PO BID 09/02/22 09/09/22 Unknown History


 


cloNIDine [Catapres] 0.1 mg PO Q8H 09/02/22 09/09/22 Unknown History


 


HYDROcodone/APAP 5-325 [[place] 1 - 2 each PO Q6HR PRN #10 tablet 09/15/22  

Unknown Rx





5/325]     











Active Meds: 


Active Medications





Acetaminophen (Acetaminophen 325 Mg Tab)  650 mg PO Q6H PRN


   PRN Reason: Pain, Mild (1-3)


Amiodarone HCl (Amiodarone 200 Mg Tab)  200 mg PO BID [facility]


Amlodipine Besylate (Amlodipine 10 Mg Tab)  10 mg PO DAILY [facility]


Aspirin (Aspirin Ec 81 Mg Tab)  81 mg PO QDAY DERIK


Atorvastatin Calcium (Atorvastatin 40 Mg Tab)  40 mg PO QHS DERIK


Carvedilol (Carvedilol 25 Mg Tab)  25 mg PO BID DERIK


Clonidine HCl (Clonidine 0.1 Mg Tab)  0.1 mg PO Q8H [facility]


Dextrose (Dextrose 50% In Water (25gm) 50 Ml Syringe)  50 ml IV Q30MIN PRN; 

Protocol


   PRN Reason: Hypoglycemia


Gabapentin (Gabapentin 100 Mg Cap)  100 mg PO BID DERIK


Heparin Sodium (Porcine) (Heparin 5,000 Unit/1 Ml Vial)  5,000 unit SUB-Q Q12HR 

DERIK


Hydralazine HCl (Hydralazine 25 Mg Tab)  50 mg PO Q8HR [facility]


Insulin Human Lispro (Insulin Lispro 100 Unit/Ml)  0 unit SUB-Q Q6HR [facility]; 

Protocol


Isosorbide Mononitrate (Isosorbide Mononitrate Er 60 Mg Tab)  60 mg PO QDAY [facility]


Losartan Potassium (Losartan 50 Mg Tab)  50 mg PO QDAY [facility]


Methimazole (Methimazole 5 Mg Tab)  10 mg PO TID [facility]


Metoprolol Tartrate (Metoprolol Tartrate 100 Mg Tab)  100 mg PO BID [facility]


Miscellaneous Medication (Apixaban)  2.5 mg PO Q12HR [facility]


Miscellaneous Medication (Levalbuterol Tartrate [Levalbuterol Tartrate Hfa])  2 

puff INHALATION Q6HR PRN


   PRN Reason: wheezing


Morphine Sulfate (Morphine 4 Mg/1 Ml Inj)  2 mg IV Q5MIN PRN


   PRN Reason: Chest Pain unrelieved by NTG


Nitroglycerin (Nitroglycerin 0.4 Mg Tab Subl)  0.4 mg SL Q5M PRN


   PRN Reason: Chest Pain


Nitroglycerin (Nitroglycerin 0.4 Mg Tab Subl)  0.4 mg SL .Q5MIN PRN


   PRN Reason: Chest Pain


Pantoprazole Sodium (Pantoprazole 40 Mg Tab)  40 mg PO QDAY [facility]


Pantoprazole Sodium (Pantoprazole 40 Mg Tab)  40 mg PO QDAY [facility]


Senna/Docusate Sodium (Sennosides/Docusate Sodium 8.6/50 Mg Tab)  1 tab PO DAILY

[facility]


Sodium Chloride (Sodium Chloride 0.9% 10 Ml Flush Syringe)  10 ml IV PRN PRN


   PRN Reason: LINE FLUSH


Tramadol HCl (Tramadol 50 Mg Tab)  50 mg PO Q6H PRN


   PRN Reason: Pain, Moderate (4-6)











Review of Systems


All systems: negative


Cardiovascular: chest pain, shortness of breath, dyspnea on exertion





Exam





- Constitutional


Vitals: 


                                        











Temp Pulse Resp BP Pulse Ox


 


 98.0 F   70   15   203/98   100 


 


 09/19/22 19:59  09/19/22 19:59  09/19/22 19:59  09/19/22 19:59  09/19/22 19:59











General appearance: Present: no acute distress, well-nourished





- EENT


Eyes: Present: PERRL


ENT: hearing intact, clear oral mucosa





- Neck


Neck: Present: supple, normal ROM





- Respiratory


Respiratory effort: normal


Respiratory: bilateral: diminished





- Cardiovascular


Rhythm: irregularly irregular


Heart Sounds: Present: S1 & S2.  Absent: rub, click





- Extremities


Extremities: pulses symmetrical, No edema


Peripheral Pulses: within normal limits





- Abdominal


General gastrointestinal: Present: soft, non-tender, non-distended, normal bowel

sounds


Female genitourinary: Present: normal





- Integumentary


Integumentary: Present: clear, warm, dry





- Musculoskeletal


Musculoskeletal: gait normal, strength equal bilaterally





- Psychiatric


Psychiatric: appropriate mood/affect, intact judgment & insight





- Neurologic


Neurologic: CNII-XII intact, moves all extremities





HEART Score





- HEART Score


EKG: Non-specific


Age: > 65


Risk factors: > 3 risk factors or hx of atherosclerotic disease


Troponin: 


                                        











Troponin T  0.092 ng/mL (0.00-0.029)  H  09/19/22  20:18    











Troponin: 1-3x normal limit





Results





- Labs


CBC & Chem 7: 


                                 09/19/22 17:58





                                 09/19/22 17:58


Labs: 


                             Laboratory Last Values











WBC  15.2 K/mm3 (4.5-11.0)  H  09/19/22  17:58    


 


RBC  3.23 M/mm3 (3.65-5.03)  L  09/19/22  17:58    


 


Hgb  9.9 gm/dl (10.1-14.3)  L  09/19/22  17:58    


 


Hct  31.5 % (30.3-42.9)   09/19/22  17:58    


 


MCV  98 fl (79-97)  H  09/19/22  17:58    


 


MCH  31 pg (28-32)   09/19/22  17:58    


 


MCHC  32 % (30-34)   09/19/22  17:58    


 


RDW  19.9 % (13.2-15.2)  H  09/19/22  17:58    


 


Plt Count  237 K/mm3 (140-440)   09/19/22  17:58    


 


Lymph % (Auto)  13.7 % (13.4-35.0)   09/19/22  17:58    


 


Mono % (Auto)  8.6 % (0.0-7.3)  H  09/19/22  17:58    


 


Eos % (Auto)  0.2 % (0.0-4.3)   09/19/22  17:58    


 


Baso % (Auto)  0.8 % (0.0-1.8)   09/19/22  17:58    


 


Lymph # (Auto)  2.1 K/mm3 (1.2-5.4)   09/19/22  17:58    


 


Mono # (Auto)  1.3 K/mm3 (0.0-0.8)  H  09/19/22  17:58    


 


Eos # (Auto)  0.0 K/mm3 (0.0-0.4)   09/19/22  17:58    


 


Baso # (Auto)  0.1 K/mm3 (0.0-0.1)   09/19/22  17:58    


 


Seg Neutrophils %  76.7 % (40.0-70.0)  H  09/19/22  17:58    


 


Seg Neutrophils #  11.6 K/mm3 (1.8-7.7)  H  09/19/22  17:58    


 


Sodium  138 mmol/L (137-145)   09/19/22  17:58    


 


Potassium  4.2 mmol/L (3.6-5.0)  D 09/19/22  17:58    


 


Chloride  98.0 mmol/L ()   09/19/22  17:58    


 


Carbon Dioxide  23 mmol/L (22-30)   09/19/22  17:58    


 


Anion Gap  21 mmol/L  09/19/22  17:58    


 


BUN  30 mg/dL (7-17)  H  09/19/22  17:58    


 


Creatinine  3.9 mg/dL (0.6-1.2)  H D 09/19/22  17:58    


 


Estimated GFR  14 ml/min  09/19/22  17:58    


 


BUN/Creatinine Ratio  8 %  09/19/22  17:58    


 


Glucose  100 mg/dL ()   09/19/22  17:58    


 


Calcium  9.8 mg/dL (8.4-10.2)   09/19/22  17:58    


 


Total Bilirubin  1.20 mg/dL (0.1-1.2)   09/19/22  17:58    


 


AST  21 units/L (5-40)   09/19/22  17:58    


 


ALT  9 units/L (7-56)   09/19/22  17:58    


 


Alkaline Phosphatase  69 units/L ()   09/19/22  17:58    


 


Troponin T  0.092 ng/mL (0.00-0.029)  H  09/19/22  20:18    


 


Total Protein  6.4 g/dL (6.3-8.2)   09/19/22  17:58    


 


Albumin  4.4 g/dL (3.9-5)   09/19/22  17:58    


 


Albumin/Globulin Ratio  2.2 %  09/19/22  17:58    














- Imaging and Cardiology


Chest x-ray: report reviewed





Assessment and Plan


VTE prophylaxis?: Chemical


Plan of care discussed with patient/family: Yes





- Patient Problems


(1) ACS (acute coronary syndrome)


Current Visit: No   Status: Acute   


Plan to address problem: 


Admit the patient to the medical telemetry.  Aspirin 81 mg p.o. daily.  Eliquis 

2.5 mg p.o. twice a day.  Nitroglycerin as needed.  Lipitor 40 mg p.o. daily.  

Serial cardiac enzymes.  Echocardiogram.  Cardiology evaluation








(2) End stage renal disease on dialysis


Current Visit: Yes   Status: Acute   


Plan to address problem: 


Avoid nephrotoxic drug.  Renally dose medication.  Reconsult nephrology for 

hemodialysis.  Recheck BMP in the morning








(3) Elevated troponin


Current Visit: Yes   Status: Acute   


Plan to address problem: 


Aspirin 81 mg p.o. daily.  Eliquis 2.5 mg p.o. twice a day.  Nitroglycerin as 

needed.  Lipitor 40 mg p.o. daily.  Serial cardiac enzymes.  Echocardiogram.  

Cardiology evaluation








(4) CHF (congestive heart failure)


Current Visit: No   Status: Acute   


Qualifiers: 


 


Plan to address problem: 


Fluid restriction.  Daily weight.  Maintain input output.  Echocardiogram.  

Continue home medication.  Cardiology evaluation








(5) COPD (chronic obstructive pulmonary disease)


Current Visit: No   Status: Acute   


Plan to address problem: 


Oxygen via nasal catheter per minute.  DuoNeb via nebulizer every 4 hours.  

Albuterol via nebulizer every 4 hours as needed








(6) Hypertension


Current Visit: No   Status: Chronic   


Qualifiers: 


   Hypertension type: primary hypertension   Qualified Code(s): I10 - Essential 

(primary) hypertension   


Plan to address problem: 


Amiodarone 200 mg p.o. twice daily.  Amlodipine 10 mg p.o. daily.  Coreg 25 mg 

p.o. twice daily.  Clonidine 0.1 mg p.o. every 8 hours








(7) Hyperthyroidism


Current Visit: No   Status: Chronic   


Plan to address problem: 


Methimazole 10 mg p.o. 3 times daily.  Outpatient follow-up with 

endocrinologist.








(8) T2DM (type 2 diabetes mellitus)


Current Visit: No   Status: Chronic   


Qualifiers: 


   Diabetes mellitus long term insulin use: unspecified long term insulin use 

status 


Plan to address problem: 


Accu-Chek every 6 hours with Humalog moderate dose coverage.  Diabetic 

education.  Continue home medication








(9) Tobacco abuse


Current Visit: Yes   Status: Acute   


Plan to address problem: 


We will counseled the patient regarding quitting smoking.  We put the patient on

nicotine patch








(10) DVT prophylaxis


Current Visit: Yes   Status: Acute   


Plan to address problem: 


Eliquis 2.5 mg p.o. twice daily for DVT prophylaxis.  Protonix 40 mg p.o. daily 

for GI prophylaxis.  Patient is a full code

## 2022-09-19 NOTE — XRAY REPORT
CHEST 1 VIEW 9/19/2022 5:54 PM



INDICATION / CLINICAL INFORMATION: Chest Pain. 



COMPARISON: 9/15/2022



FINDINGS:



SUPPORT DEVICES: Stable left pectoral transvenous pacemaker.

HEART / MEDIASTINUM: Stable cardiomegaly. 

LUNGS / PLEURA: Mildly increased central pulmonary venous congestion and interstitial thickening most
 consistent with mild interstitial edema associated with CHF. No pneumothorax. 



ADDITIONAL FINDINGS: None



IMPRESSION:

1. Mild interstitial edema, likely secondary to mild acute on chronic CHF. 





Signer Name: João Anne MD 

Signed: 9/19/2022 6:24 PM

Workstation Name: [email or web address]

## 2022-09-19 NOTE — EMERGENCY DEPARTMENT REPORT
ED Chest Pain HPI





- General


Chief Complaint: Chest Pain


Stated Complaint: CHEST PAIN


Time Seen by Provider: 09/19/22 17:27


Source: patient, EMS


Mode of arrival: Stretcher


Limitations: No Limitations





- History of Present Illness


Initial Comments: 





67-year-old female with history of end-stage renal disease (dialysis dependent),

atrial fibrillation, COPD and bipolar disorder presents to the emergency 

department with severe substernal chest pressure which started just prior to 

arrival.  Patient denies shortness of breath, nausea, dizziness, or diaphoresis.

 Patient denies any other symptoms.  Patient has been seen here several times 

for the same complaints, the last time being 9/15/2022.  Patient was discharged 

from the emergency department on that day, but was admitted on 9/8/2022.  Denies

alleviating factors.  Patient was not able to take.  Medication prior to 

arrival.


Severity scale (0 -10): 4





- Related Data


                                Home Medications











 Medication  Instructions  Recorded  Confirmed  Last Taken


 


Gabapentin 100 mg PO BID 04/23/22 09/09/22 Unknown


 


Pantoprazole [Protonix TAB] 40 mg PO QDAY 04/23/22 09/09/22 04/23/22 10:00


 


methIMAzole [Methimazole] 10 mg PO TID 05/01/22 09/09/22 Unknown


 


Levalbuterol Tartrate 2 puff INHALATION Q6HR PRN 09/02/22 09/09/22 Unknown





[Levalbuterol Tartrate Hfa]    


 


amLODIPine 10 mg PO DAILY 09/02/22 09/09/22 Unknown


 


carvediloL [Coreg] 25 mg PO BID 09/02/22 09/09/22 Unknown


 


cloNIDine [Catapres] 0.1 mg PO Q8H 09/02/22 09/09/22 Unknown








                                  Previous Rx's











 Medication  Instructions  Recorded  Last Taken  Type


 


Sennosides/Docusate [Senokot S] 1 tab PO DAILY #30 tablet 05/03/22 Unknown Rx


 


Nitroglycerin [Nitrostat] 0.4 mg SL .Q5MIN PRN 30 Days #30 05/24/22 Unknown Rx





 tablet   


 


Apixaban [Eliquis] 2.5 mg PO Q12HR  tablet 07/14/22 Unknown Rx


 


Aspirin EC [Halfprin EC] 81 mg PO QDAY  tablet 07/14/22 Unknown Rx


 


ISOSORBIDE MONOnitrate [Imdur ER] 60 mg PO QDAY  tablet 07/14/22 Unknown Rx


 


Losartan [Cozaar] 50 mg PO QDAY  tablet 07/14/22 Unknown Rx


 


Metoprolol [Lopressor TAB] 100 mg PO BID #60 tablet 07/14/22 Unknown Rx


 


hydrALAZINE [Apresoline TAB] 50 mg PO Q8HR  tablet 07/14/22 Unknown Rx


 


Amiodarone [Cordarone 200 MG TAB] 200 mg PO BID 30 Days #60 tablet 09/02/22 

Unknown Rx


 


HYDROcodone/APAP 5-325 [[place] 1 - 2 each PO Q6HR PRN #10 tablet 09/15/22 Unknown

 Rx





5/325]    











                                    Allergies











Allergy/AdvReac Type Severity Reaction Status Date / Time


 


Iodinated Contrast Media AdvReac Intermediate Swelling Verified 09/19/22 16:56


 


apple AdvReac  Hives Verified 09/19/22 16:56


 


shell fish Allergy  Swelling Uncoded 09/19/22 16:56














Heart Score





- HEART Score


History: Moderately suspicious


EKG: Non-specific


Age: > 65


Risk factors: > 3 risk factors or hx of atherosclerotic disease


Troponin: 1-3x normal limit


HEART Score: 7





- EKG Read Time


Time EKG Completed: 17:35


EKG Read Time: 17:41





ED Review of Systems


ROS: 


Stated complaint: CHEST PAIN


Other details as noted in HPI





Comment: All other systems reviewed and negative


Constitutional: denies: chills, diaphoresis, fever, weakness


Eyes: denies: eye pain, eye discharge, vision change


ENT: denies: ear pain, throat pain


Respiratory: denies: cough, shortness of breath, SOB with exertion, wheezing


Cardiovascular: chest pain.  denies: palpitations


Endocrine: no symptoms reported


Gastrointestinal: denies: abdominal pain, nausea, diarrhea


Musculoskeletal: denies: back pain, joint swelling, arthralgia


Skin: denies: rash, lesions


Neurological: denies: headache, weakness, paresthesias


Psychiatric: denies: anxiety, depression


Hematological/Lymphatic: denies: easy bleeding, easy bruising





ED Past Medical Hx





- Past Medical History


Hx Hypertension: Yes


Hx CVA: Yes (right sided weakness)


Hx Heart Attack/AMI: Yes (1 stent)


Hx Congestive Heart Failure: Yes


Hx Diabetes: Yes


Hx GERD: Yes


Hx Renal Disease: Yes (REQ DIALYSIS TUE THUR SAT)


Hx Sickle Cell Disease: No


Hx Kidney Stones: No


Hx Psychiatric Treatment: Yes (BIPOLAR/SCHIZOPHRENIA)


Hx Asthma: No


Hx COPD: Yes (REQ 3L O2 CONTINOUSLY)


Hx HIV: No


Additional medical history: HIGH CHOLESTEROL





- Surgical History


Hx Coronary Stent: Yes


Hx Pacemaker: Yes


Additional Surgical History: stent placement x 2 in 2015, knee surgery 01/2018, 

ABD SX 2021





- Social History


Smoking Status: Current Every Day Smoker


Substance Use Type: None





- Medications


Home Medications: 


                                Home Medications











 Medication  Instructions  Recorded  Confirmed  Last Taken  Type


 


Gabapentin 100 mg PO BID 04/23/22 09/09/22 Unknown History


 


Pantoprazole [Protonix TAB] 40 mg PO QDAY 04/23/22 09/09/22 04/23/22 10:00 

History


 


methIMAzole [Methimazole] 10 mg PO TID 05/01/22 09/09/22 Unknown History


 


Sennosides/Docusate [Senokot S] 1 tab PO DAILY #30 tablet 05/03/22 09/09/22 

Unknown Rx


 


Nitroglycerin [Nitrostat] 0.4 mg SL .Q5MIN PRN 30 Days #30 05/24/22 09/09/22 

Unknown Rx





 tablet    


 


Apixaban [Eliquis] 2.5 mg PO Q12HR  tablet 07/14/22 09/09/22 Unknown Rx


 


Aspirin EC [Halfprin EC] 81 mg PO QDAY  tablet 07/14/22 09/09/22 Unknown Rx


 


ISOSORBIDE MONOnitrate [Imdur ER] 60 mg PO QDAY  tablet 07/14/22 09/09/22 

Unknown Rx


 


Losartan [Cozaar] 50 mg PO QDAY  tablet 07/14/22 09/09/22 Unknown Rx


 


Metoprolol [Lopressor TAB] 100 mg PO BID #60 tablet 07/14/22 09/09/22 Unknown Rx


 


hydrALAZINE [Apresoline TAB] 50 mg PO Q8HR  tablet 07/14/22 09/09/22 Unknown Rx


 


Amiodarone [Cordarone 200 MG TAB] 200 mg PO BID 30 Days #60 tablet 09/02/22 09/09/22 Unknown Rx


 


Levalbuterol Tartrate 2 puff INHALATION Q6HR PRN 09/02/22 09/09/22 Unknown 

History





[Levalbuterol Tartrate Hfa]     


 


amLODIPine 10 mg PO DAILY 09/02/22 09/09/22 Unknown History


 


carvediloL [Coreg] 25 mg PO BID 09/02/22 09/09/22 Unknown History


 


cloNIDine [Catapres] 0.1 mg PO Q8H 09/02/22 09/09/22 Unknown History


 


HYDROcodone/APAP 5-325 [[place] 1 - 2 each PO Q6HR PRN #10 tablet 09/15/22  

Unknown Rx





5/325]     














ED Physical Exam





- General


Limitations: No Limitations


General appearance: alert, in no apparent distress, other (Appears 

uncomfortable)





- Head


Head exam: Present: atraumatic, normocephalic





- Eye


Eye exam: Present: normal appearance, PERRL, EOMI





- ENT


ENT exam: Present: mucous membranes moist





- Neck


Neck exam: Present: normal inspection





- Respiratory


Respiratory exam: Present: normal lung sounds bilaterally, chest wall 

tenderness.  Absent: respiratory distress, wheezes, rales





- Cardiovascular


Cardiovascular Exam: Present: regular rate, normal rhythm, other (Pacemaker in 

place, right upper extremity AV graft with bruits).  Absent: systolic murmur, 

diastolic murmur, rubs, gallop





- GI/Abdominal


GI/Abdominal exam: Present: soft, normal bowel sounds.  Absent: distended, 

tenderness





- Extremities Exam


Extremities exam: Present: normal inspection, full ROM, other (No evidence of 

DVT).  Absent: tenderness, calf tenderness





- Back Exam


Back exam: Present: normal inspection





- Neurological Exam


Neurological exam: Present: alert, oriented X3, CN II-XII intact, other (Mild 

right-sided weakness which is chronic)





- Psychiatric


Psychiatric exam: Present: normal affect, normal mood, anxious





- Skin


Skin exam: Present: warm, dry, intact, normal color.  Absent: rash





ED Course


                                   Vital Signs











  09/19/22 09/19/22





  16:43 19:59


 


Temperature 98.6 F 98.0 F


 


Pulse Rate 89 70


 


Respiratory 18 15





Rate  


 


Blood Pressure 206/104 203/98





[Left]  


 


O2 Sat by Pulse 100 100





Oximetry  














- Reevaluation(s)


Reevaluation #1: 





09/19/22 19:39


Minutes patient had a ventilation/perfusion scan on 915, which was read as 

negative for pulmonary embolism, I discontinued the order for D-dimer and coags.


Reevaluation #2: 





09/19/22 20:54


Case was discussed with Dr. Valadez (Cardiology) recommends admission, and 

trending of troponins. Believes chest pain may be due to elevated blood pressure

 and recommends optimizing blood pressure.


09/19/22 21:30








PANCHO score





- Pancho Score


Age > 65: (1) Yes


Aspirin use within the Past 7 Days: (0) No


3 or more CAD Risk Factors: (1) Yes


2 or more Angina events in past 24 hrs: (1) Yes


Known CAD with more than 50% Stenosis: (1) Yes


Elevated Cardiac Markers: (0) No


ST Deviation Greater than 0.5mm: (0) No


PANCHO Score: 4





ED Medical Decision Making





- Lab Data


Result diagrams: 


                                 09/19/22 17:58





                                 09/19/22 17:58





- EKG Data


When compared to previous EKG there are: changes noted


Interpretation: other





09/19/22 21:11


ventricular paced at 89 with st elevation in v3 (this is new from previous 

ekg's)





- Radiology Data


Radiology results: report reviewed


cxr


IMPRESSION:  


 1. Mild interstitial edema, likely secondary to mild acute on chronic CHF.   


 


 


 Signer Name: João Anne MD   





- Medical Decision Making





This is a 67-year-old female with multiple medical problems including 

hypertension, diabetes, COPD, and end-stage renal disease who presents to the 

emergency department with cute onset of substernal chest pain, which she has 

been evaluated for many times in this emergency department.  Patient's vital 

signs were initially concerning for hypertension and some chest wall tenderness.

  EKG is ventricular paced at 89 with ST elevation in V3 which is different than

 previous EKGs, but no reciprocal changes or any other lead involvement.  

Diagnosis ACS, malingering, hypertensive emergency, as she recently had a 

ventilation/perfusion scan which was negative and she is on apixaban.  Is her 

diagnosis she does not have widened


Mediastinum on chest x-ray, however she is unable to really see a CT scan with 

IV contrast as she is allergic to media.  Patient's troponin was 0.922 in the 

emergency department, today, which is double her baseline.  Case was discussed 

with cardiology, who recommends that patient be admitted normalization of her 

blood pressure for ACS or Hypertensive Emergency.  Patient got improvement of 

pain with morphine, and is amenable to being admitted to the hospital.  Patient 

care is now being transferred to the hospitalist Dr. Clark.


Critical care attestation.: 


If time is entered above; I have spent that time in minutes in the direct care 

of this critically ill patient, excluding procedure time.








ED Disposition


Clinical Impression: 


 Elevated troponin





Chest pain


Qualifiers:


 Chest pain type: unspecified Qualified Code(s): R07.9 - Chest pain, unspecified





Disposition: 09 ADMITTED AS INPATIENT


Is pt being admited?: Yes


Does the pt Need Aspirin: No


Condition: Stable


Instructions:  Nonspecific Chest Pain, Adult


Time of Disposition: 21:29

## 2022-09-20 LAB
BASOPHILS # (AUTO): 0.1 K/MM3 (ref 0–0.1)
BASOPHILS NFR BLD AUTO: 0.9 % (ref 0–1.8)
BUN SERPL-MCNC: 38 MG/DL (ref 7–17)
BUN/CREAT SERPL: 8 %
CALCIUM SERPL-MCNC: 9.1 MG/DL (ref 8.4–10.2)
EOSINOPHIL # BLD AUTO: 0.1 K/MM3 (ref 0–0.4)
EOSINOPHIL NFR BLD AUTO: 0.5 % (ref 0–4.3)
HCT VFR BLD CALC: 29.2 % (ref 30.3–42.9)
HDLC SERPL-MCNC: 65 MG/DL (ref 40–59)
HEMOLYSIS INDEX: 59
HGB BLD-MCNC: 9.2 GM/DL (ref 10.1–14.3)
LYMPHOCYTES # BLD AUTO: 1.8 K/MM3 (ref 1.2–5.4)
LYMPHOCYTES NFR BLD AUTO: 15.9 % (ref 13.4–35)
MCHC RBC AUTO-ENTMCNC: 32 % (ref 30–34)
MCV RBC AUTO: 98 FL (ref 79–97)
MONOCYTES # (AUTO): 0.8 K/MM3 (ref 0–0.8)
MONOCYTES % (AUTO): 6.9 % (ref 0–7.3)
PLATELET # BLD: 192 K/MM3 (ref 140–440)
RBC # BLD AUTO: 2.98 M/MM3 (ref 3.65–5.03)

## 2022-09-20 PROCEDURE — 5A1D70Z PERFORMANCE OF URINARY FILTRATION, INTERMITTENT, LESS THAN 6 HOURS PER DAY: ICD-10-PCS | Performed by: INTERNAL MEDICINE

## 2022-09-20 RX ADMIN — SENNOSIDES AND DOCUSATE SODIUM SCH TAB: 50; 8.6 TABLET ORAL at 12:17

## 2022-09-20 RX ADMIN — LOSARTAN POTASSIUM SCH MG: 50 TABLET, FILM COATED ORAL at 12:18

## 2022-09-20 RX ADMIN — GABAPENTIN SCH MG: 100 CAPSULE ORAL at 21:25

## 2022-09-20 RX ADMIN — ASPIRIN SCH MG: 81 TABLET, COATED ORAL at 12:18

## 2022-09-20 RX ADMIN — INSULIN LISPRO SCH: 100 INJECTION, SOLUTION INTRAVENOUS; SUBCUTANEOUS at 12:10

## 2022-09-20 RX ADMIN — GABAPENTIN SCH MG: 100 CAPSULE ORAL at 12:18

## 2022-09-20 RX ADMIN — INSULIN LISPRO SCH: 100 INJECTION, SOLUTION INTRAVENOUS; SUBCUTANEOUS at 18:34

## 2022-09-20 RX ADMIN — AMIODARONE HYDROCHLORIDE SCH MG: 200 TABLET ORAL at 21:25

## 2022-09-20 RX ADMIN — AMIODARONE HYDROCHLORIDE SCH MG: 200 TABLET ORAL at 01:43

## 2022-09-20 RX ADMIN — AMIODARONE HYDROCHLORIDE SCH MG: 200 TABLET ORAL at 12:18

## 2022-09-20 RX ADMIN — INSULIN LISPRO SCH: 100 INJECTION, SOLUTION INTRAVENOUS; SUBCUTANEOUS at 08:14

## 2022-09-20 RX ADMIN — Medication PRN ML: at 21:25

## 2022-09-20 RX ADMIN — APIXABAN SCH MG: 2.5 TABLET, FILM COATED ORAL at 21:25

## 2022-09-20 RX ADMIN — INSULIN LISPRO SCH: 100 INJECTION, SOLUTION INTRAVENOUS; SUBCUTANEOUS at 01:39

## 2022-09-20 RX ADMIN — PANTOPRAZOLE SODIUM SCH: 40 TABLET, DELAYED RELEASE ORAL at 12:19

## 2022-09-20 RX ADMIN — APIXABAN SCH MG: 2.5 TABLET, FILM COATED ORAL at 12:18

## 2022-09-20 RX ADMIN — GABAPENTIN SCH MG: 100 CAPSULE ORAL at 00:33

## 2022-09-20 RX ADMIN — MORPHINE SULFATE PRN MG: 2 INJECTION, SOLUTION INTRAMUSCULAR; INTRAVENOUS at 08:21

## 2022-09-20 RX ADMIN — NICOTINE SCH: 14 PATCH TRANSDERMAL at 12:19

## 2022-09-20 NOTE — ELECTROCARDIOGRAPH REPORT
[facility]

                                       

Test Date:    2022               Test Time:    17:35:17

Pat Name:     LINDA GARCIA              Department:   

Patient ID:   [id number]          Room:         A465 1

Gender:       F                        Technician:   JEANIE

:          1955               Requested By: PADMAJA LINDSAY

Order Number: [id number]             Reading MD:   Sarbjit Del Rosario

                                 Measurements

Intervals                              Axis          

Rate:         89                       P:            68

NM:           235                      QRS:          -71

QRSD:         125                      T:            117

QT:           422                                    

QTc:          514                                    

                           Interpretive Statements

Ventricular-paced rhythm

Compared to ECG 09/15/2022 20:03:20

Atrial fibrillation no longer present

Electronically Signed On 2022 9:32:29 EDT by Sarbjit Del Rosario

## 2022-09-20 NOTE — CONSULTATION
History of Present Illness


Consult date: 09/20/22


Consult reason: other (Palpitations)


History of present illness: 





Patient is a 67-year-old woman with paroxysmal atrial fibrillation and coronary 

artery disease.  Coronary artery disease has been stable and asymptomatic.  

Notably, she has had multiple admissions over the past several months, each 

instance for acute onset of palpitations and chest pressure, due to a recurrence

of atrial fibrillation.  She is on amiodarone 200 mg, and oral anticoagulation 

with Eliquis.  Comorbidities include end-stage renal disease on hemodialysis.  

She also has a dual-chamber cardiac pacemaker.





This time, she is readmitted, with acute onset palpitations which occurred while

she was at home.  She has been compliant with her hemodialysis and most recently

the day before.  There was no exertional chest pain, no syncope.  By the time of

presentation to the emergency room, EKG was a sinus rhythm, first-degree AV 

block and ventricular paced complexes.  Patient denies noncompliance with her 

amiodarone.





Past History


Past Medical History: atrial fib, COPD, diabetes, ESRD, GERD, heart failure, 

hypertension, hyperlipidemia, renal failure, stroke, other (Bipolar 

schizophrenia)


Past Surgical History: Other (Coronary stent, pacemaker, stent placement x 2 in 

2015, knee surgery 01/2018, ABD SX 2021)


Social history: smoking


Family history: diabetes, hypertension





Medications and Allergies


                                    Allergies











Allergy/AdvReac Type Severity Reaction Status Date / Time


 


Iodinated Contrast Media AdvReac Intermediate Swelling Verified 09/19/22 16:56


 


apple AdvReac  Hives Verified 09/19/22 16:56


 


shell fish Allergy  Swelling Uncoded 09/19/22 16:56











                                Home Medications











 Medication  Instructions  Recorded  Confirmed  Last Taken  Type


 


Gabapentin 100 mg PO BID 04/23/22 09/09/22 Unknown History


 


Pantoprazole [Protonix TAB] 40 mg PO QDAY 04/23/22 09/09/22 04/23/22 10:00 

History


 


methIMAzole [Methimazole] 10 mg PO TID 05/01/22 09/09/22 Unknown History


 


Sennosides/Docusate [Senokot S] 1 tab PO DAILY #30 tablet 05/03/22 09/09/22 

Unknown Rx


 


Nitroglycerin [Nitrostat] 0.4 mg SL .Q5MIN PRN 30 Days #30 05/24/22 09/09/22 

Unknown Rx





 tablet    


 


Apixaban [Eliquis] 2.5 mg PO Q12HR  tablet 07/14/22 09/09/22 Unknown Rx


 


Aspirin EC [Halfprin EC] 81 mg PO QDAY  tablet 07/14/22 09/09/22 Unknown Rx


 


ISOSORBIDE MONOnitrate [Imdur ER] 60 mg PO QDAY  tablet 07/14/22 09/09/22 

Unknown Rx


 


Losartan [Cozaar] 50 mg PO QDAY  tablet 07/14/22 09/09/22 Unknown Rx


 


Metoprolol [Lopressor TAB] 100 mg PO BID #60 tablet 07/14/22 09/09/22 Unknown Rx


 


hydrALAZINE [Apresoline TAB] 50 mg PO Q8HR  tablet 07/14/22 09/09/22 Unknown Rx


 


Amiodarone [Cordarone 200 MG TAB] 200 mg PO BID 30 Days #60 tablet 09/02/22 09/09/22 Unknown Rx


 


Levalbuterol Tartrate 2 puff INHALATION Q6HR PRN 09/02/22 09/09/22 Unknown 

History





[Levalbuterol Tartrate Hfa]     


 


amLODIPine 10 mg PO DAILY 09/02/22 09/09/22 Unknown History


 


carvediloL [Coreg] 25 mg PO BID 09/02/22 09/09/22 Unknown History


 


cloNIDine [Catapres] 0.1 mg PO Q8H 09/02/22 09/09/22 Unknown History


 


HYDROcodone/APAP 5-325 [[address] - 2 each PO Q6HR PRN #10 tablet 09/15/22  

Unknown Rx





5/325]     











Active Meds: 


Active Medications





Acetaminophen (Acetaminophen 325 Mg Tab)  650 mg PO Q6H PRN


   PRN Reason: Pain, Mild (1-3)


Amiodarone HCl (Amiodarone 200 Mg Tab)  200 mg PO BID [facility]


   Last Admin: 09/20/22 12:18 Dose:  200 mg


   


Amlodipine Besylate (Amlodipine 10 Mg Tab)  10 mg PO DAILY [facility]


   Last Admin: 09/20/22 12:10 Dose:  Not Given


   


Apixaban (Apixaban 2.5 Mg Tab)  2.5 mg PO Q12HR [facility]


   Last Admin: 09/20/22 12:18 Dose:  2.5 mg


   


Aspirin (Aspirin Ec 81 Mg Tab)  81 mg PO QDAY [facility]


   Last Admin: 09/20/22 12:18 Dose:  81 mg


   


Atorvastatin Calcium (Atorvastatin 40 Mg Tab)  40 mg PO QHS [facility]


   Last Admin: 09/20/22 00:33 Dose:  40 mg


   


Carvedilol (Carvedilol 25 Mg Tab)  25 mg PO BID@0800,1700 [facility]


   Last Admin: 09/20/22 08:22 Dose:  25 mg


   


Clonidine HCl (Clonidine 0.1 Mg Tab)  0.1 mg PO Q8H [facility]


   Last Admin: 09/20/22 13:36 Dose:  0.1 mg


   


Dextrose (Dextrose 50% In Water (25gm) 50 Ml Syringe)  0 ml IV Q30MIN PRN; 

Protocol


   PRN Reason: Hypoglycemia


Epoetin Teodoro-epbx (Epoetin Teodoro-Epbx 20,000 Unit/1 Ml Vial)  20,000 unit SUB-Q 

VANCE PRN


   PRN Reason: hemodialysis


Gabapentin (Gabapentin 100 Mg Cap)  100 mg PO BID [facility]


   Last Admin: 09/20/22 12:18 Dose:  100 mg


   


Heparin Sodium (Porcine) (Heparin 10,000 Units/10 Ml Vial)  3,000 unit IV VANCE 

PRN


   PRN Reason: hemodialysis


Hydralazine HCl (Hydralazine 25 Mg Tab)  50 mg PO Q8HR [facility]


   Last Admin: 09/20/22 13:36 Dose:  50 mg


   


Sodium Chloride (Nacl 0.9%)  100 mls @ 999 mls/hr IV VANCE PRN


   PRN Reason: Hypotension


Insulin Human Lispro (Insulin Lispro 100 Unit/Ml)  0 unit SUB-Q Q6HR [facility]; 

Protocol


   Last Admin: 09/20/22 12:10 Dose:  Not Given


   


Isosorbide Mononitrate (Isosorbide Mononitrate Er 60 Mg Tab)  60 mg PO QDAY [facility]


   Last Admin: 09/20/22 12:17 Dose:  60 mg


   


Levalbuterol HCl (Levalbuterol 1.25 Mg/3 Ml Neb)  1.25 mg IH Q6HRT PRN


   PRN Reason: wheezing


Losartan Potassium (Losartan 50 Mg Tab)  50 mg PO QDAY [facility]


   Last Admin: 09/20/22 12:18 Dose:  50 mg


   


Methimazole (Methimazole 5 Mg Tab)  10 mg PO TID [facility]


   Last Admin: 09/20/22 13:36 Dose:  10 mg


   


Morphine Sulfate (Morphine 2 Mg/1 Ml Inj)  2 mg IV Q5MIN PRN


   PRN Reason: Chest Pain unrelieved by NTG


   Last Admin: 09/20/22 08:21 Dose:  2 mg


   


Nicotine (Nicotine 14 Mg/24 Hr Patch)  14 mg TD QDAY [facility]


   Last Admin: 09/20/22 12:19 Dose:  Not Given


   


Nitroglycerin (Nitroglycerin 0.4 Mg Tab Subl)  0.4 mg SL .Q5MIN PRN


   PRN Reason: Chest Pain


Pantoprazole Sodium (Pantoprazole 40 Mg Tab)  40 mg PO QDAY [facility]


   Last Admin: 09/20/22 12:19 Dose:  Not Given


   


Senna/Docusate Sodium (Sennosides/Docusate Sodium 8.6/50 Mg Tab)  1 tab PO DAILY

[facility]


   Last Admin: 09/20/22 12:17 Dose:  1 tab


   


Sodium Chloride (Sodium Chloride 0.9% 10 Ml Flush Syringe)  10 ml IV PRN PRN


   PRN Reason: LINE FLUSH


Tramadol HCl (Tramadol 50 Mg Tab)  50 mg PO Q6H PRN


   PRN Reason: Pain, Moderate (4-6)











Review of Systems


Cardiovascular: chest pain, palpitations, rapid/irregular heart beat, shortness 

of breath, no orthopnea, no edema, no syncope, no lightheadedness





Physical Examination


                                   Vital Signs











Temp Pulse Resp BP Pulse Ox


 


 98.6 F   89   18   206/104   100 


 


 09/19/22 16:43  09/19/22 16:43  09/19/22 16:43  09/19/22 16:43  09/19/22 16:43











General appearance: no acute distress


HEENT: Positive: PERRL


Neck: Positive: neck supple


Cardiac: Positive: Reg Rate and Rhythm


Lungs: Positive: Decreased Breath Sounds


Neuro: Positive: Grossly Intact


Abdomen: Positive: Soft


Female genitourinary: deferred


Skin: Positive: Clear


Extremities: Absent: edema





Results





                                 09/20/22 04:00





                                 09/20/22 04:00


                                 Cardiac Enzymes











  09/19/22 Range/Units





  17:58 


 


AST  21  (5-40)  units/L








                                     Lipids











  09/20/22 Range/Units





  03:52 


 


Triglycerides  91  (2-149)  mg/dL


 


Cholesterol  144  ()  mg/dL


 


HDL Cholesterol  65 H  (40-59)  mg/dL


 


Cholesterol/HDL Ratio  2.21  %








                                       CBC











  09/19/22 09/20/22 Range/Units





  17:58 04:00 


 


WBC  15.2 H  11.6 H  (4.5-11.0)  K/mm3


 


RBC  3.23 L  2.98 L  (3.65-5.03)  M/mm3


 


Hgb  9.9 L  9.2 L  (10.1-14.3)  gm/dl


 


Hct  31.5  29.2 L  (30.3-42.9)  %


 


Plt Count  237  192  (140-440)  K/mm3


 


Lymph # (Auto)  2.1  1.8  (1.2-5.4)  K/mm3


 


Mono # (Auto)  1.3 H  0.8  (0.0-0.8)  K/mm3


 


Eos # (Auto)  0.0  0.1  (0.0-0.4)  K/mm3


 


Baso # (Auto)  0.1  0.1  (0.0-0.1)  K/mm3








                          Comprehensive Metabolic Panel











  09/19/22 09/20/22 Range/Units





  17:58 04:00 


 


Sodium  138  139  (137-145)  mmol/L


 


Potassium  4.2  D  4.7  (3.6-5.0)  mmol/L


 


Chloride  98.0  98.9  ()  mmol/L


 


Carbon Dioxide  23  24  (22-30)  mmol/L


 


BUN  30 H  38 H  (7-17)  mg/dL


 


Creatinine  3.9 H D  4.5 H  (0.6-1.2)  mg/dL


 


Glucose  100  85  ()  mg/dL


 


Calcium  9.8  9.1  (8.4-10.2)  mg/dL


 


AST  21   (5-40)  units/L


 


ALT  9   (7-56)  units/L


 


Alkaline Phosphatase  69   ()  units/L


 


Total Protein  6.4   (6.3-8.2)  g/dL


 


Albumin  4.4   (3.9-5)  g/dL














EKG interpretations





- Telemetry


EKG Rhythm: Sinus Rhythm (With first-degree AV block and ventricular paced 

complexes)





Assessment and Plan





- Patient Problems


(1) Paroxysmal atrial fibrillation


Current Visit: Yes   Status: Acute   


Plan to address problem: 


Patient has recurrent admissions for palpitations due to paroxysms of atrial 

fibrillation.  She currently has returned to his sinus rhythm.  She is on 

amiodarone 200 mg p.o. twice daily.  





Patient looks and feels better, on discharge I would recommend immediate follow-

up with electrophysiologist, for further evaluation of atrial fibrillation to 

recommend additional avenues of rhythm control.

## 2022-09-20 NOTE — PROGRESS NOTE
Assessment and Plan


Assessment and plan: 


-- ACS (acute coronary syndrome)


Admit the patient to the medical telemetry.  


Aspirin 81 mg p.o. daily.  Eliquis 2.5 mg p.o. twice a day.  


Nitroglycerin as needed.  Lipitor 40 mg p.o. daily.  Serial cardiac enzymes.  


Echocardiogram.  Cardiology evaluation





-- End stage renal disease on dialysis


Nephrology consulted


Hemodialysis per schedule, avoid nephrotoxins


Renal dosing of medications, closely monitor





--Elevated troponin


In the setting of end-stage renal disease, troponin probably is nonspecific


However patient has multiple risk factors need to rule out acute coronary 

syndrome


Follow cardiology evaluation recommendations, follow echocardiogram and LV 

ejection fraction





--Atrial fibrillation; rate controlled


Amiodarone 200 mg p.o. twice daily.  Continue beta-blockers, Cardiology 

following


chronic anticoagulation with Eliquis, follow echocardiogram findings





-- CHF (congestive heart failure) unknown ejection fraction


Fluid restriction.  Daily weight.  Maintain input output.  Echocardiogram.  


Continue home medication.  Cardiology evaluation





--COPD (chronic obstructive pulmonary disease) chronic


Oxygen via nasal catheter per minute.  DuoNeb via nebulizer every 4 hours.  


Albuterol via nebulizer every 4 hours as needed








--Hypertension moderate control


Amlodipine 10 mg p.o. daily.  Coreg 25 mg p.o. twice daily.  


Clonidine 0.1 mg p.o. every 8 hours


As needed hydralazine





--Hyperthyroidism


Methimazole 10 mg p.o. 3 times daily.  


Outpatient follow-up with endocrinologist.








--T2DM (type 2 diabetes mellitus) 


Accu-Chek every 6 hours with Humalog moderate dose coverage.  


Diabetic education.  Continue home medication





--Tobacco abuse


We will counseled the patient regarding quitting smoking.  


We put the patient on nicotine patch





Smoking cessation counseling; 





--DVT prophylaxis


Eliquis 2.5 mg p.o. twice daily for DVT prophylaxis.  


Protonix 40 mg p.o. daily for GI prophylaxis.  Patient is a full code





Closely monitor the patient and adjust management as needed


Plan of care reviewed with the patient and her nurse


Cardiology evaluation and recommendations noted and appreciated














History


Interval history: 


I have seen and examined the patient at the bedside


Patient's chart and medications reviewed


Patient was admitted with chest pain and mild elevation of troponins


Patient feels slightly better


Denies chest pain or shortness of breath


Vital signs noted








Hospitalist Physical





- Constitutional


Vitals: 


                                        











Temp Pulse Resp BP Pulse Ox


 


 98.0 F   70   18   125/80   98 


 


 09/19/22 19:59  09/20/22 05:11  09/20/22 05:11  09/20/22 05:11  09/20/22 05:11











General appearance: Present: no acute distress, well-nourished





- EENT


Eyes: Present: PERRL, EOM intact





- Neck


Neck: Present: supple, normal ROM





- Respiratory


Respiratory effort: normal


Respiratory: bilateral: diminished, negative: rales, rhonchi, wheezing





- Cardiovascular


Rhythm: regular


Heart Sounds: Present: S1 & S2





- Extremities


Extremities: no ischemia, No edema





- Abdominal


General gastrointestinal: soft, non-tender, non-distended, normal bowel sounds





- Integumentary


Integumentary: Present: clear, warm





- Psychiatric


Psychiatric: appropriate mood/affect, cooperative





- Neurologic


Neurologic: CNII-XII intact, moves all extremities





HEART Score





- HEART Score


EKG: Non-specific


Age: > 65


Risk factors: > 3 risk factors or hx of atherosclerotic disease


Troponin: 


                                        











Troponin T  0.089 ng/mL (0.00-0.029)  H  09/20/22  03:52    











Troponin: 1-3x normal limit





Results





- Labs


CBC & Chem 7: 


                                 09/20/22 04:00





                                 09/20/22 04:00


Labs: 


                             Laboratory Last Values











WBC  11.6 K/mm3 (4.5-11.0)  H  09/20/22  04:00    


 


RBC  2.98 M/mm3 (3.65-5.03)  L  09/20/22  04:00    


 


Hgb  9.2 gm/dl (10.1-14.3)  L  09/20/22  04:00    


 


Hct  29.2 % (30.3-42.9)  L  09/20/22  04:00    


 


MCV  98 fl (79-97)  H  09/20/22  04:00    


 


MCH  31 pg (28-32)   09/20/22  04:00    


 


MCHC  32 % (30-34)   09/20/22  04:00    


 


RDW  19.6 % (13.2-15.2)  H  09/20/22  04:00    


 


Plt Count  192 K/mm3 (140-440)   09/20/22  04:00    


 


Lymph % (Auto)  15.9 % (13.4-35.0)   09/20/22  04:00    


 


Mono % (Auto)  6.9 % (0.0-7.3)   09/20/22  04:00    


 


Eos % (Auto)  0.5 % (0.0-4.3)   09/20/22  04:00    


 


Baso % (Auto)  0.9 % (0.0-1.8)   09/20/22  04:00    


 


Lymph # (Auto)  1.8 K/mm3 (1.2-5.4)   09/20/22  04:00    


 


Mono # (Auto)  0.8 K/mm3 (0.0-0.8)   09/20/22  04:00    


 


Eos # (Auto)  0.1 K/mm3 (0.0-0.4)   09/20/22  04:00    


 


Baso # (Auto)  0.1 K/mm3 (0.0-0.1)   09/20/22  04:00    


 


Seg Neutrophils %  75.8 % (40.0-70.0)  H  09/20/22  04:00    


 


Seg Neutrophils #  8.8 K/mm3 (1.8-7.7)  H  09/20/22  04:00    


 


Sodium  139 mmol/L (137-145)   09/20/22  04:00    


 


Potassium  4.7 mmol/L (3.6-5.0)   09/20/22  04:00    


 


Chloride  98.9 mmol/L ()   09/20/22  04:00    


 


Carbon Dioxide  24 mmol/L (22-30)   09/20/22  04:00    


 


Anion Gap  21 mmol/L  09/20/22  04:00    


 


BUN  38 mg/dL (7-17)  H  09/20/22  04:00    


 


Creatinine  4.5 mg/dL (0.6-1.2)  H  09/20/22  04:00    


 


Estimated GFR  12 ml/min  09/20/22  04:00    


 


BUN/Creatinine Ratio  8 %  09/20/22  04:00    


 


Glucose  85 mg/dL ()   09/20/22  04:00    


 


Calcium  9.1 mg/dL (8.4-10.2)   09/20/22  04:00    


 


Total Bilirubin  1.20 mg/dL (0.1-1.2)   09/19/22  17:58    


 


AST  21 units/L (5-40)   09/19/22  17:58    


 


ALT  9 units/L (7-56)   09/19/22  17:58    


 


Alkaline Phosphatase  69 units/L ()   09/19/22  17:58    


 


Troponin T  0.089 ng/mL (0.00-0.029)  H  09/20/22  03:52    


 


Total Protein  6.4 g/dL (6.3-8.2)   09/19/22  17:58    


 


Albumin  4.4 g/dL (3.9-5)   09/19/22  17:58    


 


Albumin/Globulin Ratio  2.2 %  09/19/22  17:58    


 


Triglycerides  91 mg/dL (2-149)   09/20/22  03:52    


 


Cholesterol  144 mg/dL ()   09/20/22  03:52    


 


LDL Cholesterol Direct  67 mg/dL ()   09/20/22  03:52    


 


HDL Cholesterol  65 mg/dL (40-59)  H  09/20/22  03:52    


 


Cholesterol/HDL Ratio  2.21 %  09/20/22  03:52    














Active Medications





- Current Medications


Current Medications: 














Generic Name Dose Route Start Last Admin





  Trade Name Freq  PRN Reason Stop Dose Admin


 


Acetaminophen  650 mg  09/19/22 21:50 





  Acetaminophen 325 Mg Tab  PO  





  Q6H PRN  





  Pain, Mild (1-3)  


 


Amiodarone HCl  200 mg  09/19/22 22:00  09/20/22 01:43





  Amiodarone 200 Mg Tab  PO   200 mg





  BID DERIK   Administration


 


Amlodipine Besylate  10 mg  09/20/22 10:00 





  Amlodipine 10 Mg Tab  PO  





  DAILY DERIK  


 


Apixaban  2.5 mg  09/20/22 10:00 





  Apixaban 2.5 Mg Tab  PO  





  Q12HR DERIK  


 


Aspirin  81 mg  09/20/22 10:00 





  Aspirin Ec 81 Mg Tab  PO  





  QDAY DERIK  


 


Atorvastatin Calcium  40 mg  09/19/22 22:00  09/20/22 00:33





  Atorvastatin 40 Mg Tab  PO   40 mg





  QHS DERIK   Administration


 


Carvedilol  25 mg  09/19/22 22:00  09/20/22 08:22





  Carvedilol 25 Mg Tab  PO   25 mg





  BID@0800,1700 DERIK   Administration


 


Clonidine HCl  0.1 mg  09/19/22 22:00  09/20/22 06:11





  Clonidine 0.1 Mg Tab  PO   Not Given





  Q8H DERIK  


 


Dextrose  0 ml  09/19/22 21:50 





  Dextrose 50% In Water (25gm) 50 Ml Syringe  IV  





  Q30MIN PRN  





  Hypoglycemia  





  Protocol  


 


Gabapentin  100 mg  09/19/22 22:00  09/20/22 00:33





  Gabapentin 100 Mg Cap  PO   100 mg





  BID DERIK   Administration


 


Hydralazine HCl  50 mg  09/19/22 22:00  09/20/22 06:11





  Hydralazine 25 Mg Tab  PO   Not Given





  Q8HR [facility]  


 


Insulin Human Lispro  0 unit  09/20/22 00:00  09/20/22 08:14





  Insulin Lispro 100 Unit/Ml  SUB-Q   Not Given





  Q6HR [facility]  





  Protocol  


 


Isosorbide Mononitrate  60 mg  09/20/22 10:00 





  Isosorbide Mononitrate Er 60 Mg Tab  PO  





  QDAY [facility]  


 


Levalbuterol HCl  1.25 mg  09/19/22 23:00 





  Levalbuterol 1.25 Mg/3 Ml Neb  IH  





  Q6HRT PRN  





  wheezing  


 


Losartan Potassium  50 mg  09/20/22 10:00 





  Losartan 50 Mg Tab  PO  





  QDAY [facility]  


 


Methimazole  10 mg  09/20/22 08:00 





  Methimazole 5 Mg Tab  PO  





  TID [facility]  


 


Morphine Sulfate  2 mg  09/19/22 21:50  09/20/22 08:21





  Morphine 2 Mg/1 Ml Inj  IV   2 mg





  Q5MIN PRN   Administration





  Chest Pain unrelieved by NTG  


 


Nicotine  14 mg  09/20/22 10:00 





  Nicotine 14 Mg/24 Hr Patch  TD  





  QDAY [facility]  


 


Nitroglycerin  0.4 mg  09/19/22 21:53 





  Nitroglycerin 0.4 Mg Tab Subl  SL  





  .Q5MIN PRN  





  Chest Pain  


 


Pantoprazole Sodium  40 mg  09/20/22 10:00 





  Pantoprazole 40 Mg Tab  PO  





  QDAY [facility]  


 


Senna/Docusate Sodium  1 tab  09/20/22 10:00 





  Sennosides/Docusate Sodium 8.6/50 Mg Tab  PO  





  DAILY [facility]  


 


Sodium Chloride  10 ml  09/19/22 21:50 





  Sodium Chloride 0.9% 10 Ml Flush Syringe  IV  





  PRN PRN  





  LINE FLUSH  


 


Tramadol HCl  50 mg  09/19/22 21:50 





  Tramadol 50 Mg Tab  PO  





  Q6H PRN  





  Pain, Moderate (4-6)

## 2022-09-20 NOTE — CONSULTATION
History of Present Illness





- Reason for Consult


Consult date: 09/20/22


end stage renal disease





- History of Present Illness


The patient is a 68 YO female known to our service with history of DM-2, HTN, 

HLD, Bipolar Disorder, CAD s/p Stent Placement, Paroxysmal Atrial fibrillation, 

HFpEF, COPD, Anemia and ESRD on hemodialysis (TTS) who presented to [facility] ED 

9/19/22 with chest pain and palpitation.  The pain was substernal, sharp, 

constant and not radiating.  Associated palpitation.  Patient has chronic 

intermittent shortness of breath, cough and wheezing.  Patient denies any N, V, 

D, abd pain, dizziness, weakness, fever, chills, rash, blurry vision, hematuria 

or hemoptysis.  She has had several admissions and ER visits with similar 

presentation and had extensive workup.  Patient was admitted for further 

evaluation.  Labs and imaging noted.  Nephrology was consulted for ESRD 

management.





Past History


Past Medical History: atrial fib, COPD, diabetes, ESRD, GERD, heart failure, 

hypertension, hyperlipidemia, renal failure, stroke, other (Bipolar 

schizophrenia)


Past Surgical History: Other (Coronary stent, pacemaker, stent placement x 2 in 

2015, knee surgery 01/2018, ABD SX 2021)


Social history: smoking


Family history: diabetes, hypertension





Medications and Allergies


                                    Allergies











Allergy/AdvReac Type Severity Reaction Status Date / Time


 


Iodinated Contrast Media AdvReac Intermediate Swelling Verified 09/19/22 16:56


 


apple AdvReac  Hives Verified 09/19/22 16:56


 


shell fish Allergy  Swelling Uncoded 09/19/22 16:56











                                Home Medications











 Medication  Instructions  Recorded  Confirmed  Last Taken  Type


 


Gabapentin 100 mg PO BID 04/23/22 09/09/22 Unknown History


 


Pantoprazole [Protonix TAB] 40 mg PO QDAY 04/23/22 09/09/22 04/23/22 10:00 

History


 


methIMAzole [Methimazole] 10 mg PO TID 05/01/22 09/09/22 Unknown History


 


Sennosides/Docusate [Senokot S] 1 tab PO DAILY #30 tablet 05/03/22 09/09/22 

Unknown Rx


 


Nitroglycerin [Nitrostat] 0.4 mg SL .Q5MIN PRN 30 Days #30 05/24/22 09/09/22 Unk

nown Rx





 tablet    


 


Apixaban [Eliquis] 2.5 mg PO Q12HR  tablet 07/14/22 09/09/22 Unknown Rx


 


Aspirin EC [Halfprin EC] 81 mg PO QDAY  tablet 07/14/22 09/09/22 Unknown Rx


 


ISOSORBIDE MONOnitrate [Imdur ER] 60 mg PO QDAY  tablet 07/14/22 09/09/22 

Unknown Rx


 


Losartan [Cozaar] 50 mg PO QDAY  tablet 07/14/22 09/09/22 Unknown Rx


 


Metoprolol [Lopressor TAB] 100 mg PO BID #60 tablet 07/14/22 09/09/22 Unknown Rx


 


hydrALAZINE [Apresoline TAB] 50 mg PO Q8HR  tablet 07/14/22 09/09/22 Unknown Rx


 


Amiodarone [Cordarone 200 MG TAB] 200 mg PO BID 30 Days #60 tablet 09/02/22 09/09/22 Unknown Rx


 


Levalbuterol Tartrate 2 puff INHALATION Q6HR PRN 09/02/22 09/09/22 Unknown 

History





[Levalbuterol Tartrate Hfa]     


 


amLODIPine 10 mg PO DAILY 09/02/22 09/09/22 Unknown History


 


carvediloL [Coreg] 25 mg PO BID 09/02/22 09/09/22 Unknown History


 


cloNIDine [Catapres] 0.1 mg PO Q8H 09/02/22 09/09/22 Unknown History


 


HYDROcodone/APAP 5-325 [[place] 1 - 2 each PO Q6HR PRN #10 tablet 09/15/22  

Unknown Rx





5/325]     











Active Meds: 


Active Medications





Acetaminophen (Acetaminophen 325 Mg Tab)  650 mg PO Q6H PRN


   PRN Reason: Pain, Mild (1-3)


Amiodarone HCl (Amiodarone 200 Mg Tab)  200 mg PO BID [facility]


   Last Admin: 09/20/22 12:18 Dose:  200 mg


   


Amlodipine Besylate (Amlodipine 10 Mg Tab)  10 mg PO DAILY [facility]


   Last Admin: 09/20/22 12:10 Dose:  Not Given


   


Apixaban (Apixaban 2.5 Mg Tab)  2.5 mg PO Q12HR [facility]


   Last Admin: 09/20/22 12:18 Dose:  2.5 mg


   


Aspirin (Aspirin Ec 81 Mg Tab)  81 mg PO QDAY [facility]


   Last Admin: 09/20/22 12:18 Dose:  81 mg


   


Atorvastatin Calcium (Atorvastatin 40 Mg Tab)  40 mg PO QHS [facility]


   Last Admin: 09/20/22 00:33 Dose:  40 mg


   


Carvedilol (Carvedilol 25 Mg Tab)  25 mg PO BID@0800,1700 [facility]


   Last Admin: 09/20/22 08:22 Dose:  25 mg


   


Clonidine HCl (Clonidine 0.1 Mg Tab)  0.1 mg PO Q8H [facility]


   Last Admin: 09/20/22 13:36 Dose:  0.1 mg


   


Dextrose (Dextrose 50% In Water (25gm) 50 Ml Syringe)  0 ml IV Q30MIN PRN; 

Protocol


   PRN Reason: Hypoglycemia


Epoetin Teodoro-epbx (Epoetin Teodoro-Epbx 20,000 Unit/1 Ml Vial)  20,000 unit SUB-Q 

VANCE PRN


   PRN Reason: hemodialysis


Gabapentin (Gabapentin 100 Mg Cap)  100 mg PO BID [facility]


   Last Admin: 09/20/22 12:18 Dose:  100 mg


   


Heparin Sodium (Porcine) (Heparin 10,000 Units/10 Ml Vial)  3,000 unit IV VANCE 

PRN


   PRN Reason: hemodialysis


Hydralazine HCl (Hydralazine 25 Mg Tab)  50 mg PO Q8HR [facility]


   Last Admin: 09/20/22 13:36 Dose:  50 mg


   


Sodium Chloride (Nacl 0.9%)  100 mls @ 999 mls/hr IV VANCE PRN


   PRN Reason: Hypotension


Insulin Human Lispro (Insulin Lispro 100 Unit/Ml)  0 unit SUB-Q Q6HR [facility]; 

Protocol


   Last Admin: 09/20/22 12:10 Dose:  Not Given


   


Isosorbide Mononitrate (Isosorbide Mononitrate Er 60 Mg Tab)  60 mg PO QDAY [facility]


   Last Admin: 09/20/22 12:17 Dose:  60 mg


   


Levalbuterol HCl (Levalbuterol 1.25 Mg/3 Ml Neb)  1.25 mg IH Q6HRT PRN


   PRN Reason: wheezing


Losartan Potassium (Losartan 50 Mg Tab)  50 mg PO QDAY [facility]


   Last Admin: 09/20/22 12:18 Dose:  50 mg


   


Methimazole (Methimazole 5 Mg Tab)  10 mg PO TID [facility]


   Last Admin: 09/20/22 13:36 Dose:  10 mg


   


Morphine Sulfate (Morphine 2 Mg/1 Ml Inj)  2 mg IV Q5MIN PRN


   PRN Reason: Chest Pain unrelieved by NTG


   Last Admin: 09/20/22 08:21 Dose:  2 mg


   


Nicotine (Nicotine 14 Mg/24 Hr Patch)  14 mg TD QDAY [facility]


   Last Admin: 09/20/22 12:19 Dose:  Not Given


   


Nitroglycerin (Nitroglycerin 0.4 Mg Tab Subl)  0.4 mg SL .Q5MIN PRN


   PRN Reason: Chest Pain


Pantoprazole Sodium (Pantoprazole 40 Mg Tab)  40 mg PO QDAY [facility]


   Last Admin: 09/20/22 12:19 Dose:  Not Given


   


Senna/Docusate Sodium (Sennosides/Docusate Sodium 8.6/50 Mg Tab)  1 tab PO DAILY

[facility]


   Last Admin: 09/20/22 12:17 Dose:  1 tab


   


Sodium Chloride (Sodium Chloride 0.9% 10 Ml Flush Syringe)  10 ml IV PRN PRN


   PRN Reason: LINE FLUSH


Tramadol HCl (Tramadol 50 Mg Tab)  50 mg PO Q6H PRN


   PRN Reason: Pain, Moderate (4-6)











Exam





- Vital Signs


Vital signs: 


                                   Vital Signs











Temp Pulse Resp BP Pulse Ox


 


 98.6 F   89   18   206/104   100 


 


 09/19/22 16:43  09/19/22 16:43  09/19/22 16:43  09/19/22 16:43  09/19/22 16:43














Results





- Lab Results





                                 09/20/22 04:00





                                 09/20/22 04:00


                             Most recent lab results











Calcium  9.1 mg/dL (8.4-10.2)   09/20/22  04:00    














Assessment and Plan





1. ESRD:


Patient is on maintenance hemodialysis, TTS schedule.


Hemodialysis: today.





2. FEN:


UF with HD as tolerated.


Monitor lytes and volume status.





3. Chest pain //  H/o CAD s/p PCI:


Seen by Cards.


Monitor.





4. Paroxysmal A.fib with RVR:


SR now.


Continue home meds.


Monitor.





5. Chronic HFpEF:


Volume control thru HD.


Fluid restriction, monitor I/O.





6. H/o COPD:


Nebs and O2 as needed.





7. Hypertension:


Volume control with HD.


Adjust BP meds as needed.


Monitor BP.





8. Normochromic anemia, POA:


Chronic.


Likely 2/2 ESRD.


Epogen as needed.











Subjective:


Patient was seen and examined at the bedside.











Examination:


General appearance: well-developed, appears stated age, no distress


HEENT: atraumatic, IVETTE


Neck: trachea midline


Respiratory: bilateral rales and wheezing noted


Heart: S1S2, no murmur


Abdomen: soft, bowel sounds heard, NT


Integumentary: no obvious rash


Neurologic: AO, able to move extremities


Ext: no edema


Hemodialysis access: R arm AVG

## 2022-09-21 RX ADMIN — PANTOPRAZOLE SODIUM SCH MG: 40 TABLET, DELAYED RELEASE ORAL at 10:21

## 2022-09-21 RX ADMIN — INSULIN LISPRO SCH: 100 INJECTION, SOLUTION INTRAVENOUS; SUBCUTANEOUS at 00:10

## 2022-09-21 RX ADMIN — MORPHINE SULFATE PRN MG: 2 INJECTION, SOLUTION INTRAMUSCULAR; INTRAVENOUS at 17:02

## 2022-09-21 RX ADMIN — Medication PRN ML: at 21:23

## 2022-09-21 RX ADMIN — ASPIRIN SCH MG: 81 TABLET, COATED ORAL at 10:21

## 2022-09-21 RX ADMIN — AMIODARONE HYDROCHLORIDE SCH MG: 200 TABLET ORAL at 10:21

## 2022-09-21 RX ADMIN — APIXABAN SCH MG: 2.5 TABLET, FILM COATED ORAL at 10:21

## 2022-09-21 RX ADMIN — NICOTINE SCH MG: 14 PATCH TRANSDERMAL at 10:21

## 2022-09-21 RX ADMIN — SENNOSIDES AND DOCUSATE SODIUM SCH TAB: 50; 8.6 TABLET ORAL at 10:21

## 2022-09-21 RX ADMIN — INSULIN LISPRO SCH UNIT: 100 INJECTION, SOLUTION INTRAVENOUS; SUBCUTANEOUS at 17:24

## 2022-09-21 RX ADMIN — INSULIN LISPRO SCH: 100 INJECTION, SOLUTION INTRAVENOUS; SUBCUTANEOUS at 07:06

## 2022-09-21 RX ADMIN — LOSARTAN POTASSIUM SCH MG: 50 TABLET, FILM COATED ORAL at 10:21

## 2022-09-21 RX ADMIN — INSULIN LISPRO SCH: 100 INJECTION, SOLUTION INTRAVENOUS; SUBCUTANEOUS at 12:16

## 2022-09-21 RX ADMIN — INSULIN LISPRO SCH: 100 INJECTION, SOLUTION INTRAVENOUS; SUBCUTANEOUS at 23:07

## 2022-09-21 RX ADMIN — GABAPENTIN SCH MG: 100 CAPSULE ORAL at 10:21

## 2022-09-21 RX ADMIN — MORPHINE SULFATE PRN MG: 2 INJECTION, SOLUTION INTRAMUSCULAR; INTRAVENOUS at 22:58

## 2022-09-21 RX ADMIN — APIXABAN SCH MG: 2.5 TABLET, FILM COATED ORAL at 21:23

## 2022-09-21 RX ADMIN — MORPHINE SULFATE PRN MG: 2 INJECTION, SOLUTION INTRAMUSCULAR; INTRAVENOUS at 06:50

## 2022-09-21 RX ADMIN — GABAPENTIN SCH MG: 100 CAPSULE ORAL at 21:23

## 2022-09-21 NOTE — ELECTROCARDIOGRAPH REPORT
[facility]

                                       

Test Date:    2022               Test Time:    09:42:07

Pat Name:     LINDA GARCIA              Department:   

Patient ID:   [id number]          Room:         A465 1

Gender:       F                        Technician:   DIANA

:          1955               Requested By: LACI RAMSEY

Order Number: [id number]             Reading MD:   Sarbjit Del Rosario

                                 Measurements

Intervals                              Axis          

Rate:         70                       P:            -29

SD:           198                      QRS:          -78

QRSD:         140                      T:            259

QT:           568                                    

QTc:          613                                    

                           Interpretive Statements

Atrial-ventricular dual-paced rhythm

Compared to ECG 2022 17:35:17

No significant changes

Electronically Signed On 2022 9:27:28 EDT by Sarbjit Del Rosario

## 2022-09-21 NOTE — PROGRESS NOTE
Assessment and Plan


Assessment and plan: 


-- ACS (acute coronary syndrome)


Admit the patient to the medical telemetry.  


Aspirin 81 mg p.o. daily.  Eliquis 2.5 mg p.o. twice a day.  


Nitroglycerin as needed.  Lipitor 40 mg p.o. daily.  Serial cardiac enzymes.  


Echocardiogram.  Cardiology evaluation





-- End stage renal disease on dialysis


Nephrology consulted


Hemodialysis per schedule, avoid nephrotoxins


Renal dosing of medications, closely monitor





--Elevated troponin


In the setting of end-stage renal disease, troponin probably is nonspecific


However patient has multiple risk factors need to rule out acute coronary 

syndrome


Follow cardiology evaluation recommendations, follow echocardiogram and LV 

ejection fraction





--Paroxysmal atrial fibrillation flutter episodes on the monitor


Converted back to sinus rhythm 


Patient is on amiodarone, carvedilol, cardiology DC'd amiodarone


Started on sotalol, closely monitor





-Atrial fibrillation; rate controlled


Amiodarone 200 mg p.o. twice daily.  Continue beta-blockers, Cardiology 

following


chronic anticoagulation with Eliquis, follow echocardiogram findings





-- CHF (congestive heart failure) unknown ejection fraction


Fluid restriction.  Daily weight.  Maintain input output.  Echocardiogram.  


Continue home medication.  Cardiology evaluation


LVEF 50 to 55%[from cardiology notes in the past]





--COPD (chronic obstructive pulmonary disease) chronic


Oxygen via nasal catheter per minute.  DuoNeb via nebulizer every 4 hours.  


Albuterol via nebulizer every 4 hours as needed





--Hypertension moderate control


Amlodipine 10 mg p.o. daily.  Coreg 25 mg p.o. twice daily.  


Clonidine 0.1 mg p.o. every 8 hours


As needed hydralazine





--Hyperthyroidism


Methimazole 10 mg p.o. 3 times daily.  


Outpatient follow-up with endocrinologist.








--T2DM (type 2 diabetes mellitus) 


Accu-Chek every 6 hours with Humalog moderate dose coverage.  


Diabetic education.  Continue home medication





--Tobacco abuse


We will counseled the patient regarding quitting smoking.  


We put the patient on nicotine patch





Smoking cessation counseling; 





--DVT prophylaxis


Eliquis 2.5 mg p.o. twice daily for DVT prophylaxis.  


Protonix 40 mg p.o. daily for GI prophylaxis.  Patient is a full code





Closely monitor the patient and adjust management as needed


Plan of care reviewed with the patient and her nurse


Cardiology evaluation and recommendations noted and appreciated





9/21/2022; this morning patient went into paroxysmal A. fib flutter


Later converted to sinus rhythm, patient did not have any symptoms


Patient was on amiodarone ,cardiology discontinued amiodarone and started 

sotalol




















History


Interval history: 


I have seen and examined the patient at the bedside


Patient's chart and medications reviewed


Overnight and this morning events reviewed


Patient had paroxysmal A. fib/flutter on the monitor


Patient did not have any symptoms of chest pain, 


shortness of breath  or palpitations


Vital signs reviewed








Hospitalist Physical





- Constitutional


Vitals: 


                                        











Temp Pulse Resp BP Pulse Ox


 


 97.5 F L  70   17   130/68   100 


 


 09/21/22 05:39  09/21/22 06:50  09/21/22 07:20  09/21/22 06:50  09/21/22 08:53











General appearance: Present: no acute distress, well-nourished





- EENT


Eyes: Present: PERRL, EOM intact





- Neck


Neck: Present: supple, normal ROM





- Respiratory


Respiratory effort: normal


Respiratory: bilateral: diminished, negative: rales, rhonchi, wheezing





- Cardiovascular


Rhythm: regular


Heart Sounds: Present: S1 & S2





- Extremities


Extremities: no ischemia, No edema





- Abdominal


General gastrointestinal: soft, non-tender, non-distended, normal bowel sounds





- Integumentary


Integumentary: Present: clear, warm





- Psychiatric


Psychiatric: appropriate mood/affect, cooperative





- Neurologic


Neurologic: CNII-XII intact, moves all extremities





HEART Score





- HEART Score


EKG: Non-specific


Age: > 65


Risk factors: > 3 risk factors or hx of atherosclerotic disease


Troponin: 


                                        











Troponin T  0.089 ng/mL (0.00-0.029)  H  09/20/22  03:52    











Troponin: 1-3x normal limit





Results





- Labs


CBC & Chem 7: 


                                 09/22/22 04:23





                                 09/22/22 04:23


Labs: 


                             Laboratory Last Values











WBC  11.6 K/mm3 (4.5-11.0)  H  09/20/22  04:00    


 


RBC  2.98 M/mm3 (3.65-5.03)  L  09/20/22  04:00    


 


Hgb  9.2 gm/dl (10.1-14.3)  L  09/20/22  04:00    


 


Hct  29.2 % (30.3-42.9)  L  09/20/22  04:00    


 


MCV  98 fl (79-97)  H  09/20/22  04:00    


 


MCH  31 pg (28-32)   09/20/22  04:00    


 


MCHC  32 % (30-34)   09/20/22  04:00    


 


RDW  19.6 % (13.2-15.2)  H  09/20/22  04:00    


 


Plt Count  192 K/mm3 (140-440)   09/20/22  04:00    


 


Lymph % (Auto)  15.9 % (13.4-35.0)   09/20/22  04:00    


 


Mono % (Auto)  6.9 % (0.0-7.3)   09/20/22  04:00    


 


Eos % (Auto)  0.5 % (0.0-4.3)   09/20/22  04:00    


 


Baso % (Auto)  0.9 % (0.0-1.8)   09/20/22  04:00    


 


Lymph # (Auto)  1.8 K/mm3 (1.2-5.4)   09/20/22  04:00    


 


Mono # (Auto)  0.8 K/mm3 (0.0-0.8)   09/20/22  04:00    


 


Eos # (Auto)  0.1 K/mm3 (0.0-0.4)   09/20/22  04:00    


 


Baso # (Auto)  0.1 K/mm3 (0.0-0.1)   09/20/22  04:00    


 


Seg Neutrophils %  75.8 % (40.0-70.0)  H  09/20/22  04:00    


 


Seg Neutrophils #  8.8 K/mm3 (1.8-7.7)  H  09/20/22  04:00    


 


Sodium  139 mmol/L (137-145)   09/20/22  04:00    


 


Potassium  4.7 mmol/L (3.6-5.0)   09/20/22  04:00    


 


Chloride  98.9 mmol/L ()   09/20/22  04:00    


 


Carbon Dioxide  24 mmol/L (22-30)   09/20/22  04:00    


 


Anion Gap  21 mmol/L  09/20/22  04:00    


 


BUN  38 mg/dL (7-17)  H  09/20/22  04:00    


 


Creatinine  4.5 mg/dL (0.6-1.2)  H  09/20/22  04:00    


 


Estimated GFR  12 ml/min  09/20/22  04:00    


 


BUN/Creatinine Ratio  8 %  09/20/22  04:00    


 


Glucose  85 mg/dL ()   09/20/22  04:00    


 


POC Glucose  112 mg/dL ()  H  09/21/22  06:47    


 


Calcium  9.1 mg/dL (8.4-10.2)   09/20/22  04:00    


 


Total Bilirubin  1.20 mg/dL (0.1-1.2)   09/19/22  17:58    


 


AST  21 units/L (5-40)   09/19/22  17:58    


 


ALT  9 units/L (7-56)   09/19/22  17:58    


 


Alkaline Phosphatase  69 units/L ()   09/19/22  17:58    


 


Troponin T  0.089 ng/mL (0.00-0.029)  H  09/20/22  03:52    


 


Total Protein  6.4 g/dL (6.3-8.2)   09/19/22  17:58    


 


Albumin  4.4 g/dL (3.9-5)   09/19/22  17:58    


 


Albumin/Globulin Ratio  2.2 %  09/19/22  17:58    


 


Triglycerides  91 mg/dL (2-149)   09/20/22  03:52    


 


Cholesterol  144 mg/dL ()   09/20/22  03:52    


 


LDL Cholesterol Direct  67 mg/dL ()   09/20/22  03:52    


 


HDL Cholesterol  65 mg/dL (40-59)  H  09/20/22  03:52    


 


Cholesterol/HDL Ratio  2.21 %  09/20/22  03:52    


 


Hepatitis A IgM Ab  Non-reactive  (NonReactive)   09/20/22  15:40    


 


Hep Bs Antigen  Non-reactive  (Negative)   09/20/22  15:40    


 


Hep B Core IgM Ab  Non-reactive  (NonReactive)   09/20/22  15:40    


 


Hepatitis C Antibody  Non-reactive  (NonReactive)   09/20/22  15:40    











[place]/IV: 


                                        





Voiding Method                   External Female Catheter











Active Medications





- Current Medications


Current Medications: 














Generic Name Dose Route Start Last Admin





  Trade Name Freq  PRN Reason Stop Dose Admin


 


Acetaminophen  650 mg  09/19/22 21:50 





  Acetaminophen 325 Mg Tab  PO  





  Q6H PRN  





  Pain, Mild (1-3)  


 


Amiodarone HCl  200 mg  09/19/22 22:00  09/20/22 21:25





  Amiodarone 200 Mg Tab  PO   200 mg





  BID DERIK   Administration


 


Amlodipine Besylate  10 mg  09/20/22 10:00  09/20/22 12:10





  Amlodipine 10 Mg Tab  PO   Not Given





  DAILY DERIK  


 


Apixaban  2.5 mg  09/20/22 10:00  09/20/22 21:25





  Apixaban 2.5 Mg Tab  PO   2.5 mg





  Q12HR DERIK   Administration


 


Aspirin  81 mg  09/20/22 10:00  09/20/22 12:18





  Aspirin Ec 81 Mg Tab  PO   81 mg





  QDAY DERIK   Administration


 


Atorvastatin Calcium  40 mg  09/19/22 22:00  09/20/22 21:25





  Atorvastatin 40 Mg Tab  PO   40 mg





  QHS DERIK   Administration


 


Carvedilol  25 mg  09/19/22 22:00  09/20/22 18:34





  Carvedilol 25 Mg Tab  PO   Not Given





  BID@0800,1700 [facility]  


 


Clonidine HCl  0.1 mg  09/19/22 22:00  09/21/22 06:50





  Clonidine 0.1 Mg Tab  PO   0.1 mg





  Q8H DERIK   Administration


 


Dextrose  0 ml  09/19/22 21:50 





  Dextrose 50% In Water (25gm) 50 Ml Syringe  IV  





  Q30MIN PRN  





  Hypoglycemia  





  Protocol  


 


Epoetin Teodoro-epbx  20,000 unit  09/20/22 17:00  09/20/22 18:00





  Epoetin Teodoro-Epbx 10,000 Unit/1 Ml Vial  SUB-Q   20,000 unit





  VANCE PRN   Administration





  HEMODIALYSIS  


 


Gabapentin  100 mg  09/19/22 22:00  09/20/22 21:25





  Gabapentin 100 Mg Cap  PO   100 mg





  BID DERIK   Administration


 


Heparin Sodium (Porcine)  3,000 unit  09/20/22 11:45 





  Heparin 10,000 Units/10 Ml Vial  IV  





  VANCE PRN  





  hemodialysis  


 


Hydralazine HCl  50 mg  09/19/22 22:00  09/21/22 06:49





  Hydralazine 25 Mg Tab  PO   50 mg





  Q8HR DERIK   Administration


 


Sodium Chloride  100 mls @ 999 mls/hr  09/20/22 11:45 





  Nacl 0.9%  IV  





  VANCE PRN  





  Hypotension  


 


Insulin Human Lispro  0 unit  09/20/22 00:00  09/21/22 07:06





  Insulin Lispro 100 Unit/Ml  SUB-Q   Not Given





  Q6HR [facility]  





  Protocol  


 


Isosorbide Mononitrate  60 mg  09/20/22 10:00  09/20/22 12:17





  Isosorbide Mononitrate Er 60 Mg Tab  PO   60 mg





  QDAY DERIK   Administration


 


Levalbuterol HCl  1.25 mg  09/19/22 23:00 





  Levalbuterol 1.25 Mg/3 Ml Neb  IH  





  Q6HRT PRN  





  wheezing  


 


Losartan Potassium  50 mg  09/20/22 10:00  09/20/22 12:18





  Losartan 50 Mg Tab  PO   50 mg





  QDAY DERIK   Administration


 


Methimazole  10 mg  09/20/22 08:00  09/20/22 21:26





  Methimazole 5 Mg Tab  PO   10 mg





  TID DERIK   Administration


 


Morphine Sulfate  2 mg  09/19/22 21:50  09/21/22 06:50





  Morphine 2 Mg/1 Ml Inj  IV   2 mg





  Q5MIN PRN   Administration





  Chest Pain unrelieved by NTG  


 


Nicotine  14 mg  09/20/22 10:00  09/20/22 12:19





  Nicotine 14 Mg/24 Hr Patch  TD   Not Given





  QDAY DERIK  


 


Nitroglycerin  0.4 mg  09/19/22 21:53 





  Nitroglycerin 0.4 Mg Tab Subl  SL  





  .Q5MIN PRN  





  Chest Pain  


 


Pantoprazole Sodium  40 mg  09/20/22 10:00  09/20/22 12:19





  Pantoprazole 40 Mg Tab  PO   Not Given





  QDAY DERIK  


 


Senna/Docusate Sodium  1 tab  09/20/22 10:00  09/20/22 12:17





  Sennosides/Docusate Sodium 8.6/50 Mg Tab  PO   1 tab





  DAILY DERIK   Administration


 


Sodium Chloride  10 ml  09/19/22 21:50  09/20/22 21:25





  Sodium Chloride 0.9% 10 Ml Flush Syringe  IV   10 ml





  PRN PRN   Administration





  LINE FLUSH  


 


Tramadol HCl  50 mg  09/19/22 21:50 





  Tramadol 50 Mg Tab  PO  





  Q6H PRN  





  Pain, Moderate (4-6)  














Nutrition/Malnutrition Assess





- Dietary Evaluation


Nutrition/Malnutrition Findings: 


                                        





Nutrition Notes                                            Start:  09/20/22 

10:53


Freq:                                                      Status: Active       




Protocol:                                                                       




 Document     09/20/22 10:53  CM  (Rec: 09/20/22 11:06  CM  [id number])


 Co-Sign      09/20/22 10:53  WW


 Nutrition Notes


     Need for Assessment generated from:         MD Order,Education


     Initial or Follow up                        Assessment


     Current Diagnosis                           CKD (stage V CKD),COPD,


                                                 Diabetes,Hypertension,Heart


                                                 Failure,Stroke,Hyperlipidemia


     Other Pertinent Diagnosis                   GERD, ACS, hyperthyroidism


     Current Diet                                Renal Diet


     Labs/Tests                                  9/20:


                                                 BUN 30


                                                 Cr 3.9


     Pertinent Medications                       9/20:


                                                 Atorvastatin


                                                 Humalog


     Height                                      5 ft 5 in


     Weight                                      54.43 kg


     Usual Body Weight                           54.5 kg


     Ideal Body Weight (kg)                      56.81


     BMI                                         20.0


     Intake Prior to Admission                   Good


     Weight change and time frame                No unintentional wt loss PTA


                                                 per malnutrition screening


                                                 tool assessment and patient.


     Weight Status                               Underweight


     Subjective/Other Information                RD consult for diet education.


                                                 Pt reported receiving diet


                                                 education from dietitian at


                                                 dialysis clinic - could not


                                                 recall information provided.


                                                 Provided verbal explanation


                                                 and educational handouts


                                                 regarding renal diet.


                                                 Pt reported intermittent


                                                 decreases in appetite and hadn


                                                 't eaten in 2 days PTA.


                                                 Nutrition-focused physical


                                                 examination performed


                                                 indicating severe muscle


                                                 wasting - mild malnutrition.


                                                 Last BM reported was yesterday


                                                 - denied diarrhea.


                                                 No physical assessment


                                                 available at this time.


     Percent of energy/protein needs met:        Renal Diet provides 2072kcal/


                                                 77g PRO q day (108%/100%)


     Burn                                        Absent


     Trauma                                      Absent


     GI Symptoms                                 None


     Food Allergy                                No


     Skin Integrity/Comment                      N/A


     Minimum of two criteria                     No


     Muscle Mass                                 Moderate Depletion (severe)


     Fluid Accumulation                          N/A


     Reduced  Strength                       N/A (non-severe)


     Protein-Calorie Malnutrition                N\A


     #2


      Nutrition Diagnosis                        Food and nutrition-related


                                                 knowledge deficit


      Etiology                                   Lack of diet education


                                                 retention


      As Evidenced by Signs and Symptoms         Pt inability to recall


                                                 information from previous diet


                                                 education


     #1


      Nutrition Diagnosis                        Malnutrition


      Comments:                                  Mild malnutrition in setting


                                                 of chronic illness


      Etiology                                   ESRD on HD


      As Evidenced by Signs and Symptoms         Severe muscle wasting (temples


                                                 , anterior thigh, calves,


                                                 patellar), underweight BMI for


                                                 advanced age


     Is patient on ventilator?                   No


     Is Patient Ambulatory and/or Out of Bed     Yes


     REE-([place] [facility]-ambulatory/OOB) [     1404.234


      [email or web address]]                               


     Kcal/Kg value to use for calculation        35


     Approximate Energy Requirements Using       1905


      kcal/Kg                                    


     Calculation Used for Recommendations        Kcal/kg


     Additional Notes                            Protein: 1.2-1.5g/kg; 65-82g


                                                 PRO q day


                                                 Fluid: 500mL + total ouput or


                                                 per MD


 Nutrition Intervention


     Change Diet Order:                          Continue renal diet


     Add Supplement/Snack (indicate name/kcal    Nepro (Variety) BID


      /protein )                                 


     Provides kCal:                              850


     Provides Protein (gm)                       38


     Teaching Methods                            Discussion,Handout


     Response to Teaching                        Verbalize understanding


     Education Handouts Provided                 NCM - Potassium / Phosphorus


                                                 Content of Foods / CKD Stage 3


                                                 -5 Nutrition Therapy


     Barriers to Learning                        No Barriers


     RD phone number provided                    Yes


     Patient aware of follow up options          Yes


     Goal #1                                     Pt to consume >75% of


                                                 estimated energy/protein needs


                                                 through current diet order /


                                                 supplementation


     Goal #2                                     Pt to maintain current wt


                                                 status within 2.5% during LOS


                                                 with exception of fluid loss/


                                                 retention


     Goal #3                                     Pt to retain and recall 2


                                                 pieces of information from


                                                 diet education.


     Follow-Up By:                               09/23/22


     Additional Comments                         Monitor %PO intake, wt status,


                                                 nutrition-related lab values,


                                                 need for further diet


                                                 education.

## 2022-09-21 NOTE — PROGRESS NOTE
Assessment and Plan





- Patient Problems


(1) Paroxysmal atrial fibrillation


Current Visit: Yes   Status: Acute   


Plan to address problem: 


Patient has recurrent admissions for palpitations due to paroxysms of atrial 

fibrillation.  She currently has returned to his sinus rhythm.  She is on 

amiodarone 200 mg p.o. twice daily.  





Due to recurrent failure of amiodarone to maintain sinus rhythm, I will 

discontinue this medication and instead place her on a trial of sotalol 80 mg 

twice daily.








Subjective


Date of service: 09/21/22


Principal diagnosis: Symptomatic atrial flutter fibrillation


Interval history: 





Patient is comfortable, looks and feels well, no acute distress.  No new cardiac

events reported.  She remains in stable sinus rhythm, on telemetry heart rate is

73.





Objective


                                   Vital Signs











  Temp Pulse Resp BP BP Pulse Ox Pulse Ox


 


 09/21/22 08:53       100 


 


 09/21/22 07:20    17    


 


 09/21/22 06:50   70  17  130/68   


 


 09/21/22 06:49   70   130/68   


 


 09/21/22 05:39  97.5 F L  70  17   130/68  100 


 


 09/21/22 04:00   71     


 


 09/20/22 23:00       99 


 


 09/20/22 22:00   82     


 


 09/20/22 21:25   69   127/71   


 


 09/20/22 21:20  97.7 F  69  18   127/71  100 


 


 09/20/22 20:15   70     


 


 09/20/22 19:00  98.2 F  69  20  156/82    100


 


 09/20/22 18:30   69   143/79   


 


 09/20/22 18:15   69   143/80   


 


 09/20/22 18:00   69   142/78   


 


 09/20/22 17:45   69   145/80   


 


 09/20/22 17:30   69   148/76   


 


 09/20/22 17:15   69   156/87   


 


 09/20/22 17:00   69   140/79   


 


 09/20/22 16:45   69   138/67   


 


 09/20/22 16:30   69   153/75   


 


 09/20/22 16:15   69   144/78   


 


 09/20/22 16:00   69   141/84   


 


 09/20/22 15:45   69   147/79   


 


 09/20/22 15:30   69   141/76   


 


 09/20/22 15:15  98.2 F  69  20  141/75    100


 


 09/20/22 11:55       94 














- Physical Examination


General: No Apparent Distress


HEENT: Positive: PERRL


Neck: Positive: neck supple


Cardiac: Positive: Reg Rate and Rhythm


Lungs: Positive: Decreased Breath Sounds


Neuro: Positive: Grossly Intact


Abdomen: Positive: Soft


Skin: Positive: Clear


Extremities: Absent: edema

## 2022-09-21 NOTE — PROGRESS NOTE
Assessment and Plan





1. ESRD:


Patient is on maintenance hemodialysis, TTS schedule.


Hemodialysis: 9/20.





2. FEN:


UF with HD as tolerated.


Monitor lytes and volume status.





3. Chest pain //  H/o CAD s/p PCI:


Seen by Cards.


Monitor.





4. Paroxysmal A.fib with RVR:


SR now.


Sotalol per Cards.


Monitor.





5. Chronic HFpEF:


Volume control thru HD.


Fluid restriction, monitor I/O.





6. H/o COPD:


Nebs and O2 as needed.





7. Hypertension:


Volume control with HD.


Adjust BP meds as needed.


Monitor BP.





8. Normochromic anemia, POA:


Chronic.


Likely 2/2 ESRD.


Epogen as needed.











Subjective:


Patient was seen and examined at the bedside.











Examination:


General appearance: well-developed, appears stated age, no distress


HEENT: atraumatic, IVETTE


Neck: trachea midline


Respiratory: ctab


Heart: S1S2, no murmur


Abdomen: soft, bowel sounds heard, NT


Integumentary: no obvious rash


Neurologic: AO, able to move extremities


Ext: no edema


Hemodialysis access: R arm AVG








Subjective


Date of service: 09/21/22


Principal diagnosis: Symptomatic atrial flutter fibrillation





Objective





- Vital Signs


Vital signs: 


                               Vital Signs - 12hr











  09/21/22 09/21/22 09/21/22





  04:00 05:39 06:49


 


Temperature  97.5 F L 


 


Pulse Rate 71 70 70


 


Respiratory  17 





Rate   


 


Blood Pressure   130/68


 


Blood Pressure  130/68 





[Left]   


 


O2 Sat by Pulse  100 





Oximetry   














  09/21/22 09/21/22 09/21/22





  06:50 07:20 08:53


 


Temperature   


 


Pulse Rate 70  


 


Respiratory 17 17 





Rate   


 


Blood Pressure 130/68  


 


Blood Pressure   





[Left]   


 


O2 Sat by Pulse   100





Oximetry   














- Lab





                                 09/20/22 04:00





                                 09/20/22 04:00


                             Most recent lab results











Calcium  9.1 mg/dL (8.4-10.2)   09/20/22  04:00    














Medications & Allergies





- Medications


Allergies/Adverse Reactions: 


                                    Allergies





Iodinated Contrast Media Adverse Reaction (Intermediate, Verified 09/19/22 

16:56)


   Swelling


apple Adverse Reaction (Verified 09/19/22 16:56)


   Hives


   yellow and green apples. can eat red. 


shell fish Allergy (Uncoded 09/19/22 16:56)


   Swelling


   allergic to seafood except blue crab 








Home Medications: 


                                Home Medications











 Medication  Instructions  Recorded  Confirmed  Last Taken  Type


 


Gabapentin 100 mg PO BID 04/23/22 09/09/22 Unknown History


 


Pantoprazole [Protonix TAB] 40 mg PO QDAY 04/23/22 09/09/22 04/23/22 10:00 

History


 


methIMAzole [Methimazole] 10 mg PO TID 05/01/22 09/09/22 Unknown History


 


Sennosides/Docusate [Senokot S] 1 tab PO DAILY #30 tablet 05/03/22 09/09/22 

Unknown Rx


 


Nitroglycerin [Nitrostat] 0.4 mg SL .Q5MIN PRN 30 Days #30 05/24/22 09/09/22 

Unknown Rx





 tablet    


 


Apixaban [Eliquis] 2.5 mg PO Q12HR  tablet 07/14/22 09/09/22 Unknown Rx


 


Aspirin EC [Halfprin EC] 81 mg PO QDAY  tablet 07/14/22 09/09/22 Unknown Rx


 


ISOSORBIDE MONOnitrate [Imdur ER] 60 mg PO QDAY  tablet 07/14/22 09/09/22 

Unknown Rx


 


Losartan [Cozaar] 50 mg PO QDAY  tablet 07/14/22 09/09/22 Unknown Rx


 


Metoprolol [Lopressor TAB] 100 mg PO BID #60 tablet 07/14/22 09/09/22 Unknown Rx


 


hydrALAZINE [Apresoline TAB] 50 mg PO Q8HR  tablet 07/14/22 09/09/22 Unknown Rx


 


Amiodarone [Cordarone 200 MG TAB] 200 mg PO BID 30 Days #60 tablet 09/02/22 09/09/22 Unknown Rx


 


Levalbuterol Tartrate 2 puff INHALATION Q6HR PRN 09/02/22 09/09/22 Unknown 

History





[Levalbuterol Tartrate Hfa]     


 


amLODIPine 10 mg PO DAILY 09/02/22 09/09/22 Unknown History


 


carvediloL [Coreg] 25 mg PO BID 09/02/22 09/09/22 Unknown History


 


cloNIDine [Catapres] 0.1 mg PO Q8H 09/02/22 09/09/22 Unknown History


 


HYDROcodone/APAP 5-325 [[place] 1 - 2 each PO Q6HR PRN #10 tablet 09/15/22  

Unknown Rx





5/325]     











Active Medications: 














Generic Name Dose Route Start Last Admin





  Trade Name Freq  PRN Reason Stop Dose Admin


 


Acetaminophen  650 mg  09/19/22 21:50 





  Acetaminophen 325 Mg Tab  PO  





  Q6H PRN  





  Pain, Mild (1-3)  


 


Amlodipine Besylate  10 mg  09/20/22 10:00  09/21/22 10:21





  Amlodipine 10 Mg Tab  PO   10 mg





  DAILY DERIK   Administration


 


Apixaban  2.5 mg  09/20/22 10:00  09/21/22 10:21





  Apixaban 2.5 Mg Tab  PO   2.5 mg





  Q12HR DERIK   Administration


 


Aspirin  81 mg  09/20/22 10:00  09/21/22 10:21





  Aspirin Ec 81 Mg Tab  PO   81 mg





  QDAY DERIK   Administration


 


Atorvastatin Calcium  40 mg  09/19/22 22:00  09/20/22 21:25





  Atorvastatin 40 Mg Tab  PO   40 mg





  QHS DERIK   Administration


 


Carvedilol  25 mg  09/19/22 22:00  09/21/22 10:22





  Carvedilol 25 Mg Tab  PO   25 mg





  BID@0800,1700 DERIK   Administration


 


Clonidine HCl  0.1 mg  09/19/22 22:00  09/21/22 06:50





  Clonidine 0.1 Mg Tab  PO   0.1 mg





  Q8H DERIK   Administration


 


Dextrose  0 ml  09/19/22 21:50 





  Dextrose 50% In Water (25gm) 50 Ml Syringe  IV  





  Q30MIN PRN  





  Hypoglycemia  





  Protocol  


 


Epoetin Teodoro-epbx  20,000 unit  09/20/22 17:00  09/20/22 18:00





  Epoetin Teodoro-Epbx 10,000 Unit/1 Ml Vial  SUB-Q   20,000 unit





  VANCE PRN   Administration





  HEMODIALYSIS  


 


Gabapentin  100 mg  09/19/22 22:00  09/21/22 10:21





  Gabapentin 100 Mg Cap  PO   100 mg





  BID DERIK   Administration


 


Heparin Sodium (Porcine)  3,000 unit  09/20/22 11:45 





  Heparin 10,000 Units/10 Ml Vial  IV  





  VANCE PRN  





  hemodialysis  


 


Hydralazine HCl  50 mg  09/19/22 22:00  09/21/22 06:49





  Hydralazine 25 Mg Tab  PO   50 mg





  Q8HR DERIK   Administration


 


Sodium Chloride  100 mls @ 999 mls/hr  09/20/22 11:45 





  Nacl 0.9%  IV  





  VANCE PRN  





  Hypotension  


 


Insulin Human Lispro  0 unit  09/20/22 00:00  09/21/22 07:06





  Insulin Lispro 100 Unit/Ml  SUB-Q   Not Given





  Q6HR [facility]  





  Protocol  


 


Isosorbide Mononitrate  60 mg  09/20/22 10:00  09/21/22 10:21





  Isosorbide Mononitrate Er 60 Mg Tab  PO   60 mg





  QDAY DERIK   Administration


 


Levalbuterol HCl  1.25 mg  09/19/22 23:00 





  Levalbuterol 1.25 Mg/3 Ml Neb  IH  





  Q6HRT PRN  





  wheezing  


 


Losartan Potassium  50 mg  09/20/22 10:00  09/21/22 10:21





  Losartan 50 Mg Tab  PO   50 mg





  QDAY DERIK   Administration


 


Methimazole  10 mg  09/20/22 08:00  09/21/22 10:22





  Methimazole 5 Mg Tab  PO   10 mg





  TID DERIK   Administration


 


Morphine Sulfate  2 mg  09/19/22 21:50  09/21/22 06:50





  Morphine 2 Mg/1 Ml Inj  IV   2 mg





  Q5MIN PRN   Administration





  Chest Pain unrelieved by NTG  


 


Nicotine  14 mg  09/20/22 10:00  09/21/22 10:21





  Nicotine 14 Mg/24 Hr Patch  TD   14 mg





  QDAY DERIK   Administration


 


Nitroglycerin  0.4 mg  09/19/22 21:53 





  Nitroglycerin 0.4 Mg Tab Subl  SL  





  .Q5MIN PRN  





  Chest Pain  


 


Pantoprazole Sodium  40 mg  09/20/22 10:00  09/21/22 10:21





  Pantoprazole 40 Mg Tab  PO   40 mg





  QDAY DERIK   Administration


 


Senna/Docusate Sodium  1 tab  09/20/22 10:00  09/21/22 10:21





  Sennosides/Docusate Sodium 8.6/50 Mg Tab  PO   1 tab





  DAILY DERIK   Administration


 


Sodium Chloride  10 ml  09/19/22 21:50  09/20/22 21:25





  Sodium Chloride 0.9% 10 Ml Flush Syringe  IV   10 ml





  PRN PRN   Administration





  LINE FLUSH  


 


Sotalol HCl  80 mg  09/21/22 11:00 





  Sotalol 80 Mg Tab  PO  





  Q12HR [facility]  


 


Tramadol HCl  50 mg  09/19/22 21:50 





  Tramadol 50 Mg Tab  PO  





  Q6H PRN  





  Pain, Moderate (4-6)

## 2022-09-22 LAB
BASOPHILS # (AUTO): 0.1 K/MM3 (ref 0–0.1)
BASOPHILS NFR BLD AUTO: 0.6 % (ref 0–1.8)
BUN SERPL-MCNC: 29 MG/DL (ref 7–17)
BUN/CREAT SERPL: 7 %
CALCIUM SERPL-MCNC: 8.5 MG/DL (ref 8.4–10.2)
EOSINOPHIL # BLD AUTO: 0.3 K/MM3 (ref 0–0.4)
EOSINOPHIL NFR BLD AUTO: 2.8 % (ref 0–4.3)
HCT VFR BLD CALC: 26.7 % (ref 30.3–42.9)
HEMOLYSIS INDEX: 109
HGB BLD-MCNC: 8.7 GM/DL (ref 10.1–14.3)
LYMPHOCYTES # BLD AUTO: 1.6 K/MM3 (ref 1.2–5.4)
LYMPHOCYTES NFR BLD AUTO: 17.6 % (ref 13.4–35)
MCHC RBC AUTO-ENTMCNC: 33 % (ref 30–34)
MCV RBC AUTO: 96 FL (ref 79–97)
MONOCYTES # (AUTO): 0.7 K/MM3 (ref 0–0.8)
MONOCYTES % (AUTO): 7.9 % (ref 0–7.3)
PLATELET # BLD: 188 K/MM3 (ref 140–440)
RBC # BLD AUTO: 2.78 M/MM3 (ref 3.65–5.03)

## 2022-09-22 PROCEDURE — 5A1D70Z PERFORMANCE OF URINARY FILTRATION, INTERMITTENT, LESS THAN 6 HOURS PER DAY: ICD-10-PCS | Performed by: INTERNAL MEDICINE

## 2022-09-22 RX ADMIN — LOSARTAN POTASSIUM SCH: 50 TABLET, FILM COATED ORAL at 09:05

## 2022-09-22 RX ADMIN — INSULIN LISPRO SCH: 100 INJECTION, SOLUTION INTRAVENOUS; SUBCUTANEOUS at 20:04

## 2022-09-22 RX ADMIN — INSULIN LISPRO SCH: 100 INJECTION, SOLUTION INTRAVENOUS; SUBCUTANEOUS at 06:35

## 2022-09-22 RX ADMIN — GABAPENTIN SCH MG: 100 CAPSULE ORAL at 09:03

## 2022-09-22 RX ADMIN — APIXABAN SCH MG: 2.5 TABLET, FILM COATED ORAL at 09:04

## 2022-09-22 RX ADMIN — MORPHINE SULFATE PRN MG: 2 INJECTION, SOLUTION INTRAMUSCULAR; INTRAVENOUS at 03:52

## 2022-09-22 RX ADMIN — NICOTINE SCH MG: 14 PATCH TRANSDERMAL at 09:03

## 2022-09-22 RX ADMIN — APIXABAN SCH MG: 2.5 TABLET, FILM COATED ORAL at 22:16

## 2022-09-22 RX ADMIN — SENNOSIDES AND DOCUSATE SODIUM SCH TAB: 50; 8.6 TABLET ORAL at 09:03

## 2022-09-22 RX ADMIN — GABAPENTIN SCH MG: 100 CAPSULE ORAL at 22:16

## 2022-09-22 RX ADMIN — PANTOPRAZOLE SODIUM SCH MG: 40 TABLET, DELAYED RELEASE ORAL at 09:03

## 2022-09-22 RX ADMIN — INSULIN LISPRO SCH: 100 INJECTION, SOLUTION INTRAVENOUS; SUBCUTANEOUS at 14:27

## 2022-09-22 RX ADMIN — ASPIRIN SCH MG: 81 TABLET, COATED ORAL at 09:03

## 2022-09-22 NOTE — PROGRESS NOTE
Assessment and Plan





1. ESRD:


Patient is on maintenance hemodialysis, TTS schedule.


Hemodialysis: 9/20, 9/22.





2. FEN:


UF with HD as tolerated.


Monitor lytes and volume status.





3. Paroxysmal A.fib with RVR:


SR now.


Sotalol per Cards.


Monitor.





4. Chest pain //  H/o CAD s/p PCI:


Seen by Cards.


Monitor.





5. Chronic HFpEF:


Volume control thru HD.


Fluid restriction, monitor I/O.





6. H/o COPD:


Nebs and O2 as needed.





7. Hypertension:


Volume control with HD.


Adjust BP meds as needed.


Monitor BP.





8. Normochromic anemia, POA:


Chronic.


Likely 2/2 ESRD.


Epogen as needed.











Subjective:


Patient was seen and examined at the bedside.











Examination:


General appearance: well-developed, appears stated age, no distress


HEENT: atraumatic, IVETTE


Neck: trachea midline


Respiratory: ctab


Heart: S1S2, no murmur


Abdomen: soft, bowel sounds heard, NT


Integumentary: no obvious rash


Neurologic: AO, able to move extremities


Ext: no edema


Hemodialysis access: R arm AVG








Subjective


Date of service: 09/22/22


Principal diagnosis: Symptomatic atrial flutter fibrillation





Objective





- Vital Signs


Vital signs: 


                               Vital Signs - 12hr











  09/21/22 09/22/22 09/22/22





  23:05 03:42 07:47


 


Temperature  98.0 F 97.8 F


 


Pulse Rate 70 70 71


 


Respiratory 18 18 18





Rate   


 


Blood Pressure 112/62 120/69 112/71


 


O2 Sat by Pulse 99 100 100





Oximetry   














  09/22/22





  08:54


 


Temperature 


 


Pulse Rate 


 


Respiratory 





Rate 


 


Blood Pressure 


 


O2 Sat by Pulse 99





Oximetry 














- Lab





                                 09/22/22 04:23





                                 09/22/22 04:23


                             Most recent lab results











Calcium  8.5 mg/dL (8.4-10.2)   09/22/22  04:23    














Medications & Allergies





- Medications


Allergies/Adverse Reactions: 


                                    Allergies





Iodinated Contrast Media Adverse Reaction (Intermediate, Verified 09/19/22 

16:56)


   Swelling


apple Adverse Reaction (Verified 09/19/22 16:56)


   Hives


   yellow and green apples. can eat red. 


shell fish Allergy (Uncoded 09/19/22 16:56)


   Swelling


   allergic to seafood except blue crab 








Home Medications: 


                                Home Medications











 Medication  Instructions  Recorded  Confirmed  Last Taken  Type


 


Gabapentin 100 mg PO BID 04/23/22 09/09/22 Unknown History


 


Pantoprazole [Protonix TAB] 40 mg PO QDAY 04/23/22 09/09/22 04/23/22 10:00 

History


 


methIMAzole [Methimazole] 10 mg PO TID 05/01/22 09/09/22 Unknown History


 


Sennosides/Docusate [Senokot S] 1 tab PO DAILY #30 tablet 05/03/22 09/09/22 

Unknown Rx


 


Nitroglycerin [Nitrostat] 0.4 mg SL .Q5MIN PRN 30 Days #30 05/24/22 09/09/22 

Unknown Rx





 tablet    


 


Apixaban [Eliquis] 2.5 mg PO Q12HR  tablet 07/14/22 09/09/22 Unknown Rx


 


Aspirin EC [Halfprin EC] 81 mg PO QDAY  tablet 07/14/22 09/09/22 Unknown Rx


 


ISOSORBIDE MONOnitrate [Imdur ER] 60 mg PO QDAY  tablet 07/14/22 09/09/22 

Unknown Rx


 


Losartan [Cozaar] 50 mg PO QDAY  tablet 07/14/22 09/09/22 Unknown Rx


 


Metoprolol [Lopressor TAB] 100 mg PO BID #60 tablet 07/14/22 09/09/22 Unknown Rx


 


hydrALAZINE [Apresoline TAB] 50 mg PO Q8HR  tablet 07/14/22 09/09/22 Unknown Rx


 


Amiodarone [Cordarone 200 MG TAB] 200 mg PO BID 30 Days #60 tablet 09/02/22 09/09/22 Unknown Rx


 


Levalbuterol Tartrate 2 puff INHALATION Q6HR PRN 09/02/22 09/09/22 Unknown 

History





[Levalbuterol Tartrate Hfa]     


 


amLODIPine 10 mg PO DAILY 09/02/22 09/09/22 Unknown History


 


carvediloL [Coreg] 25 mg PO BID 09/02/22 09/09/22 Unknown History


 


cloNIDine [Catapres] 0.1 mg PO Q8H 09/02/22 09/09/22 Unknown History


 


HYDROcodone/APAP 5-325 [[place] 1 - 2 each PO Q6HR PRN #10 tablet 09/15/22  

Unknown Rx





5/325]     











Active Medications: 














Generic Name Dose Route Start Last Admin





  Trade Name Freq  PRN Reason Stop Dose Admin


 


Acetaminophen  650 mg  09/19/22 21:50 





  Acetaminophen 325 Mg Tab  PO  





  Q6H PRN  





  Pain, Mild (1-3)  


 


Amlodipine Besylate  10 mg  09/20/22 10:00  09/22/22 09:05





  Amlodipine 10 Mg Tab  PO   Not Given





  DAILY [facility]  


 


Apixaban  2.5 mg  09/20/22 10:00  09/22/22 09:04





  Apixaban 2.5 Mg Tab  PO   2.5 mg





  Q12HR DERIK   Administration


 


Aspirin  81 mg  09/20/22 10:00  09/22/22 09:03





  Aspirin Ec 81 Mg Tab  PO   81 mg





  QDAY DERIK   Administration


 


Atorvastatin Calcium  40 mg  09/19/22 22:00  09/21/22 21:23





  Atorvastatin 40 Mg Tab  PO   40 mg





  [facility]HS DERIK   Administration


 


Carvedilol  25 mg  09/19/22 22:00  09/22/22 09:04





  Carvedilol 25 Mg Tab  PO   Not Given





  BID@0800,1700 [facility]  


 


Clonidine HCl  0.1 mg  09/19/22 22:00  09/22/22 06:34





  Clonidine 0.1 Mg Tab  PO   0.1 mg





  Q8H DERIK   Administration


 


Dextrose  0 ml  09/19/22 21:50 





  Dextrose 50% In Water (25gm) 50 Ml Syringe  IV  





  Q30MIN PRN  





  Hypoglycemia  





  Protocol  


 


Epoetin Teodoro-epbx  20,000 unit  09/20/22 17:00  09/20/22 18:00





  Epoetin Teodoro-Epbx 10,000 Unit/1 Ml Vial  SUB-Q   20,000 unit





  VANCE PRN   Administration





  HEMODIALYSIS  


 


Gabapentin  100 mg  09/19/22 22:00  09/22/22 09:03





  Gabapentin 100 Mg Cap  PO   100 mg





  BID DERIK   Administration


 


Heparin Sodium (Porcine)  3,000 unit  09/20/22 11:45 





  Heparin 10,000 Units/10 Ml Vial  IV  





  VANCE PRN  





  hemodialysis  


 


Hydralazine HCl  50 mg  09/19/22 22:00  09/22/22 06:32





  Hydralazine 25 Mg Tab  PO   50 mg





  Q8HR DERIK   Administration


 


Sodium Chloride  100 mls @ 999 mls/hr  09/20/22 11:45 





  Nacl 0.9%  IV  





  VANCE PRN  





  Hypotension  


 


Insulin Human Lispro  0 unit  09/20/22 00:00  09/22/22 06:35





  Insulin Lispro 100 Unit/Ml  SUB-Q   Not Given





  Q6HR [facility]  





  Protocol  


 


Isosorbide Mononitrate  60 mg  09/20/22 10:00  09/22/22 09:05





  Isosorbide Mononitrate Er 60 Mg Tab  PO   Not Given





  QDAY DERIK  


 


Levalbuterol HCl  1.25 mg  09/19/22 23:00 





  Levalbuterol 1.25 Mg/3 Ml Neb  IH  





  Q6HRT PRN  





  wheezing  


 


Losartan Potassium  50 mg  09/20/22 10:00  09/22/22 09:05





  Losartan 50 Mg Tab  PO   Not Given





  QDAY DERIK  


 


Methimazole  10 mg  09/20/22 08:00  09/22/22 09:04





  Methimazole 5 Mg Tab  PO   10 mg





  TID DERIK   Administration


 


Morphine Sulfate  2 mg  09/19/22 21:50  09/22/22 03:52





  Morphine 2 Mg/1 Ml Inj  IV   2 mg





  Q5MIN PRN   Administration





  Chest Pain unrelieved by NTG  


 


Nicotine  14 mg  09/20/22 10:00  09/22/22 09:03





  Nicotine 14 Mg/24 Hr Patch  TD   14 mg





  QDAY DERIK   Administration


 


Nitroglycerin  0.4 mg  09/19/22 21:53 





  Nitroglycerin 0.4 Mg Tab Subl  SL  





  .Q5MIN PRN  





  Chest Pain  


 


Pantoprazole Sodium  40 mg  09/20/22 10:00  09/22/22 09:03





  Pantoprazole 40 Mg Tab  PO   40 mg





  QDAY [facility]   Administration


 


Senna/Docusate Sodium  1 tab  09/20/22 10:00  09/22/22 09:03





  Sennosides/Docusate Sodium 8.6/50 Mg Tab  PO   1 tab





  DAILY DERIK   Administration


 


Sodium Chloride  10 ml  09/19/22 21:50  09/21/22 21:23





  Sodium Chloride 0.9% 10 Ml Flush Syringe  IV   10 ml





  PRN PRN   Administration





  LINE FLUSH  


 


Sotalol HCl  80 mg  09/21/22 11:00  09/22/22 09:05





  Sotalol 80 Mg Tab  PO   Not Given





  Q12HR DERIK  


 


Tramadol HCl  50 mg  09/19/22 21:50  09/21/22 14:22





  Tramadol 50 Mg Tab  PO   50 mg





  Q6H PRN   Administration





  Pain, Moderate (4-6)

## 2022-09-22 NOTE — PROGRESS NOTE
Assessment and Plan





- Patient Problems


(1) Paroxysmal atrial fibrillation


Current Visit: Yes   Status: Acute   


Plan to address problem: 


Patient has recurrent admissions for palpitations due to paroxysms of atrial 

fibrillation.  We have switched antiarrhythmic therapy from amiodarone to 

sotalol.








Subjective


Date of service: 09/22/22


Principal diagnosis: Symptomatic atrial flutter fibrillation


Interval history: 





Patient is comfortable, looks and feels well, no acute distress.  No new cardiac

events reported.  Routine dialysis is scheduled for today.





Objective


                                   Vital Signs











  Temp Pulse Pulse Resp BP BP Pulse Ox


 


 09/22/22 12:42     18    3 L


 


 09/22/22 11:45   69    115/51  


 


 09/22/22 11:30   70    123/63  


 


 09/22/22 11:15   70    124/63  


 


 09/22/22 11:00   71    121/65  


 


 09/22/22 10:45   69    123/68  


 


 09/22/22 10:30   69    115/66  


 


 09/22/22 10:15   68    125/66  


 


 09/22/22 10:00  98.2 F  69   20  112/63  


 


 09/22/22 08:54        99


 


 09/22/22 07:47  97.8 F  71   18  112/71   100


 


 09/22/22 03:42  98.0 F  70   18  120/69   100


 


 09/21/22 23:05   70   18  112/62   99


 


 09/21/22 22:00    71  18    100


 


 09/21/22 21:02  98.3 F  70   18   95/63  100


 


 09/21/22 15:10  98.2 F  70   18   104/54  100














  Pulse Ox


 


 09/22/22 12:42 


 


 09/22/22 11:45 


 


 09/22/22 11:30 


 


 09/22/22 11:15 


 


 09/22/22 11:00 


 


 09/22/22 10:45 


 


 09/22/22 10:30 


 


 09/22/22 10:15 


 


 09/22/22 10:00  100


 


 09/22/22 08:54 


 


 09/22/22 07:47 


 


 09/22/22 03:42 


 


 09/21/22 23:05 


 


 09/21/22 22:00 


 


 09/21/22 21:02 


 


 09/21/22 15:10 














- Physical Examination


General: No Apparent Distress


HEENT: Positive: PERRL


Neck: Positive: neck supple


Cardiac: Positive: Reg Rate and Rhythm


Lungs: Positive: Decreased Breath Sounds


Neuro: Positive: Grossly Intact


Abdomen: Positive: Soft


Skin: Positive: Clear


Extremities: Absent: edema





- Labs and Meds


                                       CBC











  09/22/22 Range/Units





  04:23 


 


WBC  9.2  (4.5-11.0)  K/mm3


 


RBC  2.78 L  (3.65-5.03)  M/mm3


 


Hgb  8.7 L  (10.1-14.3)  gm/dl


 


Hct  26.7 L  (30.3-42.9)  %


 


Plt Count  188  (140-440)  K/mm3


 


Lymph # (Auto)  1.6  (1.2-5.4)  K/mm3


 


Mono # (Auto)  0.7  (0.0-0.8)  K/mm3


 


Eos # (Auto)  0.3  (0.0-0.4)  K/mm3


 


Baso # (Auto)  0.1  (0.0-0.1)  K/mm3








                          Comprehensive Metabolic Panel











  09/22/22 Range/Units





  04:23 


 


Sodium  132 L D  (137-145)  mmol/L


 


Potassium  4.4  (3.6-5.0)  mmol/L


 


Chloride  93.2 L  ()  mmol/L


 


Carbon Dioxide  28  (22-30)  mmol/L


 


BUN  29 H  (7-17)  mg/dL


 


Creatinine  4.0 H  (0.6-1.2)  mg/dL


 


Glucose  100  ()  mg/dL


 


Calcium  8.5  (8.4-10.2)  mg/dL

## 2022-09-22 NOTE — PROGRESS NOTE
Assessment and Plan


Assessment and plan: 


-- ACS (acute coronary syndrome)


Admit the patient to the medical telemetry.  


Aspirin 81 mg p.o. daily.  Eliquis 2.5 mg p.o. twice a day.  


Nitroglycerin as needed.  Lipitor 40 mg p.o. daily.  Serial cardiac enzymes.  


Echocardiogram.  Cardiology evaluation





-- End stage renal disease on dialysis


Nephrology consulted


Hemodialysis per schedule, avoid nephrotoxins


Renal dosing of medications, closely monitor





--Elevated troponin


In the setting of end-stage renal disease, troponin probably is nonspecific


However patient has multiple risk factors need to rule out acute coronary 

syndrome


Follow cardiology evaluation recommendations, follow echocardiogram and LV 

ejection fraction





--Paroxysmal atrial fibrillation flutter episodes on the monitor


Converted back to sinus rhythm 


Cardiology changed amiodarone to sotalol


Rate controlled, sinus rhythm





-Atrial fibrillation; rate controlled


Cardiologist DC'd amiodarone and started sotalol.  Continue beta-blockers, 

Cardiology following


chronic anticoagulation with Eliquis, follow echocardiogram findings





-- CHF (congestive heart failure) unknown ejection fraction


Fluid restriction.  Daily weight.  Maintain input output.  Echocardiogram.  


Continue home medication.  Cardiology evaluation


LVEF 50 to 55%[from cardiology notes in the past]





--COPD (chronic obstructive pulmonary disease) chronic


Oxygen via nasal catheter per minute.  DuoNeb via nebulizer every 4 hours.  


Albuterol via nebulizer every 4 hours as needed





--Hypertension moderate control


Amlodipine 10 mg p.o. daily.  Coreg 25 mg p.o. twice daily.  


Clonidine 0.1 mg p.o. every 8 hours


As needed hydralazine





--Hyperthyroidism


Methimazole 10 mg p.o. 3 times daily.  


Outpatient follow-up with endocrinologist.








--T2DM (type 2 diabetes mellitus) 


Accu-Chek every 6 hours with Humalog moderate dose coverage.  


Diabetic education.  Continue home medication





--Tobacco abuse


We will counseled the patient regarding quitting smoking.  


We put the patient on nicotine patch





Smoking cessation counseling; 





--DVT prophylaxis


Eliquis 2.5 mg p.o. twice daily for DVT prophylaxis.  


Protonix 40 mg p.o. daily for GI prophylaxis.  Patient is a full code





Closely monitor the patient and adjust management as needed


Plan of care reviewed with the patient and her nurse


Cardiology evaluation and recommendations noted and appreciated





9/21/2022; this morning patient went into paroxysmal A. fib flutter


Later converted to sinus rhythm, patient did not have any symptoms


Cardiology changed amiodarone to sotalol with significant improvement

















History


Interval history: 


I have seen and examined the patient at the bedside


Patient's chart and medications reviewed


Patient feels slightly better


Vital signs noted








Hospitalist Physical





- Constitutional


Vitals: 


                                        











Temp Pulse Resp BP Pulse Ox


 


 97.8 F   71   18   112/71   99 


 


 09/22/22 07:47  09/22/22 07:47  09/22/22 07:47  09/22/22 07:47  09/22/22 08:54











General appearance: Present: no acute distress, well-nourished





- EENT


Eyes: Present: PERRL, EOM intact





- Neck


Neck: Present: supple, normal ROM





- Respiratory


Respiratory effort: normal


Respiratory: bilateral: diminished, negative: rales, rhonchi, wheezing





- Cardiovascular


Rhythm: regular


Heart Sounds: Present: S1 & S2





- Extremities


Extremities: no ischemia, No edema





- Abdominal


General gastrointestinal: soft, non-tender, non-distended, normal bowel sounds





- Integumentary


Integumentary: Present: clear, warm





- Psychiatric


Psychiatric: appropriate mood/affect, cooperative





- Neurologic


Neurologic: moves all extremities





HEART Score





- HEART Score


EKG: Non-specific


Age: > 65


Risk factors: > 3 risk factors or hx of atherosclerotic disease


Troponin: 


                                        











Troponin T  0.089 ng/mL (0.00-0.029)  H  09/20/22  03:52    











Troponin: 1-3x normal limit





Results





- Labs


CBC & Chem 7: 


                                 09/22/22 04:23





                                 09/22/22 04:23


Labs: 


                             Laboratory Last Values











WBC  9.2 K/mm3 (4.5-11.0)   09/22/22  04:23    


 


RBC  2.78 M/mm3 (3.65-5.03)  L  09/22/22  04:23    


 


Hgb  8.7 gm/dl (10.1-14.3)  L  09/22/22  04:23    


 


Hct  26.7 % (30.3-42.9)  L  09/22/22  04:23    


 


MCV  96 fl (79-97)   09/22/22  04:23    


 


MCH  31 pg (28-32)   09/22/22  04:23    


 


MCHC  33 % (30-34)   09/22/22  04:23    


 


RDW  19.9 % (13.2-15.2)  H  09/22/22  04:23    


 


Plt Count  188 K/mm3 (140-440)   09/22/22  04:23    


 


Lymph % (Auto)  17.6 % (13.4-35.0)   09/22/22  04:23    


 


Mono % (Auto)  7.9 % (0.0-7.3)  H  09/22/22  04:23    


 


Eos % (Auto)  2.8 % (0.0-4.3)   09/22/22  04:23    


 


Baso % (Auto)  0.6 % (0.0-1.8)   09/22/22  04:23    


 


Lymph # (Auto)  1.6 K/mm3 (1.2-5.4)   09/22/22  04:23    


 


Mono # (Auto)  0.7 K/mm3 (0.0-0.8)   09/22/22  04:23    


 


Eos # (Auto)  0.3 K/mm3 (0.0-0.4)   09/22/22  04:23    


 


Baso # (Auto)  0.1 K/mm3 (0.0-0.1)   09/22/22  04:23    


 


Seg Neutrophils %  71.1 % (40.0-70.0)  H  09/22/22  04:23    


 


Seg Neutrophils #  6.5 K/mm3 (1.8-7.7)   09/22/22  04:23    


 


Sodium  132 mmol/L (137-145)  L D 09/22/22  04:23    


 


Potassium  4.4 mmol/L (3.6-5.0)   09/22/22  04:23    


 


Chloride  93.2 mmol/L ()  L  09/22/22  04:23    


 


Carbon Dioxide  28 mmol/L (22-30)   09/22/22  04:23    


 


Anion Gap  15 mmol/L  09/22/22  04:23    


 


BUN  29 mg/dL (7-17)  H  09/22/22  04:23    


 


Creatinine  4.0 mg/dL (0.6-1.2)  H  09/22/22  04:23    


 


Estimated GFR  14 ml/min  09/22/22  04:23    


 


BUN/Creatinine Ratio  7 %  09/22/22  04:23    


 


Glucose  100 mg/dL ()   09/22/22  04:23    


 


POC Glucose  107 mg/dL ()  H  09/22/22  05:36    


 


Calcium  8.5 mg/dL (8.4-10.2)   09/22/22  04:23    


 


Total Bilirubin  1.20 mg/dL (0.1-1.2)   09/19/22  17:58    


 


AST  21 units/L (5-40)   09/19/22  17:58    


 


ALT  9 units/L (7-56)   09/19/22  17:58    


 


Alkaline Phosphatase  69 units/L ()   09/19/22  17:58    


 


Troponin T  0.089 ng/mL (0.00-0.029)  H  09/20/22  03:52    


 


Total Protein  6.4 g/dL (6.3-8.2)   09/19/22  17:58    


 


Albumin  4.4 g/dL (3.9-5)   09/19/22  17:58    


 


Albumin/Globulin Ratio  2.2 %  09/19/22  17:58    


 


Triglycerides  91 mg/dL (2-149)   09/20/22  03:52    


 


Cholesterol  144 mg/dL ()   09/20/22  03:52    


 


LDL Cholesterol Direct  67 mg/dL ()   09/20/22  03:52    


 


HDL Cholesterol  65 mg/dL (40-59)  H  09/20/22  03:52    


 


Cholesterol/HDL Ratio  2.21 %  09/20/22  03:52    


 


Hepatitis A IgM Ab  Non-reactive  (NonReactive)   09/20/22  15:40    


 


Hep Bs Antigen  Non-reactive  (Negative)   09/20/22  15:40    


 


Hep B Core IgM Ab  Non-reactive  (NonReactive)   09/20/22  15:40    


 


Hepatitis C Antibody  Non-reactive  (NonReactive)   09/20/22  15:40    











[place]/IV: 


                                        





Voiding Method                   Toilet











Active Medications





- Current Medications


Current Medications: 














Generic Name Dose Route Start Last Admin





  Trade Name Freq  PRN Reason Stop Dose Admin


 


Acetaminophen  650 mg  09/19/22 21:50 





  Acetaminophen 325 Mg Tab  PO  





  Q6H PRN  





  Pain, Mild (1-3)  


 


Amlodipine Besylate  10 mg  09/20/22 10:00  09/22/22 09:05





  Amlodipine 10 Mg Tab  PO   Not Given





  DAILY DERIK  


 


Apixaban  2.5 mg  09/20/22 10:00  09/22/22 09:04





  Apixaban 2.5 Mg Tab  PO   2.5 mg





  Q12HR DERIK   Administration


 


Aspirin  81 mg  09/20/22 10:00  09/22/22 09:03





  Aspirin Ec 81 Mg Tab  PO   81 mg





  QDAY [facility]   Administration


 


Atorvastatin Calcium  40 mg  09/19/22 22:00  09/21/22 21:23





  Atorvastatin 40 Mg Tab  PO   40 mg





  QHS DERIK   Administration


 


Carvedilol  25 mg  09/19/22 22:00  09/22/22 09:04





  Carvedilol 25 Mg Tab  PO   Not Given





  BID@0800,1700 [facility]  


 


Clonidine HCl  0.1 mg  09/19/22 22:00  09/22/22 06:34





  Clonidine 0.1 Mg Tab  PO   0.1 mg





  Q8H DERIK   Administration


 


Dextrose  0 ml  09/19/22 21:50 





  Dextrose 50% In Water (25gm) 50 Ml Syringe  IV  





  Q30MIN PRN  





  Hypoglycemia  





  Protocol  


 


Epoetin Teodoro-epbx  20,000 unit  09/20/22 17:00  09/20/22 18:00





  Epoetin Teodoro-Epbx 10,000 Unit/1 Ml Vial  SUB-Q   20,000 unit





  VANCE PRN   Administration





  HEMODIALYSIS  


 


Gabapentin  100 mg  09/19/22 22:00  09/22/22 09:03





  Gabapentin 100 Mg Cap  PO   100 mg





  BID [facility]   Administration


 


Heparin Sodium (Porcine)  3,000 unit  09/20/22 11:45 





  Heparin 10,000 Units/10 Ml Vial  IV  





  VANCE PRN  





  hemodialysis  


 


Hydralazine HCl  50 mg  09/19/22 22:00  09/22/22 06:32





  Hydralazine 25 Mg Tab  PO   50 mg





  Q8HR [facility]   Administration


 


Sodium Chloride  100 mls @ 999 mls/hr  09/20/22 11:45 





  Nacl 0.9%  IV  





  VANCE PRN  





  Hypotension  


 


Insulin Human Lispro  0 unit  09/20/22 00:00  09/22/22 06:35





  Insulin Lispro 100 Unit/Ml  SUB-Q   Not Given





  Q6HR [facility]  





  Protocol  


 


Isosorbide Mononitrate  60 mg  09/20/22 10:00  09/22/22 09:05





  Isosorbide Mononitrate Er 60 Mg Tab  PO   Not Given





  QDAY [facility]  


 


Levalbuterol HCl  1.25 mg  09/19/22 23:00 





  Levalbuterol 1.25 Mg/3 Ml Neb  IH  





  Q6HRT PRN  





  wheezing  


 


Losartan Potassium  50 mg  09/20/22 10:00  09/22/22 09:05





  Losartan 50 Mg Tab  PO   Not Given





  QDAY DERIK  


 


Methimazole  10 mg  09/20/22 08:00  09/22/22 09:04





  Methimazole 5 Mg Tab  PO   10 mg





  TID DERIK   Administration


 


Morphine Sulfate  2 mg  09/19/22 21:50  09/22/22 03:52





  Morphine 2 Mg/1 Ml Inj  IV   2 mg





  Q5MIN PRN   Administration





  Chest Pain unrelieved by NTG  


 


Nicotine  14 mg  09/20/22 10:00  09/22/22 09:03





  Nicotine 14 Mg/24 Hr Patch  TD   14 mg





  QDAY DERIK   Administration


 


Nitroglycerin  0.4 mg  09/19/22 21:53 





  Nitroglycerin 0.4 Mg Tab Subl  SL  





  .Q5MIN PRN  





  Chest Pain  


 


Pantoprazole Sodium  40 mg  09/20/22 10:00  09/22/22 09:03





  Pantoprazole 40 Mg Tab  PO   40 mg





  QDAY DERIK   Administration


 


Senna/Docusate Sodium  1 tab  09/20/22 10:00  09/22/22 09:03





  Sennosides/Docusate Sodium 8.6/50 Mg Tab  PO   1 tab





  DAILY EDRIK   Administration


 


Sodium Chloride  10 ml  09/19/22 21:50  09/21/22 21:23





  Sodium Chloride 0.9% 10 Ml Flush Syringe  IV   10 ml





  PRN PRN   Administration





  LINE FLUSH  


 


Sotalol HCl  80 mg  09/21/22 11:00  09/22/22 09:05





  Sotalol 80 Mg Tab  PO   Not Given





  Q12HR DERIK  


 


Tramadol HCl  50 mg  09/19/22 21:50  09/21/22 14:22





  Tramadol 50 Mg Tab  PO   50 mg





  Q6H PRN   Administration





  Pain, Moderate (4-6)  














Nutrition/Malnutrition Assess





- Dietary Evaluation


Nutrition/Malnutrition Findings: 


                                        





Nutrition Notes                                            Start:  09/20/22 

10:53


Freq:                                                      Status: Active       




Protocol:                                                                       




 Document     09/20/22 10:53  CM  (Rec: 09/20/22 11:06  CM  [email or web address])


 Co-Sign      09/20/22 10:53  WW


 Nutrition Notes


     Need for Assessment generated from:         MD Order,Education


     Initial or Follow up                        Assessment


     Current Diagnosis                           CKD (stage V CKD),COPD,


                                                 Diabetes,Hypertension,Heart


                                                 Failure,Stroke,Hyperlipidemia


     Other Pertinent Diagnosis                   GERD, ACS, hyperthyroidism


     Current Diet                                Renal Diet


     Labs/Tests                                  9/20:


                                                 BUN 30


                                                 Cr 3.9


     Pertinent Medications                       9/20:


                                                 Atorvastatin


                                                 Humalog


     Height                                      5 ft 5 in


     Weight                                      54.43 kg


     Usual Body Weight                           54.5 kg


     Ideal Body Weight (kg)                      56.81


     BMI                                         20.0


     Intake Prior to Admission                   Good


     Weight change and time frame                No unintentional wt loss PTA


                                                 per malnutrition screening


                                                 tool assessment and patient.


     Weight Status                               Underweight


     Subjective/Other Information                RD consult for diet education.


                                                 Pt reported receiving diet


                                                 education from dietitian at


                                                 dialysis clinic - could not


                                                 recall information provided.


                                                 Provided verbal explanation


                                                 and educational handouts


                                                 regarding renal diet.


                                                 Pt reported intermittent


                                                 decreases in appetite and hadn


                                                 't eaten in 2 days PTA.


                                                 Nutrition-focused physical


                                                 examination performed


                                                 indicating severe muscle


                                                 wasting - mild malnutrition.


                                                 Last BM reported was yesterday


                                                 - denied diarrhea.


                                                 No physical assessment


                                                 available at this time.


     Percent of energy/protein needs met:        Renal Diet provides 2072kcal/


                                                 77g PRO q day (108%/100%)


     Burn                                        Absent


     Trauma                                      Absent


     GI Symptoms                                 None


     Food Allergy                                No


     Skin Integrity/Comment                      N/A


     Minimum of two criteria                     No


     Muscle Mass                                 Moderate Depletion (severe)


     Fluid Accumulation                          N/A


     Reduced  Strength                       N/A (non-severe)


     Protein-Calorie Malnutrition                N\A


     #2


      Nutrition Diagnosis                        Food and nutrition-related


                                                 knowledge deficit


      Etiology                                   Lack of diet education


                                                 retention


      As Evidenced by Signs and Symptoms         Pt inability to recall


                                                 information from previous diet


                                                 education


     #1


      Nutrition Diagnosis                        Malnutrition


      Comments:                                  Mild malnutrition in setting


                                                 of chronic illness


      Etiology                                   ESRD on HD


      As Evidenced by Signs and Symptoms         Severe muscle wasting (temples


                                                 , anterior thigh, calves,


                                                 patellar), underweight BMI for


                                                 advanced age


     Is patient on ventilator?                   No


     Is Patient Ambulatory and/or Out of Bed     Yes


     REE-([facility]-ambulatory/OO[facility]) [     1404.234


      [email or web address]]                               


     Kcal/Kg value to use for calculation        35


     Approximate Energy Requirements Using       1905


      kcal/Kg                                    


     Calculation Used for Recommendations        Kcal/kg


     Additional Notes                            Protein: 1.2-1.5g/kg; 65-82g


                                                 PRO q day


                                                 Fluid: 500mL + total ouput or


                                                 per MD


 Nutrition Intervention


     Change Diet Order:                          Continue renal diet


     Add Supplement/Snack (indicate name/kcal    Nepro (Variety) BID


      /protein )                                 


     Provides kCal:                              850


     Provides Protein (gm)                       38


     Teaching Methods                            Discussion,Handout


     Response to Teaching                        Verbalize understanding


     Education Handouts Provided                 NCM - Potassium / Phosphorus


                                                 Content of Foods / CKD Stage 3


                                                 -5 Nutrition Therapy


     Barriers to Learning                        No Barriers


     RD phone number provided                    Yes


     Patient aware of follow up options          Yes


     Goal #1                                     Pt to consume >75% of


                                                 estimated energy/protein needs


                                                 through current diet order /


                                                 supplementation


     Goal #2                                     Pt to maintain current wt


                                                 status within 2.5% during LOS


                                                 with exception of fluid loss/


                                                 retention


     Goal #3                                     Pt to retain and recall 2


                                                 pieces of information from


                                                 diet education.


     Follow-Up By:                               09/23/22


     Additional Comments                         Monitor %PO intake, wt status,


                                                 nutrition-related lab values,


                                                 need for further diet


                                                 education.

## 2022-09-23 VITALS — DIASTOLIC BLOOD PRESSURE: 64 MMHG | SYSTOLIC BLOOD PRESSURE: 107 MMHG

## 2022-09-23 RX ADMIN — ASPIRIN SCH MG: 81 TABLET, COATED ORAL at 09:27

## 2022-09-23 RX ADMIN — APIXABAN SCH MG: 2.5 TABLET, FILM COATED ORAL at 09:27

## 2022-09-23 RX ADMIN — INSULIN LISPRO SCH: 100 INJECTION, SOLUTION INTRAVENOUS; SUBCUTANEOUS at 13:20

## 2022-09-23 RX ADMIN — LOSARTAN POTASSIUM SCH MG: 50 TABLET, FILM COATED ORAL at 09:27

## 2022-09-23 RX ADMIN — NICOTINE SCH MG: 14 PATCH TRANSDERMAL at 09:26

## 2022-09-23 RX ADMIN — INSULIN LISPRO SCH: 100 INJECTION, SOLUTION INTRAVENOUS; SUBCUTANEOUS at 01:04

## 2022-09-23 RX ADMIN — SENNOSIDES AND DOCUSATE SODIUM SCH TAB: 50; 8.6 TABLET ORAL at 09:27

## 2022-09-23 RX ADMIN — PANTOPRAZOLE SODIUM SCH MG: 40 TABLET, DELAYED RELEASE ORAL at 09:27

## 2022-09-23 RX ADMIN — GABAPENTIN SCH MG: 100 CAPSULE ORAL at 09:35

## 2022-09-23 RX ADMIN — INSULIN LISPRO SCH: 100 INJECTION, SOLUTION INTRAVENOUS; SUBCUTANEOUS at 05:53

## 2022-09-23 NOTE — PROGRESS NOTE
Assessment and Plan





1. ESRD:


Patient is on maintenance hemodialysis, TTS schedule.


Hemodialysis: 9/20, 9/22.





2. FEN:


UF with HD as tolerated.


Monitor lytes and volume status.





3. Paroxysmal A.fib with RVR:


SR now.


Sotalol per Cards.


Monitor.





4. Chest pain //  H/o CAD s/p PCI:


Seen by Cards.


Monitor.





5. Chronic HFpEF:


Volume control thru HD.


Fluid restriction, monitor I/O.





6. H/o COPD:


Nebs and O2 as needed.





7. Hypertension:


Volume control with HD.


Adjust BP meds as needed.


Monitor BP.





8. Normochromic anemia, POA:


Chronic.


Likely 2/2 ESRD.


Epogen as needed.











Subjective:


Patient was seen and examined at the bedside.


Doing better.


Wants to go home.











Examination:


General appearance: well-developed, appears stated age, no distress


HEENT: atraumatic, IVETTE


Neck: trachea midline


Respiratory: ctab


Heart: S1S2, no murmur


Abdomen: soft, bowel sounds heard, NT


Integumentary: no obvious rash


Neurologic: AO, able to move extremities


Ext: no edema


Hemodialysis access: R arm AVG








Subjective


Date of service: 09/23/22


Principal diagnosis: Symptomatic atrial flutter fibrillation





Objective





- Vital Signs


Vital signs: 


                               Vital Signs - 12hr











  09/22/22 09/23/22 09/23/22





  22:00 00:53 03:10


 


Temperature  99.1 F 98.5 F


 


Pulse Rate  71 73


 


Respiratory 18 18 19





Rate   


 


Blood Pressure   115/64


 


Blood Pressure  99/63 





[Left]   


 


O2 Sat by Pulse 3 L 96 100





Oximetry   














- Lab





                                 09/22/22 04:23





                                 09/22/22 04:23


                             Most recent lab results











Calcium  8.5 mg/dL (8.4-10.2)   09/22/22  04:23    














Medications & Allergies





- Medications


Allergies/Adverse Reactions: 


                                    Allergies





Iodinated Contrast Media Adverse Reaction (Intermediate, Verified 09/19/22 

16:56)


   Swelling


apple Adverse Reaction (Verified 09/19/22 16:56)


   Hives


   yellow and green apples. can eat red. 


shell fish Allergy (Uncoded 09/19/22 16:56)


   Swelling


   allergic to seafood except blue crab 








Home Medications: 


                                Home Medications











 Medication  Instructions  Recorded  Confirmed  Last Taken  Type


 


Gabapentin 100 mg PO BID 04/23/22 09/09/22 Unknown History


 


Pantoprazole [Protonix TAB] 40 mg PO QDAY 04/23/22 09/09/22 04/23/22 10:00 

History


 


methIMAzole [Methimazole] 10 mg PO TID 05/01/22 09/09/22 Unknown History


 


Sennosides/Docusate [Senokot S] 1 tab PO DAILY #30 tablet 05/03/22 09/09/22 

Unknown Rx


 


Nitroglycerin [Nitrostat] 0.4 mg SL .Q5MIN PRN 30 Days #30 05/24/22 09/09/22 

Unknown Rx





 tablet    


 


Apixaban [Eliquis] 2.5 mg PO Q12HR  tablet 07/14/22 09/09/22 Unknown Rx


 


Aspirin EC [Halfprin EC] 81 mg PO QDAY  tablet 07/14/22 09/09/22 Unknown Rx


 


ISOSORBIDE MONOnitrate [Imdur ER] 60 mg PO QDAY  tablet 07/14/22 09/09/22 

Unknown Rx


 


Losartan [Cozaar] 50 mg PO QDAY  tablet 07/14/22 09/09/22 Unknown Rx


 


hydrALAZINE [Apresoline TAB] 50 mg PO Q8HR  tablet 07/14/22 09/09/22 Unknown Rx


 


Levalbuterol Tartrate 2 puff INHALATION Q6HR PRN 09/02/22 09/09/22 Unknown 

History





[Levalbuterol Tartrate Hfa]     


 


amLODIPine 10 mg PO DAILY 09/02/22 09/09/22 Unknown History


 


carvediloL [Coreg] 25 mg PO BID 09/02/22 09/09/22 Unknown History


 


cloNIDine [Catapres] 0.1 mg PO Q8H 09/02/22 09/09/22 Unknown History


 


HYDROcodone/APAP 5-325 [Vivian 1 - 2 each PO Q6HR PRN #10 tablet 09/15/22  

Unknown Rx





5-325 mg TAB]     


 


Nicotine [Habitrol] 14 mg TD QDAY #30 patch 09/23/22  Unknown Rx


 


sotaloL [Betapace] 80 mg PO Q12HR #60 tablet 09/23/22  Unknown Rx











Active Medications: 














Generic Name Dose Route Start Last Admin





  Trade Name Freq  PRN Reason Stop Dose Admin


 


Acetaminophen  650 mg  09/19/22 21:50 





  Acetaminophen 325 Mg Tab  PO  





  Q6H PRN  





  Pain, Mild (1-3)  


 


Amlodipine Besylate  10 mg  09/20/22 10:00  09/22/22 09:05





  Amlodipine 10 Mg Tab  PO   Not Given





  DAILY DERIK  


 


Apixaban  2.5 mg  09/20/22 10:00  09/22/22 22:16





  Apixaban 2.5 Mg Tab  PO   2.5 mg





  Q12HR DERIK   Administration


 


Aspirin  81 mg  09/20/22 10:00  09/22/22 09:03





  Aspirin Ec 81 Mg Tab  PO   81 mg





  QDAY DERIK   Administration


 


Atorvastatin Calcium  40 mg  09/19/22 22:00  09/22/22 22:16





  Atorvastatin 40 Mg Tab  PO   40 mg





  QHS DERIK   Administration


 


Carvedilol  25 mg  09/19/22 22:00  09/22/22 16:24





  Carvedilol 25 Mg Tab  PO   25 mg





  BID@0800,1700 DERIK   Administration


 


Clonidine HCl  0.1 mg  09/19/22 22:00  09/23/22 05:57





  Clonidine 0.1 Mg Tab  PO   0.1 mg





  Q8H DERIK   Administration


 


Dextrose  0 ml  09/19/22 21:50 





  Dextrose 50% In Water (25gm) 50 Ml Syringe  IV  





  Q30MIN PRN  





  Hypoglycemia  





  Protocol  


 


Epoetin Teodoro-epbx  20,000 unit  09/20/22 17:00  09/20/22 18:00





  Epoetin Teodoro-Epbx 10,000 Unit/1 Ml Vial  SUB-Q   20,000 unit





  VANCE PRN   Administration





  HEMODIALYSIS  


 


Gabapentin  100 mg  09/19/22 22:00  09/22/22 22:16





  Gabapentin 100 Mg Cap  PO   100 mg





  BID DERIK   Administration


 


Heparin Sodium (Porcine)  3,000 unit  09/20/22 11:45 





  Heparin 10,000 Units/10 Ml Vial  IV  





  VANCE PRN  





  hemodialysis  


 


Hydralazine HCl  50 mg  09/19/22 22:00  09/23/22 05:57





  Hydralazine 25 Mg Tab  PO   50 mg





  Q8HR DERIK   Administration


 


Sodium Chloride  100 mls @ 999 mls/hr  09/20/22 11:45 





  Nacl 0.9%  IV  





  VANCE PRN  





  Hypotension  


 


Insulin Human Lispro  0 unit  09/20/22 00:00  09/23/22 05:53





  Insulin Lispro 100 Unit/Ml  SUB-Q   Not Given





  Q6HR [facility]  





  Protocol  


 


Isosorbide Mononitrate  60 mg  09/20/22 10:00  09/22/22 09:05





  Isosorbide Mononitrate Er 60 Mg Tab  PO   Not Given





  QDAY DERIK  


 


Levalbuterol HCl  1.25 mg  09/19/22 23:00 





  Levalbuterol 1.25 Mg/3 Ml Neb  IH  





  Q6HRT PRN  





  wheezing  


 


Losartan Potassium  50 mg  09/20/22 10:00  09/22/22 09:05





  Losartan 50 Mg Tab  PO   Not Given





  QDAY DERIK  


 


Methimazole  10 mg  09/20/22 08:00  09/22/22 22:15





  Methimazole 5 Mg Tab  PO   10 mg





  TID DERIK   Administration


 


Morphine Sulfate  2 mg  09/19/22 21:50  09/22/22 03:52





  Morphine 2 Mg/1 Ml Inj  IV   2 mg





  Q5MIN PRN   Administration





  Chest Pain unrelieved by NTG  


 


Nicotine  14 mg  09/20/22 10:00  09/22/22 09:03





  Nicotine 14 Mg/24 Hr Patch  TD   14 mg





  QDAY DERIK   Administration


 


Nitroglycerin  0.4 mg  09/19/22 21:53 





  Nitroglycerin 0.4 Mg Tab Subl  SL  





  .Q5MIN PRN  





  Chest Pain  


 


Pantoprazole Sodium  40 mg  09/20/22 10:00  09/22/22 09:03





  Pantoprazole 40 Mg Tab  PO   40 mg





  QDAY DERIK   Administration


 


Senna/Docusate Sodium  1 tab  09/20/22 10:00  09/22/22 09:03





  Sennosides/Docusate Sodium 8.6/50 Mg Tab  PO   1 tab





  DAILY DERIK   Administration


 


Sodium Chloride  10 ml  09/19/22 21:50  09/21/22 21:23





  Sodium Chloride 0.9% 10 Ml Flush Syringe  IV   10 ml





  PRN PRN   Administration





  LINE FLUSH  


 


Sotalol HCl  80 mg  09/21/22 11:00  09/22/22 22:15





  Sotalol 80 Mg Tab  PO   80 mg





  Q12HR DERIK   Administration


 


Tramadol HCl  50 mg  09/19/22 21:50  09/23/22 05:57





  Tramadol 50 Mg Tab  PO   50 mg





  Q6H PRN   Administration





  Pain, Moderate (4-6)

## 2022-09-23 NOTE — DISCHARGE SUMMARY
Providers





- Providers


Date of Admission: 


09/19/22 21:51





Date of discharge: 09/23/22


Attending physician: 


AMY J KOCHERLA





                                        





09/19/22


Consult to Cardiac Rehabilitation [CONS] Routine 


   Reason For Exam: Phase I





09/19/22 21:51


Consult to Cardiology [CONS] Routine 


   Consulting Provider: AUSTIN CORONEL


   Reason For Exam: acs


Consult to Dietitian/Nutrition [CONS] Routine 


   Physician Instructions: 


   Reason For Exam: 


   Reason for Consult: Diet education





09/19/22 22:24


Consult to Physician [CONS] Routine 


   Comment: 


   Consulting Provider: EMELINA CH


   Physician Instructions: 


   Reason For Exam: esrd











Primary care physician: 


RA BALTAZAR








Hospitalization


Reason for admission: Chest pain


Condition: Stable


Pertinent studies: 





Chest x-ray


Hospital course: 


-- ACS (acute coronary syndrome)


Admit the patient to the medical telemetry.  


Aspirin 81 mg p.o. daily.  Eliquis 2.5 mg p.o. twice a day.  


Nitroglycerin as needed.  Lipitor 40 mg p.o. daily.  Serial cardiac enzymes.  


Echocardiogram.  Cardiology evaluation





-- End stage renal disease on dialysis


Nephrology consulted


Hemodialysis per schedule, avoid nephrotoxins


Renal dosing of medications, closely monitor





--Elevated troponin


In the setting of end-stage renal disease, troponin probably is nonspecific


However patient has multiple risk factors need to rule out acute coronary 

syndrome


Follow cardiology evaluation recommendations, follow echocardiogram and LV 

ejection fraction





--Paroxysmal atrial fibrillation flutter episodes on the monitor


Converted back to sinus rhythm 


Patient is on amiodarone, carvedilol, cardiology following





-Atrial fibrillation; rate controlled


Cardiologist DC'd amiodarone and started sotalol.  Continue beta-blockers, 

Cardiology following


chronic anticoagulation with Eliquis, follow echocardiogram findings





-- Acute on chronic diastolic CHF (congestive heart failure) LVEF 50 to 55% 


Fluid restriction.  Daily weight.  Maintain input output.  Echocardiogram.  


Continue home medication.  Cardiology evaluation


LVEF 50 to 55%[from cardiology notes in the past]





--COPD (chronic obstructive pulmonary disease) well compensated


Oxygen via nasal catheter per minute.  DuoNeb via nebulizer every 4 hours.  


Albuterol via nebulizer every 4 hours as needed





--Hypertension moderate control


Amlodipine 10 mg p.o. daily.  Coreg 25 mg p.o. twice daily.  


Clonidine 0.1 mg p.o. every 8 hours


As needed hydralazine





--Hyperthyroidism


Methimazole 10 mg p.o. 3 times daily.  


Outpatient follow-up with endocrinologist.





--T2DM (type 2 diabetes mellitus) 


Accu-Chek every 6 hours with Humalog moderate dose coverage.  


Diabetic education.  Continue home medication





--Tobacco abuse


We will counseled the patient regarding quitting smoking.  


We put the patient on nicotine patch





Smoking cessation counseling; 











Disposition: 06 HOME HEALTH CARE SERVICE


Final Discharge Diagnosis (Prints w/discharge instructions): Acute coronary 

syndrome.  End-stage renal disease on hemodialysis.  Elevated troponin.  

Paroxysmal atrial fibrillation and flutter.  Atrial fibrillation rate 

controlled.  Acute on chronic diastolic congestive heart failure.  COPD well 

compensated.  Hypertension.  Hyperthyroidism.  Type 2 diabetes mellitus.  

Ongoing tobacco use


Time spent for discharge: 35 minutes





Core Measure Documentation





- Palliative Care


Palliative Care/ Comfort Measures: Not Applicable





- Core Measures


Any of the following diagnoses?: none





Exam





- Constitutional


Vitals: 


                                        











Temp Pulse Resp BP Pulse Ox


 


 98.5 F   73   19   115/64   100 


 


 09/23/22 03:10  09/23/22 03:10  09/23/22 03:10  09/23/22 03:10  09/23/22 03:10











General appearance: Present: no acute distress, well-nourished





- EENT


Eyes: Present: PERRL, EOM intact





- Neck


Neck: Present: supple, normal ROM





- Respiratory


Respiratory effort: normal


Respiratory: bilateral: diminished, negative: rales, rhonchi, wheezing





- Cardiovascular


Rhythm: regular


Heart Sounds: Present: S1 & S2





- Extremities


Extremities: no ischemia, No edema





- Abdominal


General gastrointestinal: Present: soft, non-tender, non-distended, normal bowel

sounds





- Integumentary


Integumentary: Present: clear, warm





- Musculoskeletal


Musculoskeletal: strength equal bilaterally, generalized weakness





- Psychiatric


Psychiatric: appropriate mood/affect, cooperative





- Neurologic


Neurologic: moves all extremities





Plan


Activity: advance as tolerated, fall precautions


Diet: other (Cardiac diet as tolerated)


Additional Instructions: If you have worsening symptoms contact MD or go to the 

nearest emergency room as needed.  Follow renal and hemodialysis per schedule 

TTS.  Strongly advised to comply with medications diet follow-up visits.  

Advised to follow with your primary care physician in 1 week.  Advised to follow

your private cardiologist in 1 to 2 weeks


Follow up with: 


AUSTIN CORONEL MD [Staff Physician] - 14 Days


RA BALTAZAR MD [Primary Care Provider] - 7 Days


EMELINA CH MD [Staff Physician] - 7 Days


Prescriptions: 


sotaloL [Betapace] 80 mg PO Q12HR #60 tablet


Nicotine [Habitrol] 14 mg TD QDAY #30 patch

## 2022-09-23 NOTE — PROGRESS NOTE
Assessment and Plan


Assessment and plan: 


-- ACS (acute coronary syndrome)


Admit the patient to the medical telemetry.  


Aspirin 81 mg p.o. daily.  Eliquis 2.5 mg p.o. twice a day.  


Nitroglycerin as needed.  Lipitor 40 mg p.o. daily.  Serial cardiac enzymes.  


Echocardiogram.  Cardiology evaluation





-- End stage renal disease on dialysis


Nephrology consulted


Hemodialysis per schedule, avoid nephrotoxins


Renal dosing of medications, closely monitor





--Elevated troponin


In the setting of end-stage renal disease, troponin probably is nonspecific


However patient has multiple risk factors need to rule out acute coronary 

syndrome


Follow cardiology evaluation recommendations, follow echocardiogram and LV 

ejection fraction





--Paroxysmal atrial fibrillation flutter episodes on the monitor


Converted back to sinus rhythm 


Patient is on amiodarone, carvedilol, cardiology following





-Atrial fibrillation; rate controlled


Cardiologist DC'd amiodarone and started sotalol.  Continue beta-blockers, 

Cardiology following


chronic anticoagulation with Eliquis, follow echocardiogram findings





-- CHF (congestive heart failure) unknown ejection fraction


Fluid restriction.  Daily weight.  Maintain input output.  Echocardiogram.  


Continue home medication.  Cardiology evaluation


LVEF 50 to 55%[from cardiology notes in the past]





--COPD (chronic obstructive pulmonary disease) chronic


Oxygen via nasal catheter per minute.  DuoNeb via nebulizer every 4 hours.  


Albuterol via nebulizer every 4 hours as needed





--Hypertension moderate control


Amlodipine 10 mg p.o. daily.  Coreg 25 mg p.o. twice daily.  


Clonidine 0.1 mg p.o. every 8 hours


As needed hydralazine





--Hyperthyroidism


Methimazole 10 mg p.o. 3 times daily.  


Outpatient follow-up with endocrinologist.








--T2DM (type 2 diabetes mellitus) 


Accu-Chek every 6 hours with Humalog moderate dose coverage.  


Diabetic education.  Continue home medication





--Tobacco abuse


We will counseled the patient regarding quitting smoking.  


We put the patient on nicotine patch





Smoking cessation counseling; 





--DVT prophylaxis


Eliquis 2.5 mg p.o. twice daily for DVT prophylaxis.  


Protonix 40 mg p.o. daily for GI prophylaxis.  Patient is a full code





Closely monitor the patient and adjust management as needed


Plan of care reviewed with the patient and her nurse


Cardiology evaluation and recommendations noted and appreciated





9/21/2022; this morning patient went into paroxysmal A. fib flutter


Later converted to sinus rhythm, patient did not have any symptoms


Cardiology following

















Hospitalist Physical





- Constitutional


Vitals: 


                                        











Temp Pulse Resp BP Pulse Ox


 


 98.5 F   73   19   115/64   100 


 


 09/23/22 03:10  09/23/22 03:10  09/23/22 03:10  09/23/22 03:10  09/23/22 03:10











General appearance: Present: no acute distress, well-nourished





HEART Score





- HEART Score


EKG: Non-specific


Age: > 65


Risk factors: > 3 risk factors or hx of atherosclerotic disease


Troponin: 


                                        











Troponin T  0.089 ng/mL (0.00-0.029)  H  09/20/22  03:52    











Troponin: 1-3x normal limit





Results





- Labs


CBC & Chem 7: 


                                 09/22/22 04:23





                                 09/22/22 04:23


Labs: 


                             Laboratory Last Values











WBC  9.2 K/mm3 (4.5-11.0)   09/22/22  04:23    


 


RBC  2.78 M/mm3 (3.65-5.03)  L  09/22/22  04:23    


 


Hgb  8.7 gm/dl (10.1-14.3)  L  09/22/22  04:23    


 


Hct  26.7 % (30.3-42.9)  L  09/22/22  04:23    


 


MCV  96 fl (79-97)   09/22/22  04:23    


 


MCH  31 pg (28-32)   09/22/22  04:23    


 


MCHC  33 % (30-34)   09/22/22  04:23    


 


RDW  19.9 % (13.2-15.2)  H  09/22/22  04:23    


 


Plt Count  188 K/mm3 (140-440)   09/22/22  04:23    


 


Lymph % (Auto)  17.6 % (13.4-35.0)   09/22/22  04:23    


 


Mono % (Auto)  7.9 % (0.0-7.3)  H  09/22/22  04:23    


 


Eos % (Auto)  2.8 % (0.0-4.3)   09/22/22  04:23    


 


Baso % (Auto)  0.6 % (0.0-1.8)   09/22/22  04:23    


 


Lymph # (Auto)  1.6 K/mm3 (1.2-5.4)   09/22/22  04:23    


 


Mono # (Auto)  0.7 K/mm3 (0.0-0.8)   09/22/22  04:23    


 


Eos # (Auto)  0.3 K/mm3 (0.0-0.4)   09/22/22  04:23    


 


Baso # (Auto)  0.1 K/mm3 (0.0-0.1)   09/22/22  04:23    


 


Seg Neutrophils %  71.1 % (40.0-70.0)  H  09/22/22  04:23    


 


Seg Neutrophils #  6.5 K/mm3 (1.8-7.7)   09/22/22  04:23    


 


Sodium  132 mmol/L (137-145)  L D 09/22/22  04:23    


 


Potassium  4.4 mmol/L (3.6-5.0)   09/22/22  04:23    


 


Chloride  93.2 mmol/L ()  L  09/22/22  04:23    


 


Carbon Dioxide  28 mmol/L (22-30)   09/22/22  04:23    


 


Anion Gap  15 mmol/L  09/22/22  04:23    


 


BUN  29 mg/dL (7-17)  H  09/22/22  04:23    


 


Creatinine  4.0 mg/dL (0.6-1.2)  H  09/22/22  04:23    


 


Estimated GFR  14 ml/min  09/22/22  04:23    


 


BUN/Creatinine Ratio  7 %  09/22/22  04:23    


 


Glucose  100 mg/dL ()   09/22/22  04:23    


 


POC Glucose  123 mg/dL ()  H  09/23/22  05:03    


 


Calcium  8.5 mg/dL (8.4-10.2)   09/22/22  04:23    


 


Total Bilirubin  1.20 mg/dL (0.1-1.2)   09/19/22  17:58    


 


AST  21 units/L (5-40)   09/19/22  17:58    


 


ALT  9 units/L (7-56)   09/19/22  17:58    


 


Alkaline Phosphatase  69 units/L ()   09/19/22  17:58    


 


Troponin T  0.089 ng/mL (0.00-0.029)  H  09/20/22  03:52    


 


Total Protein  6.4 g/dL (6.3-8.2)   09/19/22  17:58    


 


Albumin  4.4 g/dL (3.9-5)   09/19/22  17:58    


 


Albumin/Globulin Ratio  2.2 %  09/19/22  17:58    


 


Triglycerides  91 mg/dL (2-149)   09/20/22  03:52    


 


Cholesterol  144 mg/dL ()   09/20/22  03:52    


 


LDL Cholesterol Direct  67 mg/dL ()   09/20/22  03:52    


 


HDL Cholesterol  65 mg/dL (40-59)  H  09/20/22  03:52    


 


Cholesterol/HDL Ratio  2.21 %  09/20/22  03:52    


 


Hepatitis A IgM Ab  Non-reactive  (NonReactive)   09/20/22  15:40    


 


Hep Bs Antigen  Non-reactive  (Negative)   09/20/22  15:40    


 


Hep B Core IgM Ab  Non-reactive  (NonReactive)   09/20/22  15:40    


 


Hepatitis C Antibody  Non-reactive  (NonReactive)   09/20/22  15:40    











[place]/IV: 


                                        





Voiding Method                   Toilet











Active Medications





- Current Medications


Current Medications: 














Generic Name Dose Route Start Last Admin





  Trade Name Freq  PRN Reason Stop Dose Admin


 


Acetaminophen  650 mg  09/19/22 21:50 





  Acetaminophen 325 Mg Tab  PO  





  Q6H PRN  





  Pain, Mild (1-3)  


 


Amlodipine Besylate  10 mg  09/20/22 10:00  09/22/22 09:05





  Amlodipine 10 Mg Tab  PO   Not Given





  DAILY [facility]  


 


Apixaban  2.5 mg  09/20/22 10:00  09/22/22 22:16





  Apixaban 2.5 Mg Tab  PO   2.5 mg





  Q12HR DERIK   Administration


 


Aspirin  81 mg  09/20/22 10:00  09/22/22 09:03





  Aspirin Ec 81 Mg Tab  PO   81 mg





  QDAY DERIK   Administration


 


Atorvastatin Calcium  40 mg  09/19/22 22:00  09/22/22 22:16





  Atorvastatin 40 Mg Tab  PO   40 mg





  QHS DERIK   Administration


 


Carvedilol  25 mg  09/19/22 22:00  09/22/22 16:24





  Carvedilol 25 Mg Tab  PO   25 mg





  BID@0800,1700 DERIK   Administration


 


Clonidine HCl  0.1 mg  09/19/22 22:00  09/23/22 05:57





  Clonidine 0.1 Mg Tab  PO   0.1 mg





  Q8H DERIK   Administration


 


Dextrose  0 ml  09/19/22 21:50 





  Dextrose 50% In Water (25gm) 50 Ml Syringe  IV  





  Q30MIN PRN  





  Hypoglycemia  





  Protocol  


 


Epoetin Teodoro-epbx  20,000 unit  09/20/22 17:00  09/20/22 18:00





  Epoetin Teodoro-Epbx 10,000 Unit/1 Ml Vial  SUB-Q   20,000 unit





  VANCE PRN   Administration





  HEMODIALYSIS  


 


Gabapentin  100 mg  09/19/22 22:00  09/22/22 22:16





  Gabapentin 100 Mg Cap  PO   100 mg





  BID DERIK   Administration


 


Heparin Sodium (Porcine)  3,000 unit  09/20/22 11:45 





  Heparin 10,000 Units/10 Ml Vial  IV  





  VANCE PRN  





  hemodialysis  


 


Hydralazine HCl  50 mg  09/19/22 22:00  09/23/22 05:57





  Hydralazine 25 Mg Tab  PO   50 mg





  Q8HR DERIK   Administration


 


Sodium Chloride  100 mls @ 999 mls/hr  09/20/22 11:45 





  Nacl 0.9%  IV  





  VANCE PRN  





  Hypotension  


 


Insulin Human Lispro  0 unit  09/20/22 00:00  09/23/22 05:53





  Insulin Lispro 100 Unit/Ml  SUB-Q   Not Given





  Q6HR [facility]  





  Protocol  


 


Isosorbide Mononitrate  60 mg  09/20/22 10:00  09/22/22 09:05





  Isosorbide Mononitrate Er 60 Mg Tab  PO   Not Given





  QDAY [facility]  


 


Levalbuterol HCl  1.25 mg  09/19/22 23:00 





  Levalbuterol 1.25 Mg/3 Ml Neb  IH  





  Q6HRT PRN  





  wheezing  


 


Losartan Potassium  50 mg  09/20/22 10:00  09/22/22 09:05





  Losartan 50 Mg Tab  PO   Not Given





  QDAY DERIK  


 


Methimazole  10 mg  09/20/22 08:00  09/22/22 22:15





  Methimazole 5 Mg Tab  PO   10 mg





  TID [facility]   Administration


 


Morphine Sulfate  2 mg  09/19/22 21:50  09/22/22 03:52





  Morphine 2 Mg/1 Ml Inj  IV   2 mg





  Q5MIN PRN   Administration





  Chest Pain unrelieved by NTG  


 


Nicotine  14 mg  09/20/22 10:00  09/22/22 09:03





  Nicotine 14 Mg/24 Hr Patch  TD   14 mg





  QDAY [facility]   Administration


 


Nitroglycerin  0.4 mg  09/19/22 21:53 





  Nitroglycerin 0.4 Mg Tab Subl  SL  





  .Q5MIN PRN  





  Chest Pain  


 


Pantoprazole Sodium  40 mg  09/20/22 10:00  09/22/22 09:03





  Pantoprazole 40 Mg Tab  PO   40 mg





  QDAY DERIK   Administration


 


Senna/Docusate Sodium  1 tab  09/20/22 10:00  09/22/22 09:03





  Sennosides/Docusate Sodium 8.6/50 Mg Tab  PO   1 tab





  DAILY DERIK   Administration


 


Sodium Chloride  10 ml  09/19/22 21:50  09/21/22 21:23





  Sodium Chloride 0.9% 10 Ml Flush Syringe  IV   10 ml





  PRN PRN   Administration





  LINE FLUSH  


 


Sotalol HCl  80 mg  09/21/22 11:00  09/22/22 22:15





  Sotalol 80 Mg Tab  PO   80 mg





  Q12HR DERIK   Administration


 


Tramadol HCl  50 mg  09/19/22 21:50  09/23/22 05:57





  Tramadol 50 Mg Tab  PO   50 mg





  Q6H PRN   Administration





  Pain, Moderate (4-6)  














Nutrition/Malnutrition Assess





- Dietary Evaluation


Nutrition/Malnutrition Findings: 


                                        





Nutrition Notes                                            Start:  09/20/22 

10:53


Freq:                                                      Status: Active       




Protocol:                                                                       




 Document     09/20/22 10:53  CM  (Rec: 09/20/22 11:06  CM  [id number])


 Co-Sign      09/20/22 10:53  WW


 Nutrition Notes


     Need for Assessment generated from:         MD Order,Education


     Initial or Follow up                        Assessment


     Current Diagnosis                           CKD (stage V CKD),COPD,


                                                 Diabetes,Hypertension,Heart


                                                 Failure,Stroke,Hyperlipidemia


     Other Pertinent Diagnosis                   GERD, ACS, hyperthyroidism


     Current Diet                                Renal Diet


     Labs/Tests                                  9/20:


                                                 BUN 30


                                                 Cr 3.9


     Pertinent Medications                       9/20:


                                                 Atorvastatin


                                                 Humalog


     Height                                      5 ft 5 in


     Weight                                      54.43 kg


     Usual Body Weight                           54.5 kg


     Ideal Body Weight (kg)                      56.81


     BMI                                         20.0


     Intake Prior to Admission                   Good


     Weight change and time frame                No unintentional wt loss PTA


                                                 per malnutrition screening


                                                 tool assessment and patient.


     Weight Status                               Underweight


     Subjective/Other Information                RD consult for diet education.


                                                 Pt reported receiving diet


                                                 education from dietitian at


                                                 dialysis clinic - could not


                                                 recall information provided.


                                                 Provided verbal explanation


                                                 and educational handouts


                                                 regarding renal diet.


                                                 Pt reported intermittent


                                                 decreases in appetite and hadn


                                                 't eaten in 2 days PTA.


                                                 Nutrition-focused physical


                                                 examination performed


                                                 indicating severe muscle


                                                 wasting - mild malnutrition.


                                                 Last BM reported was yesterday


                                                 - denied diarrhea.


                                                 No physical assessment


                                                 available at this time.


     Percent of energy/protein needs met:        Renal Diet provides 2072kcal/


                                                 77g PRO q day (108%/100%)


     Burn                                        Absent


     Trauma                                      Absent


     GI Symptoms                                 None


     Food Allergy                                No


     Skin Integrity/Comment                      N/A


     Minimum of two criteria                     No


     Muscle Mass                                 Moderate Depletion (severe)


     Fluid Accumulation                          N/A


     Reduced  Strength                       N/A (non-severe)


     Protein-Calorie Malnutrition                N\A


     #2


      Nutrition Diagnosis                        Food and nutrition-related


                                                 knowledge deficit


      Etiology                                   Lack of diet education


                                                 retention


      As Evidenced by Signs and Symptoms         Pt inability to recall


                                                 information from previous diet


                                                 education


     #1


      Nutrition Diagnosis                        Malnutrition


      Comments:                                  Mild malnutrition in setting


                                                 of chronic illness


      Etiology                                   ESRD on HD


      As Evidenced by Signs and Symptoms         Severe muscle wasting (temples


                                                 , anterior thigh, calves,


                                                 patellar), underweight BMI for


                                                 advanced age


     Is patient on ventilator?                   No


     Is Patient Ambulatory and/or Out of Bed     Yes


     REE-(Mayes-St. Jeor-ambulatory/OOB) [     1404.234


      [email or web address]]                               


     Kcal/Kg value to use for calculation        35


     Approximate Energy Requirements Using       1905


      kcal/Kg                                    


     Calculation Used for Recommendations        Kcal/kg


     Additional Notes                            Protein: 1.2-1.5g/kg; 65-82g


                                                 PRO q day


                                                 Fluid: 500mL + total ouput or


                                                 per MD


 Nutrition Intervention


     Change Diet Order:                          Continue renal diet


     Add Supplement/Snack (indicate name/kcal    Nepro (Variety) BID


      /protein )                                 


     Provides kCal:                              850


     Provides Protein (gm)                       38


     Teaching Methods                            Discussion,Handout


     Response to Teaching                        Verbalize understanding


     Education Handouts Provided                 NCM - Potassium / Phosphorus


                                                 Content of Foods / CKD Stage 3


                                                 -5 Nutrition Therapy


     Barriers to Learning                        No Barriers


     RD phone number provided                    Yes


     Patient aware of follow up options          Yes


     Goal #1                                     Pt to consume >75% of


                                                 estimated energy/protein needs


                                                 through current diet order /


                                                 supplementation


     Goal #2                                     Pt to maintain current wt


                                                 status within 2.5% during LOS


                                                 with exception of fluid loss/


                                                 retention


     Goal #3                                     Pt to retain and recall 2


                                                 pieces of information from


                                                 diet education.


     Follow-Up By:                               09/23/22


     Additional Comments                         Monitor %PO intake, wt status,


                                                 nutrition-related lab values,


                                                 need for further diet


                                                 education.

## 2022-09-23 NOTE — PROGRESS NOTE
Assessment and Plan





- Patient Problems


(1) Paroxysmal atrial fibrillation


Current Visit: Yes   Status: Acute   


Plan to address problem: 


Patient has recurrent admissions for palpitations due to paroxysms of atrial 

fibrillation.  We have switched antiarrhythmic therapy from amiodarone to 

sotalol.  We will perform a twelve-lead ECG for QT interval on sotalol, 

otherwise anticipate discharge today.








Subjective


Date of service: 09/23/22


Principal diagnosis: Symptomatic atrial flutter fibrillation


Interval history: 





Patient is comfortable, looks and feels well, no acute distress.  No new cardiac

events reported.





Objective


                                   Vital Signs











  Temp Pulse Resp BP BP Pulse Ox


 


 09/23/22 11:11  98.0 F  70  18  108/62   100


 


 09/23/22 10:00   71  18    100


 


 09/23/22 07:26  98.4 F  71  18  109/67   100


 


 09/23/22 03:10  98.5 F  73  19  115/64   100


 


 09/23/22 00:53  99.1 F  71  18   99/63  96


 


 09/22/22 22:00    18    3 L


 


 09/22/22 19:12  98.4 F  70  18  124/63   100


 


 09/22/22 15:17  98.0 F  70  18  112/63   100


 


 09/22/22 13:55  98.0 F  70  18  110/61   100














- Physical Examination


General: No Apparent Distress


HEENT: Positive: PERRL


Neck: Positive: neck supple


Cardiac: Positive: Reg Rate and Rhythm


Lungs: Positive: Decreased Breath Sounds


Neuro: Positive: Grossly Intact


Abdomen: Positive: Soft


Skin: Positive: Clear


Extremities: Absent: edema

## 2022-09-24 NOTE — ELECTROCARDIOGRAPH REPORT
[facility]

                                       

Test Date:    2022               Test Time:    13:48:53

Pat Name:     LINDA GARCIA              Department:   

Patient ID:   [id number]          Room:         A465 1

Gender:       F                        Technician:   DIANA

:          1955               Requested By: AUSTIN CORONEL

Order Number: [id number]             Reading MD:   Tone Campos

                                 Measurements

Intervals                              Axis          

Rate:         70                       P:            -70

IL:           198                      QRS:          -73

QRSD:         140                      T:            241

QT:           499                                    

QTc:          539                                    

                           Interpretive Statements

A-V dual-paced rhythm with some inhibition

Compared to ECG 2022 06:54:49

No significant changes

Electronically Signed On 2022 11:46:54 EDT by Tone Campos